# Patient Record
Sex: MALE | Race: WHITE | NOT HISPANIC OR LATINO | Employment: UNEMPLOYED | URBAN - NONMETROPOLITAN AREA
[De-identification: names, ages, dates, MRNs, and addresses within clinical notes are randomized per-mention and may not be internally consistent; named-entity substitution may affect disease eponyms.]

---

## 2017-02-02 ENCOUNTER — HOSPITAL ENCOUNTER (EMERGENCY)
Facility: HOSPITAL | Age: 52
Discharge: PSYCHIATRIC OR SUBSTANCE USE FACILITY | End: 2017-02-03
Attending: EMERGENCY MEDICINE | Admitting: EMERGENCY MEDICINE

## 2017-02-02 DIAGNOSIS — F33.2 SEVERE EPISODE OF RECURRENT MAJOR DEPRESSIVE DISORDER, WITHOUT PSYCHOTIC FEATURES (HCC): Primary | ICD-10-CM

## 2017-02-02 DIAGNOSIS — R45.851 SUICIDAL THOUGHTS: ICD-10-CM

## 2017-02-02 LAB
ALBUMIN SERPL-MCNC: 4.1 G/DL (ref 3.4–4.8)
ALBUMIN/GLOB SERPL: 1.5 G/DL (ref 1.1–1.8)
ALP SERPL-CCNC: 100 U/L (ref 38–126)
ALT SERPL W P-5'-P-CCNC: 18 U/L (ref 21–72)
AMPHET+METHAMPHET UR QL: POSITIVE
ANION GAP SERPL CALCULATED.3IONS-SCNC: 8 MMOL/L (ref 5–15)
APAP SERPL-MCNC: <10 MCG/ML (ref 10–30)
AST SERPL-CCNC: 26 U/L (ref 17–59)
BARBITURATES UR QL SCN: NEGATIVE
BASOPHILS # BLD AUTO: 0.05 10*3/MM3 (ref 0–0.2)
BASOPHILS NFR BLD AUTO: 0.5 % (ref 0–2)
BENZODIAZ UR QL SCN: NEGATIVE
BILIRUB SERPL-MCNC: 0.4 MG/DL (ref 0.2–1.3)
BUN BLD-MCNC: 11 MG/DL (ref 7–21)
BUN/CREAT SERPL: 13.9 (ref 7–25)
CALCIUM SPEC-SCNC: 9.4 MG/DL (ref 8.4–10.2)
CANNABINOIDS SERPL QL: POSITIVE
CHLORIDE SERPL-SCNC: 99 MMOL/L (ref 95–110)
CO2 SERPL-SCNC: 29 MMOL/L (ref 22–31)
COCAINE UR QL: NEGATIVE
CREAT BLD-MCNC: 0.79 MG/DL (ref 0.7–1.3)
DEPRECATED RDW RBC AUTO: 43.8 FL (ref 35.1–43.9)
EOSINOPHIL # BLD AUTO: 0.33 10*3/MM3 (ref 0–0.7)
EOSINOPHIL NFR BLD AUTO: 3.5 % (ref 0–7)
ERYTHROCYTE [DISTWIDTH] IN BLOOD BY AUTOMATED COUNT: 13.5 % (ref 11.5–14.5)
ETHANOL BLD-MCNC: <10 MG/DL (ref 0–0)
ETHANOL UR QL: <0.01 %
GFR SERPL CREATININE-BSD FRML MDRD: 103 ML/MIN/1.73 (ref 56–130)
GLOBULIN UR ELPH-MCNC: 2.7 GM/DL (ref 2.3–3.5)
GLUCOSE BLD-MCNC: 89 MG/DL (ref 60–100)
HCT VFR BLD AUTO: 40.9 % (ref 39–49)
HGB BLD-MCNC: 14.2 G/DL (ref 13.7–17.3)
IMM GRANULOCYTES # BLD: 0.02 10*3/MM3 (ref 0–0.02)
IMM GRANULOCYTES NFR BLD: 0.2 % (ref 0–0.5)
LYMPHOCYTES # BLD AUTO: 3.65 10*3/MM3 (ref 0.6–4.2)
LYMPHOCYTES NFR BLD AUTO: 38.9 % (ref 10–50)
MCH RBC QN AUTO: 30.7 PG (ref 26.5–34)
MCHC RBC AUTO-ENTMCNC: 34.7 G/DL (ref 31.5–36.3)
MCV RBC AUTO: 88.3 FL (ref 80–98)
METHADONE UR QL SCN: NEGATIVE
MONOCYTES # BLD AUTO: 0.39 10*3/MM3 (ref 0–0.9)
MONOCYTES NFR BLD AUTO: 4.2 % (ref 0–12)
NEUTROPHILS # BLD AUTO: 4.95 10*3/MM3 (ref 2–8.6)
NEUTROPHILS NFR BLD AUTO: 52.7 % (ref 37–80)
OPIATES UR QL: NEGATIVE
OXYCODONE UR QL SCN: NEGATIVE
PLATELET # BLD AUTO: 320 10*3/MM3 (ref 150–450)
PMV BLD AUTO: 10.4 FL (ref 8–12)
POTASSIUM BLD-SCNC: 4.1 MMOL/L (ref 3.5–5.1)
PROT SERPL-MCNC: 6.8 G/DL (ref 6.3–8.6)
RBC # BLD AUTO: 4.63 10*6/MM3 (ref 4.37–5.74)
SALICYLATES SERPL-MCNC: <1 MG/DL (ref 10–20)
SODIUM BLD-SCNC: 136 MMOL/L (ref 137–145)
WBC NRBC COR # BLD: 9.39 10*3/MM3 (ref 3.2–9.8)

## 2017-02-02 RX ORDER — ACETAMINOPHEN 500 MG
1000 TABLET ORAL ONCE
Status: COMPLETED | OUTPATIENT
Start: 2017-02-02 | End: 2017-02-02

## 2017-02-02 RX ADMIN — ACETAMINOPHEN 1000 MG: 500 TABLET ORAL at 23:51

## 2017-02-03 ENCOUNTER — HOSPITAL ENCOUNTER (INPATIENT)
Facility: HOSPITAL | Age: 52
LOS: 5 days | Discharge: HOME | End: 2017-02-08
Attending: PSYCHIATRY & NEUROLOGY | Admitting: PSYCHIATRY & NEUROLOGY

## 2017-02-03 VITALS
HEIGHT: 72 IN | HEART RATE: 102 BPM | RESPIRATION RATE: 18 BRPM | SYSTOLIC BLOOD PRESSURE: 119 MMHG | DIASTOLIC BLOOD PRESSURE: 69 MMHG | OXYGEN SATURATION: 96 % | TEMPERATURE: 98.4 F | WEIGHT: 165 LBS | BODY MASS INDEX: 22.35 KG/M2

## 2017-02-03 PROBLEM — F12.10 CANNABIS ABUSE: Status: ACTIVE | Noted: 2017-02-03

## 2017-02-03 PROBLEM — F15.20 METHAMPHETAMINE USE DISORDER, MODERATE: Status: ACTIVE | Noted: 2017-02-03

## 2017-02-03 PROBLEM — F33.2 SEVERE EPISODE OF RECURRENT MAJOR DEPRESSIVE DISORDER, WITHOUT PSYCHOTIC FEATURES: Status: ACTIVE | Noted: 2017-02-03

## 2017-02-03 PROBLEM — R45.851 SUICIDAL IDEATIONS: Status: ACTIVE | Noted: 2017-02-03

## 2017-02-03 LAB
HOLD SPECIMEN: NORMAL
HOLD SPECIMEN: NORMAL
WHOLE BLOOD HOLD SPECIMEN: NORMAL
WHOLE BLOOD HOLD SPECIMEN: NORMAL

## 2017-02-03 PROCEDURE — 90791 PSYCH DIAGNOSTIC EVALUATION: CPT | Performed by: PSYCHIATRY & NEUROLOGY

## 2017-02-03 PROCEDURE — 99222 1ST HOSP IP/OBS MODERATE 55: CPT | Performed by: FAMILY MEDICINE

## 2017-02-03 RX ORDER — ACETAMINOPHEN 325 MG/1
650 TABLET ORAL EVERY 4 HOURS PRN
Status: DISCONTINUED | OUTPATIENT
Start: 2017-02-03 | End: 2017-02-08 | Stop reason: HOSPADM

## 2017-02-03 RX ORDER — PAROXETINE HYDROCHLORIDE 20 MG/1
20 TABLET, FILM COATED ORAL DAILY
Status: COMPLETED | OUTPATIENT
Start: 2017-02-03 | End: 2017-02-03

## 2017-02-03 RX ORDER — PAROXETINE 10 MG/1
10 TABLET, FILM COATED ORAL
COMMUNITY
End: 2017-02-08 | Stop reason: HOSPADM

## 2017-02-03 RX ORDER — HYDROXYZINE PAMOATE 50 MG/1
50 CAPSULE ORAL EVERY 6 HOURS PRN
Status: DISCONTINUED | OUTPATIENT
Start: 2017-02-03 | End: 2017-02-08 | Stop reason: HOSPADM

## 2017-02-03 RX ORDER — TRAZODONE HYDROCHLORIDE 50 MG/1
50 TABLET ORAL NIGHTLY PRN
Status: DISCONTINUED | OUTPATIENT
Start: 2017-02-03 | End: 2017-02-08 | Stop reason: HOSPADM

## 2017-02-03 RX ORDER — MAGNESIUM HYDROXIDE/ALUMINUM HYDROXICE/SIMETHICONE 120; 1200; 1200 MG/30ML; MG/30ML; MG/30ML
30 SUSPENSION ORAL EVERY 6 HOURS PRN
Status: DISCONTINUED | OUTPATIENT
Start: 2017-02-03 | End: 2017-02-08 | Stop reason: HOSPADM

## 2017-02-03 RX ORDER — DOCUSATE SODIUM 100 MG/1
100 CAPSULE, LIQUID FILLED ORAL 2 TIMES DAILY PRN
Status: DISCONTINUED | OUTPATIENT
Start: 2017-02-03 | End: 2017-02-08 | Stop reason: HOSPADM

## 2017-02-03 RX ORDER — CLONIDINE HYDROCHLORIDE 0.1 MG/1
0.1 TABLET ORAL EVERY 4 HOURS PRN
Status: DISCONTINUED | OUTPATIENT
Start: 2017-02-03 | End: 2017-02-08 | Stop reason: HOSPADM

## 2017-02-03 RX ORDER — LOPERAMIDE HYDROCHLORIDE 2 MG/1
2 CAPSULE ORAL 4 TIMES DAILY PRN
Status: DISCONTINUED | OUTPATIENT
Start: 2017-02-03 | End: 2017-02-08 | Stop reason: HOSPADM

## 2017-02-03 RX ORDER — QUETIAPINE FUMARATE 25 MG/1
50 TABLET, FILM COATED ORAL NIGHTLY
Status: DISCONTINUED | OUTPATIENT
Start: 2017-02-03 | End: 2017-02-04

## 2017-02-03 RX ADMIN — TRAZODONE HYDROCHLORIDE 50 MG: 50 TABLET ORAL at 20:23

## 2017-02-03 RX ADMIN — ACETAMINOPHEN 650 MG: 325 TABLET ORAL at 13:33

## 2017-02-03 RX ADMIN — QUETIAPINE FUMARATE 50 MG: 25 TABLET, FILM COATED ORAL at 20:23

## 2017-02-03 RX ADMIN — PAROXETINE HYDROCHLORIDE 20 MG: 20 TABLET, FILM COATED ORAL at 16:44

## 2017-02-03 NOTE — CONSULTS
"Adult Nutrition  Assessment    Patient Name:  Honorio Vigil Jr.  YOB: 1965  MRN: [id number]  Admit Date:  2/3/2017    Assessment Date:  2/3/2017          Reason for Assessment       02/03/17 1455    Reason for Assessment    Identified At Risk By Screening Criteria MST SCORE 2+                Anthropometrics       02/03/17 1457    Anthropometrics    Height 182.9 cm (72.01\")    Weight 74.8 kg (164 lb 14.5 oz)    Ideal Body Weight (IBW)    Ideal Body Weight (IBW), Male (kg) 82.09    % Ideal Body Weight 91.31    Body Mass Index (BMI)    BMI (kg/m2) 22.41            Labs/Tests/Procedures/Meds       02/03/17 1458    Labs/Tests/Procedures/Meds    Labs/Tests Review Na+;Glucose    Medication Review Reviewed, pertinent              Estimated/Assessed Needs       02/03/17 1459    Calculation Measurements    Weight Used For Calculations 74.8 kg (164 lb 14.5 oz)    Height Used for Calculations 1.829 m (6' 0.01\")    Estimated/Assessed Energy Needs    Energy Need Method Harman-St Jeor    Age 51    RMR (Harman-St. Jeor Equation) 1641.13    Activity Factors (Harman St Jeor)  Out of bed, ambulatory  1.3    Total estimated needs (Harman St. Jeor) 2133    Estimated/Assessed Protein Needs    Weight Used for Protein Calculation 74.8 kg (164 lb 14.5 oz)    Protein (gm/kg) 1.0    1.0 Gm Protein (gm) 74.8    Estimated/Assessed Fluid Needs    Fluid Need Method --   2244            Nutrition Prescription Ordered       02/03/17 1502    Nutrition Prescription PO    Current PO Diet Regular            Evaluation of Received Nutrient/Fluid Intake       02/03/17 1502    PO Evaluation    Number of Meals 2    % PO Intake 100% - 2x            Comments:  Pt. Indicates his appetite varies.  Reports weighing 210 lb 1 year ago indicating wt loss related to work, taking drugs.  Indicates his wt has been stable the past 3 mo.  Intake 100%.  Meds and labs reviewed.  Willing to receive MIghty Shakes.        Electronically signed " by:  Rayne Blackburn, NEVAEH  02/03/17 3:03 PM

## 2017-02-03 NOTE — NURSING NOTE
Dr Marin ROS         General  Recent weight change and headaches    Eyes   None     ENT/Mouth   None    Cardio   None    Resp   None    GI    None       None    MS    Back pain    Skin/Hair/Nails   None    Neuro   None

## 2017-02-03 NOTE — PLAN OF CARE
"Problem: BH Patient Care Overview (Adult)  Goal: Plan of Care Review  Outcome: Ongoing (interventions implemented as appropriate)    02/03/17 0925   Coping/Psychosocial Response Interventions   Plan Of Care Reviewed With patient   Coping/Psychosocial   Patient Agreement with Plan of Care agrees with comment (describe)   Outcome Evaluation   Outcome Summary/Follow up Plan Does not see substance abuse as a problem   Patient Care Overview   Progress no change       Goal: Interdisciplinary Rounds/Family Conference  Outcome: Ongoing (interventions implemented as appropriate)    02/03/17 0925   Interdisciplinary Rounds/Family Conf   Participants advanced practice nurse;;psychiatrist;social work;therapeutic recreation;nursing;other (see comments)       Goal: Individualization and Mutuality  Outcome: Ongoing (interventions implemented as appropriate)    02/03/17 0327   Behavioral Health Screens   Patient Personal Strengths resilient   Patient Personal Strengths Comment \" I don't know\"   Patient Vulnerabilities my granddaughter   Mutuality/Individual Preferences   What Anxieties, Fears or Concerns Do You Have About Your Health or Care? \" i don't know\"   What Questions Do You Have About Your Health or Care? \"none\"   What Information Would Help Us Give You More Personalized Care? \"none\"       Goal: Discharge Needs Assessment  Outcome: Ongoing (interventions implemented as appropriate)    02/03/17 0925   Discharge Needs Assessment   Concerns To Be Addressed substance/tobacco abuse/use concerns;mental health concerns;grief and loss concerns;financial/insurance concerns   Readmission Within The Last 30 Days no previous admission in last 30 days   Current Discharge Risk lack of support system/caregiver;other (see comments)  (Going thru a divorce)   Discharge Needs Assessment   Outpatient/Agency/Support Group Needs 12 step program (specify);outpatient substance abuse treatment (specify)   Anticipated Discharge " Disposition home or self-care   Living Environment   Transportation Available family or friend will provide

## 2017-02-03 NOTE — NURSING NOTE
Behavior   Anxiety 4  Depression 5  Pain 3  AVH no  S/I no  H/I no    Pt has been up out of his room for most of the day, pt attended group with moderate amount of participation. Pt takes meds well. Pt denies any needs/concerns at this time.        Intervention  Instructed in med usage and effects, encouraged to verbalize needs. Meds administered as ordered.          Response  Verbalized understanding           Plan  Continue to monitor for safety/  every 15 minute monitoring checks.

## 2017-02-03 NOTE — ED PROVIDER NOTES
Subjective   HPI Comments: 51 years old with major depression is brought in by police after he was threatening with suicide comments. He is having issues with the his brother whose presence makes him very agitated. He is having suicidal thought with ideas of hopelessness/helplessness.  He is on paxil and vistaril which doesnot seems very helpful.    Patient is a 51 y.o. male presenting with mental health disorder.   History provided by:  Patient  Mental Health Problem   Presenting symptoms: aggressive behavior, agitation, depression and suicidal thoughts    Degree of incapacity (severity):  Severe  Onset quality:  Gradual  Timing:  Intermittent  Progression:  Worsening  Chronicity:  Recurrent  Context: drug abuse and stressful life event    Treatment compliance:  Most of the time  Time since last psychoactive medication taken:  1 day  Relieved by:  Nothing  Worsened by:  Family interactions  Ineffective treatments:  Antidepressants and anti-anxiety medications  Associated symptoms: anxiety, fatigue, feelings of worthlessness, insomnia and weight change    Associated symptoms: no abdominal pain, no chest pain, no euphoric mood, no headaches and no irritability    Fatigue:     Severity:  Moderate    Timing:  Intermittent    Progression:  Worsening      Review of Systems   Constitutional: Positive for fatigue. Negative for activity change, chills, fever and irritability.   HENT: Negative for congestion, facial swelling, rhinorrhea and sinus pressure.    Eyes: Negative for photophobia and visual disturbance.   Respiratory: Negative for cough, chest tightness, shortness of breath and wheezing.    Cardiovascular: Negative for chest pain.   Gastrointestinal: Negative for abdominal distention, abdominal pain, diarrhea, nausea and vomiting.   Endocrine: Negative for polyuria.   Genitourinary: Negative for flank pain.   Musculoskeletal: Negative for back pain, joint swelling and neck pain.   Skin: Negative for rash.    Neurological: Negative for seizures, numbness and headaches.   Hematological: Negative for adenopathy.   Psychiatric/Behavioral: Positive for agitation, behavioral problems, decreased concentration and suicidal ideas. The patient is nervous/anxious and has insomnia.        History reviewed. No pertinent past medical history.    No Known Allergies    History reviewed. No pertinent past surgical history.    History reviewed. No pertinent family history.    Social History     Social History   • Marital status:      Spouse name: N/A   • Number of children: N/A   • Years of education: N/A     Social History Main Topics   • Smoking status: Current Every Day Smoker     Packs/day: 1.00   • Smokeless tobacco: None   • Alcohol use No   • Drug use: 1.00 per week     Special: Marijuana   • Sexual activity: Not Asked     Other Topics Concern   • None     Social History Narrative           Objective   Physical Exam   Constitutional: He is oriented to person, place, and time. He appears well-developed and well-nourished. No distress.   HENT:   Head: Normocephalic and atraumatic.   Eyes: Conjunctivae and EOM are normal. Pupils are equal, round, and reactive to light.   Neck: Normal range of motion. Neck supple.   Cardiovascular: Normal rate, regular rhythm and normal heart sounds.    Pulmonary/Chest: Effort normal and breath sounds normal. No respiratory distress. He has no wheezes.   Abdominal: Soft. Bowel sounds are normal. He exhibits no distension. There is no tenderness. There is no rebound and no guarding.   Musculoskeletal: Normal range of motion. He exhibits no edema or deformity.   Neurological: He is alert and oriented to person, place, and time.   Skin: Skin is warm and dry. No rash noted.   Psychiatric: His mood appears anxious. He is withdrawn. He expresses inappropriate judgment. He exhibits a depressed mood. He expresses suicidal ideation.   Nursing note and vitals reviewed.      Procedures         ED  Course  ED Course   Comment By Time   Medically all the workup is grossly negative except for UDS. [facility] has talked to the patient and is being admitted . Angel Falcon MD 02/03 0113                Labs Reviewed   COMPREHENSIVE METABOLIC PANEL - Abnormal; Notable for the following:        Result Value    Sodium 136 (*)     ALT (SGPT) 18 (*)     All other components within normal limits   ACETAMINOPHEN LEVEL - Abnormal; Notable for the following:     Acetaminophen <10.0 (*)     All other components within normal limits   ETHANOL - Abnormal; Notable for the following:     Ethanol <10 (*)     All other components within normal limits   SALICYLATE LEVEL - Abnormal; Notable for the following:     Salicylate <1.0 (*)     All other components within normal limits   URINE DRUG SCREEN - Abnormal; Notable for the following:     Amphetamine Screen, Urine Positive (*)     THC, Screen, Urine Positive (*)     All other components within normal limits    Narrative:     Negative Thresholds For Drugs Screened in Urine:     Amphetamines          500 ng/ml  Barbiturates          200 ng/ml  Benzodiazepines       200 ng/ml  Cocaine               150 ng/ml  Methadone             300 ng/mL  Opiates               300 ng/mL  Oxycodone             100 ng/mL  THC                   20 ng/mL    The normal value for all drugs tested is negative. This report includes final unconfirmed screening results.  A positive result by this assay can be, at your request, sent to the Reference Lab for confirmation by gas chromatography. Unconfirmed results must not be used for non-medical purposes, such as employment or legal testing. Clinical consideration should be applied to any drug of abuse test result, particularly when unconfirmed results are used.   CBC WITH AUTO DIFFERENTIAL - Normal   RAINBOW DRAW    Narrative:     The following orders were created for panel order [place] Draw.  Procedure                               Abnormality         Status                      ---------                               -----------         ------                     Light Blue Top[[id number]]                                    In process                 Green Top (Gel)[[id number]]                                   In process                 Lavender Top[[id number]]                                      In process                 Gold Top - SST[[id number]]                                    In process                   Please view results for these tests on the individual orders.   CBC AND DIFFERENTIAL    Narrative:     The following orders were created for panel order CBC & Differential.  Procedure                               Abnormality         Status                     ---------                               -----------         ------                     CBC Auto Differential[[id number]]         Normal              Final result                 Please view results for these tests on the individual orders.   LIGHT BLUE TOP   GREEN TOP   LAVENDER TOP   GOLD TOP - SST       No results found.        MDM  Number of Diagnoses or Management Options  Severe episode of recurrent major depressive disorder, without psychotic features:   Suicidal thoughts:      Amount and/or Complexity of Data Reviewed  Clinical lab tests: ordered and reviewed    Patient Progress  Patient progress: stable      Final diagnoses:   Severe episode of recurrent major depressive disorder, without psychotic features   Suicidal thoughts            Angel Falcon MD  02/03/17 0120

## 2017-02-03 NOTE — PLAN OF CARE
Problem: BH Patient Care Overview (Adult)  Goal: Plan of Care Review  Outcome: Ongoing (interventions implemented as appropriate)  Goal: Interdisciplinary Rounds/Family Conference  Outcome: Ongoing (interventions implemented as appropriate)  Goal: Individualization and Mutuality  Outcome: Ongoing (interventions implemented as appropriate)  Goal: Discharge Needs Assessment  Outcome: Ongoing (interventions implemented as appropriate)    Problem:  Overarching Goals  Goal: Adheres to Safety Considerations for Self and Others  Outcome: Ongoing (interventions implemented as appropriate)  Goal: Optimized Coping Skills in Response to Life Stressors  Outcome: Ongoing (interventions implemented as appropriate)  Goal: Develops/Participates in Therapeutic [place] to Support Successful Transition  Outcome: Ongoing (interventions implemented as appropriate)

## 2017-02-03 NOTE — H&P
2/3/2017    Chief Complaint: suicidal ideation    HPI:    Patient is a 51 y.o. male who presents with suicidal ideation. Onset of symptoms was abrupt starting a few months ago.  Agitation and anger has been present on a intemittent basis.  He notes he has had passive SI intermittently for the last few months. Symptoms are associated with anxiety, depressed mood, irritability and substance use.  Symptoms are aggravated by problems related to support from wife and problems with substance abuse.  Patients symptoms severity is severe.   Patient reports that level of hopefulness is 0/10.  Patient's symptoms occur in the context of In Sept he found out dad had terminal colon cancer.  He found out from doc before his father did.  He notes he lost his job over it b/c of missing too many days.  He did not have FMLA.  His wife filed for divorce around that time.  He notes since then he has had an anger problem.  He notes having more trouble with emotion control and will have outbursts of tears.  He is having an issue with his younger brother since dad's cancer and he is not able to go over there.  He seems to think brother has intentions for dad's property.  He could not find his cellphone and he got upset with wife b/c he thought she was hiding it from him.  He got upset and destroyed several things.  She called the police but they did not do anything.  The next morning he could not get back to into house because it was locked.  He got upset and kicked the door down.  She called the police.  He was never arrested but he had EPO but she took it off.  She filed divorce the next day.  They are still living together b/c he had nowhere to go.    Psychiatric Review Of Systems:  anhedonia, anxiety, depression, excessive irritability, suicidal ideations and unstable mood    History  Past psychiatric history:    Psychiatric Hospitalizations: Patient has had no prior hospitalizations.    Suicide Attempts: Patient has had no prior  suicide attempts.    Prior Treatment and Medications Tried: Paxil started in December but not particularly helpful.  Vistaril did not help.    History of violence or legal issues: The patient has had drug or alcohold related charges including couple of DUIs. The patient has a history of violence.  See HPI for details.    Social History     Social History   • Marital status:      Spouse name: N/A   • Number of children: N/A   • Years of education: N/A     Occupational History   • Not on file.     Social History Main Topics   • Smoking status: Current Every Day Smoker     Packs/day: 1.00   • Smokeless tobacco: Not on file   • Alcohol use No   • Drug use: 1.00 per week     Special: Marijuana, Methamphetamines   • Sexual activity: Not on file     Other Topics Concern   • Not on file     Social History Narrative       Substance Abuse: Alcohol: none currently but had issues in the past and had DUIs,  Cannabis: 2-3 times per month, Methamphetamine: 2 years daily use in the 90's.  Recently intermittent use with periods of daily use for periods of weeks., Opiate: quit 4-5 months ago but daily prior to that., Cocaine: does not use, Synthetic: does not use and IV drug use: denies  Abuse/Trauma: History of physical abuse: no and History of sexual abuse: no  Education: high school diploma/GED   Occupation: , individual, not currently working, last worked in Sept.  Worked at [employer] for 1 year.  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  Living Situation: spouse and 18yo son    Family History   Problem Relation Age of Onset   • Anxiety disorder Mother    • Depression Mother        Further details: oldest daughter has depression and BPAD; dad: ETOH; brother: IV drugs; suicide: possibly mom has tried    Past Medical and Surgical History:    Past Medical History   Diagnosis Date   • Substance abuse      History reviewed. No pertinent past surgical history.  Allergies:  Review of patient's allergies indicates no known  "allergies.  Prescriptions Prior to Admission   Medication Sig Dispense Refill Last Dose   • PARoxetine (PAXIL) 10 MG tablet Take 10 mg by mouth Daily With Dinner.   2/2/2017 at 2300       Medical Review Of Systems:  Reviewed review of systems from  Dr. Marin consult note from today.    Objective     Vital Signs    Temp:  [98.1 °F (36.7 °C)-98.8 °F (37.1 °C)] 98.8 °F (37.1 °C)  Heart Rate:  [] 88  Resp:  [18-20] 18  BP: (109-136)/(69-84) 122/75    Physical Exam:   General Appearance: alert, appears stated age and cooperative,  Hygiene:   good  Gait & Station: Normal  Musculoskeletal: No tremors or abnormal involuntary movements    Mental Status Exam:   Cooperation:  Cooperative  Eye Contact:  at times downcast  Psychomotor Behavior:  Restless  Mood: Sad/Depressed  Affect:  labile  Speech:  Rapid  Thought Process:  Coherent and [place]  Associations: Circumstantial and Tangential  Thought Content:     Mood congurent   Suicidal:  Suicidal Ideation   Homicidal:  None   Hallucinations:  None   Delusion:  None  Cognitive Functioning:   Consciousness: awake and alert   Orientation:  Person, Place, Time and Situation   Attention: normal Concentration: World Backwards: \"dlrow\"   Language:  Intact Vocabulary: Average   Short Term Memory: Intact   Long Term Memory: Intact   Fund of Knowledge: Below Average  Reliability:  fair  Insight:  Poor  Judgement:  Poor  Impulse Control:  Impaired    Diagnostic Data:    Labs Reviewed   COMPREHENSIVE METABOLIC PANEL - Abnormal; Notable for the following:    Result Value      Sodium 136 (*)       ALT (SGPT) 18 (*)       All other components within normal limits   ACETAMINOPHEN LEVEL - Abnormal; Notable for the following:      Acetaminophen <10.0 (*)       All other components within normal limits   ETHANOL - Abnormal; Notable for the following:      Ethanol <10 (*)       All other components within normal limits   SALICYLATE LEVEL - Abnormal; Notable for the following:      " Salicylate <1.0 (*)       All other components within normal limits   URINE DRUG SCREEN - Abnormal; Notable for the following:      Amphetamine Screen, Urine Positive (*)       THC, Screen, Urine Positive (*)       All other components within normal limits       Imaging Results (last 72 hours)     ** No results found for the last 72 hours. **          Patient Strengths: capable of independent living, communication skills, good physical health     Patient Barriers: marital/family conflict, resistance to treatment    Assessment/Plan     Active Problems:    Suicidal ideations    Severe episode of recurrent major depressive disorder, without psychotic features    Cannabis abuse    Methamphetamine use disorder, moderate      Treatment Plan:    1) Will admit patient to the behavioral health unit at [facility] to ensure patient safety.  2) Patient will be provided treatment with the unit milieu, activities, therapies and psychopharmacological management.  3) Patient placed on  Q15 minute checks  and Suicide precautions.  4) Dr. Marin consulted for management of medical co-morbidities.  5) Will order following labs: none  6) Will restart patient on the following psychiatric home meds: paxil but increase to 30mg daily.  7) Will make the following medication changes: Start seroquel 50mg qhs.  Will increase to 100mg if not too sedating.  8) Will begin discharge planning as appropriate for patient.  Suggested rehab but he declines.  Will cont to discuss rehab.  9) Psychotherapy provided for less than 16 minutes.    Treatment plan and medication risks and benefits discussed with: Patient      Estimated Length of Stay: 3-5 days  Prognosis: angel Begum MD  02/03/17  12:04 PM

## 2017-02-03 NOTE — CONSULTS
CHIEF COMPLAINT/REASON FOR VISIT:  Suicidal Ideation    HPI:  Patient presented to our ED with the above complaint on February 2 at 11:13 PM.  He reported that his wife gave him 1 day to get out of the house in the process of a divorce.  He meant to call the crisis line and actually got the suicide line.  Eventually the police were involved and brought him to the emergency room.  The suicidal ideation was not consistent but he did tell someone that if he had nowhere to live he would rather be dead.    PROBLEM LIST:  Patient Active Problem List    Diagnosis   • Suicidal ideations [R45.851]         CURRENT MEDICATIONS:  Prescriptions Prior to Admission   Medication Sig Dispense Refill Last Dose   • PARoxetine (PAXIL) 10 MG tablet Take 10 mg by mouth Daily With Dinner.   2/2/2017 at 2300       ALLERGIES:  Review of patient's allergies indicates no known allergies.      PAST MEDICAL/SURGICAL HISTORY:  Past Medical History   Diagnosis Date   • Substance abuse        History reviewed. No pertinent past surgical history.    Review of Systems   Constitutional: Negative for activity change, appetite change, fatigue and fever.   HENT: Negative for congestion, ear discharge, ear pain, facial swelling, hearing loss, nosebleeds, postnasal drip, rhinorrhea, sinus pressure, sore throat, tinnitus and trouble swallowing.    Eyes: Negative for pain, discharge and visual disturbance.   Respiratory: Negative for cough, shortness of breath and wheezing.    Cardiovascular: Negative for chest pain, palpitations and leg swelling.   Gastrointestinal: Negative for abdominal pain, blood in stool, constipation, diarrhea, nausea and vomiting.   Genitourinary: Negative for difficulty urinating, discharge, dysuria, flank pain, frequency, hematuria, penile pain, penile swelling, scrotal swelling, testicular pain and urgency.   Musculoskeletal: Positive for back pain. Negative for arthralgias, joint swelling, myalgias and neck pain.   Skin:  "Negative for rash and wound.   Neurological: Positive for headaches. Negative for dizziness, seizures, syncope, weakness and light-headedness.   Hematological: Negative for adenopathy.   Psychiatric/Behavioral: Positive for agitation, dysphoric mood, sleep disturbance and suicidal ideas.       Social History     Social History   • Marital status:      Spouse name: N/A   • Number of children: N/A   • Years of education: N/A     Occupational History   • Not on file.     Social History Main Topics   • Smoking status: Current Every Day Smoker     Packs/day: 1.00   • Smokeless tobacco: Not on file   • Alcohol use No   • Drug use: 1.00 per week     Special: Marijuana, Methamphetamines   • Sexual activity: Not on file     Other Topics Concern   • Not on file     Social History Narrative       Family History   Problem Relation Age of Onset   • Anxiety disorder Mother    • Depression Mother              Objective     Visit Vitals   • /75 (BP Location: Right arm, Patient Position: Lying)   • Pulse 88   • Temp 98.8 °F (37.1 °C) (Oral)   • Resp 18   • Ht 72\" (182.9 cm)   • Wt 165 lb (74.8 kg)   • SpO2 98%   • BMI 22.38 kg/m2       Physical Exam   Constitutional: He appears well-developed and well-nourished.   HENT:   Head: Normocephalic and atraumatic.   Eyes: Conjunctivae and EOM are normal.   Neck: Normal range of motion. Neck supple. No thyromegaly present.   Cardiovascular: Normal rate, regular rhythm and normal heart sounds.  Exam reveals no gallop and no friction rub.    No murmur heard.  Pulmonary/Chest: Effort normal and breath sounds normal. No respiratory distress. He has no wheezes. He has no rales.   Abdominal: Soft. He exhibits no distension and no mass. There is no tenderness. There is no rebound and no guarding.   Musculoskeletal: Normal range of motion.   Lymphadenopathy:     He has no cervical adenopathy.   Neurological: He is alert. He has normal strength and normal reflexes. He displays no tremor " and normal reflexes. No cranial nerve deficit or sensory deficit. He exhibits normal muscle tone. Coordination normal. He displays no Babinski's sign on the right side. He displays no Babinski's sign on the left side.   Reflex Scores:       Tricep reflexes are 2+ on the right side and 2+ on the left side.       Bicep reflexes are 2+ on the right side and 2+ on the left side.       Brachioradialis reflexes are 2+ on the right side and 2+ on the left side.       Patellar reflexes are 2+ on the right side and 2+ on the left side.       Achilles reflexes are 2+ on the right side and 2+ on the left side.  Skin: Skin is warm and dry. No rash noted. No erythema.   Nursing note and vitals reviewed.      Dystonia/Tardive Dyskinesia  Absent  Meningeal Signs  Absent    Diagnostic Studies  CBC, CMP,UDS, acetaminophen level, salicylate level, ethanol level, all normal except  COMPREHENSIVE METABOLIC PANEL - Abnormal; Notable for the following:    Result Value      Sodium 136 (*)       ALT (SGPT) 18 (*)       All other components within normal limits   ACETAMINOPHEN LEVEL - Abnormal; Notable for the following:      Acetaminophen <10.0 (*)       All other components within normal limits   ETHANOL - Abnormal; Notable for the following:      Ethanol <10 (*)       All other components within normal limits   SALICYLATE LEVEL - Abnormal; Notable for the following:      Salicylate <1.0 (*)       All other components within normal limits   URINE DRUG SCREEN - Abnormal; Notable for the following:      Amphetamine Screen, Urine Positive (*)       THC, Screen, Urine Positive (*)       All other components within normal limits       Assessment/Plan     Patient Active Problem List    Diagnosis   • Suicidal ideations [R47.639]     depression      Continue Home Meds as ordered. Mental health and pain issues managed per psychiatry.  Further diagnostic studies or intervention based on hospital course.

## 2017-02-03 NOTE — NURSING NOTE
Behavior   Anxiety 5  Depression 7  Pain 0  AVH no  S/I no  H/I no    Pt resting in his room, pt has flat affect and requires prompting by staff for communication, pt has poor eye contact. Pt states his anxiety and depression are related to his home situation and not knowing what is going to happen.          Intervention  Instructed in med usage and effects, encouraged to verbalize needs. Meds administered as ordered.          Response  Verbalized understanding           Plan  Continue to monitor for safety/  every 15 minute monitoring checks.

## 2017-02-03 NOTE — SIGNIFICANT NOTE
"   02/03/17 0846   Individual Counseling   Time Session Began 800   Time Session Ended 830   Total Time (minutes) 30   Topic Initial Assessment   Session Detail CSW met with pt 1:1 and completed psychosocial assessment and BPRS   Patient Response Pt presented with anxious/irritable mood, affect was congruent. Pt was somewhat labile. Pt was easily distracted, often jumping from topic to topic. Pt stated \"I called the hotline because my anger has just been out of control.\" Pt notes that in September he found out his father had colon cancer, his wife filed for divorce, and he lost his job all within a month. Pt stated that following these events, his anger increased and he became more unstable. Pt stated that his wife eventually filed an EPO against him, as he was \"destroying everything in the house.\" Pt notes that his substance use also increased after these precipitating events. PT stated that he began using THC and meth weekly. Pt also notes using THC to \"help with my headaches.\" CSW voiced concern re: substance use and provided feedback linking use to health related issues. CSW advised pt to abstain. Pt stated he had been to rehab in the \"early 90s\" and was \"mostly sober until this happened.\" Pt does report having \"a couple\" of DUIs in the past, denies other legal charges. Pt denies any hx of abuse or trauma. Pt notes that he has not had any prior psych admissions. PT reports that he has had no prior suicide attempts, however, stated that last night \"I wasnt far from thinking about it.\" Pt appears to have poor impulse control and limited judgement. Pt is agreeable to tx, goal is \"to be able to control my anger.\" Plan is for pt to participate in individual and group tx, remain med compliant. Tx team to meet and develop tx plan. CSW to follow up accordingly.      "

## 2017-02-03 NOTE — PLAN OF CARE
Problem: BH Patient Care Overview (Adult)  Goal: Plan of Care Review  Outcome: Ongoing (interventions implemented as appropriate)  Goal: Individualization and Mutuality  Outcome: Ongoing (interventions implemented as appropriate)  Goal: Discharge Needs Assessment  Outcome: Ongoing (interventions implemented as appropriate)    Problem: BH Overarching Goals  Goal: Adheres to Safety Considerations for Self and Others  Outcome: Ongoing (interventions implemented as appropriate)  Goal: Optimized Coping Skills in Response to Life Stressors  Outcome: Ongoing (interventions implemented as appropriate)  Goal: Develops/Participates in Therapeutic [place] to Support Successful Transition  Outcome: Ongoing (interventions implemented as appropriate)    Problem: Depression  Goal: Treatment Goal: Demonstrate behavioral control of depressive symptoms, verbalize feelings of improved mood/affect, and adopt new coping skills prior to discharge  Outcome: Ongoing (interventions implemented as appropriate)  Goal: Verbalize thoughts and feelings associated with:  Outcome: Ongoing (interventions implemented as appropriate)  Goal: Refrain from harming self  Outcome: Ongoing (interventions implemented as appropriate)  Goal: Refrain from isolation  Outcome: Ongoing (interventions implemented as appropriate)  Goal: Refrain from self-neglect  Outcome: Ongoing (interventions implemented as appropriate)  Goal: Attend and participate in unit activities, including therapeutic, recreational, and educational groups  Outcome: Ongoing (interventions implemented as appropriate)  Goal: Complete daily ADLs, including personal hygiene independently, as able  Outcome: Ongoing (interventions implemented as appropriate)

## 2017-02-03 NOTE — NURSING NOTE
Pt. Escorted to [facility] room 664. Oriented to room & unit. Consents signed. Pt. Noted with good eye contact, calm, cooperative. Relates he had called hotline due to crisses not suicide. Reveals his wife is  him and had told him he would need to be out by 2/3/17 & pt. Would be homeless. Confirms recent meth use of 2 days ago & did so to escape his problems. At this time denies SI/HI. Has not thought of SI since 5 days ago. Pt. Has granddaughter that he watches some and she is his reason to live. Anxiety at a 2, depression at a 10. Denies hallucinations & delusions.

## 2017-02-04 PROCEDURE — 99232 SBSQ HOSP IP/OBS MODERATE 35: CPT | Performed by: NURSE PRACTITIONER

## 2017-02-04 RX ORDER — NICOTINE 21 MG/24HR
1 PATCH, TRANSDERMAL 24 HOURS TRANSDERMAL EVERY 24 HOURS
Status: DISCONTINUED | OUTPATIENT
Start: 2017-02-04 | End: 2017-02-08 | Stop reason: HOSPADM

## 2017-02-04 RX ORDER — QUETIAPINE FUMARATE 100 MG/1
100 TABLET, FILM COATED ORAL NIGHTLY
Status: DISCONTINUED | OUTPATIENT
Start: 2017-02-04 | End: 2017-02-05

## 2017-02-04 RX ADMIN — QUETIAPINE FUMARATE 100 MG: 100 TABLET, FILM COATED ORAL at 20:26

## 2017-02-04 RX ADMIN — NICOTINE 1 PATCH: 21 PATCH TRANSDERMAL at 13:03

## 2017-02-04 RX ADMIN — ACETAMINOPHEN 650 MG: 325 TABLET ORAL at 15:50

## 2017-02-04 RX ADMIN — TRAZODONE HYDROCHLORIDE 50 MG: 50 TABLET ORAL at 20:26

## 2017-02-04 RX ADMIN — PAROXETINE 30 MG: 10 TABLET, FILM COATED ORAL at 08:09

## 2017-02-04 RX ADMIN — ACETAMINOPHEN 650 MG: 325 TABLET ORAL at 20:26

## 2017-02-04 NOTE — NURSING NOTE
"Behavior   Anxiety 4  Depression 5  Pain 4  AVH no  S/I no  H/I no    Pt resting in bed this morning.  Pt anxious, cooperative.  Pt compliant with medications.  Pt stated that he slept \"ok\" last night.        Intervention  Meds administered as ordered.  Pt encouraged to attend groups, follow treatment plan, express any concerns that he may have.          Response  Verbalized understanding           Plan  Continue to monitor for safety/  every 15 minute monitoring checks.  "

## 2017-02-04 NOTE — PLAN OF CARE
Problem: Depression  Goal: Refrain from self-neglect  Outcome: Ongoing (interventions implemented as appropriate)

## 2017-02-04 NOTE — NURSING NOTE
Behavior   Anxiety 8  Depression 8  Pain 0  AVH no  S/I no  H/I no      PT IS SLEEPING NOW. EARLIER HE STATED THAT HE IS STILL UPSET ABOUT GETTING A DIVORCE FROM HIS WIFE. HE DOESN'T KNOW WHERE HE IS GOING TO GO OR HOW TO HAVE MONEY TO LIVE.      Intervention  Instructed in med usage and effects, encouraged to verbalize needs. Meds administered as ordered.          Response  Verbalized understanding           Plan  Continue to monitor for safety/  every 15 minute monitoring checks.

## 2017-02-04 NOTE — NURSING NOTE
"Behavior   Anxiety 3  Depression 6  Pain 6  AVH no  S/I no  H/I no    Pt has not attended any groups today.  \"I just don't want to go.\"        Intervention  Pt encouraged to attend groups, follow treatment plan and express any concerns that he may have.          Response  Verbalized understanding           Plan  Continue to monitor for safety/  every 15 minute monitoring checks.  "

## 2017-02-04 NOTE — PLAN OF CARE
Problem: Depression  Goal: Complete daily ADLs, including personal hygiene independently, as able  Outcome: Ongoing (interventions implemented as appropriate)

## 2017-02-04 NOTE — PLAN OF CARE
Problem: Depression  Goal: Verbalize thoughts and feelings associated with:  Outcome: Ongoing (interventions implemented as appropriate)

## 2017-02-04 NOTE — PLAN OF CARE
Problem: Depression  Goal: Treatment Goal: Demonstrate behavioral control of depressive symptoms, verbalize feelings of improved mood/affect, and adopt new coping skills prior to discharge  Outcome: Ongoing (interventions implemented as appropriate)
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
## 2017-02-04 NOTE — PLAN OF CARE
Problem: BH Overarching Goals  Goal: Adheres to Safety Considerations for Self and Others  Outcome: Ongoing (interventions implemented as appropriate)  Goal: Optimized Coping Skills in Response to Life Stressors  Outcome: Ongoing (interventions implemented as appropriate)  Goal: Develops/Participates in Therapeutic [place] to Support Successful Transition  Outcome: Ongoing (interventions implemented as appropriate)    Problem: Depression  Goal: Treatment Goal: Demonstrate behavioral control of depressive symptoms, verbalize feelings of improved mood/affect, and adopt new coping skills prior to discharge  Outcome: Ongoing (interventions implemented as appropriate)  Goal: Verbalize thoughts and feelings associated with:  Outcome: Ongoing (interventions implemented as appropriate)  Goal: Refrain from harming self  Outcome: Ongoing (interventions implemented as appropriate)  Goal: Refrain from isolation  Outcome: Ongoing (interventions implemented as appropriate)  Goal: Refrain from self-neglect  Outcome: Ongoing (interventions implemented as appropriate)  Goal: Attend and participate in unit activities, including therapeutic, recreational, and educational groups  Outcome: Ongoing (interventions implemented as appropriate)  Goal: Complete daily ADLs, including personal hygiene independently, as able  Outcome: Ongoing (interventions implemented as appropriate)    Problem: Risk for Self Injury/Neglect  Goal: Treatment Goal: Remain safe during length of stay, learn and adopt new coping skills, and be free of self-injurious ideation, impulses and acts at the time of discharge  Outcome: Ongoing (interventions implemented as appropriate)  Goal: Verbalize thoughts and feelings associated with:  Outcome: Ongoing (interventions implemented as appropriate)  Goal: Refrain from harming self  Outcome: Ongoing (interventions implemented as appropriate)  Goal: Attend and participate in unit activities, including therapeutic,  recreational, and educational groups  Outcome: Ongoing (interventions implemented as appropriate)  Goal: Recognize maladaptive responses and adopt new coping mechanisms  Outcome: Ongoing (interventions implemented as appropriate)  Goal: Complete daily ADLs, including personal hygiene independently, as able  Outcome: Ongoing (interventions implemented as appropriate)    Problem: SUBSTANCE USE/ABUSE  Goal: By day 5 will complete medical detox and be discharged with an appropriate treatment plan in place.  Outcome: Ongoing (interventions implemented as appropriate)  Goal: By discharge, will develop insight into their chemical dependency and sustain motivation to continue in recovery.  Outcome: Ongoing (interventions implemented as appropriate)  Goal: By discharge, will have in place an ongoing treatment plan in collaboration with assigned CDP.  Outcome: Ongoing (interventions implemented as appropriate)

## 2017-02-04 NOTE — PLAN OF CARE
Problem: Depression  Goal: Attend and participate in unit activities, including therapeutic, recreational, and educational groups  Outcome: Ongoing (interventions implemented as appropriate)

## 2017-02-05 PROCEDURE — 99232 SBSQ HOSP IP/OBS MODERATE 35: CPT | Performed by: NURSE PRACTITIONER

## 2017-02-05 RX ORDER — ACETAMINOPHEN, ASPIRIN AND CAFFEINE 250; 250; 65 MG/1; MG/1; MG/1
2 TABLET, FILM COATED ORAL EVERY 6 HOURS PRN
Status: DISCONTINUED | OUTPATIENT
Start: 2017-02-05 | End: 2017-02-06

## 2017-02-05 RX ORDER — QUETIAPINE FUMARATE 100 MG/1
200 TABLET, FILM COATED ORAL NIGHTLY
Status: DISCONTINUED | OUTPATIENT
Start: 2017-02-05 | End: 2017-02-08 | Stop reason: HOSPADM

## 2017-02-05 RX ADMIN — QUETIAPINE FUMARATE 200 MG: 100 TABLET, FILM COATED ORAL at 20:21

## 2017-02-05 RX ADMIN — HYDROXYZINE PAMOATE 50 MG: 50 CAPSULE ORAL at 16:15

## 2017-02-05 RX ADMIN — HYDROXYZINE PAMOATE 50 MG: 50 CAPSULE ORAL at 09:59

## 2017-02-05 RX ADMIN — HYDROXYZINE PAMOATE 50 MG: 50 CAPSULE ORAL at 22:24

## 2017-02-05 RX ADMIN — TRAZODONE HYDROCHLORIDE 50 MG: 50 TABLET ORAL at 21:10

## 2017-02-05 RX ADMIN — PAROXETINE 30 MG: 10 TABLET, FILM COATED ORAL at 09:07

## 2017-02-05 RX ADMIN — NICOTINE 1 PATCH: 21 PATCH TRANSDERMAL at 10:00

## 2017-02-05 RX ADMIN — ACETAMINOPHEN, ASPIRIN, CAFFEINE 2 TABLET: 250; 250; 65 TABLET ORAL at 21:10

## 2017-02-05 RX ADMIN — ACETAMINOPHEN 650 MG: 325 TABLET ORAL at 09:11

## 2017-02-05 RX ADMIN — ACETAMINOPHEN 650 MG: 325 TABLET ORAL at 15:42

## 2017-02-05 NOTE — NURSING NOTE
"Behavior   Anxiety 4  Depression 5  Pain 0  AVH no  S/I no  H/I no  Isolates in room between groups and meals. c/o headache \"ever since i came in\". Refused groups.minimal participation with peers/staff.fair eye contact.\"I didn't sleep good even after they changed my medicine.\"          Intervention  Instructed in med usage and effects, encouraged to verbalize needs. Meds administered as ordered.dr ag notified of headache.medicated for headache.          Response  Verbalized understanding           Plan  Continue to monitor for safety/  every 15 minute monitoring checks.  "

## 2017-02-05 NOTE — PLAN OF CARE
Problem: BH Patient Care Overview (Adult)  Goal: Plan of Care Review  Outcome: Ongoing (interventions implemented as appropriate)  Goal: Interdisciplinary Rounds/Family Conference  Outcome: Ongoing (interventions implemented as appropriate)  Goal: Individualization and Mutuality  Outcome: Ongoing (interventions implemented as appropriate)  Goal: Discharge Needs Assessment  Outcome: Ongoing (interventions implemented as appropriate)    Problem: BH Overarching Goals  Goal: Adheres to Safety Considerations for Self and Others  Outcome: Ongoing (interventions implemented as appropriate)  Goal: Optimized Coping Skills in Response to Life Stressors  Outcome: Ongoing (interventions implemented as appropriate)  Goal: Develops/Participates in Therapeutic [place] to Support Successful Transition  Outcome: Ongoing (interventions implemented as appropriate)    Problem: Depression  Goal: Treatment Goal: Demonstrate behavioral control of depressive symptoms, verbalize feelings of improved mood/affect, and adopt new coping skills prior to discharge  Outcome: Ongoing (interventions implemented as appropriate)  Goal: Verbalize thoughts and feelings associated with:  Outcome: Ongoing (interventions implemented as appropriate)  Goal: Refrain from harming self  Outcome: Ongoing (interventions implemented as appropriate)  Goal: Refrain from isolation  Outcome: Ongoing (interventions implemented as appropriate)  Goal: Refrain from self-neglect  Outcome: Ongoing (interventions implemented as appropriate)  Goal: Attend and participate in unit activities, including therapeutic, recreational, and educational groups  Outcome: Ongoing (interventions implemented as appropriate)  Goal: Complete daily ADLs, including personal hygiene independently, as able  Outcome: Ongoing (interventions implemented as appropriate)    Problem: Risk for Self Injury/Neglect  Goal: Treatment Goal: Remain safe during length of stay, learn and adopt new coping  skills, and be free of self-injurious ideation, impulses and acts at the time of discharge  Outcome: Ongoing (interventions implemented as appropriate)  Goal: Verbalize thoughts and feelings associated with:  Outcome: Ongoing (interventions implemented as appropriate)  Goal: Refrain from harming self  Outcome: Ongoing (interventions implemented as appropriate)  Goal: Attend and participate in unit activities, including therapeutic, recreational, and educational groups  Outcome: Ongoing (interventions implemented as appropriate)  Attended 1.5 groups  Goal: Recognize maladaptive responses and adopt new coping mechanisms  Outcome: Ongoing (interventions implemented as appropriate)  Goal: Complete daily ADLs, including personal hygiene independently, as able  Outcome: Ongoing (interventions implemented as appropriate)    Problem: SUBSTANCE USE/ABUSE  Goal: By day 5 will complete medical detox and be discharged with an appropriate treatment plan in place.  Outcome: Ongoing (interventions implemented as appropriate)  Goal: By discharge, will develop insight into their chemical dependency and sustain motivation to continue in recovery.  Outcome: Ongoing (interventions implemented as appropriate)  Goal: By discharge, will have in place an ongoing treatment plan in collaboration with assigned CDP.  Outcome: Ongoing (interventions implemented as appropriate)

## 2017-02-05 NOTE — PLAN OF CARE
Problem: BH Overarching Goals  Goal: Develops/Participates in Therapeutic [place] to Support Successful Transition  Outcome: Ongoing (interventions implemented as appropriate)    Problem: Depression  Goal: Treatment Goal: Demonstrate behavioral control of depressive symptoms, verbalize feelings of improved mood/affect, and adopt new coping skills prior to discharge  Outcome: Ongoing (interventions implemented as appropriate)

## 2017-02-05 NOTE — NURSING NOTE
Behavior   Anxiety 8  Depression 8  Pain 0  AVH no  S/I no  H/I no    PT IS SLEEPING NOW. EARLIER HE WAS CONCERNED ABOUT BEING HOMELESS AND HIS WIFE  HIM. HE STATED THEY HAD BEEN TOGETHER FOR OVER 30 YEARS. HE STATED THAT HE HAD A DYSFUNCTIONAL FAMILY ALSO. HE AND HIS BROTHER FIGHT ALL OF THE TIME. HIS PARENTS HAVE ALWAYS BEEN DRINKERS. HE STATED THERE HAS BEEN A LOT OF PROBLEMS OVER THE YEARS.        Intervention  Instructed in med usage and effects, encouraged to verbalize needs. Meds administered as ordered.          Response  Verbalized understanding           Plan  Continue to monitor for safety/  every 15 minute monitoring checks.

## 2017-02-05 NOTE — PROGRESS NOTES
2/5/2017    Chief Complaint: Headache    Subjective:  Patient is a 51 y.o. male who was hospitalized for suicidal ideation and depression.   Since yesterday the patient has been about the same. Still having problems with mid insomnia. Still has headache not relieved by Tylenol.  Patient reports that level of hopefulness is 2/10.  Patient reports medications are tolerable and but no benefits noted yet.  Further history reported: had a visit from son, daughter and wife--did not speak to wife, she sat outside the unit. His father also knows that he is here. He does not want his father to visit him. Pros and cons of this decision was discussed. His youngest son is 17. Spoke with daughter about living with her. Her boyfriend does not want Honorio living with them. He believes he will be returning to live with wife. He and wife are now getting a divorce.    Objective     Vital Signs    Temp:  [96.6 °F (35.9 °C)-98.1 °F (36.7 °C)] 98.1 °F (36.7 °C)  Heart Rate:  [] 83  Resp:  [16] 16  BP: (103-119)/(57-79) 119/57    Physical Exam:   General Appearance: alert, appears stated age and cooperative,  Hygiene:   fair  Gait & Station: affected by back and knee pain  Musculoskeletal: mild hand tremors}    Mental Status Exam:   Cooperation:  Cooperative  Eye Contact:  Downcast  Psychomotor Behavior:  Slow  Mood: Sad/Depressed and Anxious/Nervous  Affect:  flat  Speech:  Monotone  Thought Process:  Poverty of thought  Associations: Goal Directed  Thought Content:     Normal   Suicidal:  None   Homicidal:  None   Hallucinations:  None   Delusion:  None  Cognitive Functioning:   Consciousness: awake and alert  Reliability:  good  Insight:  Fair  Judgement:  Fair  Impulse Control:  improving    Lab Results (last 24 hours)     ** No results found for the last 24 hours. **        Imaging Results (last 24 hours)     ** No results found for the last 24 hours. **          Medicine:   Current Facility-Administered Medications:   •   acetaminophen (TYLENOL) tablet 650 mg, 650 mg, Oral, Q4H PRN, Doris Begum MD, 650 mg at 02/05/17 0911  •  aluminum-magnesium hydroxide-simethicone (MAALOX/MYLANTA) suspension 30 mL, 30 mL, Oral, Q6H PRN, Doris Begum MD  •  aspirin-acetaminophen-caffeine (EXCEDRIN MIGRAINE) per tablet 2 tablet, 2 tablet, Oral, Q6H PRN, FLAVIO Melgar  •  CloNIDine (CATAPRES) tablet 0.1 mg, 0.1 mg, Oral, Q4H PRN, Doris Begum MD  •  docusate sodium (COLACE) capsule 100 mg, 100 mg, Oral, BID PRN, Doris Begum MD  •  hydrOXYzine (VISTARIL) capsule 50 mg, 50 mg, Oral, Q6H PRN, Doris Begum MD  •  loperamide (IMODIUM) capsule 2 mg, 2 mg, Oral, 4x Daily PRN, Doris Begum MD  •  magnesium hydroxide (MILK OF MAGNESIA) suspension 2400 mg/10mL 10 mL, 10 mL, Oral, Daily PRN, Doris Begum MD  •  nicotine (NICODERM CQ) 21 MG/24HR patch 1 patch, 1 patch, Transdermal, Q24H, FLAVIO Melgar, 1 patch at 02/04/17 1303  •  [COMPLETED] PARoxetine (PAXIL) tablet 20 mg, 20 mg, Oral, Daily, 20 mg at 02/03/17 1644 **FOLLOWED BY** PARoxetine (PAXIL) tablet 30 mg, 30 mg, Oral, Daily, Doris Begum MD, 30 mg at 02/05/17 0907  •  QUEtiapine (SEROquel) tablet 200 mg, 200 mg, Oral, Nightly, FLAVIO Melgar  •  traZODone (DESYREL) tablet 50 mg, 50 mg, Oral, Nightly PRN, Doris Begum MD, 50 mg at 02/04/17 2026    Diagnoses/Assessment:   Active Problems:    Suicidal ideations    Severe episode of recurrent major depressive disorder, without psychotic features    Cannabis abuse    Methamphetamine use disorder, moderate      Treatment Plan:    1) Will continue care for the patient on the behavioral health unit at [facility] to ensure patient safety.  2) Will continue to provide treatment with the unit milieu, activities, therapies and psychopharmacological management.  3) Patient to be placed on or continued on  Q15 minute checks  and Suicide precautions.  4) Will  continue medical management by Dr. Marin.  5) Will order following labs: none  6) Will make the following medication changes: Increase Seroquel to 200 mg daily.  7) Will continue discharge planning as appropriate for patient.  8) Psychotherapy provided for less than 16 minutes.    Treatment plan and medication risks and benefits discussed with: Patient    Alisia Castro Umesh, FLAVIO  02/05/17  9:33 AM

## 2017-02-06 PROCEDURE — 99232 SBSQ HOSP IP/OBS MODERATE 35: CPT | Performed by: PSYCHIATRY & NEUROLOGY

## 2017-02-06 RX ORDER — PAROXETINE HYDROCHLORIDE 20 MG/1
20 TABLET, FILM COATED ORAL DAILY
Status: DISCONTINUED | OUTPATIENT
Start: 2017-02-07 | End: 2017-02-08 | Stop reason: HOSPADM

## 2017-02-06 RX ORDER — BUPROPION HYDROCHLORIDE 150 MG/1
150 TABLET, EXTENDED RELEASE ORAL DAILY
Status: DISCONTINUED | OUTPATIENT
Start: 2017-02-07 | End: 2017-02-08 | Stop reason: HOSPADM

## 2017-02-06 RX ORDER — GABAPENTIN 300 MG/1
300 CAPSULE ORAL 3 TIMES DAILY
Status: DISCONTINUED | OUTPATIENT
Start: 2017-02-06 | End: 2017-02-08 | Stop reason: HOSPADM

## 2017-02-06 RX ORDER — BUPROPION HYDROCHLORIDE 75 MG/1
75 TABLET ORAL ONCE
Status: COMPLETED | OUTPATIENT
Start: 2017-02-06 | End: 2017-02-06

## 2017-02-06 RX ORDER — NAPROXEN 375 MG/1
375 TABLET ORAL 3 TIMES DAILY PRN
Status: DISCONTINUED | OUTPATIENT
Start: 2017-02-06 | End: 2017-02-08 | Stop reason: HOSPADM

## 2017-02-06 RX ADMIN — GABAPENTIN 300 MG: 300 CAPSULE ORAL at 20:31

## 2017-02-06 RX ADMIN — GABAPENTIN 300 MG: 300 CAPSULE ORAL at 16:11

## 2017-02-06 RX ADMIN — ACETAMINOPHEN 650 MG: 325 TABLET ORAL at 13:25

## 2017-02-06 RX ADMIN — BUPROPION HYDROCHLORIDE 75 MG: 75 TABLET, FILM COATED ORAL at 13:21

## 2017-02-06 RX ADMIN — NAPROXEN 375 MG: 375 TABLET ORAL at 18:43

## 2017-02-06 RX ADMIN — DOCUSATE SODIUM 100 MG: 100 CAPSULE, LIQUID FILLED ORAL at 16:42

## 2017-02-06 RX ADMIN — QUETIAPINE FUMARATE 200 MG: 100 TABLET, FILM COATED ORAL at 20:31

## 2017-02-06 RX ADMIN — PAROXETINE 30 MG: 10 TABLET, FILM COATED ORAL at 08:54

## 2017-02-06 RX ADMIN — NICOTINE 1 PATCH: 21 PATCH TRANSDERMAL at 12:08

## 2017-02-06 RX ADMIN — TRAZODONE HYDROCHLORIDE 50 MG: 50 TABLET ORAL at 20:31

## 2017-02-06 RX ADMIN — GABAPENTIN 300 MG: 300 CAPSULE ORAL at 13:21

## 2017-02-06 NOTE — NURSING NOTE
Behavior Pt is resting in bed with eyes closed. No s/s of distress noted. Pt has rested quietly throughout the night.            Intervention          Response          Plan  Continue to monitor for safety/  every 15 minute monitoring checks and redirect PRN.

## 2017-02-06 NOTE — PLAN OF CARE
Problem: BH Patient Care Overview (Adult)  Goal: Plan of Care Review  Outcome: Ongoing (interventions implemented as appropriate)  Goal: Interdisciplinary Rounds/Family Conference  Outcome: Ongoing (interventions implemented as appropriate)  Goal: Individualization and Mutuality  Outcome: Ongoing (interventions implemented as appropriate)  Goal: Discharge Needs Assessment  Outcome: Ongoing (interventions implemented as appropriate)    Problem:  Overarching Goals  Goal: Adheres to Safety Considerations for Self and Others  Outcome: Ongoing (interventions implemented as appropriate)  Goal: Optimized Coping Skills in Response to Life Stressors  Outcome: Ongoing (interventions implemented as appropriate)  Goal: Develops/Participates in Therapeutic [place] to Support Successful Transition  Outcome: Ongoing (interventions implemented as appropriate)

## 2017-02-06 NOTE — PROGRESS NOTES
2/6/2017    Chief Complaint: suicidal ideation, substance abuse and anxiety    Subjective:  Patient is a 51 y.o. male who was hospitalized for suicidal ideation, substance abuse and anxiety.   Since yesterday the patient has been about the same.  He notes has a buzzing in his ears and he feels that he will lose his balance.  He has had prior to hosp.  He has not sought help for it.  Patient reports that level of hopefulness is 2/10.  Patient reports medications are tolerable and effective.  Further history reported: He notes having a headache since getting here but denies it is related to his meds.  He notes sleep is better on the seroquel.  He denies change in sadness or anhedonia, however.  He notes no SI since here but not sure if it is due to being in controlled hosp environment.    Objective     Vital Signs    Temp:  [96.8 °F (36 °C)-97.1 °F (36.2 °C)] 97.1 °F (36.2 °C)  Heart Rate:  [74-93] 74  Resp:  [18] 18  BP: (115-116)/(64-81) 115/64    Physical Exam:   General Appearance: alert, appears stated age and cooperative,  Hygiene:   good  Gait & Station: Normal  Musculoskeletal: No tremors or abnormal involuntary movements}    Mental Status Exam:   Cooperation:  Cooperative  Eye Contact:  Downcast  Psychomotor Behavior:  Slow  Mood: Apathetic and Sad/Depressed  Affect:  mood-congruent  Speech:  Monotone and soft  Thought Process:  Coherent  Associations: Circumstantial and Tangential  Thought Content:     Normal   Suicidal:  None   Homicidal:  None   Hallucinations:  None   Delusion:  None  Cognitive Functioning:   Consciousness: awake, alert and oriented  Reliability:  fair  Insight:  Fair  Judgement:  Fair  Impulse Control:  Fair    Lab Results (last 24 hours)     ** No results found for the last 24 hours. **        Imaging Results (last 24 hours)     ** No results found for the last 24 hours. **          Medicine:   Current Facility-Administered Medications:   •  acetaminophen (TYLENOL) tablet 650 mg, 650  mg, Oral, Q4H PRN, Doris Begum MD, 650 mg at 02/05/17 1542  •  aluminum-magnesium hydroxide-simethicone (MAALOX/MYLANTA) suspension 30 mL, 30 mL, Oral, Q6H PRN, Doris Begum MD  •  CloNIDine (CATAPRES) tablet 0.1 mg, 0.1 mg, Oral, Q4H PRN, Doris Begum MD  •  docusate sodium (COLACE) capsule 100 mg, 100 mg, Oral, BID PRN, Doris Begum MD  •  hydrOXYzine (VISTARIL) capsule 50 mg, 50 mg, Oral, Q6H PRN, Doris Begum MD, 50 mg at 02/05/17 2224  •  loperamide (IMODIUM) capsule 2 mg, 2 mg, Oral, 4x Daily PRN, Doris Begum MD  •  magnesium hydroxide (MILK OF MAGNESIA) suspension 2400 mg/10mL 10 mL, 10 mL, Oral, Daily PRN, Doris Begum MD  •  naproxen (NAPROSYN) tablet 375 mg, 375 mg, Oral, TID PRN, Nabil Marin MD  •  nicotine (NICODERM CQ) 21 MG/24HR patch 1 patch, 1 patch, Transdermal, Q24H, FLAVIO Melgar, 1 patch at 02/05/17 1000  •  [COMPLETED] PARoxetine (PAXIL) tablet 20 mg, 20 mg, Oral, Daily, 20 mg at 02/03/17 1644 **FOLLOWED BY** PARoxetine (PAXIL) tablet 30 mg, 30 mg, Oral, Daily, Doris Begum MD, 30 mg at 02/06/17 0854  •  QUEtiapine (SEROquel) tablet 200 mg, 200 mg, Oral, Nightly, FLAVIO Melgar, 200 mg at 02/05/17 2021  •  traZODone (DESYREL) tablet 50 mg, 50 mg, Oral, Nightly PRN, Doris Begum MD, 50 mg at 02/05/17 2110    Diagnoses/Assessment:   Active Problems:    Suicidal ideations    Severe episode of recurrent major depressive disorder, without psychotic features    Cannabis abuse    Methamphetamine use disorder, moderate      Treatment Plan:    1) Will continue care for the patient on the behavioral health unit at [facility] to ensure patient safety.  2) Will continue to provide treatment with the unit milieu, activities, therapies and psychopharmacological management.  3) Patient to be placed on or continued on  Q15 minute checks  and Suicide precautions.  4) Will continue medical management by   Tomas.  5) Will order following labs: TSH as it was not done on admission.  6) Will make the following medication changes: decrease the paxil to 20mg daily with plan to taper off.  Will start wellbutrin 75mg today and increase to wellbutrin sr 150mg daily tomorrow.  Will start neurontin 300mg tid for anxiety and back pain.  7) Will continue discharge planning as appropriate for patient.  8) Psychotherapy provided: none    Treatment plan and medication risks and benefits discussed with: Patient    Doris Begum MD  02/06/17  11:49 AM

## 2017-02-06 NOTE — PLAN OF CARE
Problem: BH Patient Care Overview (Adult)  Goal: Plan of Care Review  Outcome: Ongoing (interventions implemented as appropriate)    02/06/17 0525   Coping/Psychosocial Response Interventions   Plan Of Care Reviewed With patient   Coping/Psychosocial   Patient Agreement with Plan of Care agrees   Outcome Evaluation   Outcome Summary/Follow up Plan Pt still have depression and anxiety. Pt dad makes him very anxious even when talking to him on the phone. Pt is quiet, withdrawn, isolating to room. Flat affect. Pt reports feeling tired and not sleeping well.    Patient Care Overview   Progress no change       Goal: Individualization and Mutuality  Outcome: Ongoing (interventions implemented as appropriate)  Goal: Discharge Needs Assessment  Outcome: Ongoing (interventions implemented as appropriate)    Problem:  Overarching Goals  Goal: Adheres to Safety Considerations for Self and Others  Outcome: Ongoing (interventions implemented as appropriate)  Goal: Optimized Coping Skills in Response to Life Stressors  Outcome: Ongoing (interventions implemented as appropriate)  Goal: Develops/Participates in Therapeutic [place] to Support Successful Transition  Outcome: Ongoing (interventions implemented as appropriate)

## 2017-02-06 NOTE — NURSING NOTE
"Behavior Pt is sitting on his bed talking with this nurse. When asked how his day has been pt states \"I ended up calling my dad and he started doing the same thing he always does and my anxiety went up.\" Pt said he only called his day b/c he was told he had to. Pt rates his depression a 6/10 and it is related to \"losing my job, my dad having colon cancer, my wife served me papers and I don't have no where to go and I have anger problems now.\" Pt said he can't go to his dad house b/c him and his younger brother don't get alone. Pt reports feeling hopeless and helpless. Pt has a flat affect, quiet, withdrawn, and isolating to self and little interactions with peers. Pt rates his anxiety a 5/10 b/c \"thinking about the situation I'm in and talking to my dad.\" Pt reports feeling tired and not much energy. Fair eye contact. Pt denies suicidal/homicidal ideations and hallucinations. Pt did say if his dad dies \"I'm going to wipe my brother ass every time I see him.\" Pt did not attend group tonight.            Intervention  Instructed in med usage and effects,compliant with all night meds. Encouraged pt to participate in treatment while he is here. Encouraged pt to let staff know of any concerns or problems.          Response  Pt verbalized understanding.          Plan  Will continue to monitor for safety/  every 15 minute monitoring checks and redirect PRN.    "

## 2017-02-06 NOTE — NURSING NOTE
Behavior   Anxiety4  Depression 5    Pain no    AVH no  S/ no  IH/I no          Intervention  Instructed in med usage and effects, encouraged to verbalize needs. Meds administered as ordered.          Response  Verbalized understanding . Pt reports he slept well last night. Tends to isolate self in room most of the time. Very little interaction with staff or peers.           Plan  Continue to monitor for safety/  every 15 minute monitoring checks. Will assist to meet treatment goals

## 2017-02-06 NOTE — NURSING NOTE
"Behavior   Anxiety 3  Depression 4  Pain 0  AVH no  S/I no  H/I no  Participated in 2 groups otherwise isolates in room. \"I have a headache\" c/o anxiety. Good eye contact          Intervention  Instructed in med usage and effects, encouraged to verbalize needs. Meds administered as ordered.medicated for anxiety          Response  Verbalized understanding           Plan  Continue to monitor for safety/  every 15 minute monitoring checks.  "

## 2017-02-07 LAB — TSH SERPL DL<=0.05 MIU/L-ACNC: 0.07 MIU/ML (ref 0.46–4.68)

## 2017-02-07 PROCEDURE — 84443 ASSAY THYROID STIM HORMONE: CPT | Performed by: PSYCHIATRY & NEUROLOGY

## 2017-02-07 PROCEDURE — 99232 SBSQ HOSP IP/OBS MODERATE 35: CPT | Performed by: NURSE PRACTITIONER

## 2017-02-07 RX ORDER — CETIRIZINE HYDROCHLORIDE 10 MG/1
10 TABLET ORAL DAILY
Status: DISCONTINUED | OUTPATIENT
Start: 2017-02-07 | End: 2017-02-08 | Stop reason: HOSPADM

## 2017-02-07 RX ADMIN — DOCUSATE SODIUM 100 MG: 100 CAPSULE, LIQUID FILLED ORAL at 15:42

## 2017-02-07 RX ADMIN — HYDROXYZINE PAMOATE 50 MG: 50 CAPSULE ORAL at 12:52

## 2017-02-07 RX ADMIN — PAROXETINE HYDROCHLORIDE 20 MG: 20 TABLET, FILM COATED ORAL at 08:19

## 2017-02-07 RX ADMIN — GABAPENTIN 300 MG: 300 CAPSULE ORAL at 20:52

## 2017-02-07 RX ADMIN — GABAPENTIN 300 MG: 300 CAPSULE ORAL at 16:46

## 2017-02-07 RX ADMIN — ACETAMINOPHEN 650 MG: 325 TABLET ORAL at 08:39

## 2017-02-07 RX ADMIN — TRAZODONE HYDROCHLORIDE 50 MG: 50 TABLET ORAL at 20:52

## 2017-02-07 RX ADMIN — GABAPENTIN 300 MG: 300 CAPSULE ORAL at 08:20

## 2017-02-07 RX ADMIN — NICOTINE 1 PATCH: 21 PATCH TRANSDERMAL at 09:48

## 2017-02-07 RX ADMIN — CETIRIZINE HYDROCHLORIDE 10 MG: 10 TABLET, FILM COATED ORAL at 13:09

## 2017-02-07 RX ADMIN — BUPROPION HYDROCHLORIDE 150 MG: 150 TABLET, FILM COATED, EXTENDED RELEASE ORAL at 08:19

## 2017-02-07 RX ADMIN — ACETAMINOPHEN 650 MG: 325 TABLET ORAL at 17:14

## 2017-02-07 RX ADMIN — QUETIAPINE FUMARATE 200 MG: 100 TABLET, FILM COATED ORAL at 20:52

## 2017-02-07 RX ADMIN — HYDROXYZINE PAMOATE 50 MG: 50 CAPSULE ORAL at 20:52

## 2017-02-07 NOTE — NURSING NOTE
Behavior   Anxiety 5  Depression 6  Pain 4  AVH no  S/I no  H/I no  Pt has been lying in his room sleeping. Pt reports a constant HA. Pt upset about father's dx and wife  him.          Intervention  Instructed in med usage and effects, encouraged to verbalize needs. Meds administered as ordered. RN offered self for expression.  RN gave PRN med as ordered.           Response  Verbalized understanding. Pt expressed self to staff.            Plan  Continue to monitor for safety/  every 15 minute monitoring checks.  RN will assess and educate as needed.

## 2017-02-07 NOTE — NURSING NOTE
"Behavior Pt is sitting visitation room watching t.v alone. When asked how his day has been pt states \"I stayed in bed most of the day and my dad visited today.\" Pt said he had an \"okay\" visit with his dad. Pt rates his depression as a 6/10 and it is still related to the issues that is going on, like losing his job, dad having terminal cancer, and going through a divorce. Pt said his worried about \"where he is going to go and what is going to happen.\" Pt reports feeling tired and not much energy. Pt has a flat affect, keeps to self, a little interactions with peers tonight,and pt isolates to self/room. Pt rates anxiety a 5/10, he says his dad makes him anxious but he says his dad didn't make him as anxious today as he usually does. Pt wants his dad to see his side about his brother and not believe everything his younger brother says and not always take the younger brothers side. Pt still reports feeling helpless and hopeless. Pt denies suicidal/homicidal ideations and hallucinations. Pt did not attend group.         Intervention  Instructed in med usage and effects, compliant with all night meds. Trazodone 50mg PO PRN is given for sleep per pt request. Encouraged pt to continue to participate in treatment. Encouraged pt to let staff know of any concerns or problems.          Response  Pt verbalized understanding.           Plan  Will continue to monitor for safety/  every 15 minute monitoring checks and redirect PRN.    "

## 2017-02-07 NOTE — NURSING NOTE
Behavior Pt is resting in bed with eyes closed. No s/s of distress noted. Pt has rested quietly throughout the night.          Intervention          Response          Plan  Will continue to monitor for safety/  every 15 minute monitoring checks and redirect PRN.
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
## 2017-02-07 NOTE — PLAN OF CARE
Problem: BH Patient Care Overview (Adult)  Goal: Plan of Care Review  Outcome: Ongoing (interventions implemented as appropriate)    02/07/17 0608   Coping/Psychosocial Response Interventions   Plan Of Care Reviewed With patient   Coping/Psychosocial   Patient Agreement with Plan of Care agrees   Outcome Evaluation   Outcome Summary/Follow up Plan Pt is still depressed and anxious. Withdrawn, little interactions with peers, not attending groups, isolating to self/room. Flat affect, reports feeling tired and staying in bed most of day.   Patient Care Overview   Progress no change       Goal: Individualization and Mutuality  Outcome: Ongoing (interventions implemented as appropriate)  Goal: Discharge Needs Assessment  Outcome: Ongoing (interventions implemented as appropriate)    Problem:  Overarching Goals  Goal: Adheres to Safety Considerations for Self and Others  Outcome: Ongoing (interventions implemented as appropriate)  Goal: Optimized Coping Skills in Response to Life Stressors  Outcome: Ongoing (interventions implemented as appropriate)  Goal: Develops/Participates in Therapeutic [place] to Support Successful Transition  Outcome: Ongoing (interventions implemented as appropriate)

## 2017-02-08 VITALS
OXYGEN SATURATION: 98 % | WEIGHT: 170.4 LBS | SYSTOLIC BLOOD PRESSURE: 113 MMHG | RESPIRATION RATE: 18 BRPM | HEART RATE: 98 BPM | TEMPERATURE: 97.8 F | HEIGHT: 72 IN | BODY MASS INDEX: 23.08 KG/M2 | DIASTOLIC BLOOD PRESSURE: 63 MMHG

## 2017-02-08 PROCEDURE — 99239 HOSP IP/OBS DSCHRG MGMT >30: CPT | Performed by: PSYCHIATRY & NEUROLOGY

## 2017-02-08 RX ORDER — PAROXETINE HYDROCHLORIDE 20 MG/1
20 TABLET, FILM COATED ORAL DAILY
Qty: 30 TABLET | Refills: 0 | Status: SHIPPED | OUTPATIENT
Start: 2017-02-08 | End: 2022-07-06

## 2017-02-08 RX ORDER — QUETIAPINE FUMARATE 200 MG/1
200 TABLET, FILM COATED ORAL NIGHTLY
Qty: 30 TABLET | Refills: 0 | Status: SHIPPED | OUTPATIENT
Start: 2017-02-08 | End: 2022-11-16

## 2017-02-08 RX ORDER — NAPROXEN 375 MG/1
375 TABLET ORAL 3 TIMES DAILY PRN
Qty: 90 TABLET | Refills: 0 | Status: SHIPPED | OUTPATIENT
Start: 2017-02-08 | End: 2017-05-23

## 2017-02-08 RX ORDER — NICOTINE 21 MG/24HR
1 PATCH, TRANSDERMAL 24 HOURS TRANSDERMAL EVERY 24 HOURS
Qty: 14 PATCH | Refills: 0 | Status: SHIPPED | OUTPATIENT
Start: 2017-02-08 | End: 2017-05-23

## 2017-02-08 RX ORDER — GABAPENTIN 400 MG/1
400 CAPSULE ORAL 3 TIMES DAILY
Qty: 90 CAPSULE | Refills: 0 | OUTPATIENT
Start: 2017-02-08 | End: 2022-07-09

## 2017-02-08 RX ORDER — CETIRIZINE HYDROCHLORIDE 10 MG/1
10 TABLET ORAL DAILY
Qty: 30 TABLET | Refills: 0 | Status: SHIPPED | OUTPATIENT
Start: 2017-02-08 | End: 2017-05-23

## 2017-02-08 RX ORDER — BUPROPION HYDROCHLORIDE 150 MG/1
150 TABLET, EXTENDED RELEASE ORAL DAILY
Qty: 30 TABLET | Refills: 0 | Status: SHIPPED | OUTPATIENT
Start: 2017-02-08 | End: 2022-07-06

## 2017-02-08 RX ORDER — HYDROXYZINE PAMOATE 50 MG/1
50 CAPSULE ORAL 3 TIMES DAILY PRN
Qty: 90 CAPSULE | Refills: 0 | Status: SHIPPED | OUTPATIENT
Start: 2017-02-08 | End: 2022-07-06

## 2017-02-08 RX ADMIN — ACETAMINOPHEN 650 MG: 325 TABLET ORAL at 08:27

## 2017-02-08 RX ADMIN — PAROXETINE HYDROCHLORIDE 20 MG: 20 TABLET, FILM COATED ORAL at 08:20

## 2017-02-08 RX ADMIN — HYDROXYZINE PAMOATE 50 MG: 50 CAPSULE ORAL at 15:07

## 2017-02-08 RX ADMIN — GABAPENTIN 300 MG: 300 CAPSULE ORAL at 08:19

## 2017-02-08 RX ADMIN — ACETAMINOPHEN 650 MG: 325 TABLET ORAL at 16:43

## 2017-02-08 RX ADMIN — BUPROPION HYDROCHLORIDE 150 MG: 150 TABLET, FILM COATED, EXTENDED RELEASE ORAL at 08:19

## 2017-02-08 RX ADMIN — NICOTINE 1 PATCH: 21 PATCH TRANSDERMAL at 08:21

## 2017-02-08 RX ADMIN — CETIRIZINE HYDROCHLORIDE 10 MG: 10 TABLET, FILM COATED ORAL at 08:20

## 2017-02-08 RX ADMIN — DOCUSATE SODIUM 100 MG: 100 CAPSULE, LIQUID FILLED ORAL at 12:24

## 2017-02-08 RX ADMIN — GABAPENTIN 300 MG: 300 CAPSULE ORAL at 15:06

## 2017-02-08 RX ADMIN — HYDROXYZINE PAMOATE 50 MG: 50 CAPSULE ORAL at 08:20

## 2017-02-08 NOTE — NURSING NOTE
Behavior   Anxiety 0  Depression 0  Pain 0  AVH no  S/I no  H/I no    Discharge instrucions given written and verbal to pt. rx's given to pt. Follow up instruction given with crisis number. Go to er for emergencies.        Intervention  Belongings given to pt from security. Discharged ambulatory with beongings  Accompanied by daughter

## 2017-02-08 NOTE — PLAN OF CARE
Problem: BH Patient Care Overview (Adult)  Goal: Individualization and Mutuality  Outcome: Ongoing (interventions implemented as appropriate)  Goal: Discharge Needs Assessment  Outcome: Ongoing (interventions implemented as appropriate)    Problem: Depression  Goal: Treatment Goal: Demonstrate behavioral control of depressive symptoms, verbalize feelings of improved mood/affect, and adopt new coping skills prior to discharge  Outcome: Ongoing (interventions implemented as appropriate)  Goal: Verbalize thoughts and feelings associated with:  Outcome: Ongoing (interventions implemented as appropriate)  Goal: Refrain from harming self  Outcome: Ongoing (interventions implemented as appropriate)  Goal: Refrain from isolation  Outcome: Ongoing (interventions implemented as appropriate)  Goal: Refrain from self-neglect  Outcome: Ongoing (interventions implemented as appropriate)  Goal: Attend and participate in unit activities, including therapeutic, recreational, and educational groups  Outcome: Ongoing (interventions implemented as appropriate)  Patient remained out of his room all pm, interacting approp. With peers.  Spent free time watching tv, eating snack, socializing.  Goal: Complete daily ADLs, including personal hygiene independently, as able  Outcome: Ongoing (interventions implemented as appropriate)    Problem: Risk for Self Injury/Neglect  Goal: Treatment Goal: Remain safe during length of stay, learn and adopt new coping skills, and be free of self-injurious ideation, impulses and acts at the time of discharge  Outcome: Ongoing (interventions implemented as appropriate)  Goal: Verbalize thoughts and feelings associated with:  Outcome: Ongoing (interventions implemented as appropriate)  Goal: Refrain from harming self  Outcome: Ongoing (interventions implemented as appropriate)  Goal: Attend and participate in unit activities, including therapeutic, recreational, and educational groups  Outcome: Ongoing  (interventions implemented as appropriate)  Goal: Recognize maladaptive responses and adopt new coping mechanisms  Outcome: Ongoing (interventions implemented as appropriate)  Goal: Complete daily ADLs, including personal hygiene independently, as able  Outcome: Ongoing (interventions implemented as appropriate)    Problem: SUBSTANCE USE/ABUSE  Goal: By day 5 will complete medical detox and be discharged with an appropriate treatment plan in place.  Outcome: Ongoing (interventions implemented as appropriate)  Goal: By discharge, will develop insight into their chemical dependency and sustain motivation to continue in recovery.  Outcome: Ongoing (interventions implemented as appropriate)  Goal: By discharge, will have in place an ongoing treatment plan in collaboration with assigned CDP.  Outcome: Ongoing (interventions implemented as appropriate)

## 2017-02-08 NOTE — NURSING NOTE
Behavior   Anxiety 0  Depression 0  Pain 0  AVH no  S/I no  H/I no  Participated in groups. Good eye contact.interacting well with staff. Minimal interaction with peers.      Intervention  Instructed in med usage and effects, encouraged to verbalize needs. Meds administered as ordered.encouraged pt to continue follow up out patient therapy. Maintain med compliance          Response  Verbalized understanding           Plan  Continue to monitor for safety/  every 15 minute monitoring checks.

## 2017-02-08 NOTE — PLAN OF CARE
Problem: BH Overarching Goals  Goal: Adheres to Safety Considerations for Self and Others  Outcome: Ongoing (interventions implemented as appropriate)  Goal: Optimized Coping Skills in Response to Life Stressors  Outcome: Ongoing (interventions implemented as appropriate)  Goal: Develops/Participates in Therapeutic [place] to Support Successful Transition  Outcome: Ongoing (interventions implemented as appropriate)

## 2017-02-08 NOTE — NURSING NOTE
"Behavior   Anxiety 5  Depression 6  Pain 4  AVH no  S/I no  H/I no  Refused am group. \"I have a headache. i cant see my dad because of my brother. My dad has cancer\" fair eye contact. Mod interaction with peers.          Intervention  Instructed in med usage and effects, encouraged to verbalize needs. Meds administered as ordered.encouraged pt to increase participation in groups. Notify staff of concerns.           Response  Verbalized understanding           Plan  Continue to monitor for safety/  every 15 minute monitoring checks.work toward treatment goals.  "

## 2017-02-08 NOTE — SIGNIFICANT NOTE
02/08/17 1308   Individual Counseling   Time Session Began 1240   Time Session Ended 1300   Total Time (minutes) 20   Topic Safety/Dc Plan   Session Detail CSW met with pt 1:1 and reviewed safety/dc plan. CSW scheduled follow up apt with therapist, Luda Weber, at [facility] in [place]. Pt to be seen and referred for medication management. CSW educated pt on Crisis Hotline, advised him to call as needed.   Patient Response Pt continues to present with somewhat of a blunted affect, anxious mood. Majority of anxiety is reportedly due to marital/living stressors. Pt does plan to return home with estranged spouse. Pt was agreeable to follow up outpt, however, appears to be somewhat apprehensive. Tx team discused rehab options with pt to address some substance abuse concerns, pt refused rehab. CSW did provide pt with education on community resources that could assist pt in abstaining. Pt did participate in some unit activities such as group tx, was med compliant. Insight and judgement are fair. Pt denies SI/HI, AVH. BPRS was complete and pt given Press Ganey to complete and return at [place].

## 2017-02-08 NOTE — NURSING NOTE
Behavior   Anxiety 5  Depression 5  Pain 5  AVH no  S/I no  H/I no  Pt still reports a ha rated 5/10. Pt given PRN tylenol. Pt states he usually takes his mother's Fioricet and it works well. Pt is very upset about his brother Natan and their relationship. This relationship also effects the relationship with his parents.          Intervention  Instructed in med usage and effects, encouraged to verbalize needs. Meds administered as ordered.          Response  Verbalized understanding           Plan  Continue to monitor for safety/  every 15 minute monitoring checks. RN will assess and educate as needed.

## 2017-02-08 NOTE — DISCHARGE SUMMARY
Admission Date: 2/3/2017    Discharge Date: 2/8/2017    Psychiatric History: Patient is a 51 y.o. male who presents with suicidal ideation. Onset of symptoms was abrupt starting a few months ago. Agitation and anger has been present on a intemittent basis. He notes he has had passive SI intermittently for the last few months. Symptoms are associated with anxiety, depressed mood, irritability and substance use. Symptoms are aggravated by problems related to support from wife and problems with substance abuse.  Patients symptoms severity is severe. Patient reports that level of hopefulness is 0/10. Patient's symptoms occur in the context of In Sept he found out dad had terminal colon cancer. He found out from doc before his father did. He notes he lost his job over it b/c of missing too many days. He did not have FMLA. His wife filed for divorce around that time. He notes since then he has had an anger problem. He notes having more trouble with emotion control and will have outbursts of tears. He is having an issue with his younger brother since dad's cancer and he is not able to go over there. He seems to think brother has intentions for dad's property. He could not find his cellphone and he got upset with wife b/c he thought she was hiding it from him. He got upset and destroyed several things. She called the police but they did not do anything. The next morning he could not get back to into house because it was locked. He got upset and kicked the door down. She called the police. He was never arrested but he had EPO but she took it off. She filed divorce the next day. They are still living together b/c he had nowhere to go.    Diagnostic Data:    Recent Results (from the past 168 hour(s))   Comprehensive Metabolic Panel    Collection Time: 02/02/17  9:59 PM   Result Value Ref Range    Glucose 89 60 - 100 mg/dL    BUN 11 7 - 21 mg/dL    Creatinine 0.79 0.70 - 1.30 mg/dL    Sodium 136 (L) 137 - 145 mmol/L     Potassium 4.1 3.5 - 5.1 mmol/L    Chloride 99 95 - 110 mmol/L    CO2 29.0 22.0 - 31.0 mmol/L    Calcium 9.4 8.4 - 10.2 mg/dL    Total Protein 6.8 6.3 - 8.6 g/dL    Albumin 4.10 3.40 - 4.80 g/dL    ALT (SGPT) 18 (L) 21 - 72 U/L    AST (SGOT) 26 17 - 59 U/L    Alkaline Phosphatase 100 38 - 126 U/L    Total Bilirubin 0.4 0.2 - 1.3 mg/dL    eGFR Non  Amer 103 56 - 130 mL/min/1.73    Globulin 2.7 2.3 - 3.5 gm/dL    A/G Ratio 1.5 1.1 - 1.8 g/dL    BUN/Creatinine Ratio 13.9 7.0 - 25.0    Anion Gap 8.0 5.0 - 15.0 mmol/L   Acetaminophen Level    Collection Time: 02/02/17  9:59 PM   Result Value Ref Range    Acetaminophen <10.0 (L) 10.0 - 30.0 mcg/mL   Ethanol    Collection Time: 02/02/17  9:59 PM   Result Value Ref Range    Ethanol <10 (H) 0 - 0 mg/dL    Ethanol % <0.010 %   Salicylate Level    Collection Time: 02/02/17  9:59 PM   Result Value Ref Range    Salicylate <1.0 (L) 10.0 - 20.0 mg/dL   CBC Auto Differential    Collection Time: 02/02/17  9:59 PM   Result Value Ref Range    WBC 9.39 3.20 - 9.80 10*3/mm3    RBC 4.63 4.37 - 5.74 10*6/mm3    Hemoglobin 14.2 13.7 - 17.3 g/dL    Hematocrit 40.9 39.0 - 49.0 %    MCV 88.3 80.0 - 98.0 fL    MCH 30.7 26.5 - 34.0 pg    MCHC 34.7 31.5 - 36.3 g/dL    RDW 13.5 11.5 - 14.5 %    RDW-SD 43.8 35.1 - 43.9 fl    MPV 10.4 8.0 - 12.0 fL    Platelets 320 150 - 450 10*3/mm3    Neutrophil % 52.7 37.0 - 80.0 %    Lymphocyte % 38.9 10.0 - 50.0 %    Monocyte % 4.2 0.0 - 12.0 %    Eosinophil % 3.5 0.0 - 7.0 %    Basophil % 0.5 0.0 - 2.0 %    Immature Grans % 0.2 0.0 - 0.5 %    Neutrophils, Absolute 4.95 2.00 - 8.60 10*3/mm3    Lymphocytes, Absolute 3.65 0.60 - 4.20 10*3/mm3    Monocytes, Absolute 0.39 0.00 - 0.90 10*3/mm3    Eosinophils, Absolute 0.33 0.00 - 0.70 10*3/mm3    Basophils, Absolute 0.05 0.00 - 0.20 10*3/mm3    Immature Grans, Absolute 0.02 0.00 - 0.02 10*3/mm3   Urine Drug Screen    Collection Time: 02/02/17 10:10 PM   Result Value Ref Range    Amphetamine Screen, Urine  Positive (A) Negative    Barbiturates Screen, Urine Negative Negative    Benzodiazepine Screen, Urine Negative Negative    Cocaine Screen, Urine Negative Negative    Methadone Screen, Urine Negative Negative    Opiate Screen Negative Negative    Oxycodone Screen, Urine Negative Negative    THC, Screen, Urine Positive (A) Negative   TSH    Collection Time: 02/07/17  5:44 AM   Result Value Ref Range    TSH 0.070 (L) 0.460 - 4.680 mIU/mL     Imaging Results (last 7 days)     ** No results found for the last 168 hours. **            Summary of Hospital Course:  Patient was admitted to the behavioral health unit at [facility] to ensure patient safety.  Patient was provided treatment with the unit milieu, activities, therapies and psychopharmacological management.  Patient was placed on Q15 minute checks and Suicide.  Dr. Marin was consulted for management of medical co-morbidities.  Patient was restarted on the following psychiatric medications: paxil.  The following medication changes were made during the hospital stay: Paxil was initially increased to 30mg/day but later reduced to 20mg daily and augmented with wellbutrin SR 150mg daily.  He was also given seroquel for augmentation and titrated to 200mg qhs.   Patient had improvement over the course of the hospital stay and tolerated his medications.  He suicidal thoughts resolved and he felt a bit more hopeful but he was still feeling down.  He cont to be worried about the chaos at home with dad's illness, his conflict with brother and wife wanting to divorce him.  Substance abuse issues were present.  He notes resolution to stop using drugs but declined rehab.  Provided individual therapy on his circumstances and discussed focusing on what had control over.  Discussed safety planning including option for hosp for return of suicidal ideations.  He cont to be depressed but was not suicidal and insurance declined further days.  He was agreeable to giving  time on the outpatient basis for the medication to fully take effect.    Patients Condition at Discharge:  Patient is stable for discharge and is not an imminent threat to self or others.  The patient's behavrior was Appropriate.  Patient reported that mood was Sad/Depressed.  Patient's affect was constricted.  Patient's thought content was as follows:   Suicidal:  None   Homicidal:  None   Hallucinations:  None   Delusion:  None    Discharge Diagnosis:  Active Problems:    Suicidal ideations    Severe episode of recurrent major depressive disorder, without psychotic features    Cannabis abuse    Methamphetamine use disorder, moderate      Discharge Medications:      Your medication list      START taking these medications       Instructions Last Dose Given Next Dose Due    buPROPion  MG 12 hr tablet   Commonly known as:  WELLBUTRIN SR        Take 1 tablet by mouth Daily.         cetirizine 10 MG tablet   Commonly known as:  zyrTEC        Take 1 tablet by mouth Daily.         gabapentin 400 MG capsule   Commonly known as:  NEURONTIN        Take 1 capsule by mouth 3 (Three) Times a Day.         hydrOXYzine 50 MG capsule   Commonly known as:  VISTARIL        Take 1 capsule by mouth 3 (Three) Times a Day As Needed for anxiety.         naproxen 375 MG tablet   Commonly known as:  NAPROSYN        Take 1 tablet by mouth 3 (Three) Times a Day As Needed for mild pain (1-3) (HA).         QUEtiapine 200 MG tablet   Commonly known as:  SEROquel        Take 1 tablet by mouth Every Night.           CHANGE how you take these medications       Instructions Last Dose Given Next Dose Due    PARoxetine 20 MG tablet   Commonly known as:  PAXIL   What changed:    - medication strength  - how much to take  - when to take this        Take 1 tablet by mouth Daily.              Where to Get Your Medications      You can get these medications from any pharmacy     Bring a paper prescription for each of these medications    • buPROPion   MG 12 hr tablet   • cetirizine 10 MG tablet   • gabapentin 400 MG capsule   • hydrOXYzine 50 MG capsule   • naproxen 375 MG tablet   • PARoxetine 20 MG tablet   • QUEtiapine 200 MG tablet             Justification for multiple antipsychotic medications at discharge:  Not Applicable.    Disposition: Patient was discharged home with self.       Referrals and Follow-ups to Schedule     Crisis  Number - [phone], go to emergency room for emergencies           Follow-up Information     Follow up with [facility]. Go on 2/21/2017.    Why:  Arrive at 11:15 am    Please take your ID, Ins Card, SS Card, and med bottles to follow up    Call Crisis hotline as needed at [phone]    Contact information:    [address] [place] [address]  [phone]        Medical follow up will be with primary care physician.    Time Spent: More than 30 minutes.    Doris Begum MD  02/08/17  12:12 PM

## 2017-10-18 ENCOUNTER — HOSPITAL ENCOUNTER (EMERGENCY)
Facility: HOSPITAL | Age: 52
Discharge: HOME | End: 2017-10-18
Attending: EMERGENCY MEDICINE | Admitting: EMERGENCY MEDICINE

## 2017-10-18 VITALS
WEIGHT: 190 LBS | HEIGHT: 72 IN | HEART RATE: 101 BPM | SYSTOLIC BLOOD PRESSURE: 119 MMHG | BODY MASS INDEX: 25.73 KG/M2 | OXYGEN SATURATION: 99 % | TEMPERATURE: 99.2 F | DIASTOLIC BLOOD PRESSURE: 83 MMHG | RESPIRATION RATE: 20 BRPM

## 2017-10-18 DIAGNOSIS — S46.211A BICEPS MUSCLE TEAR, RIGHT, INITIAL ENCOUNTER: Primary | ICD-10-CM

## 2017-10-18 PROCEDURE — 99283 EMERGENCY DEPT VISIT LOW MDM: CPT

## 2017-10-18 RX ORDER — ACETAMINOPHEN AND CODEINE PHOSPHATE 300; 30 MG/1; MG/1
1 TABLET ORAL EVERY 6 HOURS PRN
Qty: 6 TABLET | Refills: 0 | Status: SHIPPED | OUTPATIENT
Start: 2017-10-18 | End: 2017-10-20

## 2017-10-18 RX ORDER — HYDROCODONE BITARTRATE AND ACETAMINOPHEN 5; 325 MG/1; MG/1
1 TABLET ORAL ONCE
Status: COMPLETED | OUTPATIENT
Start: 2017-10-18 | End: 2017-10-18

## 2017-10-18 RX ADMIN — HYDROCODONE BITARTRATE AND ACETAMINOPHEN 1 TABLET: 5; 325 TABLET ORAL at 23:45

## 2017-10-19 NOTE — ED PROVIDER NOTES
Subjective   HPI Comments: Patient was lifting a 50 pound box when patient felt something snap from his right shoulder and he is swelling to the biceps area and appears that patient has ruptured bicipital HEAD of biceps.  Patient denies any fall or injury or any distal numbness or paresthesias.  Patient has a remote history of surgery on the shoulder    Patient is a 52 y.o. male presenting with shoulder problem.   Shoulder Problem   Location:  Shoulder  Shoulder location:  R shoulder  Injury: no    Pain details:     Quality:  Aching and throbbing    Radiates to:  R elbow    Severity:  Moderate    Onset quality:  Sudden    Duration:  1 day    Timing:  Constant    Progression:  Unchanged  Handedness:  Right-handed  Dislocation: no    Prior injury to area:  Yes  Relieved by:  Immobilization  Worsened by:  Movement  Ineffective treatments:  None tried  Associated symptoms: decreased range of motion and muscle weakness    Associated symptoms: no back pain, no fatigue, no fever, no neck pain, no numbness, no stiffness, no swelling and no tingling        Review of Systems   Constitutional: Negative for fatigue and fever.   Respiratory: Negative.    Cardiovascular: Negative.    Musculoskeletal: Negative for back pain, neck pain and stiffness.   Neurological: Negative.    All other systems reviewed and are negative.      Past Medical History:   Diagnosis Date   • Backache    • Cellulitis of scrotum    • Substance abuse    • Viral gastroenteritis        No Known Allergies    Past Surgical History:   Procedure Laterality Date   • INJECTION OF MEDICATION  08/28/2014    kENALOG(1)   • INJECTION OF MEDICATION  09/13/2014    tORADOL(2)   • INJECTION OF MEDICATION  09/20/2011    zOFRAN(1)   • KNEE ARTHROSCOPY  05/21/2014    Arthroscopy of right knee, debride medial meniscus.       Family History   Problem Relation Age of Onset   • Anxiety disorder Mother    • Depression Mother        Social History     Social History   • Marital  status:      Spouse name: N/A   • Number of children: N/A   • Years of education: N/A     Social History Main Topics   • Smoking status: Current Every Day Smoker     Packs/day: 1.00   • Smokeless tobacco: Never Used   • Alcohol use No   • Drug use: 1.00 per week     Special: Marijuana, Methamphetamines   • Sexual activity: Not Asked     Other Topics Concern   • None     Social History Narrative           Objective   Physical Exam   Constitutional: He is oriented to person, place, and time. He appears well-developed and well-nourished.   HENT:   Head: Normocephalic.   Neck: Neck supple.   Cardiovascular: Normal rate and regular rhythm.    Pulmonary/Chest: Effort normal and breath sounds normal.   Abdominal: Soft. Bowel sounds are normal.   Musculoskeletal:   Patient has ruptured bicipital tendon of biceps on the right side.  There is no other localized deformity or tenderness.   Neurological: He is alert and oriented to person, place, and time.   Skin: Skin is warm and dry.   Nursing note and vitals reviewed.      Procedures         ED Course  ED Course        Patient was given sling and referral for orthopedic physician and advised to call the office in the morning to get an appointment to be evaluated          MDM    Final diagnoses:   Biceps muscle tear, right, initial encounter            Drew Fletcher MD  10/18/17 9823

## 2018-10-12 ENCOUNTER — TRANSCRIBE ORDERS (OUTPATIENT)
Dept: ADMINISTRATIVE | Facility: HOSPITAL | Age: 53
End: 2018-10-12

## 2018-10-12 DIAGNOSIS — J34.89 OTHER SPECIFIED DISORDERS OF NOSE AND NASAL SINUSES: Primary | ICD-10-CM

## 2018-10-18 ENCOUNTER — HOSPITAL ENCOUNTER (OUTPATIENT)
Dept: CT IMAGING | Facility: HOSPITAL | Age: 53
Discharge: HOME | End: 2018-10-18
Admitting: FAMILY MEDICINE

## 2018-10-18 DIAGNOSIS — J34.89 OTHER SPECIFIED DISORDERS OF NOSE AND NASAL SINUSES: ICD-10-CM

## 2018-10-18 PROCEDURE — 70486 CT MAXILLOFACIAL W/O DYE: CPT

## 2019-05-30 ENCOUNTER — HOSPITAL ENCOUNTER (EMERGENCY)
Facility: HOSPITAL | Age: 54
Discharge: HOME | End: 2019-05-30
Attending: EMERGENCY MEDICINE | Admitting: EMERGENCY MEDICINE

## 2019-05-30 ENCOUNTER — APPOINTMENT (OUTPATIENT)
Dept: GENERAL RADIOLOGY | Facility: HOSPITAL | Age: 54
End: 2019-05-30

## 2019-05-30 VITALS
HEART RATE: 85 BPM | HEIGHT: 72 IN | TEMPERATURE: 98.4 F | OXYGEN SATURATION: 97 % | DIASTOLIC BLOOD PRESSURE: 92 MMHG | BODY MASS INDEX: 29.12 KG/M2 | WEIGHT: 215 LBS | SYSTOLIC BLOOD PRESSURE: 119 MMHG | RESPIRATION RATE: 16 BRPM

## 2019-05-30 DIAGNOSIS — F41.9 ANXIETY: ICD-10-CM

## 2019-05-30 DIAGNOSIS — R07.9 CHEST PAIN, UNSPECIFIED TYPE: Primary | ICD-10-CM

## 2019-05-30 LAB
ALBUMIN SERPL-MCNC: 4.6 G/DL (ref 3.5–5)
ALBUMIN/GLOB SERPL: 1.6 G/DL (ref 1.1–2.5)
ALP SERPL-CCNC: 106 U/L (ref 24–120)
ALT SERPL W P-5'-P-CCNC: <15 U/L (ref 0–54)
ANION GAP SERPL CALCULATED.3IONS-SCNC: 10 MMOL/L (ref 4–13)
AST SERPL-CCNC: 27 U/L (ref 7–45)
BASOPHILS # BLD AUTO: 0.04 10*3/MM3 (ref 0–0.2)
BASOPHILS NFR BLD AUTO: 0.3 % (ref 0–2)
BILIRUB SERPL-MCNC: 0.6 MG/DL (ref 0.1–1)
BUN BLD-MCNC: 9 MG/DL (ref 5–21)
BUN/CREAT SERPL: 10.6 (ref 7–25)
CALCIUM SPEC-SCNC: 9.9 MG/DL (ref 8.4–10.4)
CHLORIDE SERPL-SCNC: 107 MMOL/L (ref 98–110)
CO2 SERPL-SCNC: 23 MMOL/L (ref 24–31)
CREAT BLD-MCNC: 0.85 MG/DL (ref 0.5–1.4)
DEPRECATED RDW RBC AUTO: 41.1 FL (ref 40–54)
EOSINOPHIL # BLD AUTO: 0.22 10*3/MM3 (ref 0–0.7)
EOSINOPHIL NFR BLD AUTO: 1.8 % (ref 0–4)
ERYTHROCYTE [DISTWIDTH] IN BLOOD BY AUTOMATED COUNT: 13.3 % (ref 12–15)
GFR SERPL CREATININE-BSD FRML MDRD: 94 ML/MIN/1.73
GLOBULIN UR ELPH-MCNC: 2.9 GM/DL
GLUCOSE BLD-MCNC: 110 MG/DL (ref 70–100)
HCT VFR BLD AUTO: 42.6 % (ref 40–52)
HGB BLD-MCNC: 15 G/DL (ref 14–18)
HOLD SPECIMEN: NORMAL
HOLD SPECIMEN: NORMAL
IMM GRANULOCYTES # BLD AUTO: 0.04 10*3/MM3 (ref 0–0.05)
IMM GRANULOCYTES NFR BLD AUTO: 0.3 % (ref 0–5)
LIPASE SERPL-CCNC: 264 U/L (ref 23–203)
LYMPHOCYTES # BLD AUTO: 2.45 10*3/MM3 (ref 0.72–4.86)
LYMPHOCYTES NFR BLD AUTO: 20.2 % (ref 15–45)
MCH RBC QN AUTO: 30.1 PG (ref 28–32)
MCHC RBC AUTO-ENTMCNC: 35.2 G/DL (ref 33–36)
MCV RBC AUTO: 85.5 FL (ref 82–95)
MONOCYTES # BLD AUTO: 0.69 10*3/MM3 (ref 0.19–1.3)
MONOCYTES NFR BLD AUTO: 5.7 % (ref 4–12)
NEUTROPHILS # BLD AUTO: 8.69 10*3/MM3 (ref 1.87–8.4)
NEUTROPHILS NFR BLD AUTO: 71.7 % (ref 39–78)
NRBC BLD AUTO-RTO: 0 /100 WBC (ref 0–0.2)
PLATELET # BLD AUTO: 315 10*3/MM3 (ref 130–400)
PMV BLD AUTO: 10.8 FL (ref 6–12)
POTASSIUM BLD-SCNC: 3.6 MMOL/L (ref 3.5–5.3)
PROT SERPL-MCNC: 7.5 G/DL (ref 6.3–8.7)
RBC # BLD AUTO: 4.98 10*6/MM3 (ref 4.8–5.9)
SODIUM BLD-SCNC: 140 MMOL/L (ref 135–145)
TROPONIN I SERPL-MCNC: <0.012 NG/ML (ref 0–0.03)
TROPONIN I SERPL-MCNC: <0.012 NG/ML (ref 0–0.03)
WBC NRBC COR # BLD: 12.13 10*3/MM3 (ref 4.8–10.8)
WHOLE BLOOD HOLD SPECIMEN: NORMAL
WHOLE BLOOD HOLD SPECIMEN: NORMAL

## 2019-05-30 PROCEDURE — 80053 COMPREHEN METABOLIC PANEL: CPT | Performed by: EMERGENCY MEDICINE

## 2019-05-30 PROCEDURE — 93005 ELECTROCARDIOGRAM TRACING: CPT

## 2019-05-30 PROCEDURE — 25010000002 MORPHINE PER 10 MG: Performed by: EMERGENCY MEDICINE

## 2019-05-30 PROCEDURE — 71045 X-RAY EXAM CHEST 1 VIEW: CPT

## 2019-05-30 PROCEDURE — 93010 ELECTROCARDIOGRAM REPORT: CPT | Performed by: INTERNAL MEDICINE

## 2019-05-30 PROCEDURE — 85025 COMPLETE CBC W/AUTO DIFF WBC: CPT | Performed by: EMERGENCY MEDICINE

## 2019-05-30 PROCEDURE — 83690 ASSAY OF LIPASE: CPT | Performed by: EMERGENCY MEDICINE

## 2019-05-30 PROCEDURE — 93005 ELECTROCARDIOGRAM TRACING: CPT | Performed by: EMERGENCY MEDICINE

## 2019-05-30 PROCEDURE — 96376 TX/PRO/DX INJ SAME DRUG ADON: CPT

## 2019-05-30 PROCEDURE — 99284 EMERGENCY DEPT VISIT MOD MDM: CPT

## 2019-05-30 PROCEDURE — 96375 TX/PRO/DX INJ NEW DRUG ADDON: CPT

## 2019-05-30 PROCEDURE — 96374 THER/PROPH/DIAG INJ IV PUSH: CPT

## 2019-05-30 PROCEDURE — 25010000002 LORAZEPAM PER 2 MG: Performed by: EMERGENCY MEDICINE

## 2019-05-30 PROCEDURE — 84484 ASSAY OF TROPONIN QUANT: CPT | Performed by: EMERGENCY MEDICINE

## 2019-05-30 RX ORDER — LORAZEPAM 2 MG/ML
1 INJECTION INTRAMUSCULAR ONCE
Status: COMPLETED | OUTPATIENT
Start: 2019-05-30 | End: 2019-05-30

## 2019-05-30 RX ORDER — ASPIRIN 81 MG/1
324 TABLET, CHEWABLE ORAL ONCE
Status: COMPLETED | OUTPATIENT
Start: 2019-05-30 | End: 2019-05-30

## 2019-05-30 RX ORDER — SODIUM CHLORIDE 0.9 % (FLUSH) 0.9 %
10 SYRINGE (ML) INJECTION AS NEEDED
Status: DISCONTINUED | OUTPATIENT
Start: 2019-05-30 | End: 2019-05-31 | Stop reason: HOSPADM

## 2019-05-30 RX ORDER — FAMOTIDINE 10 MG/ML
20 INJECTION, SOLUTION INTRAVENOUS ONCE
Status: COMPLETED | OUTPATIENT
Start: 2019-05-30 | End: 2019-05-30

## 2019-05-30 RX ORDER — BUSPIRONE HYDROCHLORIDE 5 MG/1
5 TABLET ORAL 3 TIMES DAILY
Qty: 30 TABLET | Refills: 0 | Status: SHIPPED | OUTPATIENT
Start: 2019-05-30 | End: 2022-07-06

## 2019-05-30 RX ADMIN — MORPHINE SULFATE 4 MG: 4 INJECTION INTRAVENOUS at 20:04

## 2019-05-30 RX ADMIN — FAMOTIDINE 20 MG: 10 INJECTION, SOLUTION INTRAVENOUS at 21:14

## 2019-05-30 RX ADMIN — LORAZEPAM 1 MG: 2 INJECTION INTRAMUSCULAR; INTRAVENOUS at 22:23

## 2019-05-30 RX ADMIN — LORAZEPAM 1 MG: 2 INJECTION INTRAMUSCULAR; INTRAVENOUS at 19:13

## 2019-05-30 RX ADMIN — MORPHINE SULFATE 4 MG: 4 INJECTION INTRAVENOUS at 21:14

## 2019-05-30 RX ADMIN — ASPIRIN 81 MG 324 MG: 81 TABLET ORAL at 19:06

## 2019-07-05 ENCOUNTER — HOSPITAL ENCOUNTER (EMERGENCY)
Facility: HOSPITAL | Age: 54
Discharge: HOME | End: 2019-07-05
Attending: EMERGENCY MEDICINE | Admitting: EMERGENCY MEDICINE

## 2019-07-05 ENCOUNTER — APPOINTMENT (OUTPATIENT)
Dept: GENERAL RADIOLOGY | Facility: HOSPITAL | Age: 54
End: 2019-07-05

## 2019-07-05 VITALS
DIASTOLIC BLOOD PRESSURE: 76 MMHG | OXYGEN SATURATION: 98 % | TEMPERATURE: 97.5 F | BODY MASS INDEX: 29.8 KG/M2 | RESPIRATION RATE: 20 BRPM | WEIGHT: 220 LBS | HEIGHT: 72 IN | SYSTOLIC BLOOD PRESSURE: 144 MMHG | HEART RATE: 89 BPM

## 2019-07-05 DIAGNOSIS — M25.561 ACUTE PAIN OF RIGHT KNEE: Primary | ICD-10-CM

## 2019-07-05 PROCEDURE — 99283 EMERGENCY DEPT VISIT LOW MDM: CPT

## 2019-07-05 PROCEDURE — 96372 THER/PROPH/DIAG INJ SC/IM: CPT

## 2019-07-05 PROCEDURE — 25010000002 KETOROLAC TROMETHAMINE PER 15 MG: Performed by: EMERGENCY MEDICINE

## 2019-07-05 PROCEDURE — 73564 X-RAY EXAM KNEE 4 OR MORE: CPT

## 2019-07-05 RX ORDER — HYDROCODONE BITARTRATE AND ACETAMINOPHEN 7.5; 325 MG/1; MG/1
1 TABLET ORAL EVERY 4 HOURS PRN
Qty: 12 TABLET | Refills: 0 | OUTPATIENT
Start: 2019-07-05 | End: 2022-07-09

## 2019-07-05 RX ORDER — HYDROCODONE BITARTRATE AND ACETAMINOPHEN 10; 325 MG/1; MG/1
1 TABLET ORAL ONCE
Status: COMPLETED | OUTPATIENT
Start: 2019-07-05 | End: 2019-07-05

## 2019-07-05 RX ORDER — KETOROLAC TROMETHAMINE 30 MG/ML
30 INJECTION, SOLUTION INTRAMUSCULAR; INTRAVENOUS ONCE
Status: COMPLETED | OUTPATIENT
Start: 2019-07-05 | End: 2019-07-05

## 2019-07-05 RX ADMIN — KETOROLAC TROMETHAMINE 30 MG: 60 INJECTION, SOLUTION INTRAMUSCULAR at 22:19

## 2019-07-05 RX ADMIN — HYDROCODONE BITARTRATE AND ACETAMINOPHEN 1 TABLET: 10; 325 TABLET ORAL at 23:18

## 2019-07-06 NOTE — DISCHARGE INSTRUCTIONS
Take pain medications as needed.  Avoid exertional activities.  Weightbearing as tolerated follow-up with primary care-orthopedic surgery for reevaluation.

## 2019-07-06 NOTE — ED PROVIDER NOTES
Subjective   53 years old presented in the ER with chief complaint of right knee pain since this evening when he stepped in a small ditch accidentally and rotated his ankle/knee.  Since then he is having moderate to severe intensity sharp pain which is worse with ambulation and is unable to put much weight on right lower extremity because of knee pain.  Minimal swelling around.  No pain in the ankle.        History provided by:  Patient      Review of Systems   Constitutional: Negative for chills and fever.   HENT: Negative for congestion, sinus pressure and sore throat.    Respiratory: Negative for chest tightness and shortness of breath.    Cardiovascular: Negative for chest pain.   Gastrointestinal: Negative for abdominal pain.   Genitourinary: Negative for flank pain.   Musculoskeletal: Positive for gait problem. Negative for joint swelling.   Skin: Negative for color change.   Neurological: Negative for syncope and headaches.   Psychiatric/Behavioral: Negative for agitation.       Past Medical History:   Diagnosis Date   • Backache    • Cellulitis of scrotum    • Substance abuse (CMS/[facility])    • Viral gastroenteritis        No Known Allergies    Past Surgical History:   Procedure Laterality Date   • INJECTION OF MEDICATION  08/28/2014    kENALOG(1)   • INJECTION OF MEDICATION  09/13/2014    tORADOL(2)   • INJECTION OF MEDICATION  09/20/2011    zOFRAN(1)   • KNEE ARTHROSCOPY  05/21/2014    Arthroscopy of right knee, debride medial meniscus.       Family History   Problem Relation Age of Onset   • Anxiety disorder Mother    • Depression Mother        Social History     Socioeconomic History   • Marital status:      Spouse name: Not on file   • Number of children: Not on file   • Years of education: Not on file   • Highest education level: Not on file   Tobacco Use   • Smoking status: Current Every Day Smoker     Packs/day: 1.00   • Smokeless tobacco: Never Used   Substance and Sexual Activity   • Alcohol  use: No   • Drug use: Yes     Frequency: 1.0 times per week     Types: Marijuana, Methamphetamines           Objective   Physical Exam   Constitutional: He is oriented to person, place, and time. He appears well-developed and well-nourished.   HENT:   Head: Normocephalic and atraumatic.   Nose: Nose normal.   Eyes: EOM are normal.   Neck: Normal range of motion. Neck supple.   Cardiovascular: Normal rate, regular rhythm and normal heart sounds.   Pulmonary/Chest: Effort normal and breath sounds normal.   Abdominal: Soft. There is no tenderness.   Musculoskeletal: He exhibits tenderness.        Right knee: He exhibits decreased range of motion. He exhibits no swelling, no effusion, no laceration, no bony tenderness and normal meniscus. Tenderness found. Patellar tendon tenderness noted.   Neurological: He is alert and oriented to person, place, and time.   Skin: Skin is warm and dry. Capillary refill takes less than 2 seconds.   Psychiatric: He has a normal mood and affect.   Nursing note and vitals reviewed.      Procedures           ED Course  ED Course as of Jul 05 2344 Fri Jul 05, 2019   2336 Request # : [id number]  [AN]      ED Course User Index  [AN] Angel Falcon MD                  MDM  Number of Diagnoses or Management Options  Diagnosis management comments: Ruled out fracture dislocation.  No effusion.  Pain better with symptomatic treatment in the ER.  I would give knee sleeve and crutches with outpatient primary care and Ortho follow-up for reevaluation.    Labs Reviewed - No data to display    Xr Knee 4+ View Right    Result Date: 7/5/2019  Narrative: Right knee four view on 7/5/2019 CLINICAL INDICATION: Right knee pain after fall COMPARISON: None FINDINGS: There is a large irregular chronic calcification superior to the tibial tuberosity that may be related to old Osgood Schlatter's disease. No joint effusion is noted. There are no acute fractures. Visualized joints are well aligned. No other bony  abnormality is noted.     Impression: No acute abnormality. Electronically signed by:  Gal Vale  7/5/2019 10:20 PM CDT Workstation: [id number]          Final diagnoses:   Acute pain of right knee            Angel Falcon MD  07/05/19 2347

## 2020-08-07 NOTE — PLAN OF CARE
Problem: Depression  Goal: Verbalize thoughts and feelings associated with:  Outcome: Ongoing (interventions implemented as appropriate)       Consent (Lip)/Introductory Paragraph: The rationale for Mohs was explained to the patient and consent was obtained. The risks, benefits and alternatives to therapy were discussed in detail. Specifically, the risks of lip deformity, changes in the oral aperture, infection, scarring, bleeding, prolonged wound healing, incomplete removal, allergy to anesthesia, nerve injury and recurrence were addressed. Prior to the procedure, the treatment site was clearly identified and confirmed by the patient. All components of Universal Protocol/PAUSE Rule completed.

## 2022-01-11 PROCEDURE — U0004 COV-19 TEST NON-CDC HGH THRU: HCPCS | Performed by: FAMILY MEDICINE

## 2022-01-12 ENCOUNTER — TELEPHONE (OUTPATIENT)
Dept: URGENT CARE | Facility: CLINIC | Age: 57
End: 2022-01-12

## 2022-01-13 ENCOUNTER — TELEPHONE (OUTPATIENT)
Dept: URGENT CARE | Facility: CLINIC | Age: 57
End: 2022-01-13

## 2022-01-14 ENCOUNTER — TELEPHONE (OUTPATIENT)
Dept: URGENT CARE | Facility: CLINIC | Age: 57
End: 2022-01-14

## 2022-02-24 ENCOUNTER — TRANSCRIBE ORDERS (OUTPATIENT)
Dept: ADMINISTRATIVE | Facility: HOSPITAL | Age: 57
End: 2022-02-24

## 2022-02-24 DIAGNOSIS — S89.81XA HYPEREXTENSION INJURY OF KNEE, RIGHT, INITIAL ENCOUNTER: ICD-10-CM

## 2022-02-24 DIAGNOSIS — M25.561 CHRONIC PAIN OF RIGHT KNEE: Primary | ICD-10-CM

## 2022-02-24 DIAGNOSIS — G89.29 CHRONIC PAIN OF RIGHT KNEE: Primary | ICD-10-CM

## 2022-06-01 ENCOUNTER — HOSPITAL ENCOUNTER (EMERGENCY)
Facility: HOSPITAL | Age: 57
Discharge: HOME | End: 2022-06-01
Attending: EMERGENCY MEDICINE | Admitting: EMERGENCY MEDICINE

## 2022-06-01 ENCOUNTER — APPOINTMENT (OUTPATIENT)
Dept: GENERAL RADIOLOGY | Facility: HOSPITAL | Age: 57
End: 2022-06-01

## 2022-06-01 VITALS
HEART RATE: 87 BPM | SYSTOLIC BLOOD PRESSURE: 127 MMHG | OXYGEN SATURATION: 100 % | TEMPERATURE: 97.6 F | WEIGHT: 220 LBS | HEIGHT: 72 IN | BODY MASS INDEX: 29.8 KG/M2 | RESPIRATION RATE: 18 BRPM | DIASTOLIC BLOOD PRESSURE: 86 MMHG

## 2022-06-01 DIAGNOSIS — S60.032A CONTUSION OF LEFT MIDDLE FINGER WITHOUT DAMAGE TO NAIL, INITIAL ENCOUNTER: Primary | ICD-10-CM

## 2022-06-01 PROCEDURE — 73130 X-RAY EXAM OF HAND: CPT

## 2022-06-01 PROCEDURE — 99282 EMERGENCY DEPT VISIT SF MDM: CPT

## 2022-06-01 NOTE — DISCHARGE INSTRUCTIONS
There were no fractures identified on your x-ray.  Take Tylenol and/or ibuprofen as needed for pain.  Apply ice and/or moist heat as tolerated.    Return for worsening symptoms or new concerns.

## 2022-06-01 NOTE — ED PROVIDER NOTES
Time: 12:11 PM EDT  Arrived by: KIRA  Chief Complaint: Left finger  History provided by: Patient    History of Present Illness:  Patient is a 56 y.o. year old male that presents to the emergency department with left finger pain and swelling after his hand was caught between a self closing door and the door frame.  He is concerned that it may be broken.         used: No    Finger Injury  Location:  Left middle finger  Severity:  Mild  Onset quality:  Sudden  Duration:  5 days  Timing:  Constant  Progression:  Worsening  Chronicity:  New  Relieved by:  Nothing  Worsened by:  Nothing  Ineffective treatments:  None tried          Similar Symptoms Previously: No  Recently seen: No      Patient Care Team  Primary Care Provider: None listed    Past Medical History:     No Known Allergies  Past Medical History:   Diagnosis Date   • ADHD    • Anxiety disorder    • Backache    • Cellulitis of scrotum    • Insomnia disorder    • Substance abuse (HCC)    • Viral gastroenteritis      Past Surgical History:   Procedure Laterality Date   • INJECTION OF MEDICATION  08/28/2014    kENALOG(1)   • INJECTION OF MEDICATION  09/13/2014    tORADOL(2)   • INJECTION OF MEDICATION  09/20/2011    zOFRAN(1)   • KNEE ARTHROSCOPY  05/21/2014    Arthroscopy of right knee, debride medial meniscus.   • SHOULDER ARTHROSCOPY       Family History   Problem Relation Age of Onset   • Anxiety disorder Mother    • Depression Mother        Home Medications:  Prior to Admission medications    Medication Sig Start Date End Date Taking? Authorizing Provider   amphetamine-dextroamphetamine (ADDERALL) 20 MG tablet Take 20 mg by mouth Daily.    Emergency, Nurse Armando, RN   azithromycin (Zithromax) 250 MG tablet Take 2 tabs po day 1, then 1 tab po daily x 4 days. 1/11/22   Rita Calvert MD   brompheniramine-pseudoephedrine-DM 30-2-10 MG/5ML syrup Take 10 mL by mouth 4 (Four) Times a Day As Needed for Cough or Allergies. 1/11/22   Enrike  "Rita Hernandez MD   buPROPion SR (WELLBUTRIN SR) 150 MG 12 hr tablet Take 1 tablet by mouth Daily. 2/8/17   Doris Begum MD   busPIRone (BUSPAR) 5 MG tablet Take 1 tablet by mouth 3 (Three) Times a Day. 5/30/19   Jun Damon Jr., MD   escitalopram (LEXAPRO) 20 MG tablet Take 20 mg by mouth Daily.    Emergency, Nurse Armando, RN   gabapentin (NEURONTIN) 400 MG capsule Take 1 capsule by mouth 3 (Three) Times a Day. 2/8/17   Doris Begum MD   HYDROcodone-acetaminophen (NORCO) 7.5-325 MG per tablet Take 1 tablet by mouth Every 6 (Six) Hours As Needed for Moderate Pain (4-6).    Emergency, Nurse Armando RN   HYDROcodone-acetaminophen (NORCO) 7.5-325 MG per tablet Take 1 tablet by mouth Every 4 (Four) Hours As Needed for Moderate Pain . 7/5/19   Angel Falcon MD   hydrOXYzine (VISTARIL) 50 MG capsule Take 1 capsule by mouth 3 (Three) Times a Day As Needed for anxiety. 2/8/17   Doris Begum MD   naproxen (NAPROSYN) 500 MG tablet Take 1 tablet by mouth 2 (Two) Times a Day As Needed for Mild Pain (1-3). 5/23/17   Kalina Rodriguez APRN   PARoxetine (PAXIL) 20 MG tablet Take 1 tablet by mouth Daily. 2/8/17   Doris Begum MD   QUEtiapine (SEROquel) 200 MG tablet Take 1 tablet by mouth Every Night. 2/8/17   Doris Begum MD        Social History:   PT  reports that he has been smoking. He has been smoking about 0.50 packs per day. He has never used smokeless tobacco. He reports previous drug use. Frequency: 1.00 time per week. Drugs: Marijuana and Methamphetamines. He reports that he does not drink alcohol.    Record Review:  I have reviewed the patient[facility] records [facility].     Review of Systems  Review of Systems   Musculoskeletal:        Left middle finger pain   All other systems reviewed and are negative.       Physical Exam    /86 (BP Location: Right arm, Patient Position: Sitting)   Pulse 87   Temp 97.6 °F (36.4 °C) (Oral)   Resp 18   Ht 182.9 cm (72\")   Wt 99.8 kg (220 lb)   SpO2 " "100%   BMI 29.84 kg/m²     Physical Exam  Vitals and nursing note reviewed.   Constitutional:       General: He is not in acute distress.     Appearance: Normal appearance. He is not toxic-appearing.   HENT:      Head: Normocephalic and atraumatic.      Mouth/Throat:      Mouth: Mucous membranes are moist.   Eyes:      General: No scleral icterus.  Cardiovascular:      Rate and Rhythm: Normal rate and regular rhythm.      Pulses:           Radial pulses are 2+ on the right side and 2+ on the left side.      Heart sounds: Normal heart sounds.   Pulmonary:      Effort: Pulmonary effort is normal. No respiratory distress.      Breath sounds: Normal breath sounds. No wheezing or rhonchi.   Musculoskeletal:         General: Normal range of motion.      Right hand: Normal.      Cervical back: Normal range of motion and neck supple.      Comments: Mild swelling of proximal phalange of left middle finger, range of motion of PIP joint decreased and painful   Skin:     General: Skin is warm and dry.   Neurological:      Mental Status: He is alert and oriented to person, place, and time. Mental status is at baseline.                  ED Course  /86 (BP Location: Right arm, Patient Position: Sitting)   Pulse 87   Temp 97.6 °F (36.4 °C) (Oral)   Resp 18   Ht 182.9 cm (72\")   Wt 99.8 kg (220 lb)   SpO2 100%   BMI 29.84 kg/m²   Results for orders placed or performed during the hospital encounter of 01/11/22   COVID-19,APTIMA PANTHER([place]),[facility], NP/OP SWAB IN UTM/VTM/[facility] MEDIA,24 HR TAT - Swab, Nasopharynx    Specimen: Nasopharynx; Swab   Result Value Ref Range    COVID19 Detected (C) Not Detected - Ref. Range   POCT Influenza A/B    Specimen: Swab   Result Value Ref Range    Rapid Influenza A Ag Negative Negative    Rapid Influenza B Ag Negative Negative    Internal Control Passed Passed    Lot Number [id number]     Expiration Date 1/19/23      Medications - No data to display  XR Hand 3+ View " Left    Result Date: 6/1/2022  Narrative: PROCEDURE: XR HAND 3+ VW LEFT  COMPARISON: None  INDICATIONS: caught 3rd digit in door 5-6 days ago, hurts to bend finger  FINDINGS:  BONES: Normal.  No significant arthropathy or acute abnormality.  SOFT TISSUES: Negative.  No visible soft tissue swelling.  EFFUSION: None visible.  OTHER: No foreign body is seen.      Impression:  Negative left hand series.      ANN-MARIE BRAND MD       Electronically Signed and Approved By: ANN-MARIE BRAND MD on 6/01/2022 at 12:21               Procedures/EKGs:  Procedures      Medical Decision Making:                     MDM  Number of Diagnoses or Management Options  Contusion of left middle finger without damage to nail, initial encounter  Diagnosis management comments: No acute findings on left finger x-rays. I have spoken with the patient and explained the patient´s condition, diagnoses and treatment plan based on the information available to me at this time. I have answered the patient's questions and addressed any concerns. The patient has a good  understanding of the diagnosis, condition, and treatment plan as can be expected at this point. The vital signs have been stable. The patient´s condition is stable and appropriate for discharge from the emergency department.      The patient will pursue further outpatient evaluation with the primary care physician or other designated or consulting physician as outlined in the discharge instructions. They are agreeable to this plan of care and follow-up instructions have been explained in detail. The patient has received these instructions in written format and has expressed an understanding of the discharge instructions. The patient is aware that any significant change in condition or worsening of symptoms should prompt an immediate return to this or the closest emergency department or call to 911.       Amount and/or Complexity of Data Reviewed  Tests in the radiology section of CPT®:  reviewed and ordered    Risk of Complications, Morbidity, and/or Mortality  Presenting problems: minimal  Diagnostic procedures: minimal  Management options: minimal    Patient Progress  Patient progress: stable       Final diagnoses:   Contusion of left middle finger without damage to nail, initial encounter        Disposition:  ED Disposition     ED Disposition   Discharge    Condition   Stable    Comment   --               Part of this note may be an electronic transcription/translation of spoken language to printed text using the Dragon Dictation System.      Eduarda Gurrola, FLAVIO  06/01/22 1600

## 2022-07-09 ENCOUNTER — HOSPITAL ENCOUNTER (EMERGENCY)
Facility: HOSPITAL | Age: 57
Discharge: HOME | End: 2022-07-09
Attending: EMERGENCY MEDICINE | Admitting: EMERGENCY MEDICINE

## 2022-07-09 ENCOUNTER — APPOINTMENT (OUTPATIENT)
Dept: GENERAL RADIOLOGY | Facility: HOSPITAL | Age: 57
End: 2022-07-09

## 2022-07-09 VITALS
DIASTOLIC BLOOD PRESSURE: 87 MMHG | WEIGHT: 225 LBS | HEIGHT: 72 IN | TEMPERATURE: 98.1 F | SYSTOLIC BLOOD PRESSURE: 142 MMHG | HEART RATE: 93 BPM | BODY MASS INDEX: 30.48 KG/M2 | OXYGEN SATURATION: 100 % | RESPIRATION RATE: 17 BRPM

## 2022-07-09 DIAGNOSIS — S83.92XA SPRAIN OF LEFT KNEE, UNSPECIFIED LIGAMENT, INITIAL ENCOUNTER: Primary | ICD-10-CM

## 2022-07-09 PROCEDURE — 96372 THER/PROPH/DIAG INJ SC/IM: CPT

## 2022-07-09 PROCEDURE — 73562 X-RAY EXAM OF KNEE 3: CPT

## 2022-07-09 PROCEDURE — 99283 EMERGENCY DEPT VISIT LOW MDM: CPT

## 2022-07-09 PROCEDURE — 25010000002 KETOROLAC TROMETHAMINE PER 15 MG: Performed by: EMERGENCY MEDICINE

## 2022-07-09 RX ORDER — HYDROCODONE BITARTRATE AND ACETAMINOPHEN 5; 325 MG/1; MG/1
1 TABLET ORAL ONCE
Status: COMPLETED | OUTPATIENT
Start: 2022-07-09 | End: 2022-07-09

## 2022-07-09 RX ORDER — KETOROLAC TROMETHAMINE 10 MG/1
10 TABLET, FILM COATED ORAL EVERY 6 HOURS PRN
Qty: 15 TABLET | Refills: 0 | Status: SHIPPED | OUTPATIENT
Start: 2022-07-09 | End: 2022-08-09 | Stop reason: HOSPADM

## 2022-07-09 RX ORDER — KETOROLAC TROMETHAMINE 30 MG/ML
30 INJECTION, SOLUTION INTRAMUSCULAR; INTRAVENOUS ONCE
Status: COMPLETED | OUTPATIENT
Start: 2022-07-09 | End: 2022-07-09

## 2022-07-09 RX ADMIN — KETOROLAC TROMETHAMINE 30 MG: 30 INJECTION, SOLUTION INTRAMUSCULAR; INTRAVENOUS at 17:50

## 2022-07-09 RX ADMIN — HYDROCODONE BITARTRATE AND ACETAMINOPHEN 1 TABLET: 5; 325 TABLET ORAL at 17:50

## 2022-07-09 NOTE — ED PROVIDER NOTES
Subjective   Patient planing of left anterior knee pain starting today.  Patient dates he was helping a friend move a mattress when he rotated his knee causing pain.  Patient states pain is worse with movement and weightbearing of left knee.  Patient states he has had numerous ligament injuries and 2 surgeries in his right knee and injury feels consistent with a ligament injury.  Patient denies any additional symptoms and or concerns at this time.      History provided by:  Patient   used: No    Leg Pain  Location:  Knee  Time since incident: Starting today at about noon.  Injury: yes    Mechanism of injury comment:  Patient states was helping move a mattress when the knee pain began.  Knee location:  L knee  Pain details:     Quality:  Aching, sharp and throbbing    Radiates to:  Does not radiate    Severity:  Moderate    Onset quality:  Sudden    Duration: Starting today.    Timing:  Constant    Progression:  Waxing and waning  Chronicity:  New  Dislocation: no    Relieved by:  Nothing  Worsened by:  Bearing weight and activity  Ineffective treatments:  None tried  Associated symptoms: stiffness and swelling    Associated symptoms: no back pain, no fatigue, no fever, no itching, no muscle weakness, no neck pain, no numbness and no tingling        Review of Systems   Constitutional: Negative for chills, fatigue and fever.   HENT: Negative for congestion, ear pain and sore throat.    Eyes: Negative for pain.   Respiratory: Negative for cough, chest tightness and shortness of breath.    Cardiovascular: Negative for chest pain.   Gastrointestinal: Negative for abdominal pain, diarrhea, nausea and vomiting.   Genitourinary: Negative for flank pain and hematuria.   Musculoskeletal: Positive for stiffness. Negative for back pain, joint swelling and neck pain.        Patient planing of left knee pain.  Patient dates medial aspect of left knee is having a sharp stabbing pain.  Patient dates he was  moving a mattress when the pain began.   Skin: Negative for itching and pallor.   Neurological: Negative for seizures and headaches.   All other systems reviewed and are negative.      Past Medical History:   Diagnosis Date   • ADHD    • Anxiety disorder    • Backache    • Cellulitis of scrotum    • Insomnia disorder    • Substance abuse (HCC)    • Viral gastroenteritis        No Known Allergies    Past Surgical History:   Procedure Laterality Date   • INJECTION OF MEDICATION  08/28/2014    kENALOG(1)   • INJECTION OF MEDICATION  09/13/2014    tORADOL(2)   • INJECTION OF MEDICATION  09/20/2011    zOFRAN(1)   • KNEE ARTHROSCOPY  05/21/2014    Arthroscopy of right knee, debride medial meniscus.   • SHOULDER ARTHROSCOPY         Family History   Problem Relation Age of Onset   • Anxiety disorder Mother    • Depression Mother        Social History     Socioeconomic History   • Marital status:    Tobacco Use   • Smoking status: Current Every Day Smoker     Packs/day: 0.50   • Smokeless tobacco: Never Used   Vaping Use   • Vaping Use: Never used   Substance and Sexual Activity   • Alcohol use: No   • Drug use: Not Currently     Frequency: 1.0 times per week     Types: Marijuana, Methamphetamines     Comment: former           Objective   Physical Exam  Vitals and nursing note reviewed.   Constitutional:       General: He is not in acute distress.     Appearance: Normal appearance. He is not toxic-appearing.   HENT:      Head: Normocephalic and atraumatic.      Mouth/Throat:      Mouth: Mucous membranes are moist.   Eyes:      General: No scleral icterus.  Cardiovascular:      Rate and Rhythm: Normal rate and regular rhythm.      Pulses: Normal pulses.      Heart sounds: Normal heart sounds.   Pulmonary:      Effort: Pulmonary effort is normal. No respiratory distress.      Breath sounds: Normal breath sounds.   Abdominal:      General: Abdomen is flat.      Palpations: Abdomen is soft.      Tenderness: There is no  abdominal tenderness.   Musculoskeletal:         General: Normal range of motion.      Cervical back: Normal range of motion and neck supple.        Legs:    Skin:     General: Skin is warm and dry.   Neurological:      Mental Status: He is alert and oriented to person, place, and time. Mental status is at baseline.   Psychiatric:         Mood and Affect: Mood normal.         Behavior: Behavior normal.         Thought Content: Thought content normal.         Judgment: Judgment normal.         Procedures           ED Course                                           MDM  Number of Diagnoses or Management Options  Diagnosis management comments: I have spoken with patient. I have explained the patient´s condition, diagnoses and treatment plan based on the information available to me at this time. I have answered the patient's questions and addressed any concerns. The patient has a good  understanding of the patient´s diagnosis, condition, and treatment plan as can be expected at this point. The vital signs have been stable. The patient´s condition is stable and appropriate for discharge from the emergency department.      The patient will pursue further outpatient evaluation with the primary care physician or other designated or consulting physician as outlined in the discharge instructions. They are agreeable to this plan of care and follow-up instructions have been explained in detail. The patient has received these instructions in written format and have expressed an understanding of the discharge instructions. The patient is aware that any significant change in condition or worsening of symptoms should prompt an immediate return to this or the closest emergency department or call to 911.       Amount and/or Complexity of Data Reviewed  Tests in the radiology section of CPT®: reviewed    Risk of Complications, Morbidity, and/or Mortality  Presenting problems: moderate  Diagnostic procedures: low  Management options:  low    Patient Progress  Patient progress: stable      Final diagnoses:   Sprain of left knee, unspecified ligament, initial encounter       ED Disposition  ED Disposition     ED Disposition   Discharge    Condition   Stable    Comment   --             Provider, No Known  [facility]  [place] [id number]    In 3 days      Carlos Hameed MD  [address] [place], [place] [id number] [phone]    Schedule an appointment as soon as possible for a visit            Medication List      New Prescriptions    ketorolac 10 MG tablet  Commonly known as: TORADOL  Take 1 tablet by mouth Every 6 (Six) Hours As Needed for Moderate Pain .        Changed    QUEtiapine 200 MG tablet  Commonly known as: SEROquel  Take 1 tablet by mouth Every Night.  What changed: how much to take        Stop    amphetamine-dextroamphetamine 20 MG tablet  Commonly known as: ADDERALL     gabapentin 400 MG capsule  Commonly known as: NEURONTIN     HYDROcodone-acetaminophen 7.5-325 MG per tablet  Commonly known as: NORCO           Where to Get Your Medications      These medications were sent to [id number] - [place], [place] - [address] AT [facility] - [phone]  - [phone]  [address] [address]    Phone: [phone]   · ketorolac 10 MG tablet          Fabrice Collado APRN  07/09/22 1748

## 2022-07-11 ENCOUNTER — OFFICE VISIT (OUTPATIENT)
Dept: ORTHOPEDIC SURGERY | Facility: CLINIC | Age: 57
End: 2022-07-11

## 2022-07-11 VITALS — HEART RATE: 101 BPM | BODY MASS INDEX: 30.48 KG/M2 | OXYGEN SATURATION: 98 % | HEIGHT: 72 IN | WEIGHT: 225 LBS

## 2022-07-11 DIAGNOSIS — M23.92 ACUTE INTERNAL DERANGEMENT OF LEFT KNEE: ICD-10-CM

## 2022-07-11 DIAGNOSIS — S89.92XA INJURY OF LEFT KNEE, INITIAL ENCOUNTER: Primary | ICD-10-CM

## 2022-07-11 DIAGNOSIS — S83.92XA SPRAIN OF LEFT KNEE, UNSPECIFIED LIGAMENT, INITIAL ENCOUNTER: ICD-10-CM

## 2022-07-11 PROCEDURE — 99203 OFFICE O/P NEW LOW 30 MIN: CPT | Performed by: STUDENT IN AN ORGANIZED HEALTH CARE EDUCATION/TRAINING PROGRAM

## 2022-07-11 NOTE — PROGRESS NOTES
"Chief Complaint  Pain and Initial Evaluation of the Left Knee    Subjective          Honorio Vigil Jr. presents to [facility] for   History of Present Illness    The patient presents here today for evaluation of the left knee. The patient reports he was carrying a mattress down some stairs and he felt a sharp stabbing pain on 7/9/22. He reports he could not bear weight. He locates pain to the medial knee. He was seen and evaluated with x-rays. He was placed in a knee brace and given crutches. He denies previous surgery. He has been taking toradol.   Allergies   Allergen Reactions   • Naproxen GI Intolerance        Social History     Socioeconomic History   • Marital status:    Tobacco Use   • Smoking status: Current Every Day Smoker     Packs/day: 0.50   • Smokeless tobacco: Never Used   Vaping Use   • Vaping Use: Never used   Substance and Sexual Activity   • Alcohol use: No   • Drug use: Not Currently     Frequency: 1.0 times per week     Types: Marijuana, Methamphetamines     Comment: former        I reviewed the patient's chief complaint, history of present illness, review of systems, past medical history, surgical history, family history, social history, medications, and allergy list.     REVIEW OF SYSTEMS    Constitutional: Denies fevers, chills, weight loss  Cardiovascular: Denies chest pain, shortness of breath  Skin: Denies rashes, acute skin changes  Neurologic: Denies headache, loss of consciousness  MSK: Left knee pain      Objective   Vital Signs:   Pulse 101   Ht 182.9 cm (72\")   Wt 102 kg (225 lb)   SpO2 98%   BMI 30.52 kg/m²     Body mass index is 30.52 kg/m².    Physical Exam    General: Alert. No acute distress.   Left knee- mild effusion. Knee Extensor Mechanism  Intact. ROM 0-110 degrees. Medial joint line tenderness. No lateral joint line tenderness. Increased translation with Lachman's. Stable to posterior drawer. Calf soft. Stable to " varus/valgus stress. Positive EHL, FHL, GS and TA. Sensation intact to all 5 nerves of the foot. Positive pulses. Positive Yuridia's to the medial joint.     Procedures    Imaging Results (Most Recent)     None                   Assessment and Plan        XR Knee 1 or 2 View Left    Result Date: 7/6/2022  Narrative: PROCEDURE: XR KNEE 1 OR 2 VW LEFT  COMPARISON: None  INDICATIONS: left medial knee pain x 3 weeks, no known injury  FINDINGS:  No acute fracture or dislocation is identified.  There is a small suprapatellar knee effusion.  Joint space is well maintained the there is minimal patellar osteophytosis.  Normal bone mineralization.      Impression:   1. Small suprapatellar knee effusion and mild degenerative changes involving the patella.      AMY SALCEDO MD       Electronically Signed and Approved By: AMY SALCEDO MD on 7/06/2022 at 13:34             XR Knee 3 View Left    Result Date: 7/9/2022  Narrative: PROCEDURE: XR KNEE 3 VW LEFT  COMPARISON: [facility], , XR KNEE 1 OR 2 VW LEFT, 7/06/2022, 13:24.  INDICATIONS: injury/LEFT KNEE PAIN  FINDINGS:  No fractures are visualized.  No lytic or sclerotic bone lesions are seen.  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  A small joint effusion is unchanged.      Impression:   Mild patellar chondromalacia with small joint effusion, unchanged.      ANN-MARIE BRAND MD       Electronically Signed and Approved By: ANN-MARIE BRAND MD on 7/09/2022 at 16:58                Diagnoses and all orders for this visit:    1. Injury of left knee, initial encounter (Primary)  -     MRI Knee Left Without Contrast; Future    2. Sprain of left knee, unspecified ligament, initial encounter    3. Acute internal derangement of left knee  -     MRI Knee Left Without Contrast; Future        Discussed the treatment plan with the patient.  I reviewed the x-rays that were previously obtained. Normal knee brace given today. Plan for MRI of the left knee to evaluate for  internal derangement.     Call or return if worsening symptoms.    Scribed for Carlos Hameed MD by Shanda Bay  07/11/2022   13:54 EDT         Follow Up   Return for MRI results.  Patient was given instructions and counseling regarding his condition or for health maintenance advice. Please see specific information pulled into the AVS if appropriate.       I have personally performed the services described in this document as scribed by the above individual and it is both accurate and complete.     Carlos Hameed MD  07/11/22  13:59 EDT

## 2022-07-23 ENCOUNTER — HOSPITAL ENCOUNTER (OUTPATIENT)
Dept: MRI IMAGING | Facility: HOSPITAL | Age: 57
Discharge: HOME | End: 2022-07-23
Admitting: STUDENT IN AN ORGANIZED HEALTH CARE EDUCATION/TRAINING PROGRAM

## 2022-07-23 DIAGNOSIS — S89.92XA INJURY OF LEFT KNEE, INITIAL ENCOUNTER: ICD-10-CM

## 2022-07-23 DIAGNOSIS — M23.92 ACUTE INTERNAL DERANGEMENT OF LEFT KNEE: ICD-10-CM

## 2022-07-23 PROCEDURE — 73721 MRI JNT OF LWR EXTRE W/O DYE: CPT

## 2022-07-25 ENCOUNTER — TELEPHONE (OUTPATIENT)
Dept: ORTHOPEDIC SURGERY | Facility: CLINIC | Age: 57
End: 2022-07-25

## 2022-07-25 NOTE — TELEPHONE ENCOUNTER
Caller: PATIENT     Relationship to patient: SELF     Best call back number: [phone]    Patient is needing: PATIENT HAD AN MRI OF HIS LEFT KNEE ON 7-23-22 AND WOULD LIKE SOMEONE TO CALL HIM WITH THE RESULTS.

## 2022-07-25 NOTE — TELEPHONE ENCOUNTER
----- Message from Lesly Turcios sent at 7/25/2022  3:16 PM EDT -----  Regarding: RESULTS FOLLOW UP  PLEASE CONTACT PATIENT TO SCHEDULE MRI RESULTS FOLLOW UP WITH DR VIVAS

## 2022-07-25 NOTE — TELEPHONE ENCOUNTER
Caller: Honorio Vigil Jr.    Relationship to patient: Self    Best call back number:     Patient is needing:PATIENT IS SURE THAT HE HAS A TEAR IN HIS LEFT KNEE. HAD THE MRI DONE ON 7/23/22 AND HIS FOLLOW UP IS NOT UNTIL 8/5/22. IS STILL IN PAIN AND WANTING TO BE SEEN SOONER IF POSSIBLE. PLEASE CALL TO ASSIST, THANK YOU!

## 2022-07-29 ENCOUNTER — TELEPHONE (OUTPATIENT)
Dept: ORTHOPEDIC SURGERY | Facility: CLINIC | Age: 57
End: 2022-07-29

## 2022-07-29 ENCOUNTER — OFFICE VISIT (OUTPATIENT)
Dept: ORTHOPEDIC SURGERY | Facility: CLINIC | Age: 57
End: 2022-07-29

## 2022-07-29 ENCOUNTER — PREP FOR SURGERY (OUTPATIENT)
Dept: OTHER | Facility: HOSPITAL | Age: 57
End: 2022-07-29

## 2022-07-29 VITALS — HEIGHT: 72 IN | OXYGEN SATURATION: 98 % | HEART RATE: 94 BPM | BODY MASS INDEX: 30.88 KG/M2 | WEIGHT: 228 LBS

## 2022-07-29 DIAGNOSIS — S83.242A ACUTE MEDIAL MENISCUS TEAR OF LEFT KNEE, INITIAL ENCOUNTER: ICD-10-CM

## 2022-07-29 DIAGNOSIS — M23.92 ACUTE INTERNAL DERANGEMENT OF LEFT KNEE: Primary | ICD-10-CM

## 2022-07-29 DIAGNOSIS — S83.242A ACUTE MEDIAL MENISCUS TEAR OF LEFT KNEE, INITIAL ENCOUNTER: Primary | ICD-10-CM

## 2022-07-29 PROCEDURE — 99213 OFFICE O/P EST LOW 20 MIN: CPT | Performed by: STUDENT IN AN ORGANIZED HEALTH CARE EDUCATION/TRAINING PROGRAM

## 2022-07-29 RX ORDER — MIRTAZAPINE 15 MG/1
TABLET, FILM COATED ORAL
COMMUNITY
Start: 2022-05-10 | End: 2022-08-02

## 2022-07-29 RX ORDER — DEXTROAMPHETAMINE SULFATE, DEXTROAMPHETAMINE SACCHARATE, AMPHETAMINE SULFATE AND AMPHETAMINE ASPARTATE 7.5; 7.5; 7.5; 7.5 MG/1; MG/1; MG/1; MG/1
30 CAPSULE, EXTENDED RELEASE ORAL EVERY MORNING
COMMUNITY
Start: 2022-07-12 | End: 2022-11-16

## 2022-07-29 RX ORDER — GABAPENTIN 600 MG/1
600 TABLET ORAL 2 TIMES DAILY
COMMUNITY
Start: 2022-07-12

## 2022-07-29 RX ORDER — NAPROXEN 500 MG/1
500 TABLET ORAL 2 TIMES DAILY
Qty: 60 TABLET | Refills: 0 | Status: SHIPPED | OUTPATIENT
Start: 2022-07-29 | End: 2022-08-02

## 2022-07-29 RX ORDER — CEFAZOLIN SODIUM 2 G/100ML
2 INJECTION, SOLUTION INTRAVENOUS ONCE
Status: CANCELLED | OUTPATIENT
Start: 2022-07-29 | End: 2022-07-29

## 2022-07-29 RX ORDER — CEFAZOLIN SODIUM IN 0.9 % NACL 3 G/100 ML
3 INTRAVENOUS SOLUTION, PIGGYBACK (ML) INTRAVENOUS ONCE
Status: CANCELLED | OUTPATIENT
Start: 2022-07-29 | End: 2022-07-29

## 2022-07-29 RX ORDER — HYDROXYZINE 50 MG/1
50 TABLET, FILM COATED ORAL 3 TIMES DAILY PRN
COMMUNITY
Start: 2022-07-11 | End: 2022-11-16

## 2022-07-29 NOTE — TELEPHONE ENCOUNTER
CALLED PATIENT PRIOR TO SURGERY SCHEDULING. PATIENT WAS PRESCRIBED NAPROXEN 500MG AT APPOINTMENT THIS MORNING, WHEN GOING OVER WHAT MEDS PATIENT WOULD NEED TO HOLD PRIOR TO SURGERY HE STATED NAPROXEN HURTS HIS STOMACH AND REQUESTED SOMETHING DIFFERENT.   [facility]

## 2022-07-29 NOTE — PROGRESS NOTES
"Chief Complaint  Follow-up and Pain of the Left Knee    Subjective          oHnorio Vigil Jr. presents to [facility] for   History of Present Illness    Mr. Vigil returns to the office for follow-up of his left knee after obtaining an MRI.  To review he injured his knee while carrying a mattress down the stairs on 7/9/2022.  The exam was concerning for a medial meniscus tear.  He denies any new symptoms.  He denies any distal numbness.  He denies any chest pain or shortness of breath.  He has a history of prior right knee arthroscopy.  No prior left knee surgeries.  Allergies   Allergen Reactions   • Naproxen GI Intolerance        Social History     Socioeconomic History   • Marital status:    Tobacco Use   • Smoking status: Current Every Day Smoker     Packs/day: 0.50   • Smokeless tobacco: Never Used   Vaping Use   • Vaping Use: Never used   Substance and Sexual Activity   • Alcohol use: No   • Drug use: Not Currently     Frequency: 1.0 times per week     Types: Marijuana, Methamphetamines     Comment: former        I reviewed the patient's chief complaint, history of present illness, review of systems, past medical history, surgical history, family history, social history, medications, and allergy list.     REVIEW OF SYSTEMS    Constitutional: Denies fevers, chills, weight loss  Cardiovascular: Denies chest pain, shortness of breath  Skin: Denies rashes, acute skin changes  Neurologic: Denies headache, loss of consciousness  MSK: Left knee pain      Objective   Vital Signs:   Pulse 94   Ht 182.9 cm (72\")   Wt 103 kg (228 lb)   SpO2 98%   BMI 30.92 kg/m²     Body mass index is 30.92 kg/m².    Physical Exam    General: Alert. No acute distress.   Cardiac: Intact peripheral pulses  Pulmonary: Nonlabored respirations  Left knee- mild effusion. Knee Extensor Mechanism  Intact. ROM 0-110 degrees. Medial joint line tenderness. No lateral joint line tenderness. " Increased translation with Lachman's. Stable to posterior drawer. Calf soft. Stable to varus/valgus stress. Positive EHL, FHL, GS and TA. Sensation intact to all 5 nerves of the foot. Positive pulses. Positive Yuridia's to the medial joint.       Procedures    Imaging Results (Most Recent)     None                   Assessment and Plan        XR Knee 1 or 2 View Left    Result Date: 7/6/2022  Narrative: PROCEDURE: XR KNEE 1 OR 2 VW LEFT  COMPARISON: None  INDICATIONS: left medial knee pain x 3 weeks, no known injury  FINDINGS:  No acute fracture or dislocation is identified.  There is a small suprapatellar knee effusion.  Joint space is well maintained the there is minimal patellar osteophytosis.  Normal bone mineralization.      Impression:   1. Small suprapatellar knee effusion and mild degenerative changes involving the patella.      AMY SALCEDO MD       Electronically Signed and Approved By: AMY SALCEDO MD on 7/06/2022 at 13:34             XR Knee 3 View Left    Result Date: 7/9/2022  Narrative: PROCEDURE: XR KNEE 3 VW LEFT  COMPARISON: [facility], , XR KNEE 1 OR 2 VW LEFT, 7/06/2022, 13:24.  INDICATIONS: injury/LEFT KNEE PAIN  FINDINGS:  No fractures are visualized.  No lytic or sclerotic bone lesions are seen.  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  A small joint effusion is unchanged.      Impression:   Mild patellar chondromalacia with small joint effusion, unchanged.      ANN-MARIE BRAND MD       Electronically Signed and Approved By: ANN-MARIE BRAND MD on 7/09/2022 at 16:58             MRI Knee Left Without Contrast    Result Date: 7/25/2022  Narrative: PROCEDURE: MRI KNEE LEFT  WO CONTRAST  COMPARISON: [facility], , XR KNEE 3 VW LEFT, 7/09/2022, 16:22.  INDICATIONS: Knee trauma, meniscal/ligament injury suspected, xray done (Age >= 1y)      TECHNIQUE: A complete multi-planar MRI was performed.   FINDINGS:  No fracture or malalignment is seen.  Mild marrow edema is  noted in the medial tibial plateau.  There is a complex undersurface tear of the medial meniscal body.  No parameniscal cyst is seen.  No tear of the lateral meniscus is seen.  The cruciate ligaments, lateral collateral ligament complex, patellar retinacula and extensor mechanism appear unremarkable.  There is edema noted along the medial collateral ligament which itself appears intact.  Finding is consistent with a grade 1 sprain.  A small knee joint effusion is noted.  Cartilage in the joint appears intact.  Early osteoarthritic spurring is noted.  A 4.0 cm x 0.9 cm popliteal cyst is noted.      Impression:   1. Complex tear of the medial meniscal body 2. Small knee joint effusion 3. 4.0 cm x 0.9 cm popliteal cyst 4. Grade 1 MCL sprain     Wali Patino M.D.       Electronically Signed and Approved By: Wali Patino M.D. on 7/25/2022 at 12:19                Diagnoses and all orders for this visit:    1. Acute internal derangement of left knee (Primary)  -     naproxen (NAPROSYN) 500 MG tablet; Take 1 tablet by mouth 2 (Two) Times a Day.  Dispense: 60 tablet; Refill: 0    2. Acute medial meniscus tear of left knee, initial encounter        Mr. Vigil has a medial meniscus tear and grade 1 MCL sprain.  We discussed treatment options.  We discussed both operative and nonoperative management.  Given his pain and acute injury he elected to proceed with surgery.  We specifically discussed left knee arthroscopy with partial medial meniscectomy and possible chondroplasty.  We discussed that the primary benefit of surgery is relieving his mechanical symptoms secondary to the meniscus tear.  We discussed surgical risk including bleeding, infection, damage nerves and blood vessels, cartilage injury, retear, persistent pain, stiffness, anesthesia risk, DVT/PE, and need for additional procedures.  He elected to proceed with surgery.      Discussed surgery. and Call or return if worsening symptoms.    Scribed for Carlos  MD Zari by Carlos Hameed MD  07/29/2022   08:04 EDT         Follow Up   Return in about 2 weeks (around 8/12/2022).  Patient was given instructions and counseling regarding his condition or for health maintenance advice. Please see specific information pulled into the AVS if appropriate.       I have personally performed the services described in this document as scribed by the above individual and it is both accurate and complete.     Carlos Hameed MD  07/29/22  08:06 EDT

## 2022-07-29 NOTE — TELEPHONE ENCOUNTER
PER DR VIVAS PATIENT ADVISED TO TAKE OTC TYLENOL/IBUPROFEN TO HELP WITH PAIN. DR VIVAS WILL PRESCRIBE SOMETHING FOR PAIN AFTER SURGERY. PATIENT VERBALIZED UNDERSTANDING.

## 2022-08-02 NOTE — PRE-PROCEDURE INSTRUCTIONS
PATIENT INSTRUCTED TO BE:    - NPO AFTER MIDNIGHT EXCEPT CAN HAVE CLEAR LIQUIDS 2 HOURS PRIOR TO SURGERY ARRIVAL TIME     - TO HOLD ALL VITAMINS, SUPPLEMENTS, NSAIDS FOR ONE WEEK PRIOR TO THEIR SURGICAL PROCEDURE    - INSTRUCTED PT TO USE SURGICAL SOAP 1 TIME THE NIGHT PRIOR TO SURGERY OR THE AM OF SURGERY.   USE SOAP FROM NECK TO TOES AVOID THEIR FACE, HAIR, AND PRIVATE PARTS. INSTRUCTED NO LOTIONS, JEWELRY, PIERCINGS, OR DEODORANT DAY OF SURGERY        - INSTRUCTED TO TAKE THE FOLLOWING MEDICATIONS THE DAY OF SURGERY: Atarax, Gabapentin      PATIENT VERBALIZED UNDERSTANDING

## 2022-08-04 ENCOUNTER — LAB (OUTPATIENT)
Dept: LAB | Facility: HOSPITAL | Age: 57
End: 2022-08-04

## 2022-08-04 DIAGNOSIS — S83.242A ACUTE MEDIAL MENISCUS TEAR OF LEFT KNEE, INITIAL ENCOUNTER: ICD-10-CM

## 2022-08-04 LAB — SARS-COV-2 RNA PNL SPEC NAA+PROBE: NOT DETECTED

## 2022-08-04 PROCEDURE — U0004 COV-19 TEST NON-CDC HGH THRU: HCPCS

## 2022-08-09 ENCOUNTER — ANESTHESIA (OUTPATIENT)
Dept: PERIOP | Facility: HOSPITAL | Age: 57
End: 2022-08-09

## 2022-08-09 ENCOUNTER — HOSPITAL ENCOUNTER (OUTPATIENT)
Facility: HOSPITAL | Age: 57
Setting detail: HOSPITAL OUTPATIENT SURGERY
Discharge: HOME | End: 2022-08-09
Attending: STUDENT IN AN ORGANIZED HEALTH CARE EDUCATION/TRAINING PROGRAM | Admitting: STUDENT IN AN ORGANIZED HEALTH CARE EDUCATION/TRAINING PROGRAM

## 2022-08-09 ENCOUNTER — ANESTHESIA EVENT (OUTPATIENT)
Dept: PERIOP | Facility: HOSPITAL | Age: 57
End: 2022-08-09

## 2022-08-09 VITALS
DIASTOLIC BLOOD PRESSURE: 72 MMHG | WEIGHT: 223.55 LBS | RESPIRATION RATE: 16 BRPM | SYSTOLIC BLOOD PRESSURE: 113 MMHG | HEIGHT: 72 IN | HEART RATE: 83 BPM | TEMPERATURE: 97.1 F | BODY MASS INDEX: 30.28 KG/M2 | OXYGEN SATURATION: 93 %

## 2022-08-09 DIAGNOSIS — S83.242A ACUTE MEDIAL MENISCUS TEAR OF LEFT KNEE, INITIAL ENCOUNTER: ICD-10-CM

## 2022-08-09 LAB — QT INTERVAL: 398 MS

## 2022-08-09 PROCEDURE — 25010000002 HYDROMORPHONE 1 MG/ML SOLUTION: Performed by: NURSE ANESTHETIST, CERTIFIED REGISTERED

## 2022-08-09 PROCEDURE — 25010000002 DEXAMETHASONE PER 1 MG: Performed by: NURSE ANESTHETIST, CERTIFIED REGISTERED

## 2022-08-09 PROCEDURE — 29881 ARTHRS KNE SRG MNISECTMY M/L: CPT | Performed by: STUDENT IN AN ORGANIZED HEALTH CARE EDUCATION/TRAINING PROGRAM

## 2022-08-09 PROCEDURE — 25010000002 MIDAZOLAM PER 1 MG: Performed by: ANESTHESIOLOGY

## 2022-08-09 PROCEDURE — 29881 ARTHRS KNE SRG MNISECTMY M/L: CPT | Performed by: PHYSICIAN ASSISTANT

## 2022-08-09 PROCEDURE — 93010 ELECTROCARDIOGRAM REPORT: CPT | Performed by: INTERNAL MEDICINE

## 2022-08-09 PROCEDURE — 25010000002 CEFAZOLIN IN DEXTROSE 2-4 GM/100ML-% SOLUTION: Performed by: STUDENT IN AN ORGANIZED HEALTH CARE EDUCATION/TRAINING PROGRAM

## 2022-08-09 PROCEDURE — 93005 ELECTROCARDIOGRAM TRACING: CPT | Performed by: STUDENT IN AN ORGANIZED HEALTH CARE EDUCATION/TRAINING PROGRAM

## 2022-08-09 PROCEDURE — 25010000002 FENTANYL CITRATE (PF) 50 MCG/ML SOLUTION: Performed by: NURSE ANESTHETIST, CERTIFIED REGISTERED

## 2022-08-09 PROCEDURE — 25010000002 ONDANSETRON PER 1 MG: Performed by: NURSE ANESTHETIST, CERTIFIED REGISTERED

## 2022-08-09 PROCEDURE — 25010000002 PROPOFOL 10 MG/ML EMULSION: Performed by: NURSE ANESTHETIST, CERTIFIED REGISTERED

## 2022-08-09 RX ORDER — BUPIVACAINE HYDROCHLORIDE 2.5 MG/ML
INJECTION, SOLUTION EPIDURAL; INFILTRATION; INTRACAUDAL AS NEEDED
Status: DISCONTINUED | OUTPATIENT
Start: 2022-08-09 | End: 2022-08-09 | Stop reason: HOSPADM

## 2022-08-09 RX ORDER — SODIUM CHLORIDE, SODIUM LACTATE, POTASSIUM CHLORIDE, CALCIUM CHLORIDE 600; 310; 30; 20 MG/100ML; MG/100ML; MG/100ML; MG/100ML
9 INJECTION, SOLUTION INTRAVENOUS CONTINUOUS PRN
Status: DISCONTINUED | OUTPATIENT
Start: 2022-08-09 | End: 2022-08-09 | Stop reason: HOSPADM

## 2022-08-09 RX ORDER — MEPERIDINE HYDROCHLORIDE 25 MG/ML
12.5 INJECTION INTRAMUSCULAR; INTRAVENOUS; SUBCUTANEOUS
Status: DISCONTINUED | OUTPATIENT
Start: 2022-08-09 | End: 2022-08-09 | Stop reason: HOSPADM

## 2022-08-09 RX ORDER — DEXAMETHASONE SODIUM PHOSPHATE 4 MG/ML
INJECTION, SOLUTION INTRA-ARTICULAR; INTRALESIONAL; INTRAMUSCULAR; INTRAVENOUS; SOFT TISSUE AS NEEDED
Status: DISCONTINUED | OUTPATIENT
Start: 2022-08-09 | End: 2022-08-09 | Stop reason: SURG

## 2022-08-09 RX ORDER — ONDANSETRON 4 MG/1
4 TABLET, FILM COATED ORAL EVERY 8 HOURS PRN
Qty: 30 TABLET | Refills: 0 | Status: SHIPPED | OUTPATIENT
Start: 2022-08-09 | End: 2022-11-16

## 2022-08-09 RX ORDER — DOCUSATE SODIUM 100 MG/1
100 CAPSULE, LIQUID FILLED ORAL 2 TIMES DAILY PRN
Qty: 20 CAPSULE | Refills: 0 | Status: SHIPPED | OUTPATIENT
Start: 2022-08-09 | End: 2022-11-16

## 2022-08-09 RX ORDER — CEFAZOLIN SODIUM IN 0.9 % NACL 3 G/100 ML
3 INTRAVENOUS SOLUTION, PIGGYBACK (ML) INTRAVENOUS ONCE
Status: DISCONTINUED | OUTPATIENT
Start: 2022-08-09 | End: 2022-08-09 | Stop reason: SDUPTHER

## 2022-08-09 RX ORDER — LIDOCAINE HYDROCHLORIDE 20 MG/ML
INJECTION, SOLUTION EPIDURAL; INFILTRATION; INTRACAUDAL; PERINEURAL AS NEEDED
Status: DISCONTINUED | OUTPATIENT
Start: 2022-08-09 | End: 2022-08-09 | Stop reason: SURG

## 2022-08-09 RX ORDER — PROMETHAZINE HYDROCHLORIDE 12.5 MG/1
25 TABLET ORAL ONCE AS NEEDED
Status: DISCONTINUED | OUTPATIENT
Start: 2022-08-09 | End: 2022-08-09 | Stop reason: HOSPADM

## 2022-08-09 RX ORDER — OXYCODONE HYDROCHLORIDE 5 MG/1
5 TABLET ORAL
Status: DISCONTINUED | OUTPATIENT
Start: 2022-08-09 | End: 2022-08-09 | Stop reason: HOSPADM

## 2022-08-09 RX ORDER — PHENYLEPHRINE HCL IN 0.9% NACL 1 MG/10 ML
SYRINGE (ML) INTRAVENOUS AS NEEDED
Status: DISCONTINUED | OUTPATIENT
Start: 2022-08-09 | End: 2022-08-09 | Stop reason: SURG

## 2022-08-09 RX ORDER — PROPOFOL 10 MG/ML
VIAL (ML) INTRAVENOUS AS NEEDED
Status: DISCONTINUED | OUTPATIENT
Start: 2022-08-09 | End: 2022-08-09 | Stop reason: SURG

## 2022-08-09 RX ORDER — ACETAMINOPHEN 500 MG
1000 TABLET ORAL ONCE
Status: COMPLETED | OUTPATIENT
Start: 2022-08-09 | End: 2022-08-09

## 2022-08-09 RX ORDER — CEFAZOLIN SODIUM 2 G/100ML
2 INJECTION, SOLUTION INTRAVENOUS ONCE
Status: COMPLETED | OUTPATIENT
Start: 2022-08-09 | End: 2022-08-09

## 2022-08-09 RX ORDER — ONDANSETRON 2 MG/ML
4 INJECTION INTRAMUSCULAR; INTRAVENOUS ONCE AS NEEDED
Status: DISCONTINUED | OUTPATIENT
Start: 2022-08-09 | End: 2022-08-09 | Stop reason: HOSPADM

## 2022-08-09 RX ORDER — PROMETHAZINE HYDROCHLORIDE 25 MG/1
25 SUPPOSITORY RECTAL ONCE AS NEEDED
Status: DISCONTINUED | OUTPATIENT
Start: 2022-08-09 | End: 2022-08-09 | Stop reason: HOSPADM

## 2022-08-09 RX ORDER — EPHEDRINE SULFATE 50 MG/ML
INJECTION, SOLUTION INTRAVENOUS AS NEEDED
Status: DISCONTINUED | OUTPATIENT
Start: 2022-08-09 | End: 2022-08-09 | Stop reason: SURG

## 2022-08-09 RX ORDER — HYDROCODONE BITARTRATE AND ACETAMINOPHEN 5; 325 MG/1; MG/1
1 TABLET ORAL EVERY 4 HOURS PRN
Qty: 30 TABLET | Refills: 0 | Status: SHIPPED | OUTPATIENT
Start: 2022-08-09 | End: 2022-11-16

## 2022-08-09 RX ORDER — FENTANYL CITRATE 50 UG/ML
INJECTION, SOLUTION INTRAMUSCULAR; INTRAVENOUS AS NEEDED
Status: DISCONTINUED | OUTPATIENT
Start: 2022-08-09 | End: 2022-08-09 | Stop reason: SURG

## 2022-08-09 RX ORDER — MIDAZOLAM HYDROCHLORIDE 1 MG/ML
2 INJECTION INTRAMUSCULAR; INTRAVENOUS ONCE
Status: COMPLETED | OUTPATIENT
Start: 2022-08-09 | End: 2022-08-09

## 2022-08-09 RX ORDER — MAGNESIUM HYDROXIDE 1200 MG/15ML
LIQUID ORAL AS NEEDED
Status: DISCONTINUED | OUTPATIENT
Start: 2022-08-09 | End: 2022-08-09 | Stop reason: HOSPADM

## 2022-08-09 RX ORDER — ONDANSETRON 2 MG/ML
INJECTION INTRAMUSCULAR; INTRAVENOUS AS NEEDED
Status: DISCONTINUED | OUTPATIENT
Start: 2022-08-09 | End: 2022-08-09 | Stop reason: SURG

## 2022-08-09 RX ADMIN — FENTANYL CITRATE 25 MCG: 50 INJECTION, SOLUTION INTRAMUSCULAR; INTRAVENOUS at 10:11

## 2022-08-09 RX ADMIN — ONDANSETRON 4 MG: 2 INJECTION INTRAMUSCULAR; INTRAVENOUS at 10:13

## 2022-08-09 RX ADMIN — CEFAZOLIN SODIUM 2 G: 2 INJECTION, SOLUTION INTRAVENOUS at 09:51

## 2022-08-09 RX ADMIN — MIDAZOLAM HYDROCHLORIDE 2 MG: 2 INJECTION, SOLUTION INTRAMUSCULAR; INTRAVENOUS at 09:30

## 2022-08-09 RX ADMIN — FENTANYL CITRATE 50 MCG: 50 INJECTION, SOLUTION INTRAMUSCULAR; INTRAVENOUS at 09:54

## 2022-08-09 RX ADMIN — LIDOCAINE HYDROCHLORIDE 100 MG: 20 INJECTION, SOLUTION EPIDURAL; INFILTRATION; INTRACAUDAL; PERINEURAL at 09:54

## 2022-08-09 RX ADMIN — ACETAMINOPHEN 1000 MG: 500 TABLET ORAL at 08:00

## 2022-08-09 RX ADMIN — HYDROMORPHONE HYDROCHLORIDE 0.5 MG: 1 INJECTION, SOLUTION INTRAMUSCULAR; INTRAVENOUS; SUBCUTANEOUS at 10:51

## 2022-08-09 RX ADMIN — EPHEDRINE SULFATE 10 MG: 50 INJECTION INTRAVENOUS at 10:00

## 2022-08-09 RX ADMIN — PROPOFOL 200 MG: 10 INJECTION, EMULSION INTRAVENOUS at 09:56

## 2022-08-09 RX ADMIN — SODIUM CHLORIDE, POTASSIUM CHLORIDE, SODIUM LACTATE AND CALCIUM CHLORIDE 9 ML/HR: 600; 310; 30; 20 INJECTION, SOLUTION INTRAVENOUS at 08:00

## 2022-08-09 RX ADMIN — EPHEDRINE SULFATE 10 MG: 50 INJECTION INTRAVENOUS at 10:06

## 2022-08-09 RX ADMIN — DEXAMETHASONE SODIUM PHOSPHATE 4 MG: 4 INJECTION, SOLUTION INTRA-ARTICULAR; INTRALESIONAL; INTRAMUSCULAR; INTRAVENOUS; SOFT TISSUE at 10:02

## 2022-08-09 RX ADMIN — FENTANYL CITRATE 25 MCG: 50 INJECTION, SOLUTION INTRAMUSCULAR; INTRAVENOUS at 10:23

## 2022-08-09 RX ADMIN — Medication 100 MCG: at 10:03

## 2022-08-09 NOTE — ANESTHESIA POSTPROCEDURE EVALUATION
Patient: Honorio Vigil Jr.    Procedure Summary     Date: 08/09/22 Room / Location: [facility]  / [facility]    Anesthesia Start: 0951 Anesthesia Stop:     Procedure: KNEE ARTHROSCOPY WITH PARTIAL MEDIAL MENISCECTOMY (Left Knee) Diagnosis:       Acute medial meniscus tear of left knee, initial encounter      (Acute medial meniscus tear of left knee, initial encounter [S83.242A])    Surgeons: Carlos Hameed MD Provider: Rc Ford MD    Anesthesia Type: general ASA Status: 2          Anesthesia Type: general    Vitals  No vitals data found for the desired time range.          Post Anesthesia Care and Evaluation    Patient location during evaluation: bedside  Patient participation: complete - patient participated  Level of consciousness: awake and alert  Pain management: adequate    Airway patency: patent  Anesthetic complications: No anesthetic complications  PONV Status: none  Cardiovascular status: acceptable  Respiratory status: acceptable  Hydration status: acceptable    Comments: An Anesthesiologist personally participated in the most demanding procedures (including induction and emergence if applicable) in the anesthesia plan, monitored the course of anesthesia administration at frequent intervals and remained physically present and available for immediate diagnosis and treatment of emergencies.

## 2022-08-09 NOTE — DISCHARGE INSTRUCTIONS
[facility]  Postop Instructions  Outpatient Knee Surgery    DIET  Begin with clear liquids and light foods (jello, soups, etc).  Progress to your normal diet if you are not nauseated.  Take pain medicine with food - crackers, bread, etc.    WOUND CARE  Maintain your operative dressing, loosen bandage if swelling or tingling of the ankle or toes.  It is normal for the knee to bleed and swell from the surgery site.  If blood soaks onto the bandage, do not become alarmed; reinforce with additional dressing.  If blood saturates more than 2 bandages call [facility].  Remove surgical dressing on the 2nd post-operative day.  If minimal drainage is present, apply bandaids over incisions.  Do not use antibiotic ointment.  To avoid infection, keep surgical incisions clean and dry.  You may shower on the 2nd day.  No immersion of operative leg until instructed by staff.    MEDICATIONS  Pain medication is injected into the wound and knee joint during surgery.  This will wear off within 8-12 hours.  Aleve 500mg 2 times per day may be taken for pain if you do not have a history of bleeding or ulcers.  Some patients will require narcotic pain medication for a short period of time.  This can be taken as per directions on the bottle.  Potential narcotic side effects include: constipation, nausea, vomiting, sleepiness.  If nausea and vomiting continue for more than 12-24 hours, contact the office to have your medication changed.  Do not drive a car or operate machinery while taking the prescription pain medication.  Increase vegetables, whole grains, and water intake to decrease risk of constipation related to pain medications.  Drink a full glass of water with every dose of medication (prescription or over the counter) and take with food.    ACTIVITY  When sleeping or resting, elevate the leg with the support of a few pillows at the ankle to reduce swelling and pain.  Do not engage in impact activities which increase  pain/swelling over the first 7-10 days following surgery.  Avoid long periods of sitting or long distance traveling for 2 weeks.  No driving or operating heavy machinery until you are off all prescription pain medications.  You may return to sedentary work or school 1-3 days after surgery, if pain is tolerable.          WEIGHT BEARING and RANGE OF MOTION  Meniscectomy / chondroplasty: Weight bearing as tolerated.      ICE THERAPY  Begin icing immediately after surgery using the compression machine or cold packs.    EXERCISE (see below)  Knee flexion as tolerated 5-10 minutes, 4 times per day.  Begin knee exercises the following day after surgery unless otherwise instructed by the surgeon  Towel Roll extensions 3 times per day for 20 minutes  Knee Flexion Progression 3 times per day  Straight Leg Raises- Hold for 2 seconds, repeat 10 reps, 3 times per day  You may also begin ankle and foot range of motion on the first post-operative day about 2-3 times per day.            Straight Leg Raises                                        Towel Extensions                           EMERGENCIES  Contact [facility] if any of the following are present:  Painful swelling or numbness, tingling, color change, or coolness in the ankle or foot  Unrelenting pain  Fever over 101 (It is normal to have a low grad fever for the first day or two following surgery)  Redness or worsening pain around incisions  Continuous drainage or bleeding from incision (a small amount of drainage is expected)  Difficulty breathing, wheezing  Excessive nausea/vomiting causing inability to keep anything down for 12-24 hours  Inability to urinate    WHAT TO EXPECT AT YOUR FIRST POST OPERATIVE VISIT  Suture/stitches will be removed and new bandage applied.  Follow up Xrays will be taken if indicated.  Next follow-up visit will usually be scheduled for 4-6 weeks

## 2022-08-09 NOTE — OP NOTE
KNEE ARTHROSCOPY WITH MENISCECTOMY  Procedure Report    Patient Name:  Honorio Vigil Jr.  YOB: 1965    Date of Surgery:  8/9/2022     Indications: Mr. Vigil is a 56-year-old male with a history of left knee pain after a injury while carrying a mattress.  MRI confirmed a medial meniscus tear.  We discussed treatment options.  We discussed both operative and nonoperative management.  We specifically discussed left knee arthroscopy with partial medial meniscectomy and possible chondroplasty.  The risk, benefits, indications, and alternatives were discussed as previously documented.  He elected to proceed with surgical management and consent was obtained.    Pre-op Diagnosis:   Acute medial meniscus tear of left knee, initial encounter [S83.242A]       Post-Op Diagnosis Codes:     * Acute medial meniscus tear of left knee, initial encounter [S83.242A]    Procedure/CPT® Codes:      Procedure(s):  KNEE ARTHROSCOPY WITH PARTIAL MEDIAL MENISCECTOMY    Staff:  Surgeon(s):  Carlos Hameed MD    Assistant: ELIZA Liang    Anesthesia: General    Estimated Blood Loss: minimal    Implants:    Nothing was implanted during the procedure    Specimen:          None        Findings: Complex medial meniscus tear at the junction of the body and posterior horn, intact posterior root.  Grade 2 chondrosis patellofemoral and medial compartments.  Grade 2 and grade 3 chondrosis lateral compartment.    Complications: None    Description of Procedure: The patient was met in the preoperative holding area and the operative extremity was marked.  The patient was transported to the operating room and laid supine on the operating room table.  General anesthesia was induced without complication.  An upper thigh tourniquet was applied but not used during the case.  The left leg was placed into a leg avalos and the foot of the table was dropped.  All extremities were well padded.  2 g of preoperative Ancef were  administered.  The left lower extremity was then prepped and draped in the usual sterile fashion.  A timeout was taken to ensure the appropriate patient, procedure, and procedural site.  All were in agreement.  I began by marking the superficial landmarks over the anterior aspect of the left knee.  A vertical incision for a standard lateral portal was created and the arthroscope was inserted into the patellofemoral compartment.  Grade 2 chondral changes were noted.  No loose or unstable cartilage flaps were identified.  No loose bodies were identified. The arthroscope was transitioned into the lateral gutter and no loose bodies were identified.  The arthroscope was transitioned into the medial gutter and no loose bodies were identified.  The arthroscope was then transitioned into the medial compartment.  A vertical incision for a medial working portal was created after needle localization.  The arthroscopic shaver was introduced and the fat pad was debrided.  A probe was introduced in the medial compartment was evaluated.    A complex tear of the medial meniscus was identified at the junction of the body and posterior horn.  The posterior root was intact.  Grade 2 chondral changes were noted diffusely.  I used the arthroscopic meniscal biter and shaver to perform a partial medial meniscectomy taking this tear back to a stable border.  The apex of the tear was taken back to the capsule, and the tear was tapered both anteriorly and posteriorly. I was satisfied with the working the medial compartment.  The arthroscope was transitioned into the notch.  The ACL and PCL were identified and were intact.  I then transitioned into the lateral compartment.    The lateral meniscus was intact.  Grade 2 and central grade 3 chondral changes were noted predominantly on the lateral tibial plateau. I then transition back into the patellofemoral compartment.  The knee was then thoroughly irrigated and the arthroscopic instrumentation  was removed.  The knee was drained of arthroscopic fluid.  Portal sites were closed with 4-0 nylon in interrupted fashion.  I injected 30 cc of 0.5% Marcaine into the portal sites.  The wound was dressed with Xeroform, fluffs, soft roll, and an Ace wrap from the foot to the proximal thigh.  The patient awoke from anesthesia without complication and was transferred to the recovery room in stable condition.  All surgical counts were correct.  The patient had a palpable dorsalis pedis pulse prior to leaving the operating room.    Assistant: ELIZA Liang was present and her skilled assistance was necessary for patient positioning, manipulating the leg, holding the camera, closing, and dressing application.      Carlos Hameed MD     Date: 8/9/2022  Time: 10:32 EDT

## 2022-08-09 NOTE — INTERVAL H&P NOTE
H&P reviewed. The patient was examined and there are no changes to the H&P.    I have seen and examined the above patient and agree with the plan.     Cardiac: Intact peripheral pulses  Pulmonary: Nonlabored respirations on room air.   Left lower extremity: Skin intact.  Distal neurovascular intact.      We reviewed the risk, benefits, indications, and alternatives to left knee arthroscopy with partial medial meniscectomy and possible chondroplasty.  Patient elected to proceed and consent was obtained.    Electronically signed by Carlos Hameed MD, 08/09/22, 7:58 AM EDT.

## 2022-08-09 NOTE — ANESTHESIA PREPROCEDURE EVALUATION
Anesthesia Evaluation     Patient summary reviewed and Nursing notes reviewed   no history of anesthetic complications:  NPO Solid Status: > 8 hours  NPO Liquid Status: > 2 hours           Airway   Mallampati: II  TM distance: >3 FB  Neck ROM: full  No difficulty expected  Dental    (+) edentulous    Pulmonary - negative pulmonary ROS and normal exam    breath sounds clear to auscultation  Cardiovascular - negative cardio ROS and normal exam  Exercise tolerance: good (4-7 METS)    Rhythm: regular  Rate: normal        Neuro/Psych  (+) psychiatric history,    GI/Hepatic/Renal/Endo - negative ROS     Musculoskeletal (-) negative ROS    Abdominal    Substance History - negative use     OB/GYN negative ob/gyn ROS         Other - negative ROS                     Anesthesia Plan    ASA 2     general     (Patient understands anesthesia not responsible for dental damage.)  intravenous induction     Anesthetic plan, risks, benefits, and alternatives have been provided, discussed and informed consent has been obtained with: patient.    Plan discussed with CRNA.        CODE STATUS:

## 2022-08-22 ENCOUNTER — OFFICE VISIT (OUTPATIENT)
Dept: ORTHOPEDIC SURGERY | Facility: CLINIC | Age: 57
End: 2022-08-22

## 2022-08-22 VITALS — OXYGEN SATURATION: 98 % | HEIGHT: 72 IN | WEIGHT: 223 LBS | HEART RATE: 93 BPM | BODY MASS INDEX: 30.2 KG/M2

## 2022-08-22 DIAGNOSIS — Z98.890 S/P LEFT KNEE ARTHROSCOPY: Primary | ICD-10-CM

## 2022-08-22 PROCEDURE — 99024 POSTOP FOLLOW-UP VISIT: CPT | Performed by: STUDENT IN AN ORGANIZED HEALTH CARE EDUCATION/TRAINING PROGRAM

## 2022-08-22 NOTE — PROGRESS NOTES
"Chief Complaint  Pain and Follow-up of the Left Knee     Subjective      Honorio Vigil Jr. presents to [facility] for follow up evaluation of the left knee. He is S/P Left Knee Arthroscopy With Partial Medial Meniscectomy 8/9/22. He is overall doing well. His sutures were removed today. He has no other complaints.     Allergies   Allergen Reactions   • Naproxen GI Intolerance        Social History     Socioeconomic History   • Marital status:    Tobacco Use   • Smoking status: Current Every Day Smoker     Packs/day: 0.50   • Smokeless tobacco: Never Used   Vaping Use   • Vaping Use: Never used   Substance and Sexual Activity   • Alcohol use: No   • Drug use: Not Currently     Frequency: 1.0 times per week     Types: Marijuana, Methamphetamines     Comment: former        Review of Systems     Objective   Vital Signs:   Pulse 93   Ht 182.9 cm (72\")   Wt 101 kg (223 lb)   SpO2 98%   BMI 30.24 kg/m²       Physical Exam  Constitutional:       Appearance: Normal appearance. The patient is well-developed and normal weight.   HENT:      Head: Normocephalic.      Right Ear: Hearing and external ear normal.      Left Ear: Hearing and external ear normal.      Nose: Nose normal.   Eyes:      Conjunctiva/sclera: Conjunctivae normal.   Cardiovascular:      Rate and Rhythm: Normal rate.   Pulmonary:      Effort: Pulmonary effort is normal.      Breath sounds: No wheezing or rales.   Abdominal:      Palpations: Abdomen is soft.      Tenderness: There is no abdominal tenderness.   Musculoskeletal:      Cervical back: Normal range of motion.   Skin:     Findings: No rash.   Neurological:      Mental Status: The patient is alert and oriented to person, place, and time.   Psychiatric:         Mood and Affect: Mood and affect normal.         Judgment: Judgment normal.       Ortho Exam      Left knee- healing arthroscopy portals with no signs of infection or drainage. No effusion. ROM " 0-110 degrees. Stable to varus/valgus stress. Stable to anterior/posterior drawer. Extensor Mechanism  Intact. Neurovascularly intact. No joint line tenderness. Negative Yuridia's. Negative Lachman's. Calf soft, non-tender.     Procedures      Imaging Results (Most Recent)     None           Result Review :       MRI Knee Left Without Contrast    Result Date: 7/25/2022  Narrative: PROCEDURE: MRI KNEE LEFT  WO CONTRAST  COMPARISON: [facility], CR, XR KNEE 3 VW LEFT, 7/09/2022, 16:22.  INDICATIONS: Knee trauma, meniscal/ligament injury suspected, xray done (Age >= 1y)      TECHNIQUE: A complete multi-planar MRI was performed.   FINDINGS:  No fracture or malalignment is seen.  Mild marrow edema is noted in the medial tibial plateau.  There is a complex undersurface tear of the medial meniscal body.  No parameniscal cyst is seen.  No tear of the lateral meniscus is seen.  The cruciate ligaments, lateral collateral ligament complex, patellar retinacula and extensor mechanism appear unremarkable.  There is edema noted along the medial collateral ligament which itself appears intact.  Finding is consistent with a grade 1 sprain.  A small knee joint effusion is noted.  Cartilage in the joint appears intact.  Early osteoarthritic spurring is noted.  A 4.0 cm x 0.9 cm popliteal cyst is noted.      Impression:   1. Complex tear of the medial meniscal body 2. Small knee joint effusion 3. 4.0 cm x 0.9 cm popliteal cyst 4. Grade 1 MCL sprain     Wali Patino M.D.       Electronically Signed and Approved By: Wali Patino M.D. on 7/25/2022 at 12:19                      Assessment and Plan     Diagnoses and all orders for this visit:    1. S/P Left Knee Arthroscopy With Partial Medial Meniscectomy  (Primary)  -     Ambulatory Referral to Physical Therapy Evaluate and treat, POST OP, Ortho; Stretching, ROM, Strengthening; Full weight bearing        Discussed the treatment plan with the patient.  Order for physical  therapy given today. Work note given today for light duty.     Call or return if worsening symptoms.    Follow Up     4 weeks      Patient was given instructions and counseling regarding his condition or for health maintenance advice. Please see specific information pulled into the AVS if appropriate.     Scribed for Carlos Hameed MD by Shanda Bay.  08/22/22   08:47 EDT

## 2022-08-26 ENCOUNTER — TREATMENT (OUTPATIENT)
Dept: PHYSICAL THERAPY | Facility: CLINIC | Age: 57
End: 2022-08-26

## 2022-08-26 DIAGNOSIS — Z98.890 S/P ARTHROSCOPIC PARTIAL MEDIAL MENISCECTOMY: Primary | ICD-10-CM

## 2022-08-26 DIAGNOSIS — M25.562 ACUTE PAIN OF LEFT KNEE: ICD-10-CM

## 2022-08-26 DIAGNOSIS — R29.898 WEAKNESS OF LEFT LOWER EXTREMITY: ICD-10-CM

## 2022-08-26 PROCEDURE — 97110 THERAPEUTIC EXERCISES: CPT | Performed by: PHYSICAL THERAPIST

## 2022-08-26 PROCEDURE — 97161 PT EVAL LOW COMPLEX 20 MIN: CPT | Performed by: PHYSICAL THERAPIST

## 2022-08-26 NOTE — PROGRESS NOTES
Physical Therapy Initial Evaluation and Plan of Care    Patient: Honorio Vigil Jr.   : 1965  Diagnosis/ICD-10 Code:  S/P arthroscopic partial medial meniscectomy [Z98.890]  Referring practitioner: Carlos Hameed MD  Date of Initial Visit: 2022  Today's Date: 2022  Patient seen for 1 sessions           Subjective Questionnaire: LEFS: 21/80 - 73.75% disability       Subjective Evaluation    History of Present Illness  Mechanism of injury: Pt presents to PT s/p L medial menisectomy on 2022. Pt injured his knee on 2022 when he turned on his knee after carrying a mattress down the stairs and tore his meniscus. Pt presents to PT with no AD and states he hasn't used one since surgery. Pt also reports he has not been icing because he doesn't like the cold on his knee. 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      Patient Occupation: Remodels apartments  Pain  Current pain ratin  At best pain rating: 3  At worst pain ratin  Quality: discomfort and dull ache  Aggravating factors: ambulation, stairs, standing and sleeping    Treatments  No previous or current treatments  Patient Goals  Patient goals for therapy: decreased pain, increased motion, increased strength and return to work             Objective          Observations   Left Knee   Positive for incision.     Additional Hip Observation Details  Pt has arthroscopic incisions on L knee with no signs and symptoms of infection.   Additional Knee Observation Details  Pt has arthroscopic incisions on L knee with no signs or symptoms of infection and slight swelling in L knee.    Active Range of Motion   Left Knee   Flexion: 130 degrees   Extension: 0 degrees     Right Knee   Flexion: 130 degrees   Extension: 0 degrees     Strength/Myotome Testing     Left Hip   Planes of Motion   Flexion: 3  Abduction: 4  Adduction: 4    Right Hip   Planes of Motion   Flexion:  5  Abduction: 5  Adduction: 5    Left Knee   Flexion: 3+  Extension: 3+    Right Knee   Flexion: 5  Extension: 5    Ambulation     Observational Gait     Additional Observational Gait Details  Pt ambulates with normal gait mechanics with no antalgic gait.         See Exercise, Manual, and Modality Logs for complete treatment.       Assessment & Plan     Assessment  Impairments: abnormal gait, abnormal muscle firing, abnormal or restricted ROM, activity intolerance, impaired balance, impaired physical strength, lacks appropriate home exercise program, pain with function and weight-bearing intolerance  Functional Limitations: lifting, sleeping, walking, uncomfortable because of pain and standing  Assessment details: Pt presents to PT s/p L medial menisectomy on 08/08/2022. Pt has normal ROM of left knee, normal gait mechanics, but patient is lacking L LE strength in both hip and knee. Will perform PT in order to improve patient's strength in L LE in order to return patient back to maximum function which includes back to work.   Prognosis: good    Goals  Plan Goals:         1. The patient has limited strength of the left knee.    LTG 1: 12 weeks: The patient will demonstrate 5/5 strength for left knee flexion and extension in order to allow patient improved joint stability in order for patient to be able to return back to work.    STATUS: New    STG 1a: 6 weeks: The patient will demonstrate 4+/5 strength for left knee flexion and extension    STATUS: New    LTG : 2 weeks: The patient will report a pain rating of 2/10 or better in order to improve tolerance to activities of daily living and improve sleep quality and be able to return back to work.    STATUS: New    STG 2: 6 weeks: The patient will report a pain rating of 4/10 or better.    STATUS: New    LTG 3: 12 weeks: The patient will demonstrate 40% limitation on the Lower Extremity Functional Scale.    STATUS: New      STG 3a: 6 weeks: The patient will demonstrate  50% limitation on the Lower Extremity Functional Scale.    STATUS: New      Plan  Therapy options: will be seen for skilled therapy services  Planned modality interventions: cryotherapy and TENS  Planned therapy interventions: manual therapy, neuromuscular re-education, ADL retraining, balance/weight-bearing training, flexibility, functional ROM exercises, gait training, home exercise program, joint mobilization, soft tissue mobilization, strengthening, stretching, therapeutic activities, abdominal trunk stabilization and spinal/joint mobilization  Frequency: 2x week  Duration in weeks: 12  Treatment plan discussed with: patient        History # of Personal Factors and/or Comorbidities: LOW (0)  Examination of Body System(s): # of elements: LOW (1-2)  Clinical Presentation: STABLE   Clinical Decision Making: LOW       Timed:         Manual Therapy:    0     mins  66707;     Therapeutic Exercise:    8     mins  33460;     Neuromuscular Amy:    0    mins  79507;    Therapeutic Activity:     0     mins  09855;     Gait Trainin     mins  74597;     Ultrasound:     0     mins  91169;    Ionto                               0    mins   14217  Self pay                         0     mins [id number]    Un-Timed:  Electrical Stimulation:    0     mins  40957 (MC )  Traction     0     mins 56949  Low Eval     22     Mins  76381  Mod Eval     0     Mins  64100  High Eval                       0     Mins  57820  Self Pay Eval                 0     PTSP1   Re-Eval                           0    mins  89824        Timed Treatment:   8   mins   Total Treatment:     30   mins    PT SIGNATURE: Electronically signed by Morales Cameron PT  [place] LICENSE: [id number]    DATE TREATMENT INITIATED: 2022    Initial Certification  Certification Period: 2022 thru 2022  I certify that the therapy services are furnished while this patient is under my care.  The services outlined above are required by this patient, and  will be reviewed every 90 days.     PHYSICIAN: Carlos Hameed MD   NPI: [id number]      DATE:     Please sign and return via fax to [phone] Thank you, [facility].

## 2022-08-29 ENCOUNTER — TELEPHONE (OUTPATIENT)
Dept: PHYSICAL THERAPY | Facility: CLINIC | Age: 57
End: 2022-08-29

## 2022-08-31 ENCOUNTER — TREATMENT (OUTPATIENT)
Dept: PHYSICAL THERAPY | Facility: CLINIC | Age: 57
End: 2022-08-31

## 2022-08-31 DIAGNOSIS — M25.562 ACUTE PAIN OF LEFT KNEE: Primary | ICD-10-CM

## 2022-08-31 DIAGNOSIS — Z98.890 S/P ARTHROSCOPIC PARTIAL MEDIAL MENISCECTOMY: ICD-10-CM

## 2022-08-31 DIAGNOSIS — R29.898 WEAKNESS OF LEFT LOWER EXTREMITY: ICD-10-CM

## 2022-08-31 PROCEDURE — 97530 THERAPEUTIC ACTIVITIES: CPT | Performed by: PHYSICAL THERAPIST

## 2022-08-31 PROCEDURE — 97110 THERAPEUTIC EXERCISES: CPT | Performed by: PHYSICAL THERAPIST

## 2022-08-31 NOTE — PROGRESS NOTES
Physical Therapy Daily Treatment Note      Patient: Honorio Vigil Jr.   : 1965  Referring practitioner: Carlos Hameed MD  Date of Initial Visit: Type: THERAPY  Noted: 2022  Today's Date: 2022  Patient seen for 2 sessions           Subjective Questionnaire:       Subjective Evaluation    History of Present Illness    Subjective comment: Pt reports 4/10 L knee pain.  Pt reports he has intermittent sharp shooting medial knee pain.       Objective   See Exercise, Manual, and Modality Logs for complete treatment.       Assessment & Plan     Assessment    Assessment details: Pt reported increased pain with step ups and tolerated strengthening exercise well.  Continue to strengthen to advance pt's LLE function.        Visit Diagnoses:    ICD-10-CM ICD-9-CM   1. Acute pain of left knee  M25.562 719.46   2. S/P arthroscopic partial medial meniscectomy  Z98.890 V45.89   3. Weakness of left lower extremity  R29.898 729.89       Progress per Plan of Care and Progress strengthening /stabilization /functional activity           Timed:  Manual Therapy:         mins  09807;  Therapeutic Exercise:    22     mins  43413;     Neuromuscular Amy:        mins  63970;    Therapeutic Activity:     8     mins  28698;     Gait Training:           mins  60201;     Ultrasound:          mins  76094;    Electrical Stimulation:         mins  53058 ( );  Aquatic Therapy          mins  35622    Untimed:  Electrical Stimulation:         mins  04367 ( );  Mechanical Traction:         mins  87349;     Timed Treatment:   30   mins   Total Treatment:     30   mins    Electronically signed    Tracy Lindquist PTA  Physical Therapist Assistant    JOR[place] LUIS license: [id number]

## 2022-11-16 ENCOUNTER — OFFICE VISIT (OUTPATIENT)
Dept: FAMILY MEDICINE CLINIC | Facility: CLINIC | Age: 57
End: 2022-11-16

## 2022-11-16 ENCOUNTER — LAB (OUTPATIENT)
Dept: LAB | Facility: HOSPITAL | Age: 57
End: 2022-11-16

## 2022-11-16 VITALS
BODY MASS INDEX: 31.64 KG/M2 | DIASTOLIC BLOOD PRESSURE: 80 MMHG | HEIGHT: 72 IN | TEMPERATURE: 97.8 F | HEART RATE: 110 BPM | OXYGEN SATURATION: 96 % | SYSTOLIC BLOOD PRESSURE: 124 MMHG | RESPIRATION RATE: 19 BRPM | WEIGHT: 233.6 LBS

## 2022-11-16 DIAGNOSIS — Z00.00 ANNUAL PHYSICAL EXAM: Primary | ICD-10-CM

## 2022-11-16 DIAGNOSIS — Z12.11 ENCOUNTER FOR SCREENING FOR MALIGNANT NEOPLASM OF COLON: ICD-10-CM

## 2022-11-16 DIAGNOSIS — Z12.5 PROSTATE CANCER SCREENING: ICD-10-CM

## 2022-11-16 DIAGNOSIS — Z76.89 ENCOUNTER TO ESTABLISH CARE: ICD-10-CM

## 2022-11-16 DIAGNOSIS — H72.91 PERFORATION OF RIGHT TYMPANIC MEMBRANE: ICD-10-CM

## 2022-11-16 DIAGNOSIS — Z00.00 ANNUAL PHYSICAL EXAM: ICD-10-CM

## 2022-11-16 DIAGNOSIS — R13.19 ESOPHAGEAL DYSPHAGIA: ICD-10-CM

## 2022-11-16 DIAGNOSIS — H65.113 NON-RECURRENT ACUTE ALLERGIC OTITIS MEDIA OF BOTH EARS: ICD-10-CM

## 2022-11-16 DIAGNOSIS — F17.200 SMOKER: ICD-10-CM

## 2022-11-16 DIAGNOSIS — F32.9 REACTIVE DEPRESSION: ICD-10-CM

## 2022-11-16 DIAGNOSIS — F41.9 ANXIETY: ICD-10-CM

## 2022-11-16 LAB
ALBUMIN SERPL-MCNC: 4.6 G/DL (ref 3.5–5.2)
ALBUMIN/GLOB SERPL: 1.8 G/DL
ALP SERPL-CCNC: 92 U/L (ref 39–117)
ALT SERPL W P-5'-P-CCNC: 16 U/L (ref 1–41)
ANION GAP SERPL CALCULATED.3IONS-SCNC: 8 MMOL/L (ref 5–15)
AST SERPL-CCNC: 34 U/L (ref 1–40)
BASOPHILS # BLD AUTO: 0.09 10*3/MM3 (ref 0–0.2)
BASOPHILS NFR BLD AUTO: 0.7 % (ref 0–1.5)
BILIRUB SERPL-MCNC: 0.5 MG/DL (ref 0–1.2)
BUN SERPL-MCNC: 11 MG/DL (ref 6–20)
BUN/CREAT SERPL: 10.7 (ref 7–25)
CALCIUM SPEC-SCNC: 9.9 MG/DL (ref 8.6–10.5)
CHLORIDE SERPL-SCNC: 103 MMOL/L (ref 98–107)
CHOLEST SERPL-MCNC: 206 MG/DL (ref 0–200)
CO2 SERPL-SCNC: 29 MMOL/L (ref 22–29)
CREAT SERPL-MCNC: 1.03 MG/DL (ref 0.76–1.27)
DEPRECATED RDW RBC AUTO: 45.2 FL (ref 37–54)
EGFRCR SERPLBLD CKD-EPI 2021: 84.7 ML/MIN/1.73
EOSINOPHIL # BLD AUTO: 0.27 10*3/MM3 (ref 0–0.4)
EOSINOPHIL NFR BLD AUTO: 2.2 % (ref 0.3–6.2)
ERYTHROCYTE [DISTWIDTH] IN BLOOD BY AUTOMATED COUNT: 13.6 % (ref 12.3–15.4)
GLOBULIN UR ELPH-MCNC: 2.6 GM/DL
GLUCOSE SERPL-MCNC: 83 MG/DL (ref 65–99)
HCT VFR BLD AUTO: 48.2 % (ref 37.5–51)
HDLC SERPL-MCNC: 39 MG/DL (ref 40–60)
HGB BLD-MCNC: 16 G/DL (ref 13–17.7)
IMM GRANULOCYTES # BLD AUTO: 0.07 10*3/MM3 (ref 0–0.05)
IMM GRANULOCYTES NFR BLD AUTO: 0.6 % (ref 0–0.5)
LDLC SERPL CALC-MCNC: 131 MG/DL (ref 0–100)
LDLC/HDLC SERPL: 3.24 {RATIO}
LYMPHOCYTES # BLD AUTO: 3.7 10*3/MM3 (ref 0.7–3.1)
LYMPHOCYTES NFR BLD AUTO: 30.4 % (ref 19.6–45.3)
MCH RBC QN AUTO: 29.9 PG (ref 26.6–33)
MCHC RBC AUTO-ENTMCNC: 33.2 G/DL (ref 31.5–35.7)
MCV RBC AUTO: 90.1 FL (ref 79–97)
MONOCYTES # BLD AUTO: 0.61 10*3/MM3 (ref 0.1–0.9)
MONOCYTES NFR BLD AUTO: 5 % (ref 5–12)
NEUTROPHILS NFR BLD AUTO: 61.1 % (ref 42.7–76)
NEUTROPHILS NFR BLD AUTO: 7.45 10*3/MM3 (ref 1.7–7)
NRBC BLD AUTO-RTO: 0.1 /100 WBC (ref 0–0.2)
PLATELET # BLD AUTO: 300 10*3/MM3 (ref 140–450)
PMV BLD AUTO: 11.5 FL (ref 6–12)
POTASSIUM SERPL-SCNC: 4.4 MMOL/L (ref 3.5–5.2)
PROT SERPL-MCNC: 7.2 G/DL (ref 6–8.5)
PSA SERPL-MCNC: 0.83 NG/ML (ref 0–4)
RBC # BLD AUTO: 5.35 10*6/MM3 (ref 4.14–5.8)
SODIUM SERPL-SCNC: 140 MMOL/L (ref 136–145)
TRIGL SERPL-MCNC: 203 MG/DL (ref 0–150)
TSH SERPL DL<=0.05 MIU/L-ACNC: 0.9 UIU/ML (ref 0.27–4.2)
VLDLC SERPL-MCNC: 36 MG/DL (ref 5–40)
WBC NRBC COR # BLD: 12.19 10*3/MM3 (ref 3.4–10.8)

## 2022-11-16 PROCEDURE — 2014F MENTAL STATUS ASSESS: CPT | Performed by: STUDENT IN AN ORGANIZED HEALTH CARE EDUCATION/TRAINING PROGRAM

## 2022-11-16 PROCEDURE — G0103 PSA SCREENING: HCPCS

## 2022-11-16 PROCEDURE — 80061 LIPID PANEL: CPT

## 2022-11-16 PROCEDURE — 99386 PREV VISIT NEW AGE 40-64: CPT | Performed by: STUDENT IN AN ORGANIZED HEALTH CARE EDUCATION/TRAINING PROGRAM

## 2022-11-16 PROCEDURE — 36415 COLL VENOUS BLD VENIPUNCTURE: CPT

## 2022-11-16 PROCEDURE — 96372 THER/PROPH/DIAG INJ SC/IM: CPT | Performed by: STUDENT IN AN ORGANIZED HEALTH CARE EDUCATION/TRAINING PROGRAM

## 2022-11-16 PROCEDURE — 85025 COMPLETE CBC W/AUTO DIFF WBC: CPT

## 2022-11-16 PROCEDURE — 83036 HEMOGLOBIN GLYCOSYLATED A1C: CPT

## 2022-11-16 PROCEDURE — 80053 COMPREHEN METABOLIC PANEL: CPT

## 2022-11-16 PROCEDURE — 3008F BODY MASS INDEX DOCD: CPT | Performed by: STUDENT IN AN ORGANIZED HEALTH CARE EDUCATION/TRAINING PROGRAM

## 2022-11-16 PROCEDURE — 84443 ASSAY THYROID STIM HORMONE: CPT

## 2022-11-16 RX ORDER — PREDNISONE 50 MG/1
50 TABLET ORAL DAILY
Qty: 5 TABLET | Refills: 0 | Status: SHIPPED | OUTPATIENT
Start: 2022-11-16 | End: 2022-12-16

## 2022-11-16 RX ORDER — KETOROLAC TROMETHAMINE 30 MG/ML
60 INJECTION, SOLUTION INTRAMUSCULAR; INTRAVENOUS ONCE
Status: COMPLETED | OUTPATIENT
Start: 2022-11-16 | End: 2022-11-16

## 2022-11-16 RX ORDER — HYDROXYZINE PAMOATE 50 MG/1
50 CAPSULE ORAL 3 TIMES DAILY PRN
COMMUNITY
Start: 2022-11-04

## 2022-11-16 RX ORDER — DEXTROAMPHETAMINE SACCHARATE, AMPHETAMINE ASPARTATE, DEXTROAMPHETAMINE SULFATE AND AMPHETAMINE SULFATE 5; 5; 5; 5 MG/1; MG/1; MG/1; MG/1
TABLET ORAL
COMMUNITY
Start: 2022-11-04 | End: 2023-02-13

## 2022-11-16 RX ORDER — DICLOFENAC SODIUM 75 MG/1
75 TABLET, DELAYED RELEASE ORAL 2 TIMES DAILY
Qty: 30 TABLET | Refills: 0 | Status: SHIPPED | OUTPATIENT
Start: 2022-11-16 | End: 2022-12-16

## 2022-11-16 RX ORDER — DOXYCYCLINE HYCLATE 100 MG/1
100 CAPSULE ORAL 2 TIMES DAILY
Qty: 20 CAPSULE | Refills: 0 | Status: SHIPPED | OUTPATIENT
Start: 2022-11-16 | End: 2022-12-16

## 2022-11-16 RX ORDER — METHYLPREDNISOLONE ACETATE 40 MG/ML
80 INJECTION, SUSPENSION INTRA-ARTICULAR; INTRALESIONAL; INTRAMUSCULAR; SOFT TISSUE ONCE
Status: COMPLETED | OUTPATIENT
Start: 2022-11-16 | End: 2022-11-16

## 2022-11-16 RX ADMIN — METHYLPREDNISOLONE ACETATE 80 MG: 40 INJECTION, SUSPENSION INTRA-ARTICULAR; INTRALESIONAL; INTRAMUSCULAR; SOFT TISSUE at 15:27

## 2022-11-16 RX ADMIN — KETOROLAC TROMETHAMINE 60 MG: 30 INJECTION, SOLUTION INTRAMUSCULAR; INTRAVENOUS at 15:26

## 2022-11-16 NOTE — PROGRESS NOTES
"Chief Complaint  Establishing care with new provider for annual physical and discuss health concerns.    Subjective         Honorio Vigil Jr. is a 57 y.o. male who presents to [facility]    57 years old male comes to the clinic today to establish care for annual physical and follow-up.    Patient is following up with psych, getting Adderall and gabapentin from psych.    Patient was recently diagnosed with bilateral ear infection, and right ear perforation at the urgent care.  Patient has finished antibiotics but no significant improvement noted with right ear discomfort and pain.  Denies any vision changes/hearing changes or any dizziness/palpitations or shortness of breath/chest pain.  Patient was referred to the ENT and pending appointment as per patient.    Patient reports occasional dysphagia, father had a history of esophageal dysplasia and stenosis so he would like to get EGD done.    Physically active without any chest pain or shortness of breath.  Objective   Vital Signs:   Vitals:    11/16/22 1456   BP: 124/80   Pulse: 110   Resp: 19   Temp: 97.8 °F (36.6 °C)   SpO2: 96%   Weight: 106 kg (233 lb 9.6 oz)   Height: 182.9 cm (72\")      Body mass index is 31.68 kg/m².   Wt Readings from Last 3 Encounters:   11/16/22 106 kg (233 lb 9.6 oz)   11/02/22 101 kg (222 lb 10.6 oz)   08/22/22 101 kg (223 lb)      BP Readings from Last 3 Encounters:   11/16/22 124/80   11/02/22 134/90   08/09/22 113/72        Patient Care Team:  Roni Gray MD as PCP - General (Family Medicine)     Physical Exam  Vitals reviewed.   Constitutional:       Appearance: Normal appearance. He is well-developed.   HENT:      Head: Normocephalic and atraumatic.      Right Ear: External ear normal. Tympanic membrane is injected and perforated.      Left Ear: External ear normal. Tympanic membrane is erythematous.      Mouth/Throat:      Pharynx: No oropharyngeal exudate.   Eyes:      " Conjunctiva/sclera: Conjunctivae normal.      Pupils: Pupils are equal, round, and reactive to light.   Cardiovascular:      Rate and Rhythm: Normal rate and regular rhythm.      Heart sounds: No murmur heard.    No friction rub. No gallop.   Pulmonary:      Effort: Pulmonary effort is normal.      Breath sounds: Normal breath sounds. No wheezing or rhonchi.   Abdominal:      General: Bowel sounds are normal. There is no distension.      Palpations: Abdomen is soft.      Tenderness: There is no abdominal tenderness.   Skin:     General: Skin is warm and dry.   Neurological:      Mental Status: He is alert and oriented to person, place, and time.      Cranial Nerves: No cranial nerve deficit.   Psychiatric:         Mood and Affect: Mood and affect normal.         Behavior: Behavior normal.         Thought Content: Thought content normal.         Judgment: Judgment normal.               Assessment and Plan   Diagnoses and all orders for this visit:    1. Annual physical exam (Primary)  Comments:  Daily exercise and healthy diet recommended  Orders:  -     TSH Rfx On Abnormal To Free T4; Future  -     CBC & Differential; Future  -     Comprehensive Metabolic Panel; Future  -     Hemoglobin A1c; Future  -     Lipid Panel; Future    2. Encounter to establish care  -     TSH Rfx On Abnormal To Free T4; Future  -     CBC & Differential; Future  -     Comprehensive Metabolic Panel; Future  -     Hemoglobin A1c; Future  -     Lipid Panel; Future    3. Non-recurrent acute allergic otitis media of both ears  Comments:  Will empirically treat and consult ENT  Orders:  -     TSH Rfx On Abnormal To Free T4; Future  -     CBC & Differential; Future  -     Comprehensive Metabolic Panel; Future  -     Hemoglobin A1c; Future  -     Lipid Panel; Future  -     doxycycline (VIBRAMYCIN) 100 MG capsule; Take 1 capsule by mouth 2 (Two) Times a Day.  Dispense: 20 capsule; Refill: 0  -     predniSONE (DELTASONE) 50 MG tablet; Take 1 tablet  by mouth Daily.  Dispense: 5 tablet; Refill: 0  -     diclofenac (VOLTAREN) 75 MG EC tablet; Take 1 tablet by mouth 2 (Two) Times a Day.  Dispense: 30 tablet; Refill: 0  -     ketorolac (TORADOL) injection 60 mg  -     methylPREDNISolone acetate (DEPO-medrol) injection 80 mg  -     Ambulatory Referral to ENT (Otolaryngology)    4. Encounter for screening for malignant neoplasm of colon  -     Ambulatory Referral For Screening Colonoscopy  -     TSH Rfx On Abnormal To Free T4; Future  -     CBC & Differential; Future  -     Comprehensive Metabolic Panel; Future  -     Hemoglobin A1c; Future  -     Lipid Panel; Future    5. Smoker  Comments:  Smoking cessation discussed  Orders:  -      CT Chest Low Dose Cancer Screening WO; Future  -     TSH Rfx On Abnormal To Free T4; Future  -     CBC & Differential; Future  -     Comprehensive Metabolic Panel; Future  -     Hemoglobin A1c; Future  -     Lipid Panel; Future    6. Perforation of right tympanic membrane  Comments:  Management as per ENT  Orders:  -     Ambulatory Referral to ENT (Otolaryngology)    7. Prostate cancer screening  -     PSA SCREENING; Future    8. Esophageal dysphagia  Comments:  Need EGD  Orders:  -     Ambulatory referral for Screening EGD    9. Anxiety  Comments:  Continue with psych, taking Adderall and gabapentin.    10. Reactive depression          Tobacco Use: High Risk   • Smoking Tobacco Use: Every Day   • Smokeless Tobacco Use: Never   • Passive Exposure: Not on file            Follow Up   Return in about 3 months (around 2/16/2023) for Next scheduled follow up.  Patient was given instructions and counseling regarding his condition or for health maintenance advice. Please see specific information pulled into the AVS if appropriate.

## 2022-11-17 LAB — HBA1C MFR BLD: 6 % (ref 4.8–5.6)

## 2022-11-22 ENCOUNTER — PREP FOR SURGERY (OUTPATIENT)
Dept: OTHER | Facility: HOSPITAL | Age: 57
End: 2022-11-22

## 2022-11-22 ENCOUNTER — OFFICE VISIT (OUTPATIENT)
Dept: SURGERY | Facility: CLINIC | Age: 57
End: 2022-11-22

## 2022-11-22 VITALS — HEIGHT: 72 IN | BODY MASS INDEX: 32.37 KG/M2 | WEIGHT: 239 LBS | RESPIRATION RATE: 16 BRPM

## 2022-11-22 DIAGNOSIS — R13.10 DYSPHAGIA, UNSPECIFIED TYPE: ICD-10-CM

## 2022-11-22 DIAGNOSIS — Z80.0 FAMILY HISTORY OF COLON CANCER IN FATHER: Primary | ICD-10-CM

## 2022-11-22 PROCEDURE — 99202 OFFICE O/P NEW SF 15 MIN: CPT | Performed by: SURGERY

## 2022-11-22 NOTE — PROGRESS NOTES
"Chief Complaint:  Esophageal dysphagia/cscope    Primary Care Provider: Roni Gray MD    Referring Provider: Roni Gray MD    History of Present Illness  Honorio Vigil Jr. is a 57 y.o. male referred by Roni Gray MD.  Patient needs to have a colonoscopy.  Patient last had a colonoscopy about 8 years ago.  No change in normal bowel function.  Patient's father had colon cancer when he was in his 70s.  Additionally, the patient has been having some difficulty swallowing.  He says it feels like food oftentimes gets stuck in his lower esophagus but he has not had to regurgitate anything.  He has occasional heartburn.  He does not take any antacid medications on a regular basis.  He says he was told when he was a child that his \" stomach flap was not working right.\"    Allergies: Naproxen    Outpatient Medications Marked as Taking for the 11/22/22 encounter (Office Visit) with Corey Torres MD   Medication Sig Dispense Refill   • amphetamine-dextroamphetamine (ADDERALL) 20 MG tablet TAKE ONE tablet by MOUTH AT EIGHT IN THE MORNING AND ONE IN THE EVENING daily     • diclofenac (VOLTAREN) 75 MG EC tablet Take 1 tablet by mouth 2 (Two) Times a Day. 30 tablet 0   • doxycycline (VIBRAMYCIN) 100 MG capsule Take 1 capsule by mouth 2 (Two) Times a Day. 20 capsule 0   • gabapentin (NEURONTIN) 600 MG tablet Take 600 mg by mouth 2 (Two) Times a Day.     • hydrOXYzine pamoate (VISTARIL) 50 MG capsule Take 50 mg by mouth 3 (Three) Times a Day As Needed.     • predniSONE (DELTASONE) 50 MG tablet Take 1 tablet by mouth Daily. 5 tablet 0       Past Medical History:   • ADHD   • Anxiety disorder   • Backache   • Cellulitis of scrotum   • Insomnia disorder   • Seizures (HCC)   • Substance abuse (HCC)    years ago   • Viral gastroenteritis        Past Surgical History:   • HAND SURGERY    ring finger   • KNEE ARTHROSCOPY    Arthroscopy of right knee, debride medial meniscus.   • KNEE ARTHROSCOPY W/ MENISCECTOMY    " "Procedure: KNEE ARTHROSCOPY WITH PARTIAL MEDIAL MENISCECTOMY;  Surgeon: Carlos Hameed MD;  [place] [facility] [place] [facility] [place]  Service: [place]opedics;  Laterality: Left;   • SHOULDER ARTHROSCOPY       Family History:   Family History   Problem Relation Age of Onset   • Anxiety disorder Mother    • Depression Mother         Social History:  Social History     Tobacco Use   • Smoking status: Every Day     Packs/day: 0.50     Types: Cigarettes   • Smokeless tobacco: Never   Substance Use Topics   • Alcohol use: No       Objective     Vital Signs:  Resp 16   Ht 182.9 cm (72\")   Wt 108 kg (239 lb)   BMI 32.41 kg/m²   • Constitutional: healthy appearing, alert, no acute distress, reliable historian  • HENT:  NCAT, no visible deformities or lesions  • Eyes:  sclerae clear, conjunctivae clear, EOMI  • Neck:  normal appearance, no masses, trachea midline  • Respiratory:  breathing not labored, respiratory effort appears normal  • Cardiovascular:  heart regular rate  • Abdomen:  soft, nontender, nondistended    • Skin and subcutaneous tissue:  no visible concerning rashes or lesions, no jaundice  • Musculoskeletal: moving all extremities symmetrically and purposefully  • Neurologic:  no obvious motor or sensory deficits, normal gait, able to stand without difficulty, cerebellar function without any obvious abnormalities, alert & oriented x 3, speech clear  • Psychiatric:  judgment and insight intact, mood normal, affect appropriate, cooperative      Assessment:  1. Family history of colon cancer in father  2. Dysphagia    Plan:  Colonoscopy   Esophagogastroduodenoscopy with possible balloon dilation    Discussion: Indications, options, risks, benefits, and expected outcomes of planned surgery were discussed with the patient and he agrees to proceed.    Corey Torres MD  11/22/2022    Electronically signed by Corey Torres MD, 11/22/22, 11:27 AM EST.      "

## 2022-11-23 ENCOUNTER — PATIENT ROUNDING (BHMG ONLY) (OUTPATIENT)
Dept: SURGERY | Facility: CLINIC | Age: 57
End: 2022-11-23

## 2022-11-23 NOTE — PROGRESS NOTES
November 23, 2022    Hello, may I speak with Honorio Vigil Jr.?    My name is Christen Almodovar      I am  with [facility]  [facility]  [address]  [place] [id number] [phone].    Before we get started may I verify your date of birth? 1965    I am calling to officially welcome you to our practice and ask about your recent visit. Is this a good time to talk? yes    Tell me about your visit with us. What things went well?  Patient said staff and Dr. Torres were nice and his appointment went good.       We're always looking for ways to make our patients' experiences even better. Do you have recommendations on ways we may improve?  no    Overall were you satisfied with your first visit to our practice? yes       I appreciate you taking the time to speak with me today. Is there anything else I can do for you? no      Thank you, and have a great day.

## 2022-11-29 ENCOUNTER — TELEPHONE (OUTPATIENT)
Dept: FAMILY MEDICINE CLINIC | Facility: CLINIC | Age: 57
End: 2022-11-29

## 2022-11-29 DIAGNOSIS — H65.113 NON-RECURRENT ACUTE ALLERGIC OTITIS MEDIA OF BOTH EARS: Primary | ICD-10-CM

## 2022-11-29 NOTE — TELEPHONE ENCOUNTER
Caller: Honorio Vigil Jr.    Relationship: Self    Best call back number: [phone]    What medication are you requesting: MEDICATION FR EAR DRAINAGE IS STILL NOT WORKING    What are your current symptoms: RUNNY EARS      Have you had these symptoms before:    [x] Yes  [] No    Have you been treated for these symptoms before:   [x] Yes  [] No    If a prescription is needed, what is your preferred pharmacy and phone number: [id number] - [place], [place] - [address] AT [facility] [phone] [facility] [phone]

## 2022-12-09 ENCOUNTER — HOSPITAL ENCOUNTER (OUTPATIENT)
Dept: CT IMAGING | Facility: HOSPITAL | Age: 57
Discharge: HOME | End: 2022-12-09
Admitting: STUDENT IN AN ORGANIZED HEALTH CARE EDUCATION/TRAINING PROGRAM

## 2022-12-09 DIAGNOSIS — F17.200 SMOKER: ICD-10-CM

## 2022-12-09 PROCEDURE — 71271 CT THORAX LUNG CANCER SCR C-: CPT

## 2022-12-16 ENCOUNTER — TELEPHONE (OUTPATIENT)
Dept: URGENT CARE | Facility: CLINIC | Age: 57
End: 2022-12-16

## 2022-12-16 PROCEDURE — 84550 ASSAY OF BLOOD/URIC ACID: CPT | Performed by: FAMILY MEDICINE

## 2022-12-16 NOTE — TELEPHONE ENCOUNTER
----- Message from Severiano Mondragon MD sent at 12/16/2022  2:48 PM EST -----  I spoke with the patient and reported its not gout

## 2022-12-22 ENCOUNTER — HOSPITAL ENCOUNTER (EMERGENCY)
Facility: HOSPITAL | Age: 57
Discharge: HOME | End: 2022-12-22
Attending: EMERGENCY MEDICINE | Admitting: EMERGENCY MEDICINE

## 2022-12-22 ENCOUNTER — APPOINTMENT (OUTPATIENT)
Dept: GENERAL RADIOLOGY | Facility: HOSPITAL | Age: 57
End: 2022-12-22

## 2022-12-22 VITALS
SYSTOLIC BLOOD PRESSURE: 141 MMHG | DIASTOLIC BLOOD PRESSURE: 83 MMHG | OXYGEN SATURATION: 96 % | HEIGHT: 72 IN | HEART RATE: 84 BPM | RESPIRATION RATE: 18 BRPM | BODY MASS INDEX: 30.88 KG/M2 | WEIGHT: 228 LBS | TEMPERATURE: 98 F

## 2022-12-22 DIAGNOSIS — L03.031 PARONYCHIA OF GREAT TOE, RIGHT: Primary | ICD-10-CM

## 2022-12-22 LAB
ALBUMIN SERPL-MCNC: 4.9 G/DL (ref 3.5–5.2)
ALBUMIN/GLOB SERPL: 1.5 G/DL
ALP SERPL-CCNC: 138 U/L (ref 39–117)
ALT SERPL W P-5'-P-CCNC: 20 U/L (ref 1–41)
ANION GAP SERPL CALCULATED.3IONS-SCNC: 12.9 MMOL/L (ref 5–15)
AST SERPL-CCNC: 28 U/L (ref 1–40)
BASOPHILS # BLD AUTO: 0.09 10*3/MM3 (ref 0–0.2)
BASOPHILS NFR BLD AUTO: 1 % (ref 0–1.5)
BILIRUB SERPL-MCNC: 0.2 MG/DL (ref 0–1.2)
BILIRUB UR QL STRIP: NEGATIVE
BUN SERPL-MCNC: 15 MG/DL (ref 6–20)
BUN/CREAT SERPL: 11.3 (ref 7–25)
CALCIUM SPEC-SCNC: 10.1 MG/DL (ref 8.6–10.5)
CHLORIDE SERPL-SCNC: 100 MMOL/L (ref 98–107)
CLARITY UR: CLEAR
CO2 SERPL-SCNC: 25.1 MMOL/L (ref 22–29)
COLOR UR: YELLOW
CREAT SERPL-MCNC: 1.33 MG/DL (ref 0.76–1.27)
D-LACTATE SERPL-SCNC: 2.1 MMOL/L (ref 0.5–2)
DEPRECATED RDW RBC AUTO: 44.3 FL (ref 37–54)
EGFRCR SERPLBLD CKD-EPI 2021: 62.3 ML/MIN/1.73
EOSINOPHIL # BLD AUTO: 0.33 10*3/MM3 (ref 0–0.4)
EOSINOPHIL NFR BLD AUTO: 3.8 % (ref 0.3–6.2)
ERYTHROCYTE [DISTWIDTH] IN BLOOD BY AUTOMATED COUNT: 13.7 % (ref 12.3–15.4)
ERYTHROCYTE [SEDIMENTATION RATE] IN BLOOD: 20 MM/HR (ref 0–20)
GLOBULIN UR ELPH-MCNC: 3.3 GM/DL
GLUCOSE SERPL-MCNC: 105 MG/DL (ref 65–99)
GLUCOSE UR STRIP-MCNC: NEGATIVE MG/DL
HCT VFR BLD AUTO: 49.4 % (ref 37.5–51)
HGB BLD-MCNC: 16.7 G/DL (ref 13–17.7)
HGB UR QL STRIP.AUTO: NEGATIVE
IMM GRANULOCYTES # BLD AUTO: 0.07 10*3/MM3 (ref 0–0.05)
IMM GRANULOCYTES NFR BLD AUTO: 0.8 % (ref 0–0.5)
KETONES UR QL STRIP: NEGATIVE
LEUKOCYTE ESTERASE UR QL STRIP.AUTO: NEGATIVE
LYMPHOCYTES # BLD AUTO: 2.53 10*3/MM3 (ref 0.7–3.1)
LYMPHOCYTES NFR BLD AUTO: 29.5 % (ref 19.6–45.3)
MCH RBC QN AUTO: 29.9 PG (ref 26.6–33)
MCHC RBC AUTO-ENTMCNC: 33.8 G/DL (ref 31.5–35.7)
MCV RBC AUTO: 88.5 FL (ref 79–97)
MONOCYTES # BLD AUTO: 0.56 10*3/MM3 (ref 0.1–0.9)
MONOCYTES NFR BLD AUTO: 6.5 % (ref 5–12)
NEUTROPHILS NFR BLD AUTO: 5 10*3/MM3 (ref 1.7–7)
NEUTROPHILS NFR BLD AUTO: 58.4 % (ref 42.7–76)
NITRITE UR QL STRIP: NEGATIVE
NRBC BLD AUTO-RTO: 0 /100 WBC (ref 0–0.2)
PH UR STRIP.AUTO: 6 [PH] (ref 5–8)
PLATELET # BLD AUTO: 373 10*3/MM3 (ref 140–450)
PMV BLD AUTO: 10.4 FL (ref 6–12)
POTASSIUM SERPL-SCNC: 4.2 MMOL/L (ref 3.5–5.2)
PROT SERPL-MCNC: 8.2 G/DL (ref 6–8.5)
PROT UR QL STRIP: NEGATIVE
RBC # BLD AUTO: 5.58 10*6/MM3 (ref 4.14–5.8)
SODIUM SERPL-SCNC: 138 MMOL/L (ref 136–145)
SP GR UR STRIP: 1.02 (ref 1–1.03)
UROBILINOGEN UR QL STRIP: NORMAL
WBC NRBC COR # BLD: 8.58 10*3/MM3 (ref 3.4–10.8)

## 2022-12-22 PROCEDURE — 85652 RBC SED RATE AUTOMATED: CPT | Performed by: EMERGENCY MEDICINE

## 2022-12-22 PROCEDURE — 80053 COMPREHEN METABOLIC PANEL: CPT

## 2022-12-22 PROCEDURE — 83605 ASSAY OF LACTIC ACID: CPT | Performed by: EMERGENCY MEDICINE

## 2022-12-22 PROCEDURE — 25010000002 MORPHINE PER 10 MG: Performed by: EMERGENCY MEDICINE

## 2022-12-22 PROCEDURE — 25010000002 ONDANSETRON PER 1 MG: Performed by: EMERGENCY MEDICINE

## 2022-12-22 PROCEDURE — 96375 TX/PRO/DX INJ NEW DRUG ADDON: CPT

## 2022-12-22 PROCEDURE — 87040 BLOOD CULTURE FOR BACTERIA: CPT

## 2022-12-22 PROCEDURE — 36415 COLL VENOUS BLD VENIPUNCTURE: CPT

## 2022-12-22 PROCEDURE — 0 LIDOCAINE 1 % SOLUTION: Performed by: PHYSICIAN ASSISTANT

## 2022-12-22 PROCEDURE — 81003 URINALYSIS AUTO W/O SCOPE: CPT | Performed by: EMERGENCY MEDICINE

## 2022-12-22 PROCEDURE — 96374 THER/PROPH/DIAG INJ IV PUSH: CPT

## 2022-12-22 PROCEDURE — 73660 X-RAY EXAM OF TOE(S): CPT

## 2022-12-22 PROCEDURE — 99283 EMERGENCY DEPT VISIT LOW MDM: CPT

## 2022-12-22 PROCEDURE — 85025 COMPLETE CBC W/AUTO DIFF WBC: CPT

## 2022-12-22 RX ORDER — GINSENG 100 MG
1 CAPSULE ORAL ONCE
Status: COMPLETED | OUTPATIENT
Start: 2022-12-22 | End: 2022-12-22

## 2022-12-22 RX ORDER — ONDANSETRON 2 MG/ML
4 INJECTION INTRAMUSCULAR; INTRAVENOUS ONCE
Status: COMPLETED | OUTPATIENT
Start: 2022-12-22 | End: 2022-12-22

## 2022-12-22 RX ORDER — HYDROCODONE BITARTRATE AND ACETAMINOPHEN 5; 325 MG/1; MG/1
1 TABLET ORAL EVERY 4 HOURS PRN
Qty: 18 TABLET | Refills: 0 | Status: SHIPPED | OUTPATIENT
Start: 2022-12-22 | End: 2022-12-25

## 2022-12-22 RX ORDER — LIDOCAINE HYDROCHLORIDE 10 MG/ML
5 INJECTION, SOLUTION INFILTRATION; PERINEURAL ONCE
Status: COMPLETED | OUTPATIENT
Start: 2022-12-22 | End: 2022-12-22

## 2022-12-22 RX ADMIN — MORPHINE SULFATE 4 MG: 4 INJECTION, SOLUTION INTRAMUSCULAR; INTRAVENOUS at 16:08

## 2022-12-22 RX ADMIN — LIDOCAINE HYDROCHLORIDE 5 ML: 10 INJECTION, SOLUTION INFILTRATION; PERINEURAL at 16:08

## 2022-12-22 RX ADMIN — ONDANSETRON 4 MG: 2 INJECTION INTRAMUSCULAR; INTRAVENOUS at 16:06

## 2022-12-22 RX ADMIN — Medication 1 APPLICATION: at 17:32

## 2022-12-22 NOTE — ED PROVIDER NOTES
Time: 2:35 PM EST  Chief Complaint:   Chief Complaint   Patient presents with   • Wound Infection     Right great toe             History of Present Illness (obtained by ED Provider, Dr. Cristo Donahue)   The patient states problems with the right great toe began 10 days ago on Dec 12. He was seen by a provider about a week ago and was started on two different oral antibiotics, Bactrim and Keflex. He has been taking the antibiotics for 7 or 8 days, and thinks he has one more day left. The pain did improve a little at first, but recurred about 2-3 days ago. He denies fever or vomiting. No history of diabetes.              Patient Care Team  Primary Care Provider: Roni Gray MD    Past Medical History:     Allergies   Allergen Reactions   • Naproxen GI Intolerance     Past Medical History:   Diagnosis Date   • ADHD    • Anxiety disorder    • Backache    • Cellulitis of scrotum    • Insomnia disorder    • Seizures (HCC)    • Substance abuse (HCC)     years ago   • Viral gastroenteritis      Past Surgical History:   Procedure Laterality Date   • HAND SURGERY Right     ring finger   • KNEE ARTHROSCOPY  05/21/2014    Arthroscopy of right knee, debride medial meniscus.   • KNEE ARTHROSCOPY W/ MENISCECTOMY Left 8/9/2022    Procedure: KNEE ARTHROSCOPY WITH PARTIAL MEDIAL MENISCECTOMY;  Surgeon: Carlos Hameed MD;  Location: [facility] OR [facility] [place];  Service: Orthopedics;  Laterality: Left;   • SHOULDER ARTHROSCOPY Right      Family History   Problem Relation Age of Onset   • Anxiety disorder Mother    • Depression Mother        Home Medications:  Prior to Admission medications    Medication Sig Start Date End Date Taking? Authorizing Provider   amphetamine-dextroamphetamine (ADDERALL) 20 MG tablet TAKE ONE tablet by MOUTH AT EIGHT IN THE MORNING AND ONE IN THE EVENING daily 11/4/22   Provider, MD Charu   cephalexin (KEFLEX) 500 MG capsule Take 1 capsule by mouth 3 (Three) Times a Day for 7 days. 12/16/22 12/23/22  Giancarlo  Severiano Harley MD   cloNIDine (CATAPRES) 0.1 MG tablet Take 1 tablet (0.1 mg) by mouth at bedtime 12/7/22   Charu Del Castillo MD   gabapentin (NEURONTIN) 600 MG tablet Take 600 mg by mouth 2 (Two) Times a Day. 7/12/22   Charu Del Castillo MD   hydrOXYzine pamoate (VISTARIL) 50 MG capsule Take 50 mg by mouth 3 (Three) Times a Day As Needed. 11/4/22   Charu Del Castillo MD   sulfamethoxazole-trimethoprim (Bactrim DS) 800-160 MG per tablet Take 1 tablet by mouth 2 (Two) Times a Day for 7 days. 12/16/22 12/23/22  Severiano Mondragon MD        Social History:   Social History     Tobacco Use   • Smoking status: Every Day     Packs/day: 0.50     Types: Cigarettes   • Smokeless tobacco: Never   Vaping Use   • Vaping Use: Never used   Substance Use Topics   • Alcohol use: No   • Drug use: Not Currently     Frequency: 1.0 times per week     Types: Marijuana, Methamphetamines     Comment: former         Review of Systems:  Review of Systems   Constitutional: Negative for chills, diaphoresis and fever.   HENT: Negative for congestion, postnasal drip, rhinorrhea and sore throat.    Eyes: Negative for photophobia.   Respiratory: Negative for cough, chest tightness and shortness of breath.    Cardiovascular: Negative for chest pain, palpitations and leg swelling.   Gastrointestinal: Negative for abdominal pain, diarrhea, nausea and vomiting.   Genitourinary: Negative for difficulty urinating, dysuria, flank pain, frequency, hematuria and urgency.   Musculoskeletal: Negative for neck pain and neck stiffness.   Skin: Positive for color change and wound (right great toe). Negative for pallor and rash.   Neurological: Negative for dizziness, syncope, weakness, numbness and headaches.   Hematological: Negative for adenopathy. Does not bruise/bleed easily.   Psychiatric/Behavioral: Negative.         Physical Exam:  /83 (BP Location: Right arm, Patient Position: Sitting)   Pulse 84   Temp 98 °F (36.7 °C) (Oral)    "Resp 18   Ht 182.9 cm (72\")   Wt 103 kg (228 lb)   SpO2 96%   BMI 30.92 kg/m²     Physical Exam  Vitals and nursing note reviewed.   Constitutional:       General: He is not in acute distress.     Appearance: Normal appearance. He is not ill-appearing, toxic-appearing or diaphoretic.   HENT:      Head: Normocephalic and atraumatic.      Mouth/Throat:      Mouth: Mucous membranes are moist.   Eyes:      Extraocular Movements: Extraocular movements intact.      Conjunctiva/sclera: Conjunctivae normal.      Pupils: Pupils are equal, round, and reactive to light.   Cardiovascular:      Rate and Rhythm: Normal rate and regular rhythm.      Pulses: Normal pulses.           Carotid pulses are 2+ on the right side and 2+ on the left side.       Radial pulses are 2+ on the right side and 2+ on the left side.        Femoral pulses are 2+ on the right side and 2+ on the left side.       Popliteal pulses are 2+ on the right side and 2+ on the left side.        Dorsalis pedis pulses are 2+ on the right side and 2+ on the left side.        Posterior tibial pulses are 2+ on the right side and 2+ on the left side.      Heart sounds: Normal heart sounds. No murmur heard.  Pulmonary:      Effort: Pulmonary effort is normal. No accessory muscle usage, respiratory distress or retractions.      Breath sounds: Normal breath sounds. No wheezing, rhonchi or rales.   Abdominal:      General: Abdomen is flat. There is no distension.      Palpations: Abdomen is soft. There is no mass.      Tenderness: There is no abdominal tenderness. There is no right CVA tenderness, left CVA tenderness, guarding or rebound.      Comments: No rigidity   Musculoskeletal:         General: No swelling, tenderness or deformity.      Cervical back: Neck supple. No tenderness.      Right lower leg: No edema.      Left lower leg: No edema.   Feet:      Comments: Extends from the nail bed dorsally to the interphalangeal joint  Skin:     General: Skin is warm and " dry.      Capillary Refill: Capillary refill takes less than 2 seconds.      Coloration: Skin is not cyanotic, jaundiced or pale.      Findings: Abscess, erythema and wound present.      Comments: Right foot -  Appears to be an abscess located at the nail bed of the great toe  Extends from the nail bed dorsally to the interphalangeal joint  Pustular discharge present  Mildly erythematous  No red streaking   Neurological:      General: No focal deficit present.      Mental Status: He is alert and oriented to person, place, and time. Mental status is at baseline.      Sensory: No sensory deficit.      Motor: No weakness.   Psychiatric:         Attention and Perception: Attention and perception normal.         Mood and Affect: Mood normal.         Behavior: Behavior normal.                Medications in the Emergency Department:  Medications   lidocaine (XYLOCAINE) 1 % injection 5 mL (5 mL Infiltration Given 12/22/22 1608)   morphine injection 4 mg (4 mg Intravenous Given 12/22/22 1608)   ondansetron (ZOFRAN) injection 4 mg (4 mg Intravenous Given 12/22/22 1606)   bacitracin 500 UNIT/GM ointment 1 application (1 application Topical Given 12/22/22 1732)        Labs  Lab Results (last 24 hours)     Procedure Component Value Units Date/Time    CBC & Differential [[id number]]  (Abnormal) Collected: 12/22/22 1445    Specimen: Blood Updated: 12/22/22 1501    Narrative:      The following orders were created for panel order CBC & Differential.  Procedure                               Abnormality         Status                     ---------                               -----------         ------                     CBC Auto Differential[[id number]]        Abnormal            Final result                 Please view results for these tests on the individual orders.    Comprehensive Metabolic Panel [[id number]]  (Abnormal) Collected: 12/22/22 1445    Specimen: Blood Updated: 12/22/22 1518     Glucose 105 mg/dL      BUN 15 mg/dL       Creatinine 1.33 mg/dL      Sodium 138 mmol/L      Potassium 4.2 mmol/L      Chloride 100 mmol/L      CO2 25.1 mmol/L      Calcium 10.1 mg/dL      Total Protein 8.2 g/dL      Albumin 4.90 g/dL      ALT (SGPT) 20 U/L      AST (SGOT) 28 U/L      Alkaline Phosphatase 138 U/L      Total Bilirubin 0.2 mg/dL      Globulin 3.3 gm/dL      A/G Ratio 1.5 g/dL      BUN/Creatinine Ratio 11.3     Anion Gap 12.9 mmol/L      eGFR 62.3 mL/min/1.73      Comment: National Kidney Foundation and American Society of Nephrology (ASN) Task Force recommended calculation based on the Chronic Kidney Disease Epidemiology Collaboration (CKD-EPI) equation refit without adjustment for race.       Narrative:      GFR Normal >60  Chronic Kidney Disease <60  Kidney Failure <15      Blood Culture - Blood, Arm, Right [[id number]] Collected: 12/22/22 1445    Specimen: Blood from Arm, Right Updated: 12/22/22 1457    Lactic Acid, Plasma [[id number]]  (Abnormal) Collected: 12/22/22 1445    Specimen: Blood Updated: 12/22/22 1528     Lactate 2.1 mmol/L     CBC Auto Differential [[id number]]  (Abnormal) Collected: 12/22/22 1445    Specimen: Blood Updated: 12/22/22 1501     WBC 8.58 10*3/mm3      RBC 5.58 10*6/mm3      Hemoglobin 16.7 g/dL      Hematocrit 49.4 %      MCV 88.5 fL      MCH 29.9 pg      MCHC 33.8 g/dL      RDW 13.7 %      RDW-SD 44.3 fl      MPV 10.4 fL      Platelets 373 10*3/mm3      Neutrophil % 58.4 %      Lymphocyte % 29.5 %      Monocyte % 6.5 %      Eosinophil % 3.8 %      Basophil % 1.0 %      Immature Grans % 0.8 %      Neutrophils, Absolute 5.00 10*3/mm3      Lymphocytes, Absolute 2.53 10*3/mm3      Monocytes, Absolute 0.56 10*3/mm3      Eosinophils, Absolute 0.33 10*3/mm3      Basophils, Absolute 0.09 10*3/mm3      Immature Grans, Absolute 0.07 10*3/mm3      nRBC 0.0 /100 WBC     Sedimentation Rate [[id number]]  (Normal) Collected: 12/22/22 1445    Specimen: Blood Updated: 12/22/22 1529     Sed Rate 20 mm/hr     Blood Culture - Blood,  Arm, Left [[id number]] Collected: 12/22/22 1449    Specimen: Blood from Arm, Left Updated: 12/22/22 1457    Urinalysis With Microscopic If Indicated (No Culture) - Urine, Clean Catch [[id number]]  (Normal) Collected: 12/22/22 1500    Specimen: Urine, Clean Catch Updated: 12/22/22 1519     Color, UA Yellow     Appearance, UA Clear     pH, UA 6.0     Specific Gravity, UA 1.022     Glucose, UA Negative     Ketones, UA Negative     Bilirubin, UA Negative     Blood, UA Negative     Protein, UA Negative     Leuk Esterase, UA Negative     Nitrite, UA Negative     Urobilinogen, UA 0.2 E.U./dL    Narrative:      Urine microscopic not indicated.           Imaging:  XR Toe 2+ View Right    Result Date: 12/22/2022  PROCEDURE: XR TOE 2+ VW RIGHT  COMPARISON: None  INDICATIONS: right first toe infection  FINDINGS:  No soft tissue gas.  No bone destruction.  No acute bony abnormality       No evidence of osteomyelitis      DOREEN RICHARDS MD       Electronically Signed and Approved By: DOREEN RICHARDS MD on 12/22/2022 at 15:52               Procedures:  Incision & Drainage    Date/Time: 12/22/2022 4:30 PM  Performed by: Jeremias Ford PA-C  Authorized by: Cristo Donahue DO     Consent:     Consent obtained:  Verbal    Consent given by:  Patient    Risks, benefits, and alternatives were discussed: yes      Risks discussed:  Bleeding, incomplete drainage, infection and pain    Alternatives discussed:  No treatment  Universal protocol:     Patient identity confirmed:  Verbally with patient  Location:     Type:  Abscess    Size:  2 cm    Location:  Lower extremity    Lower extremity location:  Toe    Toe location:  R big toe  Pre-procedure details:     Skin preparation:  Chlorhexidine  Sedation:     Sedation type:  None  Anesthesia:     Anesthesia method:  Nerve block    Block location:  Wing    Block needle gauge:  27 G    Block anesthetic:  Lidocaine 1% w/o epi    Block injection procedure:  Anatomic landmarks identified, anatomic  landmarks palpated, negative aspiration for blood, introduced needle and incremental injection    Block outcome:  Anesthesia achieved  Procedure type:     Complexity:  Simple  Procedure details:     Incision types:  Stab incision    Incision depth:  Subcutaneous    Wound management:  Extensive cleaning    Drainage:  Purulent    Drainage amount:  Moderate    Wound treatment:  Wound left open  Post-procedure details:     Procedure completion:  Tolerated well, no immediate complications        Progress  ED Course as of 12/22/22 2157   Thu Dec 22, 2022   1436 --- PROVIDER IN TRIAGE NOTE ---    The patient was evaluated my meCalderon in triage. Orders were placed and the patient is currently awaiting disposition.    [AJ]      ED Course User Index  [AJ] Calderon Devine PA-C                            The patient was initially evaluated in the triage area where orders were placed. The patient was later dispositioned by Cristo Donahue DO.      The patient was advised to stay for completion of workup which includes but is not limited to communication of labs and radiological results, reassessment and plan. The patient was advised that leaving prior to disposition by a provider could result in critical findings that are not communicated to the patient.     Medical Decision Making:  MDM  Number of Diagnoses or Management Options  Paronychia of great toe, right  Diagnosis management comments: The patient clinically had a paronychia.  There is no evidence of lymphangitis or cellulitis.  The patient's vital signs are stable.  The patient's CBC was normal.  The patient's sed rate was normal.  The patient's urine was normal.  The patient's chemistry was essentially unremarkable.  The patient's x-ray of the right toe demonstrated no evidence of osteomyelitis.  The patient's paronychia was drained by the nurse practitioner.  A large amount of pus was obtained.  The patient was given morphine for pain in the emergency  room.  The patient tolerated the procedure well.  The patient will do warm soaks with antibacterial soap twice a day.  The patient will apply bacitracin.  The patient will follow-up with her doctor in 24 to 48 hours for reevaluation of the wound.  The patient will be discharged home with hydrocodone.  The patient was given very specific instructions on when and why to return to the emergency room.  The patient voiced understanding and felt comfortable with the discharge instructions.  They would return to the emergency room if necessary.  The patient appears appropriate for discharge and outpatient follow-up.         Amount and/or Complexity of Data Reviewed  Clinical lab tests: reviewed  Tests in the radiology section of CPT®: reviewed             The following orders were placed after triage and evaluation:  Orders Placed This Encounter   Procedures   • Incision & Drainage   • Blood Culture - Blood,   • Blood Culture - Blood,   • XR Toe 2+ View Right   • Comprehensive Metabolic Panel   • Lactic Acid, Plasma   • CBC Auto Differential   • Urinalysis With Microscopic If Indicated (No Culture) - Urine, Clean Catch   • Sedimentation Rate   • CBC & Differential       Final diagnoses:   Paronychia of great toe, right          Disposition:  ED Disposition     ED Disposition   Discharge    Condition   Stable    Comment   --             This medical record created using voice recognition software.           Rosa Maxwell  12/22/22 1541       Rosa Maxwell  12/22/22 1542       Cristo Donahue DO  12/22/22 0383

## 2022-12-22 NOTE — DISCHARGE INSTRUCTIONS
Please soak your right great toe in warm water and antibacterial soap twice a day    Please apply bacitracin twice daily    Please change your dressing twice daily    Return to the emergency immediately for increasing redness of the toe or foot, red streaking of the foot, uncontrolled pain, fever, shaking chills, diffuse muscle ache or any new symptoms you are concerned with

## 2022-12-27 LAB
BACTERIA SPEC AEROBE CULT: NORMAL
BACTERIA SPEC AEROBE CULT: NORMAL

## 2023-01-02 ENCOUNTER — APPOINTMENT (OUTPATIENT)
Dept: GENERAL RADIOLOGY | Facility: HOSPITAL | Age: 58
End: 2023-01-02
Payer: COMMERCIAL

## 2023-01-02 ENCOUNTER — APPOINTMENT (OUTPATIENT)
Dept: CT IMAGING | Facility: HOSPITAL | Age: 58
End: 2023-01-02
Payer: COMMERCIAL

## 2023-01-02 ENCOUNTER — HOSPITAL ENCOUNTER (EMERGENCY)
Facility: HOSPITAL | Age: 58
Discharge: LEFT BEFORE DISCHARGE | End: 2023-01-02
Attending: EMERGENCY MEDICINE | Admitting: EMERGENCY MEDICINE
Payer: COMMERCIAL

## 2023-01-02 VITALS
HEIGHT: 72 IN | TEMPERATURE: 98 F | BODY MASS INDEX: 29.8 KG/M2 | SYSTOLIC BLOOD PRESSURE: 105 MMHG | DIASTOLIC BLOOD PRESSURE: 87 MMHG | WEIGHT: 220 LBS | RESPIRATION RATE: 18 BRPM | OXYGEN SATURATION: 99 % | HEART RATE: 82 BPM

## 2023-01-02 LAB
ALBUMIN SERPL-MCNC: 4.2 G/DL (ref 3.5–5.2)
ALBUMIN/GLOB SERPL: 1.4 G/DL
ALP SERPL-CCNC: 100 U/L (ref 39–117)
ALT SERPL W P-5'-P-CCNC: 11 U/L (ref 1–41)
AMPHET+METHAMPHET UR QL: POSITIVE
ANION GAP SERPL CALCULATED.3IONS-SCNC: 9.7 MMOL/L (ref 5–15)
AST SERPL-CCNC: 23 U/L (ref 1–40)
BARBITURATES UR QL SCN: NEGATIVE
BASOPHILS # BLD AUTO: 0.05 10*3/MM3 (ref 0–0.2)
BASOPHILS NFR BLD AUTO: 0.4 % (ref 0–1.5)
BENZODIAZ UR QL SCN: POSITIVE
BILIRUB SERPL-MCNC: 0.5 MG/DL (ref 0–1.2)
BILIRUB UR QL STRIP: NEGATIVE
BUN SERPL-MCNC: 12 MG/DL (ref 6–20)
BUN/CREAT SERPL: 10.5 (ref 7–25)
CALCIUM SPEC-SCNC: 9.3 MG/DL (ref 8.6–10.5)
CANNABINOIDS SERPL QL: NEGATIVE
CHLORIDE SERPL-SCNC: 104 MMOL/L (ref 98–107)
CLARITY UR: CLEAR
CO2 SERPL-SCNC: 25.3 MMOL/L (ref 22–29)
COCAINE UR QL: NEGATIVE
COLOR UR: YELLOW
CREAT SERPL-MCNC: 1.14 MG/DL (ref 0.76–1.27)
DEPRECATED RDW RBC AUTO: 41.6 FL (ref 37–54)
EGFRCR SERPLBLD CKD-EPI 2021: 75 ML/MIN/1.73
EOSINOPHIL # BLD AUTO: 0.14 10*3/MM3 (ref 0–0.4)
EOSINOPHIL NFR BLD AUTO: 1.1 % (ref 0.3–6.2)
ERYTHROCYTE [DISTWIDTH] IN BLOOD BY AUTOMATED COUNT: 13.2 % (ref 12.3–15.4)
ETHANOL BLD-MCNC: <10 MG/DL (ref 0–10)
ETHANOL UR QL: <0.01 %
GLOBULIN UR ELPH-MCNC: 2.9 GM/DL
GLUCOSE SERPL-MCNC: 110 MG/DL (ref 65–99)
GLUCOSE UR STRIP-MCNC: NEGATIVE MG/DL
HCT VFR BLD AUTO: 45.3 % (ref 37.5–51)
HGB BLD-MCNC: 15.3 G/DL (ref 13–17.7)
HGB UR QL STRIP.AUTO: NEGATIVE
IMM GRANULOCYTES # BLD AUTO: 0.05 10*3/MM3 (ref 0–0.05)
IMM GRANULOCYTES NFR BLD AUTO: 0.4 % (ref 0–0.5)
KETONES UR QL STRIP: NEGATIVE
LEUKOCYTE ESTERASE UR QL STRIP.AUTO: NEGATIVE
LYMPHOCYTES # BLD AUTO: 3.41 10*3/MM3 (ref 0.7–3.1)
LYMPHOCYTES NFR BLD AUTO: 28 % (ref 19.6–45.3)
MCH RBC QN AUTO: 29.3 PG (ref 26.6–33)
MCHC RBC AUTO-ENTMCNC: 33.8 G/DL (ref 31.5–35.7)
MCV RBC AUTO: 86.8 FL (ref 79–97)
METHADONE UR QL SCN: NEGATIVE
MONOCYTES # BLD AUTO: 0.45 10*3/MM3 (ref 0.1–0.9)
MONOCYTES NFR BLD AUTO: 3.7 % (ref 5–12)
NEUTROPHILS NFR BLD AUTO: 66.4 % (ref 42.7–76)
NEUTROPHILS NFR BLD AUTO: 8.1 10*3/MM3 (ref 1.7–7)
NITRITE UR QL STRIP: NEGATIVE
NRBC BLD AUTO-RTO: 0 /100 WBC (ref 0–0.2)
OPIATES UR QL: POSITIVE
OXYCODONE UR QL SCN: NEGATIVE
PH UR STRIP.AUTO: 6 [PH] (ref 5–8)
PLATELET # BLD AUTO: 277 10*3/MM3 (ref 140–450)
PMV BLD AUTO: 10.4 FL (ref 6–12)
POTASSIUM SERPL-SCNC: 3.5 MMOL/L (ref 3.5–5.2)
PROT SERPL-MCNC: 7.1 G/DL (ref 6–8.5)
PROT UR QL STRIP: NEGATIVE
RBC # BLD AUTO: 5.22 10*6/MM3 (ref 4.14–5.8)
SODIUM SERPL-SCNC: 139 MMOL/L (ref 136–145)
SP GR UR STRIP: 1.02 (ref 1–1.03)
UROBILINOGEN UR QL STRIP: NORMAL
WBC NRBC COR # BLD: 12.2 10*3/MM3 (ref 3.4–10.8)

## 2023-01-02 PROCEDURE — 80307 DRUG TEST PRSMV CHEM ANLYZR: CPT

## 2023-01-02 PROCEDURE — 82077 ASSAY SPEC XCP UR&BREATH IA: CPT

## 2023-01-02 PROCEDURE — 71046 X-RAY EXAM CHEST 2 VIEWS: CPT

## 2023-01-02 PROCEDURE — 99283 EMERGENCY DEPT VISIT LOW MDM: CPT

## 2023-01-02 PROCEDURE — 70450 CT HEAD/BRAIN W/O DYE: CPT

## 2023-01-02 PROCEDURE — 81003 URINALYSIS AUTO W/O SCOPE: CPT

## 2023-01-02 PROCEDURE — 72125 CT NECK SPINE W/O DYE: CPT

## 2023-01-02 PROCEDURE — 72100 X-RAY EXAM L-S SPINE 2/3 VWS: CPT

## 2023-01-02 PROCEDURE — 85025 COMPLETE CBC W/AUTO DIFF WBC: CPT

## 2023-01-02 PROCEDURE — 80053 COMPREHEN METABOLIC PANEL: CPT

## 2023-01-02 RX ORDER — KETOROLAC TROMETHAMINE 30 MG/ML
30 INJECTION, SOLUTION INTRAMUSCULAR; INTRAVENOUS ONCE
Status: DISCONTINUED | OUTPATIENT
Start: 2023-01-02 | End: 2023-01-02

## 2023-01-02 RX ORDER — KETOROLAC TROMETHAMINE 15 MG/ML
15 INJECTION, SOLUTION INTRAMUSCULAR; INTRAVENOUS ONCE
Status: DISCONTINUED | OUTPATIENT
Start: 2023-01-02 | End: 2023-01-02 | Stop reason: HOSPADM

## 2023-01-02 NOTE — ED PROVIDER NOTES
Time: 4:57 PM EST  Date of encounter:  1/2/2023  Independent Historian/Clinical History and Information was obtained by:   Patient and EMS  Chief Complaint   Patient presents with   • Fall     via EMS, pt reports falling at home, hitting lower back first and then head.        History is limited by: N/A    History of Present Illness:  Patient is a 57 y.o. year old male who presents to the emergency department for evaluation of fall. The patient states that he was at his home and fell down multiple steps today. He states he slipped and fell. He reports he landed on his lower back and hit the back of his head. -LOC. Reports headache, denies dizziness. Reports complaints of pain to lower back, more pronounced on left side. Pain radiates down left posterior hip and tingling to left foot. Has not attempted to walk since this occurred.     HPI    Patient Care Team  Primary Care Provider: Roni Gray MD    Past Medical History:     Allergies   Allergen Reactions   • Naproxen GI Intolerance     Past Medical History:   Diagnosis Date   • ADHD    • Anxiety disorder    • Backache    • Cellulitis of scrotum    • Insomnia disorder    • Seizures (HCC)    • Substance abuse ([facility])     years ago   • Viral gastroenteritis      Past Surgical History:   Procedure Laterality Date   • HAND SURGERY Right     ring finger   • KNEE ARTHROSCOPY  05/21/2014    Arthroscopy of right knee, debride medial meniscus.   • KNEE ARTHROSCOPY W/ MENISCECTOMY Left 8/9/2022    Procedure: KNEE ARTHROSCOPY WITH PARTIAL MEDIAL MENISCECTOMY;  Surgeon: Carlos Hameed MD;  [place] [facility] [place] [facility] [place]  Service: Orthopedics;  Laterality: Left;   • SHOULDER ARTHROSCOPY Right      Family History   Problem Relation Age of Onset   • Anxiety disorder Mother    • Depression Mother        Home Medications:  Prior to Admission medications    Medication Sig Start Date End Date Taking? Authorizing Provider   amphetamine-dextroamphetamine (ADDERALL) 20 MG tablet TAKE ONE tablet  by MOUTH AT EIGHT IN THE MORNING AND ONE IN THE EVENING daily 11/4/22   Charu Del Castillo MD   cloNIDine (CATAPRES) 0.1 MG tablet Take 1 tablet (0.1 mg) by mouth at bedtime 12/7/22   Charu Del Castillo MD   gabapentin (NEURONTIN) 600 MG tablet Take 600 mg by mouth 2 (Two) Times a Day. 7/12/22   Charu Del Castillo MD   hydrOXYzine pamoate (VISTARIL) 50 MG capsule Take 50 mg by mouth 3 (Three) Times a Day As Needed. 11/4/22   Charu Del Castillo MD        Social History:   Social History     Tobacco Use   • Smoking status: Every Day     Packs/day: 0.50     Types: Cigarettes   • Smokeless tobacco: Never   Vaping Use   • Vaping Use: Never used   Substance Use Topics   • Alcohol use: No   • Drug use: Not Currently     Frequency: 1.0 times per week     Types: Marijuana, Methamphetamines     Comment: former         Review of Systems:  Review of Systems   Constitutional: Negative for chills and fever.   HENT: Negative for congestion, ear pain and sore throat.    Eyes: Negative for pain.   Respiratory: Negative for cough, chest tightness and shortness of breath.    Cardiovascular: Negative for chest pain.   Gastrointestinal: Negative for abdominal pain, diarrhea, nausea and vomiting.   Genitourinary: Negative for flank pain and hematuria.   Musculoskeletal: Positive for back pain. Negative for joint swelling, neck pain and neck stiffness.   Skin: Negative for pallor.   Neurological: Positive for headaches. Negative for seizures.   All other systems reviewed and are negative.       Physical Exam:  /87   Pulse 82   Temp 98 °F (36.7 °C)   Resp 18   Ht 182.9 cm (72\")   Wt 99.8 kg (220 lb)   SpO2 99%   BMI 29.84 kg/m²     Physical Exam  Vitals and nursing note reviewed.   Constitutional:       General: He is not in acute distress.     Appearance: Normal appearance. He is ill-appearing. He is not toxic-appearing.   HENT:      Head: Normocephalic and atraumatic.      Mouth/Throat:      Mouth: Mucous  membranes are moist.   Eyes:      General: No scleral icterus.     Extraocular Movements: Extraocular movements intact.      Conjunctiva/sclera: Conjunctivae normal.      Pupils: Pupils are equal, round, and reactive to light.   Cardiovascular:      Rate and Rhythm: Normal rate and regular rhythm.      Pulses:           Radial pulses are 2+ on the right side and 2+ on the left side.        Dorsalis pedis pulses are 2+ on the right side and 2+ on the left side.      Heart sounds: Normal heart sounds.   Pulmonary:      Effort: Pulmonary effort is normal. No respiratory distress.      Breath sounds: Normal breath sounds. No wheezing or rhonchi.   Abdominal:      General: Abdomen is flat. Bowel sounds are normal.      Palpations: Abdomen is soft.      Tenderness: There is no abdominal tenderness.   Musculoskeletal:         General: Normal range of motion.      Cervical back: Normal range of motion and neck supple. Muscular tenderness present.        Back:       Comments: No saddle anesthesia   Skin:     General: Skin is warm and dry.      Coloration: Skin is not cyanotic.   Neurological:      Mental Status: He is alert and oriented to person, place, and time. Mental status is at baseline.      Sensory: Sensation is intact.      Motor: Motor function is intact.      Coordination: Coordination is intact.   Psychiatric:         Attention and Perception: Attention and perception normal.         Mood and Affect: Mood normal.                  Procedures:  Procedures      Medical Decision Making:      Comorbidities that affect care:    Back pain    External Notes reviewed:    None      The following orders were placed and all results were independently analyzed by me:  Orders Placed This Encounter   Procedures   • CT Head Without Contrast   • CT Cervical Spine Without Contrast   • XR Chest 2 View   • XR Spine Lumbar 2 or 3 View   • Comprehensive Metabolic Panel   • Ethanol   • CBC Auto Differential   • Urinalysis With  Microscopic If Indicated (No Culture) - Urine, Clean Catch   • Urine Drug Screen - Urine, Clean Catch   • CBC & Differential       Medications Given in the Emergency Department:  Medications   ketorolac (TORADOL) injection 15 mg (15 mg Intravenous Not Given 1/2/23 1810)        ED Course:    The patient was initially evaluated in the triage area where orders were placed. The patient was later dispositioned by FLAVIO Stephens.      The patient was advised to stay for completion of workup which includes but is not limited to communication of labs and radiological results, reassessment and plan. The patient was advised that leaving prior to disposition by a provider could result in critical findings that are not communicated to the patient.     ED Course as of 01/02/23 1818 Mon Jan 02, 2023 1655 The patient was seen and examined by Kaitlin lin APRN, while in triage. Orders placed. Patient is awaiting disposition.   [AR]   1806 XR Spine Lumbar 2 or 3 View  Degenerative changes [CW]      ED Course User Index  [AR] Kaitlin Marshall APRN  [CW] Eduarda Gurrola APRN       Labs:    Lab Results (last 24 hours)     Procedure Component Value Units Date/Time    Comprehensive Metabolic Panel [[id number]]  (Abnormal) Collected: 01/02/23 1443    Specimen: Blood Updated: 01/02/23 1506     Glucose 110 mg/dL      BUN 12 mg/dL      Creatinine 1.14 mg/dL      Sodium 139 mmol/L      Potassium 3.5 mmol/L      Chloride 104 mmol/L      CO2 25.3 mmol/L      Calcium 9.3 mg/dL      Total Protein 7.1 g/dL      Albumin 4.2 g/dL      ALT (SGPT) 11 U/L      AST (SGOT) 23 U/L      Alkaline Phosphatase 100 U/L      Total Bilirubin 0.5 mg/dL      Globulin 2.9 gm/dL      A/G Ratio 1.4 g/dL      BUN/Creatinine Ratio 10.5     Anion Gap 9.7 mmol/L      eGFR 75.0 mL/min/1.73      Comment: National Kidney Foundation and American Society of Nephrology (ASN) Task Force recommended calculation based on the Chronic Kidney Disease Epidemiology  Collaboration (CKD-EPI) equation refit without adjustment for race.       Narrative:      GFR Normal >60  Chronic Kidney Disease <60  Kidney Failure <15      CBC & Differential [[id number]]  (Abnormal) Collected: 01/02/23 1443    Specimen: Blood Updated: 01/02/23 1451    Narrative:      The following orders were created for panel order CBC & Differential.  Procedure                               Abnormality         Status                     ---------                               -----------         ------                     CBC Auto Differential[[id number]]        Abnormal            Final result                 Please view results for these tests on the individual orders.    Ethanol [[id number]] Collected: 01/02/23 1443    Specimen: Blood Updated: 01/02/23 1506     Ethanol <10 mg/dL      Ethanol % <0.010 %     Narrative:      Ethanol (Plasma)  <10 Essentially Negative    Toxic Concentrations           mg/dL    Flushing, slowing of reflexes    Impaired visual activity         Depression of CNS              >100  Possible Coma                  >300       CBC Auto Differential [[id number]]  (Abnormal) Collected: 01/02/23 1443    Specimen: Blood Updated: 01/02/23 1451     WBC 12.20 10*3/mm3      RBC 5.22 10*6/mm3      Hemoglobin 15.3 g/dL      Hematocrit 45.3 %      MCV 86.8 fL      MCH 29.3 pg      MCHC 33.8 g/dL      RDW 13.2 %      RDW-SD 41.6 fl      MPV 10.4 fL      Platelets 277 10*3/mm3      Neutrophil % 66.4 %      Lymphocyte % 28.0 %      Monocyte % 3.7 %      Eosinophil % 1.1 %      Basophil % 0.4 %      Immature Grans % 0.4 %      Neutrophils, Absolute 8.10 10*3/mm3      Lymphocytes, Absolute 3.41 10*3/mm3      Monocytes, Absolute 0.45 10*3/mm3      Eosinophils, Absolute 0.14 10*3/mm3      Basophils, Absolute 0.05 10*3/mm3      Immature Grans, Absolute 0.05 10*3/mm3      nRBC 0.0 /100 WBC     Urinalysis With Microscopic If Indicated (No Culture) - Urine, Clean Catch [[id number]]  (Normal)  Collected: 01/02/23 1706    Specimen: Urine, Clean Catch Updated: 01/02/23 1715     Color, UA Yellow     Appearance, UA Clear     pH, UA 6.0     Specific Gravity, UA 1.017     Glucose, UA Negative     Ketones, UA Negative     Bilirubin, UA Negative     Blood, UA Negative     Protein, UA Negative     Leuk Esterase, UA Negative     Nitrite, UA Negative     Urobilinogen, UA 0.2 E.U./dL    Narrative:      Urine microscopic not indicated.    Urine Drug Screen - Urine, Clean Catch [[id number]]  (Abnormal) Collected: 01/02/23 1706    Specimen: Urine, Clean Catch Updated: 01/02/23 1740     Amphet/Methamphet, Screen Positive     Barbiturates Screen, Urine Negative     Benzodiazepine Screen, Urine Positive     Cocaine Screen, Urine Negative     Opiate Screen Positive     THC, Screen, Urine Negative     Methadone Screen, Urine Negative     Oxycodone Screen, Urine Negative    Narrative:      Negative Thresholds Per Drugs Screened:    Amphetamines                 500 ng/ml  Barbiturates                 200 ng/ml  Benzodiazepines              100 ng/ml  Cocaine                      300 ng/ml  Methadone                    300 ng/ml  Opiates                      300 ng/ml  Oxycodone                    100 ng/ml  THC                           50 ng/ml    The Normal Value for all drugs tested is negative. This report includes final unconfirmed screening results to be used for medical treatment purposes only. Unconfirmed results must not be used for non-medical purposes such as employment or legal testing. Clinical consideration should be applied to any drug of abuse test, particularly when unconfirmed results are used.                   Imaging:    XR Chest 2 View    Result Date: 1/2/2023  PROCEDURE: XR CHEST 2 VW  COMPARISON: [facility], CT, CT CHEST LOW DOSE CANCER SCREENING WO, 12/09/2022, 15:05.  INDICATIONS: fall  FINDINGS:  The heart is normal in size.  The lungs are well-expanded.  Subsegmental atelectasis is seen  at the left lung base.  Bony structures appear intact.  Mild degenerative spurring is seen in the thoracic spine.        Subsegmental atelectasis is seen at the left lung base.     ANN-MARIE BRAND MD       Electronically Signed and Approved By: ANN-MARIE BRAND MD on 1/02/2023 at 17:46             XR Spine Lumbar 2 or 3 View    Result Date: 1/2/2023  PROCEDURE: XR SPINE LUMBAR 2 OR 3 VW  COMPARISON: None  INDICATIONS: fall, low back pain  FINDINGS:  Vertebral body heights and disc spaces are maintained.  No fractures or subluxations are seen.  Mild anterior spurring is evident.  No lytic or sclerotic bone lesions are seen.        Lumbar spine series demonstrating mild multi level degenerative change.     ANN-MARIE BRAND MD       Electronically Signed and Approved By: ANN-MARIE BRAND MD on 1/02/2023 at 17:47             CT Head Without Contrast    Result Date: 1/2/2023  PROCEDURE: CT HEAD WO CONTRAST  COMPARISON:  None  INDICATIONS: FALL. GENERALIZED HEADACHE AND NECK PAIN.  PROTOCOL:   Standard imaging protocol performed    RADIATION:   DLP: 1079mGy*cm   MA and/or KV was adjusted to minimize radiation dose.    TECHNIQUE: CT images were obtained without non-ionic intravenous contrast material.  FINDINGS:  The ventricles are normal in size, position, and configuration.  Sulci are not abnormally prominent.  No abnormal gray or white matter density is appreciated.  There is no CT evidence of acute intracranial hemorrhage, mass, or mass effect.  The orbits have a normal appearance.  The paranasal sinuses, middle ears, and mastoid air cells are well aerated.  No fractures are seen on bone window images.       Negative CT scan of the head without IV contrast.     ANN-MARIE BRAND MD       Electronically Signed and Approved By: ANN-MARIE BRAND MD on 1/02/2023 at 18:04             CT Cervical Spine Without Contrast    Result Date: 1/2/2023  PROCEDURE: CT CERVICAL SPINE WO CONTRAST  COMPARISON: None  INDICATIONS: FALL. GENERALIZED  HEADACHE AND NECK PAIN.  PROTOCOL:   Standard imaging protocol performed    RADIATION:   DLP: 479mGy*cm   MA and/or KV was adjusted to minimize radiation dose.    TECHNIQUE: Multiplanar CT images were created without contrast material.   FINDINGS:  No abnormality is seen at the craniocervical junction.  Mild degenerative spurring is seen between the anterior arch of C1 and the dens.  Vertebral body heights are well maintained.  No fractures or subluxations are seen.  Mild facet arthropathy is seen at multiple levels.  This C2-C3, C3-C4, and C4-C5 disc spaces are maintained.  Mild spurring is seen at C4-5.  Mild narrowing of the C5-6 disc space is seen, with moderate spurring.  The C6-7 disc space is maintained.  Mild spurring is evident.  No abnormality is seen at C7-T1.  Taco-'s fracture of the posterior spinous process of T1 appears old, we have no prior studies for comparison.  No soft tissue mass or adenopathy is seen.  The thyroid gland has a normal appearance.  CONTINUED ON NEXT PAGE...    Moderate paraseptal emphysema is seen at the lung apices.        CT scan of the cervical spine with multiplanar reconstructions demonstrating multi level degenerative change.  Taco-'s fracture of the posterior spinous process of T1 appears old.     ANN-MARIE BRAND MD       Electronically Signed and Approved By: ANN-MARIE BRAND MD on 1/02/2023 at 18:10                 Differential Diagnosis and Discussion:      Back pain: The patient presents with back and neck pain after a fall.  My differential diagnosis includes but is not limited to acute spinal injury, back contusion, musculoskeletal back pain, spinal fracture, osteoarthritis        MDM  Number of Diagnoses or Management Options  Diagnosis management comments: This patient has left the emergency department or waiting room with no communication to myself, nursing or administrative staff. There was no opportunity to discuss the patient's decision to leave,  provide medical advice or discuss alternatives to leaving. The staff has made efforts to locate patient without success.       Amount and/or Complexity of Data Reviewed  Clinical lab tests: reviewed and ordered  Tests in the radiology section of CPT®: reviewed and ordered             Patient Care Considerations:          Consultants/Shared Management Plan:    None    Social Determinants of Health:    Patient is independent, reliable, and has access to care.       Disposition and Care Coordination:    Eloped: This patient has left the emergency department or waiting room with no communication to myself, nursing or administrative staff. There was no opportunity to discuss the patient's decision to leave, provide medical advice or discuss alternatives to. The staff has made efforts to locate patient without success.        Final diagnoses:   None        ED Disposition     ED Disposition   Eloped    Condition   --    Comment   Pt ripped out IV and left             This medical record created using voice recognition software.           Eduarda Gurrola APRN  01/02/23 1816       Eduarda Gurrola, FLAVIO  01/02/23 181

## 2023-01-05 ENCOUNTER — TELEPHONE (OUTPATIENT)
Dept: SURGERY | Facility: CLINIC | Age: 58
End: 2023-01-05
Payer: COMMERCIAL

## 2023-01-05 NOTE — TELEPHONE ENCOUNTER
I tried calling pt to get him r/s for his colonoscopy/egd that was on for yesterday. There was no answer. LMOM

## 2023-01-06 NOTE — TELEPHONE ENCOUNTER
I tried calling pt again this morning, no answer. LMOM    I will send referring provider a letter letting them know colon/egd was not done.

## 2023-01-17 ENCOUNTER — HOSPITAL ENCOUNTER (EMERGENCY)
Facility: HOSPITAL | Age: 58
Discharge: HOME | End: 2023-01-17
Attending: EMERGENCY MEDICINE | Admitting: EMERGENCY MEDICINE
Payer: COMMERCIAL

## 2023-01-17 ENCOUNTER — APPOINTMENT (OUTPATIENT)
Dept: GENERAL RADIOLOGY | Facility: HOSPITAL | Age: 58
End: 2023-01-17
Payer: COMMERCIAL

## 2023-01-17 VITALS
BODY MASS INDEX: 29.53 KG/M2 | SYSTOLIC BLOOD PRESSURE: 113 MMHG | OXYGEN SATURATION: 98 % | RESPIRATION RATE: 18 BRPM | HEIGHT: 72 IN | TEMPERATURE: 98 F | DIASTOLIC BLOOD PRESSURE: 79 MMHG | WEIGHT: 218.03 LBS | HEART RATE: 84 BPM

## 2023-01-17 DIAGNOSIS — L08.9 TOE INFECTION: ICD-10-CM

## 2023-01-17 DIAGNOSIS — L03.031 CELLULITIS OF TOE OF RIGHT FOOT: Primary | ICD-10-CM

## 2023-01-17 LAB
ALBUMIN SERPL-MCNC: 4.5 G/DL (ref 3.5–5.2)
ALBUMIN/GLOB SERPL: 1.5 G/DL
ALP SERPL-CCNC: 115 U/L (ref 39–117)
ALT SERPL W P-5'-P-CCNC: 13 U/L (ref 1–41)
ANION GAP SERPL CALCULATED.3IONS-SCNC: 7.4 MMOL/L (ref 5–15)
AST SERPL-CCNC: 21 U/L (ref 1–40)
BASOPHILS # BLD AUTO: 0.05 10*3/MM3 (ref 0–0.2)
BASOPHILS NFR BLD AUTO: 0.3 % (ref 0–1.5)
BILIRUB SERPL-MCNC: 0.6 MG/DL (ref 0–1.2)
BUN SERPL-MCNC: 12 MG/DL (ref 6–20)
BUN/CREAT SERPL: 11.5 (ref 7–25)
CALCIUM SPEC-SCNC: 9.7 MG/DL (ref 8.6–10.5)
CHLORIDE SERPL-SCNC: 104 MMOL/L (ref 98–107)
CO2 SERPL-SCNC: 28.6 MMOL/L (ref 22–29)
CREAT SERPL-MCNC: 1.04 MG/DL (ref 0.76–1.27)
D-LACTATE SERPL-SCNC: 0.9 MMOL/L (ref 0.5–2)
DEPRECATED RDW RBC AUTO: 45.5 FL (ref 37–54)
EGFRCR SERPLBLD CKD-EPI 2021: 83.7 ML/MIN/1.73
EOSINOPHIL # BLD AUTO: 0.21 10*3/MM3 (ref 0–0.4)
EOSINOPHIL NFR BLD AUTO: 1.4 % (ref 0.3–6.2)
ERYTHROCYTE [DISTWIDTH] IN BLOOD BY AUTOMATED COUNT: 13.9 % (ref 12.3–15.4)
GLOBULIN UR ELPH-MCNC: 3.1 GM/DL
GLUCOSE SERPL-MCNC: 109 MG/DL (ref 65–99)
HCT VFR BLD AUTO: 49.2 % (ref 37.5–51)
HGB BLD-MCNC: 16.1 G/DL (ref 13–17.7)
HOLD SPECIMEN: NORMAL
HOLD SPECIMEN: NORMAL
IMM GRANULOCYTES # BLD AUTO: 0.07 10*3/MM3 (ref 0–0.05)
IMM GRANULOCYTES NFR BLD AUTO: 0.5 % (ref 0–0.5)
LYMPHOCYTES # BLD AUTO: 2.67 10*3/MM3 (ref 0.7–3.1)
LYMPHOCYTES NFR BLD AUTO: 17.5 % (ref 19.6–45.3)
MCH RBC QN AUTO: 29.2 PG (ref 26.6–33)
MCHC RBC AUTO-ENTMCNC: 32.7 G/DL (ref 31.5–35.7)
MCV RBC AUTO: 89.1 FL (ref 79–97)
MONOCYTES # BLD AUTO: 0.86 10*3/MM3 (ref 0.1–0.9)
MONOCYTES NFR BLD AUTO: 5.6 % (ref 5–12)
NEUTROPHILS NFR BLD AUTO: 11.43 10*3/MM3 (ref 1.7–7)
NEUTROPHILS NFR BLD AUTO: 74.7 % (ref 42.7–76)
NRBC BLD AUTO-RTO: 0 /100 WBC (ref 0–0.2)
PLATELET # BLD AUTO: 324 10*3/MM3 (ref 140–450)
PMV BLD AUTO: 11.2 FL (ref 6–12)
POTASSIUM SERPL-SCNC: 4 MMOL/L (ref 3.5–5.2)
PROT SERPL-MCNC: 7.6 G/DL (ref 6–8.5)
RBC # BLD AUTO: 5.52 10*6/MM3 (ref 4.14–5.8)
SODIUM SERPL-SCNC: 140 MMOL/L (ref 136–145)
WBC NRBC COR # BLD: 15.29 10*3/MM3 (ref 3.4–10.8)
WHOLE BLOOD HOLD COAG: NORMAL
WHOLE BLOOD HOLD SPECIMEN: NORMAL

## 2023-01-17 PROCEDURE — 96366 THER/PROPH/DIAG IV INF ADDON: CPT

## 2023-01-17 PROCEDURE — 25010000002 KETOROLAC TROMETHAMINE PER 15 MG

## 2023-01-17 PROCEDURE — 73660 X-RAY EXAM OF TOE(S): CPT

## 2023-01-17 PROCEDURE — 99284 EMERGENCY DEPT VISIT MOD MDM: CPT

## 2023-01-17 PROCEDURE — 96375 TX/PRO/DX INJ NEW DRUG ADDON: CPT

## 2023-01-17 PROCEDURE — 25010000002 VANCOMYCIN 5 G RECONSTITUTED SOLUTION

## 2023-01-17 PROCEDURE — 80053 COMPREHEN METABOLIC PANEL: CPT

## 2023-01-17 PROCEDURE — 36415 COLL VENOUS BLD VENIPUNCTURE: CPT

## 2023-01-17 PROCEDURE — 83605 ASSAY OF LACTIC ACID: CPT

## 2023-01-17 PROCEDURE — 25010000002 MORPHINE PER 10 MG: Performed by: EMERGENCY MEDICINE

## 2023-01-17 PROCEDURE — 25010000002 FENTANYL CITRATE (PF) 50 MCG/ML SOLUTION: Performed by: EMERGENCY MEDICINE

## 2023-01-17 PROCEDURE — 96365 THER/PROPH/DIAG IV INF INIT: CPT

## 2023-01-17 PROCEDURE — 87040 BLOOD CULTURE FOR BACTERIA: CPT

## 2023-01-17 PROCEDURE — 85025 COMPLETE CBC W/AUTO DIFF WBC: CPT

## 2023-01-17 RX ORDER — KETOROLAC TROMETHAMINE 30 MG/ML
30 INJECTION, SOLUTION INTRAMUSCULAR; INTRAVENOUS ONCE
Status: COMPLETED | OUTPATIENT
Start: 2023-01-17 | End: 2023-01-17

## 2023-01-17 RX ORDER — HYDROCODONE BITARTRATE AND ACETAMINOPHEN 5; 325 MG/1; MG/1
1 TABLET ORAL EVERY 6 HOURS PRN
Qty: 12 TABLET | Refills: 0 | Status: SHIPPED | OUTPATIENT
Start: 2023-01-17 | End: 2023-02-06

## 2023-01-17 RX ORDER — MORPHINE SULFATE 2 MG/ML
1 INJECTION, SOLUTION INTRAMUSCULAR; INTRAVENOUS ONCE
Status: COMPLETED | OUTPATIENT
Start: 2023-01-17 | End: 2023-01-17

## 2023-01-17 RX ORDER — FENTANYL CITRATE 50 UG/ML
50 INJECTION, SOLUTION INTRAMUSCULAR; INTRAVENOUS ONCE
Status: COMPLETED | OUTPATIENT
Start: 2023-01-17 | End: 2023-01-17

## 2023-01-17 RX ORDER — CEPHALEXIN 500 MG/1
500 CAPSULE ORAL 2 TIMES DAILY
Qty: 14 CAPSULE | Refills: 0 | Status: SHIPPED | OUTPATIENT
Start: 2023-01-17 | End: 2023-01-24

## 2023-01-17 RX ORDER — IBUPROFEN 600 MG/1
600 TABLET ORAL EVERY 6 HOURS PRN
Qty: 20 TABLET | Refills: 0 | Status: SHIPPED | OUTPATIENT
Start: 2023-01-17

## 2023-01-17 RX ORDER — SODIUM CHLORIDE 0.9 % (FLUSH) 0.9 %
10 SYRINGE (ML) INJECTION AS NEEDED
Status: DISCONTINUED | OUTPATIENT
Start: 2023-01-17 | End: 2023-01-17 | Stop reason: HOSPADM

## 2023-01-17 RX ADMIN — FENTANYL CITRATE 50 MCG: 50 INJECTION, SOLUTION INTRAMUSCULAR; INTRAVENOUS at 16:30

## 2023-01-17 RX ADMIN — MORPHINE SULFATE 1 MG: 2 INJECTION, SOLUTION INTRAMUSCULAR; INTRAVENOUS at 13:27

## 2023-01-17 RX ADMIN — KETOROLAC TROMETHAMINE 30 MG: 30 INJECTION, SOLUTION INTRAMUSCULAR; INTRAVENOUS at 15:39

## 2023-01-17 RX ADMIN — VANCOMYCIN HYDROCHLORIDE 2000 MG: 5 INJECTION, POWDER, LYOPHILIZED, FOR SOLUTION INTRAVENOUS at 13:28

## 2023-01-17 NOTE — DISCHARGE INSTRUCTIONS
It is very important that you take all the antibiotics that been prescribed each day as directed.  Take the pain medications as needed.  It is also very important that you follow-up with the podiatrist whose information has been provided for you.  Call his office first thing in the morning to schedule an appointment.  If it anytime you develop a fever that cannot be controlled with Tylenol or Motrin, severe nausea, vomiting, diarrhea, have an increase of redness in your toe, noticed that streaking up your foot and up your leg, or have any other concerns return to the ER otherwise follow-up with Dr. Hand

## 2023-01-17 NOTE — ED PROVIDER NOTES
Emergency Department Encounter    Room number: 23/23  Date seen: 1/17/2023  Time: 1:06 PM EST    PCP: Roni Gray MD     Chief Complaint: Right great toe pain      Independent Historian/Clinical History and Information was obtained by:   Patient and Chart    History is limited by: N/A    History of Present Illness:  Patient is a 57 y.o. year old male who presents to the emergency department for evaluation of right great toe pain.  Patient was seen here recently and diagnosed with parenchyma.  It was incised and drained.  He reports that only topical antibiotic was applied and he did not receive any antibiotics to take once discharged.  He returns today with an increase in pain and concerns about infection.  He rates his pain as a 9 on a scale of 0-10.    HPI    Patient Care Team  Primary Care Provider: Roni Gray MD    Past Medical History:     Allergies   Allergen Reactions   • Naproxen GI Intolerance     Past Medical History:   Diagnosis Date   • ADHD    • Anxiety disorder    • Backache    • Cellulitis of scrotum    • Insomnia disorder    • Seizures (HCC)    • Substance abuse ([facility])     years ago   • Viral gastroenteritis      Past Surgical History:   Procedure Laterality Date   • HAND SURGERY Right     ring finger   • KNEE ARTHROSCOPY  05/21/2014    Arthroscopy of right knee, debride medial meniscus.   • KNEE ARTHROSCOPY W/ MENISCECTOMY Left 8/9/2022    Procedure: KNEE ARTHROSCOPY WITH PARTIAL MEDIAL MENISCECTOMY;  Surgeon: Carlos Hameed MD;  Location: [facility] [place] [facility] [place];  Service: Orthopedics;  Laterality: Left;   • SHOULDER ARTHROSCOPY Right      Family History   Problem Relation Age of Onset   • Anxiety disorder Mother    • Depression Mother        Home Medications:  Prior to Admission medications    Medication Sig Start Date End Date Taking? Authorizing Provider   amphetamine-dextroamphetamine (ADDERALL) 20 MG tablet TAKE ONE tablet by MOUTH AT EIGHT IN THE MORNING AND ONE IN THE EVENING daily 11/4/22    "Charu Del Castillo MD   cloNIDine (CATAPRES) 0.1 MG tablet Take 1 tablet (0.1 mg) by mouth at bedtime 12/7/22   Charu Del Castillo MD   gabapentin (NEURONTIN) 600 MG tablet Take 600 mg by mouth 2 (Two) Times a Day. 7/12/22   Charu Del Castillo MD   hydrOXYzine pamoate (VISTARIL) 50 MG capsule Take 50 mg by mouth 3 (Three) Times a Day As Needed. 11/4/22   Charu Del Castillo MD        Social History:   Social History     Tobacco Use   • Smoking status: Every Day     Packs/day: 0.50     Types: Cigarettes   • Smokeless tobacco: Never   Vaping Use   • Vaping Use: Never used   Substance Use Topics   • Alcohol use: No   • Drug use: Not Currently     Frequency: 1.0 times per week     Types: Marijuana, Methamphetamines     Comment: former         Review of Systems:  Review of Systems   Constitutional: Negative for chills and fever.   HENT: Negative for congestion, ear pain and sore throat.    Eyes: Negative for pain.   Respiratory: Negative for cough, chest tightness and shortness of breath.    Cardiovascular: Negative for chest pain.   Gastrointestinal: Negative for abdominal pain, diarrhea, nausea and vomiting.   Genitourinary: Negative for flank pain and hematuria.   Musculoskeletal: Negative for joint swelling.   Skin: Positive for color change and wound. Negative for pallor.        Right great toe   Neurological: Negative for seizures and headaches.   All other systems reviewed and are negative.       Physical Exam:  /84   Pulse 87   Temp 98 °F (36.7 °C) (Oral)   Resp 18   Ht 182.9 cm (72\")   Wt 98.9 kg (218 lb 0.6 oz)   SpO2 99%   BMI 29.57 kg/m²     Physical Exam  Vitals and nursing note reviewed.   Constitutional:       General: He is not in acute distress.     Appearance: Normal appearance. He is not ill-appearing, toxic-appearing or diaphoretic.   HENT:      Head: Normocephalic and atraumatic.      Mouth/Throat:      Mouth: Mucous membranes are moist.   Eyes:      General: No scleral " icterus.  Cardiovascular:      Rate and Rhythm: Normal rate and regular rhythm.      Pulses: Normal pulses.      Heart sounds: Normal heart sounds.   Pulmonary:      Effort: Pulmonary effort is normal. No respiratory distress.      Breath sounds: Normal breath sounds. No stridor. No wheezing.   Abdominal:      General: Abdomen is flat. There is no distension.      Palpations: Abdomen is soft.      Tenderness: There is no abdominal tenderness.   Musculoskeletal:         General: Swelling and tenderness present. No deformity or signs of injury. Normal range of motion.      Cervical back: Normal range of motion and neck supple.        Feet:    Skin:     General: Skin is warm and dry.      Findings: Erythema present.   Neurological:      Mental Status: He is alert and oriented to person, place, and time. Mental status is at baseline.      Sensory: No sensory deficit.                  Procedures:  Procedures      Medical Decision Making:      Comorbidities that affect care:    None patient denies any history of diabetes.    External Notes reviewed:    Previous ED Note      The following orders were placed and all results were independently analyzed by me:  Orders Placed This Encounter   Procedures   • Blood Culture - Blood,   • Blood Culture - Blood,   • XR Toe 2+ View Right   • [place] Draw   • Lactic Acid, Plasma   • Comprehensive Metabolic Panel   • CBC Auto Differential   • Ambulatory Referral to Podiatry   • Insert peripheral IV   • Green Top (Gel)   • Lavender Top   • Gold Top - SST   • Light Blue Top   • CBC & Differential       Medications Given in the Emergency Department:  Medications   sodium chloride 0.9 % flush 10 mL (has no administration in time range)   vancomycin 2000 mg/500 mL 0.9% NS IVPB ([facility]) (0 mg Intravenous Stopped 1/17/23 1607)   morphine injection 1 mg (1 mg Intravenous Given 1/17/23 1327)   ketorolac (TORADOL) injection 30 mg (30 mg Intravenous Given 1/17/23 1539)   fentaNYL citrate (PF)  (SUBLIMAZE) injection 50 mcg (50 mcg Intravenous Given 1/17/23 1630)        ED Course:    ED Course as of 01/17/23 1651 Tue Jan 17, 2023   1307 Photo of patient's right great toe taken using the haiku gilda, after receiving patient's permission, and placed in patient's chart under media tab [MS]   1531 Patient continues to receive vancomycin infusion.  He reports that his pain is starting to return.  Additional pain medications ordered at this time [MS]      ED Course User Index  [MS] Gemma Michaels, FLAVIO       Labs:    Lab Results (last 24 hours)     Procedure Component Value Units Date/Time    CBC & Differential [[id number]]  (Abnormal) Collected: 01/17/23 1237    Specimen: Blood Updated: 01/17/23 1601    Narrative:      The following orders were created for panel order CBC & Differential.  Procedure                               Abnormality         Status                     ---------                               -----------         ------                     CBC Auto Differential[[id number]]        Abnormal            Final result                 Please view results for these tests on the individual orders.    Comprehensive Metabolic Panel [[id number]]  (Abnormal) Collected: 01/17/23 1237    Specimen: Blood Updated: 01/17/23 1612     Glucose 109 mg/dL      BUN 12 mg/dL      Creatinine 1.04 mg/dL      Sodium 140 mmol/L      Potassium 4.0 mmol/L      Chloride 104 mmol/L      CO2 28.6 mmol/L      Calcium 9.7 mg/dL      Total Protein 7.6 g/dL      Albumin 4.5 g/dL      ALT (SGPT) 13 U/L      AST (SGOT) 21 U/L      Alkaline Phosphatase 115 U/L      Total Bilirubin 0.6 mg/dL      Globulin 3.1 gm/dL      A/G Ratio 1.5 g/dL      BUN/Creatinine Ratio 11.5     Anion Gap 7.4 mmol/L      eGFR 83.7 mL/min/1.73     Narrative:      GFR Normal >60  Chronic Kidney Disease <60  Kidney Failure <15      CBC Auto Differential [[id number]]  (Abnormal) Collected: 01/17/23 1237    Specimen: Blood Updated: 01/17/23 1601     WBC  15.29 10*3/mm3      RBC 5.52 10*6/mm3      Hemoglobin 16.1 g/dL      Hematocrit 49.2 %      MCV 89.1 fL      MCH 29.2 pg      MCHC 32.7 g/dL      RDW 13.9 %      RDW-SD 45.5 fl      MPV 11.2 fL      Platelets 324 10*3/mm3      Neutrophil % 74.7 %      Lymphocyte % 17.5 %      Monocyte % 5.6 %      Eosinophil % 1.4 %      Basophil % 0.3 %      Immature Grans % 0.5 %      Neutrophils, Absolute 11.43 10*3/mm3      Lymphocytes, Absolute 2.67 10*3/mm3      Monocytes, Absolute 0.86 10*3/mm3      Eosinophils, Absolute 0.21 10*3/mm3      Basophils, Absolute 0.05 10*3/mm3      Immature Grans, Absolute 0.07 10*3/mm3      nRBC 0.0 /100 WBC     Lactic Acid, Plasma [[id number]]  (Normal) Collected: 01/17/23 1326    Specimen: Blood Updated: 01/17/23 1359     Lactate 0.9 mmol/L     Blood Culture - Blood, Arm, Right [[id number]] Collected: 01/17/23 1326    Specimen: Blood from Arm, Right Updated: 01/17/23 1340    Blood Culture - Blood, Arm, Left [[id number]] Collected: 01/17/23 1328    Specimen: Blood from Arm, Left Updated: 01/17/23 1340           Imaging:    XR Toe 2+ View Right    Result Date: 1/17/2023  PROCEDURE: XR TOE 2+ VW RIGHT  COMPARISON: [facility], CR, XR TOE 2+ VW RIGHT, 12/22/2022, 15:37.  INDICATIONS: foot ulcer, infected BIG toe - reports worse than before  FINDINGS:  There is no acute fracture.  There are no conventional radiographic changes of osteomyelitis.  There is no soft tissue gas or foreign body.       No convincing radiographic evidence of osteomyelitis.      ACE VILLEGAS MD       Electronically Signed and Approved By: ACE VILLEGAS MD on 1/17/2023 at 14:06                 Differential Diagnosis and Discussion:    Extremity Pain: Differential diagnosis includes but is not limited to soft tissue sprain, tendonitis, tendon injury, dislocation, fracture, deep vein thrombosis, arterial insufficiency, osteoarthritis, bursitis, and ligamentous damage.    All labs were reviewed and analyzed by me.  All  X-rays were independently reviewed by me.    MDM  Number of Diagnoses or Management Options  Cellulitis of toe of right foot: new and requires workup  Toe infection: new and does not require workup     Amount and/or Complexity of Data Reviewed  Clinical lab tests: ordered and reviewed  Tests in the radiology section of CPT®: ordered and reviewed  Review and summarize past medical records: yes (I have personally reviewed patient's previous medical encounters.  )    Risk of Complications, Morbidity, and/or Mortality  Presenting problems: low  Diagnostic procedures: low  Management options: low    Patient Progress  Patient progress: stable           Patient Care Considerations:    I considered CT of extremity however plain film x-ray confirmed that there was no gas surrounding area, no osteomyelitis, and no fracture or foreign body.  Nothing was indeterminate therefore CT not needed.  Patient also denies being diabetic which is less concerning.      Consultants/Shared Management Plan:    None    Social Determinants of Health:    Patient is independent, reliable, and has access to care.       Disposition and Care Coordination:    Discharged: I considered escalation of care by admitting this patient for observation, however the patient has improved and is suitable and  stable for discharge.    I have explained the patient´s condition, diagnoses and treatment plan based on the information available to me at this time. I have answered questions and addressed any concerns. The patient has a good  understanding of the patient´s diagnosis, condition, and treatment plan as can be expected at this point. The vital signs have been stable. The patient´s condition is stable and appropriate for discharge from the emergency department.      The patient will pursue further outpatient evaluation with the primary care physician or other designated or consulting physician as outlined in the discharge instructions. They are agreeable to  this plan of care and follow-up instructions have been explained in detail. The patient has received these instructions in written format and have expressed an understanding of the discharge instructions. The patient is aware that any significant change in condition or worsening of symptoms should prompt an immediate return to this or the closest emergency department or call to 911.    Final diagnoses:   Cellulitis of toe of right foot   Toe infection        ED Disposition     ED Disposition   Discharge    Condition   Stable    Comment   --             This medical record created using voice recognition software.    Gemma Michaels, APRN  01/17/23 [id number]

## 2023-01-22 LAB
BACTERIA SPEC AEROBE CULT: NORMAL
BACTERIA SPEC AEROBE CULT: NORMAL

## 2023-01-23 ENCOUNTER — OFFICE VISIT (OUTPATIENT)
Dept: PODIATRY | Facility: CLINIC | Age: 58
End: 2023-01-23
Payer: COMMERCIAL

## 2023-01-23 VITALS
DIASTOLIC BLOOD PRESSURE: 90 MMHG | SYSTOLIC BLOOD PRESSURE: 145 MMHG | HEIGHT: 72 IN | BODY MASS INDEX: 29.8 KG/M2 | HEART RATE: 85 BPM | TEMPERATURE: 99.1 F | OXYGEN SATURATION: 98 % | WEIGHT: 220 LBS

## 2023-01-23 DIAGNOSIS — L03.119 CELLULITIS OF FOOT: ICD-10-CM

## 2023-01-23 DIAGNOSIS — L60.0 INGROWN TOENAIL: Primary | ICD-10-CM

## 2023-01-23 PROCEDURE — 99203 OFFICE O/P NEW LOW 30 MIN: CPT | Performed by: PODIATRIST

## 2023-01-23 PROCEDURE — 11750 EXCISION NAIL&NAIL MATRIX: CPT | Performed by: PODIATRIST

## 2023-01-23 NOTE — PROGRESS NOTES
[facility] - PODIATRY    Today's Date: 01/23/23    Patient Name: Honorio Vigil Jr.  MRN: [id number]  [facility]: [id number]  PCP: Roni Gray MD,  Referring Provider: Gemma Michaels, *    SUBJECTIVE     Chief Complaint   Patient presents with   • Right Foot - Establish Care, Ingrown Toenail, Pain     Possible infection with ingrown in patient's right big toe that has been ongoing for the past two months.      HPI: Honorio Vigil Jr., a 57 y.o.male, presents to clinic.    Patient is a 57-year-old male presenting with a right ingrown toenail to his right great toe.  Patient states has been going on for months.  Patient states that he has had it drained several times in the emergency department.  Patient states it is painful and has been swollen for 6 to 8 weeks.  He is here to have the nail removed.    Past Medical History:   Diagnosis Date   • ADHD    • Anxiety disorder    • Backache    • Cellulitis of scrotum    • Insomnia disorder    • Seizures (HCC)    • Substance abuse (HCC)     years ago   • Viral gastroenteritis      Past Surgical History:   Procedure Laterality Date   • HAND SURGERY Right     ring finger   • KNEE ARTHROSCOPY  05/21/2014    Arthroscopy of right knee, debride medial meniscus.   • KNEE ARTHROSCOPY W/ MENISCECTOMY Left 8/9/2022    Procedure: KNEE ARTHROSCOPY WITH PARTIAL MEDIAL MENISCECTOMY;  Surgeon: Carlos Hameed MD;  [place] [facility] [place] [facility] [place]  Service: [place]opedics;  Laterality: Left;   • SHOULDER ARTHROSCOPY Right      Family History   Problem Relation Age of Onset   • Thyroid disease Mother    • Diabetes Mother    • Cancer Mother         pancreatic,thyroid   • Anxiety disorder Mother    • Depression Mother    • Heart disease Father    • Cancer Father         colon   • Diabetes Maternal Grandmother    • Cancer Paternal Grandmother         lung   • Hypertension Neg Hx      Social History     Socioeconomic History   • Marital status:    Tobacco Use   •  Smoking status: Every Day     Packs/day: 0.25     Types: Cigarettes   • Smokeless tobacco: Never   Vaping Use   • Vaping Use: Never used   Substance and Sexual Activity   • Alcohol use: No   • Drug use: Not Currently     Frequency: 1.0 times per week     Types: Marijuana, Methamphetamines     Comment: former   • Sexual activity: Defer     Allergies   Allergen Reactions   • Naproxen GI Intolerance     Current Outpatient Medications   Medication Sig Dispense Refill   • ibuprofen (ADVIL,MOTRIN) 600 MG tablet Take 1 tablet by mouth Every 6 (Six) Hours As Needed for Moderate Pain. 20 tablet 0   • amphetamine-dextroamphetamine (ADDERALL) 20 MG tablet TAKE ONE tablet by MOUTH AT EIGHT IN THE MORNING AND ONE IN THE EVENING daily     • cephalexin (KEFLEX) 500 MG capsule Take 1 capsule by mouth 2 (Two) Times a Day for 7 days. 14 capsule 0   • cloNIDine (CATAPRES) 0.1 MG tablet Take 1 tablet (0.1 mg) by mouth at bedtime     • gabapentin (NEURONTIN) 600 MG tablet Take 600 mg by mouth 2 (Two) Times a Day.     • HYDROcodone-acetaminophen (NORCO) 5-325 MG per tablet Take 1 tablet by mouth Every 6 (Six) Hours As Needed for Severe Pain. 12 tablet 0   • hydrOXYzine pamoate (VISTARIL) 50 MG capsule Take 50 mg by mouth 3 (Three) Times a Day As Needed.       No current facility-administered medications for this visit.     Review of Systems   Musculoskeletal:        Pain to right great toe       OBJECTIVE     Vitals:    01/23/23 1258   BP: 145/90   Pulse:    Temp:    SpO2:        WBC   Date Value Ref Range Status   01/17/2023 15.29 (H) 3.40 - 10.80 10*3/mm3 Final     RBC   Date Value Ref Range Status   01/17/2023 5.52 4.14 - 5.80 10*6/mm3 Final     Hemoglobin   Date Value Ref Range Status   01/17/2023 16.1 13.0 - 17.7 g/dL Final     Hematocrit   Date Value Ref Range Status   01/17/2023 49.2 37.5 - 51.0 % Final     MCV   Date Value Ref Range Status   01/17/2023 89.1 79.0 - 97.0 fL Final     MCH   Date Value Ref Range Status   01/17/2023  29.2 26.6 - 33.0 pg Final     MCHC   Date Value Ref Range Status   01/17/2023 32.7 31.5 - 35.7 g/dL Final     RDW   Date Value Ref Range Status   01/17/2023 13.9 12.3 - 15.4 % Final     RDW-SD   Date Value Ref Range Status   01/17/2023 45.5 37.0 - 54.0 fl Final     MPV   Date Value Ref Range Status   01/17/2023 11.2 6.0 - 12.0 fL Final     Platelets   Date Value Ref Range Status   01/17/2023 324 140 - 450 10*3/mm3 Final     Neutrophil %   Date Value Ref Range Status   01/17/2023 74.7 42.7 - 76.0 % Final     Lymphocyte %   Date Value Ref Range Status   01/17/2023 17.5 (L) 19.6 - 45.3 % Final     Monocyte %   Date Value Ref Range Status   01/17/2023 5.6 5.0 - 12.0 % Final     Eosinophil %   Date Value Ref Range Status   01/17/2023 1.4 0.3 - 6.2 % Final     Basophil %   Date Value Ref Range Status   01/17/2023 0.3 0.0 - 1.5 % Final     Immature Grans %   Date Value Ref Range Status   01/17/2023 0.5 0.0 - 0.5 % Final     Neutrophils, Absolute   Date Value Ref Range Status   01/17/2023 11.43 (H) 1.70 - 7.00 10*3/mm3 Final     Lymphocytes, Absolute   Date Value Ref Range Status   01/17/2023 2.67 0.70 - 3.10 10*3/mm3 Final     Monocytes, Absolute   Date Value Ref Range Status   01/17/2023 0.86 0.10 - 0.90 10*3/mm3 Final     Eosinophils, Absolute   Date Value Ref Range Status   01/17/2023 0.21 0.00 - 0.40 10*3/mm3 Final     Basophils, Absolute   Date Value Ref Range Status   01/17/2023 0.05 0.00 - 0.20 10*3/mm3 Final     Immature Grans, Absolute   Date Value Ref Range Status   01/17/2023 0.07 (H) 0.00 - 0.05 10*3/mm3 Final     nRBC   Date Value Ref Range Status   01/17/2023 0.0 0.0 - 0.2 /100 WBC Final         Lab Results   Component Value Date    GLUCOSE 109 (H) 01/17/2023    BUN 12 01/17/2023    CREATININE 1.04 01/17/2023    EGFRIFNONA 94 05/30/2019    BCR 11.5 01/17/2023    K 4.0 01/17/2023    CO2 28.6 01/17/2023    CALCIUM 9.7 01/17/2023    ALBUMIN 4.5 01/17/2023    AST 21 01/17/2023    ALT 13 01/17/2023       Patient  seen in no apparent distress.      PHYSICAL EXAM:     Foot/Ankle Exam:       General:   Appearance: appears stated age and healthy    Orientation: AAOx3    Affect: appropriate    Gait: unimpaired    Shoe Gear:  Casual shoes    VASCULAR      Right Foot Vascularity   Normal vascular exam    Dorsalis pedis:  2+  Posterior tibial:  2+  Skin Temperature: warm    Edema Grading:  None  CFT:  < 3 seconds  Pedal Hair Growth:  Present  Varicosities: none       Left Foot Vascularity   Normal vascular exam    Dorsalis pedis:  2+  Posterior tibial:  2+  Skin Temperature: warm    Edema Grading:  None  CFT:  < 3 seconds  Pedal Hair Growth:  Present  Varicosities: none        NEUROLOGIC     Right Foot Neurologic   Normal sensation    Light touch sensation:  Normal  Vibratory sensation:  Normal  Hot/Cold sensation: normal       Left Foot Neurologic   Normal sensation    Light touch sensation:  Normal  Vibratory sensation:  Normal  Hot/cold sensation: normal       MUSCLE STRENGTH     Right Foot Muscle Strength   Foot dorsiflexion:  4  Foot plantar flexion:  4  Foot inversion:  4  Foot eversion:  4     Left Foot Muscle Strength   Foot dorsiflexion:  4  Foot plantar flexion:  4  Foot inversion:  4  Foot eversion:  4     RANGE OF MOTION      Right Foot Range of Motion   Foot and ankle ROM within normal limits       Left Foot Range of Motion   Foot and ankle ROM within normal limits       DERMATOLOGIC     Right Foot Dermatologic   Skin: skin intact    Nails: ingrown toenail and paronychia    Nails comment:  Right 1st bilateral nail borders     Left Foot Dermatologic   Skin: skin intact    Nails: normal    Nails comment:  Swelling and erythema to right great toe        XR Chest 2 View    Result Date: 1/2/2023  Narrative: PROCEDURE: XR CHEST 2 VW  COMPARISON: [facility], CT, CT CHEST LOW DOSE CANCER SCREENING WO, 12/09/2022, 15:05.  INDICATIONS: fall  FINDINGS:  The heart is normal in size.  The lungs are well-expanded.   Subsegmental atelectasis is seen at the left lung base.  Bony structures appear intact.  Mild degenerative spurring is seen in the thoracic spine.      Impression:   Subsegmental atelectasis is seen at the left lung base.     ANN-MARIE BRAND MD       Electronically Signed and Approved By: ANN-MARIE BRAND MD on 1/02/2023 at 17:46             XR Spine Lumbar 2 or 3 View    Result Date: 1/2/2023  Narrative: PROCEDURE: XR SPINE LUMBAR 2 OR 3 VW  COMPARISON: None  INDICATIONS: fall, low back pain  FINDINGS:  Vertebral body heights and disc spaces are maintained.  No fractures or subluxations are seen.  Mild anterior spurring is evident.  No lytic or sclerotic bone lesions are seen.      Impression:   Lumbar spine series demonstrating mild multi level degenerative change.     ANN-MARIE BRAND MD       Electronically Signed and Approved By: ANN-MARIE BRAND MD on 1/02/2023 at 17:47             CT Head Without Contrast    Result Date: 1/2/2023  Narrative: PROCEDURE: CT HEAD WO CONTRAST  COMPARISON:  None  INDICATIONS: FALL. GENERALIZED HEADACHE AND NECK PAIN.  PROTOCOL:   Standard imaging protocol performed    RADIATION:   DLP: 1079mGy*cm   MA and/or KV was adjusted to minimize radiation dose.    TECHNIQUE: CT images were obtained without non-ionic intravenous contrast material.  FINDINGS:  The ventricles are normal in size, position, and configuration.  Sulci are not abnormally prominent.  No abnormal gray or white matter density is appreciated.  There is no CT evidence of acute intracranial hemorrhage, mass, or mass effect.  The orbits have a normal appearance.  The paranasal sinuses, middle ears, and mastoid air cells are well aerated.  No fractures are seen on bone window images.      Impression:  Negative CT scan of the head without IV contrast.     ANN-MARIE BRAND MD       Electronically Signed and Approved By: ANN-MARIE BRAND MD on 1/02/2023 at 18:04             CT Cervical Spine Without Contrast    Result Date:  1/2/2023  Narrative: PROCEDURE: CT CERVICAL SPINE WO CONTRAST  COMPARISON: None  INDICATIONS: FALL. GENERALIZED HEADACHE AND NECK PAIN.  PROTOCOL:   Standard imaging protocol performed    RADIATION:   DLP: 479mGy*cm   MA and/or KV was adjusted to minimize radiation dose.    TECHNIQUE: Multiplanar CT images were created without contrast material.   FINDINGS:  No abnormality is seen at the craniocervical junction.  Mild degenerative spurring is seen between the anterior arch of C1 and the dens.  Vertebral body heights are well maintained.  No fractures or subluxations are seen.  Mild facet arthropathy is seen at multiple levels.  This C2-C3, C3-C4, and C4-C5 disc spaces are maintained.  Mild spurring is seen at C4-5.  Mild narrowing of the C5-6 disc space is seen, with moderate spurring.  The C6-7 disc space is maintained.  Mild spurring is evident.  No abnormality is seen at C7-T1.  Taco-'s fracture of the posterior spinous process of T1 appears old, we have no prior studies for comparison.  No soft tissue mass or adenopathy is seen.  The thyroid gland has a normal appearance.  CONTINUED ON NEXT PAGE...    Moderate paraseptal emphysema is seen at the lung apices.      Impression:   CT scan of the cervical spine with multiplanar reconstructions demonstrating multi level degenerative change.  Taco-'s fracture of the posterior spinous process of T1 appears old.     ANN-MARIE BRAND MD       Electronically Signed and Approved By: ANN-MARIE BRAND MD on 1/02/2023 at 18:10             XR Toe 2+ View Right    Result Date: 1/17/2023  Narrative: PROCEDURE: XR TOE 2+ VW RIGHT  COMPARISON: [facility], , XR TOE 2+ VW RIGHT, 12/22/2022, 15:37.  INDICATIONS: foot ulcer, infected BIG toe - reports worse than before  FINDINGS:  There is no acute fracture.  There are no conventional radiographic changes of osteomyelitis.  There is no soft tissue gas or foreign body.      Impression:  No convincing  radiographic evidence of osteomyelitis.      ACE VILLEGAS MD       Electronically Signed and Approved By: ACE VILLEGAS MD on 1/17/2023 at 14:06               ASSESSMENT/PLAN     Diagnoses and all orders for this visit:    1. Ingrown toenail (Primary)    2. Cellulitis of foot    Procedure - This procedure is indicated for onychocryptosis and infection of the right great nail. Indications, risks and benefits and alternative treatments have been discussed with this patient who has agreed to this procedure. The area was sterilely prepped with a povidone-iodine solution. The affected area was locally anesthetized with 3 ml, of 0.5% Marcaine plain. The offending nail plate was completely excised.  The nail matrix was debrided out with a curette.  Purulent drainage was expressed from the proximal nail site. A sterile dressing was applied. The patient tolerated the procedure well.  We will have patient follow-up in 2 weeks.  Postop instructions given with patient.  Okay to take anti-inflammatory medication for pain.      Patient to finish current antibiotics.    Comprehensive lower extremity examination and evaluation was performed.    Discussed findings and treatment plan including risks, benefits, and treatment options with patient in detail. Patient agreed with treatment plan.    Medications and allergies reviewed.  Reviewed available lab values along with other pertinent labs.  These were discussed with the patient.    An After Visit Summary was printed and given to the patient at discharge, including (if requested) any available informative/educational handouts regarding diagnosis, treatment, or medications. All questions were answered to patient/family satisfaction. Should symptoms fail to improve or worsen they agree to call or return to clinic or to go to the Emergency Department. Discussed the importance of following up with any needed screening tests/labs/specialist appointments and any requested follow-up  recommended by me today. Importance of maintaining follow-up discussed and patient accepts that missed appointments can delay diagnosis and potentially lead to worsening of conditions.    No follow-ups on file., or sooner if acute issues arise.    This document has been electronically signed by Cristo Christy DPM on January 23, 2023 14:16 EST

## 2023-02-06 ENCOUNTER — OFFICE VISIT (OUTPATIENT)
Dept: PODIATRY | Facility: CLINIC | Age: 58
End: 2023-02-06
Payer: COMMERCIAL

## 2023-02-06 VITALS
SYSTOLIC BLOOD PRESSURE: 170 MMHG | BODY MASS INDEX: 31.15 KG/M2 | OXYGEN SATURATION: 98 % | DIASTOLIC BLOOD PRESSURE: 86 MMHG | HEART RATE: 76 BPM | WEIGHT: 230 LBS | HEIGHT: 72 IN | TEMPERATURE: 98.2 F

## 2023-02-06 DIAGNOSIS — L02.611 ABSCESS OF RIGHT FOOT: Primary | ICD-10-CM

## 2023-02-06 PROCEDURE — 99213 OFFICE O/P EST LOW 20 MIN: CPT | Performed by: PODIATRIST

## 2023-02-06 RX ORDER — SODIUM CHLORIDE, SODIUM LACTATE, POTASSIUM CHLORIDE, CALCIUM CHLORIDE 600; 310; 30; 20 MG/100ML; MG/100ML; MG/100ML; MG/100ML
100 INJECTION, SOLUTION INTRAVENOUS CONTINUOUS
Status: CANCELLED | OUTPATIENT
Start: 2023-02-06

## 2023-02-06 RX ORDER — HYDROCODONE BITARTRATE AND ACETAMINOPHEN 5; 325 MG/1; MG/1
1-2 TABLET ORAL EVERY 8 HOURS PRN
Qty: 12 TABLET | Refills: 0 | Status: SHIPPED | OUTPATIENT
Start: 2023-02-06 | End: 2023-02-07

## 2023-02-06 RX ORDER — DEXTROAMPHETAMINE SACCHARATE, AMPHETAMINE ASPARTATE, DEXTROAMPHETAMINE SULFATE AND AMPHETAMINE SULFATE 7.5; 7.5; 7.5; 7.5 MG/1; MG/1; MG/1; MG/1
45 TABLET ORAL DAILY
COMMUNITY
Start: 2023-02-01

## 2023-02-06 NOTE — H&P (VIEW-ONLY)
[facility] - PODIATRY    Today's Date: 02/06/23    Patient Name: Honorio Vigil Jr.  MRN: [id number]  [facility]: [id number]  PCP: Roni Gray MD,  Referring Provider: No ref. provider found    SUBJECTIVE     Chief Complaint   Patient presents with   • Right Foot - Follow-up     Toe nail removal, pt states he believes it is getting worse.     HPI: Honorio Vigil Jr., a 57 y.o.male, presents to clinic.    Patient is a 57-year-old male presenting with a right ingrown toenail to his right great toe.  Patient states has been going on for months.  Patient states that he has had it drained several times in the emergency department.  Patient states it is painful and has been swollen for 6 to 8 weeks.  He is here to have the nail removed.    2/6/2023-patient was doing well stated 2 days ago his toe started swelling up.    Past Medical History:   Diagnosis Date   • ADHD    • Anxiety disorder    • Backache    • Cellulitis of scrotum    • Insomnia disorder    • Seizures (HCC)    • Substance abuse (HCC)     years ago   • Viral gastroenteritis      Past Surgical History:   Procedure Laterality Date   • HAND SURGERY Right     ring finger   • KNEE ARTHROSCOPY  05/21/2014    Arthroscopy of right knee, debride medial meniscus.   • KNEE ARTHROSCOPY W/ MENISCECTOMY Left 8/9/2022    Procedure: KNEE ARTHROSCOPY WITH PARTIAL MEDIAL MENISCECTOMY;  Surgeon: Carlos Hameed MD;  Location: [facility] [place];  Service: Orthopedics;  Laterality: Left;   • SHOULDER ARTHROSCOPY Right      Family History   Problem Relation Age of Onset   • Thyroid disease Mother    • Diabetes Mother    • Cancer Mother         pancreatic,thyroid   • Anxiety disorder Mother    • Depression Mother    • Heart disease Father    • Cancer Father         colon   • Diabetes Maternal Grandmother    • Cancer Paternal Grandmother         lung   • Hypertension Neg Hx      Social History     Socioeconomic History   • Marital status:    Tobacco  Use   • Smoking status: Every Day     Packs/day: 0.25     Types: Cigarettes   • Smokeless tobacco: Never   Vaping Use   • Vaping Use: Never used   Substance and Sexual Activity   • Alcohol use: No   • Drug use: Not Currently     Frequency: 1.0 times per week     Types: Marijuana, Methamphetamines     Comment: former   • Sexual activity: Defer     Allergies   Allergen Reactions   • Naproxen GI Intolerance     Current Outpatient Medications   Medication Sig Dispense Refill   • amphetamine-dextroamphetamine (ADDERALL) 20 MG tablet TAKE ONE tablet by MOUTH AT EIGHT IN THE MORNING AND ONE IN THE EVENING daily     • amphetamine-dextroamphetamine (ADDERALL) 30 MG tablet      • cloNIDine (CATAPRES) 0.1 MG tablet Take 1 tablet (0.1 mg) by mouth at bedtime     • gabapentin (NEURONTIN) 600 MG tablet Take 600 mg by mouth 2 (Two) Times a Day.     • hydrOXYzine pamoate (VISTARIL) 50 MG capsule Take 50 mg by mouth 3 (Three) Times a Day As Needed.     • ibuprofen (ADVIL,MOTRIN) 600 MG tablet Take 1 tablet by mouth Every 6 (Six) Hours As Needed for Moderate Pain. 20 tablet 0   • HYDROcodone-acetaminophen (Norco) 5-325 MG per tablet Take 1-2 tablets by mouth Every 8 (Eight) Hours As Needed for Moderate Pain. 12 tablet 0     No current facility-administered medications for this visit.     Review of Systems   Musculoskeletal:        Pain to right great toe       OBJECTIVE     Vitals:    02/06/23 1403   BP: 170/86   Pulse: 76   Temp: 98.2 °F (36.8 °C)   SpO2: 98%       WBC   Date Value Ref Range Status   01/17/2023 15.29 (H) 3.40 - 10.80 10*3/mm3 Final     RBC   Date Value Ref Range Status   01/17/2023 5.52 4.14 - 5.80 10*6/mm3 Final     Hemoglobin   Date Value Ref Range Status   01/17/2023 16.1 13.0 - 17.7 g/dL Final     Hematocrit   Date Value Ref Range Status   01/17/2023 49.2 37.5 - 51.0 % Final     MCV   Date Value Ref Range Status   01/17/2023 89.1 79.0 - 97.0 fL Final     MCH   Date Value Ref Range Status   01/17/2023 29.2 26.6 -  33.0 pg Final     MCHC   Date Value Ref Range Status   01/17/2023 32.7 31.5 - 35.7 g/dL Final     RDW   Date Value Ref Range Status   01/17/2023 13.9 12.3 - 15.4 % Final     RDW-SD   Date Value Ref Range Status   01/17/2023 45.5 37.0 - 54.0 fl Final     MPV   Date Value Ref Range Status   01/17/2023 11.2 6.0 - 12.0 fL Final     Platelets   Date Value Ref Range Status   01/17/2023 324 140 - 450 10*3/mm3 Final     Neutrophil %   Date Value Ref Range Status   01/17/2023 74.7 42.7 - 76.0 % Final     Lymphocyte %   Date Value Ref Range Status   01/17/2023 17.5 (L) 19.6 - 45.3 % Final     Monocyte %   Date Value Ref Range Status   01/17/2023 5.6 5.0 - 12.0 % Final     Eosinophil %   Date Value Ref Range Status   01/17/2023 1.4 0.3 - 6.2 % Final     Basophil %   Date Value Ref Range Status   01/17/2023 0.3 0.0 - 1.5 % Final     Immature Grans %   Date Value Ref Range Status   01/17/2023 0.5 0.0 - 0.5 % Final     Neutrophils, Absolute   Date Value Ref Range Status   01/17/2023 11.43 (H) 1.70 - 7.00 10*3/mm3 Final     Lymphocytes, Absolute   Date Value Ref Range Status   01/17/2023 2.67 0.70 - 3.10 10*3/mm3 Final     Monocytes, Absolute   Date Value Ref Range Status   01/17/2023 0.86 0.10 - 0.90 10*3/mm3 Final     Eosinophils, Absolute   Date Value Ref Range Status   01/17/2023 0.21 0.00 - 0.40 10*3/mm3 Final     Basophils, Absolute   Date Value Ref Range Status   01/17/2023 0.05 0.00 - 0.20 10*3/mm3 Final     Immature Grans, Absolute   Date Value Ref Range Status   01/17/2023 0.07 (H) 0.00 - 0.05 10*3/mm3 Final     nRBC   Date Value Ref Range Status   01/17/2023 0.0 0.0 - 0.2 /100 WBC Final         Lab Results   Component Value Date    GLUCOSE 109 (H) 01/17/2023    BUN 12 01/17/2023    CREATININE 1.04 01/17/2023    EGFRIFNONA 94 05/30/2019    BCR 11.5 01/17/2023    K 4.0 01/17/2023    CO2 28.6 01/17/2023    CALCIUM 9.7 01/17/2023    ALBUMIN 4.5 01/17/2023    AST 21 01/17/2023    ALT 13 01/17/2023       Patient seen in no  apparent distress.      PHYSICAL EXAM:     Foot/Ankle Exam:       General:   Appearance: appears stated age and healthy    Orientation: AAOx3    Affect: appropriate    Gait: unimpaired    Shoe Gear:  Casual shoes    VASCULAR      Right Foot Vascularity   Normal vascular exam    Dorsalis pedis:  2+  Posterior tibial:  2+  Skin Temperature: warm    Edema Grading:  None  CFT:  < 3 seconds  Pedal Hair Growth:  Present  Varicosities: none       Left Foot Vascularity   Normal vascular exam    Dorsalis pedis:  2+  Posterior tibial:  2+  Skin Temperature: warm    Edema Grading:  None  CFT:  < 3 seconds  Pedal Hair Growth:  Present  Varicosities: none        NEUROLOGIC     Right Foot Neurologic   Normal sensation    Light touch sensation:  Normal  Vibratory sensation:  Normal  Hot/Cold sensation: normal       Left Foot Neurologic   Normal sensation    Light touch sensation:  Normal  Vibratory sensation:  Normal  Hot/cold sensation: normal       MUSCLE STRENGTH     Right Foot Muscle Strength   Foot dorsiflexion:  4  Foot plantar flexion:  4  Foot inversion:  4  Foot eversion:  4     Left Foot Muscle Strength   Foot dorsiflexion:  4  Foot plantar flexion:  4  Foot inversion:  4  Foot eversion:  4     RANGE OF MOTION      Right Foot Range of Motion   Foot and ankle ROM within normal limits       Left Foot Range of Motion   Foot and ankle ROM within normal limits       DERMATOLOGIC     Right Foot Dermatologic   Skin: skin intact    Nails: no ingrown toenail and no paronychia    Nails comment:  Swelling just proximal to the nail fold with fluid and fluctuation.  Erythema around the area.     Left Foot Dermatologic   Skin: skin intact    Nails: normal          XR Toe 2+ View Right    Result Date: 1/17/2023  Narrative: PROCEDURE: XR TOE 2+ VW RIGHT  COMPARISON: [facility], , XR TOE 2+ VW RIGHT, 12/22/2022, 15:37.  INDICATIONS: foot ulcer, infected BIG toe - reports worse than before  FINDINGS:  There is no acute  fracture.  There are no conventional radiographic changes of osteomyelitis.  There is no soft tissue gas or foreign body.      Impression:  No convincing radiographic evidence of osteomyelitis.      ACE VILLEGAS MD       Electronically Signed and Approved By: ACE VILLEGAS MD on 1/17/2023 at 14:06               ASSESSMENT/PLAN     Diagnoses and all orders for this visit:    1. Abscess of right foot (Primary)  -     Case Request; Standing  -     lactated ringers infusion  -     ceFOXitin (MEFOXIN) 2 g in sodium chloride 0.9 % 100 mL IVPB  -     Case Request  -     HYDROcodone-acetaminophen (Norco) 5-325 MG per tablet; Take 1-2 tablets by mouth Every 8 (Eight) Hours As Needed for Moderate Pain.  Dispense: 12 tablet; Refill: 0    Other orders  -     Follow Anesthesia Guidelines / Protocol; Future  -     Obtain Informed Consent; Future  -     Provide NPO Instructions to Patient; Future  -     Chlorhexidine Skin Prep; Future  -     Follow Anesthesia Guidelines / Protocol; Standing  -     Verify / Perform Chlorhexidine Skin Prep; Standing  -     Verify / Perform Chlorhexidine Skin Prep if Indicated (If Not Already Completed); Standing      Nail site looks good.  Patient has recurrent swelling just proximal to the nail matrix.  We will need to open up the area surgically in order to drain the proximal aspect of the toe.  Discussed mechanical matrixectomy along with this in order to prevent any recurrent nail formation.  We will schedule him to the operating room for an incision and drainage.    Patient to finish current antibiotics.    Comprehensive lower extremity examination and evaluation was performed.    Discussed findings and treatment plan including risks, benefits, and treatment options with patient in detail. Patient agreed with treatment plan.    Medications and allergies reviewed.  Reviewed available lab values along with other pertinent labs.  These were discussed with the patient.    An After Visit Summary was  printed and given to the patient at discharge, including (if requested) any available informative/educational handouts regarding diagnosis, treatment, or medications. All questions were answered to patient/family satisfaction. Should symptoms fail to improve or worsen they agree to call or return to clinic or to go to the Emergency Department. Discussed the importance of following up with any needed screening tests/labs/specialist appointments and any requested follow-up recommended by me today. Importance of maintaining follow-up discussed and patient accepts that missed appointments can delay diagnosis and potentially lead to worsening of conditions.    No follow-ups on file., or sooner if acute issues arise.    This document has been electronically signed by Cristo Christy DPM on February 6, 2023 16:15 EST

## 2023-02-06 NOTE — PROGRESS NOTES
[facility] - PODIATRY    Today's Date: 02/06/23    Patient Name: Honorio Vigil Jr.  MRN: [id number]  [facility]: [id number]  PCP: Roni Gray MD,  Referring Provider: No ref. provider found    SUBJECTIVE     Chief Complaint   Patient presents with   • Right Foot - Follow-up     Toe nail removal, pt states he believes it is getting worse.     HPI: Honorio Vigil Jr., a 57 y.o.male, presents to clinic.    Patient is a 57-year-old male presenting with a right ingrown toenail to his right great toe.  Patient states has been going on for months.  Patient states that he has had it drained several times in the emergency department.  Patient states it is painful and has been swollen for 6 to 8 weeks.  He is here to have the nail removed.    2/6/2023-patient was doing well stated 2 days ago his toe started swelling up.    Past Medical History:   Diagnosis Date   • ADHD    • Anxiety disorder    • Backache    • Cellulitis of scrotum    • Insomnia disorder    • Seizures (HCC)    • Substance abuse (HCC)     years ago   • Viral gastroenteritis      Past Surgical History:   Procedure Laterality Date   • HAND SURGERY Right     ring finger   • KNEE ARTHROSCOPY  05/21/2014    Arthroscopy of right knee, debride medial meniscus.   • KNEE ARTHROSCOPY W/ MENISCECTOMY Left 8/9/2022    Procedure: KNEE ARTHROSCOPY WITH PARTIAL MEDIAL MENISCECTOMY;  Surgeon: Carlos Hameed MD;  Location: [facility] OR [facility] [place];  Service: Orthopedics;  Laterality: Left;   • SHOULDER ARTHROSCOPY Right      Family History   Problem Relation Age of Onset   • Thyroid disease Mother    • Diabetes Mother    • Cancer Mother         pancreatic,thyroid   • Anxiety disorder Mother    • Depression Mother    • Heart disease Father    • Cancer Father         colon   • Diabetes Maternal Grandmother    • Cancer Paternal Grandmother         lung   • Hypertension Neg Hx      Social History     Socioeconomic History   • Marital status:    Tobacco  Use   • Smoking status: Every Day     Packs/day: 0.25     Types: Cigarettes   • Smokeless tobacco: Never   Vaping Use   • Vaping Use: Never used   Substance and Sexual Activity   • Alcohol use: No   • Drug use: Not Currently     Frequency: 1.0 times per week     Types: Marijuana, Methamphetamines     Comment: former   • Sexual activity: Defer     Allergies   Allergen Reactions   • Naproxen GI Intolerance     Current Outpatient Medications   Medication Sig Dispense Refill   • amphetamine-dextroamphetamine (ADDERALL) 20 MG tablet TAKE ONE tablet by MOUTH AT EIGHT IN THE MORNING AND ONE IN THE EVENING daily     • amphetamine-dextroamphetamine (ADDERALL) 30 MG tablet      • cloNIDine (CATAPRES) 0.1 MG tablet Take 1 tablet (0.1 mg) by mouth at bedtime     • gabapentin (NEURONTIN) 600 MG tablet Take 600 mg by mouth 2 (Two) Times a Day.     • hydrOXYzine pamoate (VISTARIL) 50 MG capsule Take 50 mg by mouth 3 (Three) Times a Day As Needed.     • ibuprofen (ADVIL,MOTRIN) 600 MG tablet Take 1 tablet by mouth Every 6 (Six) Hours As Needed for Moderate Pain. 20 tablet 0   • HYDROcodone-acetaminophen (Norco) 5-325 MG per tablet Take 1-2 tablets by mouth Every 8 (Eight) Hours As Needed for Moderate Pain. 12 tablet 0     No current facility-administered medications for this visit.     Review of Systems   Musculoskeletal:        Pain to right great toe       OBJECTIVE     Vitals:    02/06/23 1403   BP: 170/86   Pulse: 76   Temp: 98.2 °F (36.8 °C)   SpO2: 98%       WBC   Date Value Ref Range Status   01/17/2023 15.29 (H) 3.40 - 10.80 10*3/mm3 Final     RBC   Date Value Ref Range Status   01/17/2023 5.52 4.14 - 5.80 10*6/mm3 Final     Hemoglobin   Date Value Ref Range Status   01/17/2023 16.1 13.0 - 17.7 g/dL Final     Hematocrit   Date Value Ref Range Status   01/17/2023 49.2 37.5 - 51.0 % Final     MCV   Date Value Ref Range Status   01/17/2023 89.1 79.0 - 97.0 fL Final     MCH   Date Value Ref Range Status   01/17/2023 29.2 26.6 -  33.0 pg Final     MCHC   Date Value Ref Range Status   01/17/2023 32.7 31.5 - 35.7 g/dL Final     RDW   Date Value Ref Range Status   01/17/2023 13.9 12.3 - 15.4 % Final     RDW-SD   Date Value Ref Range Status   01/17/2023 45.5 37.0 - 54.0 fl Final     MPV   Date Value Ref Range Status   01/17/2023 11.2 6.0 - 12.0 fL Final     Platelets   Date Value Ref Range Status   01/17/2023 324 140 - 450 10*3/mm3 Final     Neutrophil %   Date Value Ref Range Status   01/17/2023 74.7 42.7 - 76.0 % Final     Lymphocyte %   Date Value Ref Range Status   01/17/2023 17.5 (L) 19.6 - 45.3 % Final     Monocyte %   Date Value Ref Range Status   01/17/2023 5.6 5.0 - 12.0 % Final     Eosinophil %   Date Value Ref Range Status   01/17/2023 1.4 0.3 - 6.2 % Final     Basophil %   Date Value Ref Range Status   01/17/2023 0.3 0.0 - 1.5 % Final     Immature Grans %   Date Value Ref Range Status   01/17/2023 0.5 0.0 - 0.5 % Final     Neutrophils, Absolute   Date Value Ref Range Status   01/17/2023 11.43 (H) 1.70 - 7.00 10*3/mm3 Final     Lymphocytes, Absolute   Date Value Ref Range Status   01/17/2023 2.67 0.70 - 3.10 10*3/mm3 Final     Monocytes, Absolute   Date Value Ref Range Status   01/17/2023 0.86 0.10 - 0.90 10*3/mm3 Final     Eosinophils, Absolute   Date Value Ref Range Status   01/17/2023 0.21 0.00 - 0.40 10*3/mm3 Final     Basophils, Absolute   Date Value Ref Range Status   01/17/2023 0.05 0.00 - 0.20 10*3/mm3 Final     Immature Grans, Absolute   Date Value Ref Range Status   01/17/2023 0.07 (H) 0.00 - 0.05 10*3/mm3 Final     nRBC   Date Value Ref Range Status   01/17/2023 0.0 0.0 - 0.2 /100 WBC Final         Lab Results   Component Value Date    GLUCOSE 109 (H) 01/17/2023    BUN 12 01/17/2023    CREATININE 1.04 01/17/2023    EGFRIFNONA 94 05/30/2019    BCR 11.5 01/17/2023    K 4.0 01/17/2023    CO2 28.6 01/17/2023    CALCIUM 9.7 01/17/2023    ALBUMIN 4.5 01/17/2023    AST 21 01/17/2023    ALT 13 01/17/2023       Patient seen in no  apparent distress.      PHYSICAL EXAM:     Foot/Ankle Exam:       General:   Appearance: appears stated age and healthy    Orientation: AAOx3    Affect: appropriate    Gait: unimpaired    Shoe Gear:  Casual shoes    VASCULAR      Right Foot Vascularity   Normal vascular exam    Dorsalis pedis:  2+  Posterior tibial:  2+  Skin Temperature: warm    Edema Grading:  None  CFT:  < 3 seconds  Pedal Hair Growth:  Present  Varicosities: none       Left Foot Vascularity   Normal vascular exam    Dorsalis pedis:  2+  Posterior tibial:  2+  Skin Temperature: warm    Edema Grading:  None  CFT:  < 3 seconds  Pedal Hair Growth:  Present  Varicosities: none        NEUROLOGIC     Right Foot Neurologic   Normal sensation    Light touch sensation:  Normal  Vibratory sensation:  Normal  Hot/Cold sensation: normal       Left Foot Neurologic   Normal sensation    Light touch sensation:  Normal  Vibratory sensation:  Normal  Hot/cold sensation: normal       MUSCLE STRENGTH     Right Foot Muscle Strength   Foot dorsiflexion:  4  Foot plantar flexion:  4  Foot inversion:  4  Foot eversion:  4     Left Foot Muscle Strength   Foot dorsiflexion:  4  Foot plantar flexion:  4  Foot inversion:  4  Foot eversion:  4     RANGE OF MOTION      Right Foot Range of Motion   Foot and ankle ROM within normal limits       Left Foot Range of Motion   Foot and ankle ROM within normal limits       DERMATOLOGIC     Right Foot Dermatologic   Skin: skin intact    Nails: no ingrown toenail and no paronychia    Nails comment:  Swelling just proximal to the nail fold with fluid and fluctuation.  Erythema around the area.     Left Foot Dermatologic   Skin: skin intact    Nails: normal          XR Toe 2+ View Right    Result Date: 1/17/2023  Narrative: PROCEDURE: XR TOE 2+ VW RIGHT  COMPARISON: [facility], , XR TOE 2+ VW RIGHT, 12/22/2022, 15:37.  INDICATIONS: foot ulcer, infected BIG toe - reports worse than before  FINDINGS:  There is no acute  fracture.  There are no conventional radiographic changes of osteomyelitis.  There is no soft tissue gas or foreign body.      Impression:  No convincing radiographic evidence of osteomyelitis.      ACE VILLEGAS MD       Electronically Signed and Approved By: ACE VILLEGAS MD on 1/17/2023 at 14:06               ASSESSMENT/PLAN     Diagnoses and all orders for this visit:    1. Abscess of right foot (Primary)  -     Case Request; Standing  -     lactated ringers infusion  -     ceFOXitin (MEFOXIN) 2 g in sodium chloride 0.9 % 100 mL IVPB  -     Case Request  -     HYDROcodone-acetaminophen (Norco) 5-325 MG per tablet; Take 1-2 tablets by mouth Every 8 (Eight) Hours As Needed for Moderate Pain.  Dispense: 12 tablet; Refill: 0    Other orders  -     Follow Anesthesia Guidelines / Protocol; Future  -     Obtain Informed Consent; Future  -     Provide NPO Instructions to Patient; Future  -     Chlorhexidine Skin Prep; Future  -     Follow Anesthesia Guidelines / Protocol; Standing  -     Verify / Perform Chlorhexidine Skin Prep; Standing  -     Verify / Perform Chlorhexidine Skin Prep if Indicated (If Not Already Completed); Standing      Nail site looks good.  Patient has recurrent swelling just proximal to the nail matrix.  We will need to open up the area surgically in order to drain the proximal aspect of the toe.  Discussed mechanical matrixectomy along with this in order to prevent any recurrent nail formation.  We will schedule him to the operating room for an incision and drainage.    Patient to finish current antibiotics.    Comprehensive lower extremity examination and evaluation was performed.    Discussed findings and treatment plan including risks, benefits, and treatment options with patient in detail. Patient agreed with treatment plan.    Medications and allergies reviewed.  Reviewed available lab values along with other pertinent labs.  These were discussed with the patient.    An After Visit Summary was  printed and given to the patient at discharge, including (if requested) any available informative/educational handouts regarding diagnosis, treatment, or medications. All questions were answered to patient/family satisfaction. Should symptoms fail to improve or worsen they agree to call or return to clinic or to go to the Emergency Department. Discussed the importance of following up with any needed screening tests/labs/specialist appointments and any requested follow-up recommended by me today. Importance of maintaining follow-up discussed and patient accepts that missed appointments can delay diagnosis and potentially lead to worsening of conditions.    No follow-ups on file., or sooner if acute issues arise.    This document has been electronically signed by Cristo Christy DPM on February 6, 2023 16:15 EST

## 2023-02-07 ENCOUNTER — TELEPHONE (OUTPATIENT)
Dept: PODIATRY | Facility: CLINIC | Age: 58
End: 2023-02-07
Payer: COMMERCIAL

## 2023-02-07 RX ORDER — HYDROCODONE BITARTRATE AND ACETAMINOPHEN 5; 325 MG/1; MG/1
1-2 TABLET ORAL EVERY 8 HOURS PRN
Qty: 12 TABLET | Refills: 0 | Status: SHIPPED | OUTPATIENT
Start: 2023-02-07 | End: 2023-02-13

## 2023-02-07 NOTE — TELEPHONE ENCOUNTER
PATIENT CALLED STATING WE HAVE THE WRONG ADDRESS ON HIS CHART AND PHARMACY WILL NOT FILL UNTIL THEY GET A SCRIPT WITH THE CORRECT ADDRESS. HE WOULD LIKE TO PICK THIS UP AS HIS FOOT IS HURTING.

## 2023-02-13 ENCOUNTER — ANESTHESIA EVENT (OUTPATIENT)
Dept: PERIOP | Facility: HOSPITAL | Age: 58
End: 2023-02-13
Payer: COMMERCIAL

## 2023-02-13 NOTE — PRE-PROCEDURE INSTRUCTIONS
PATIENT INSTRUCTED TO BE:    - NPO AFTER MIDNIGHT EXCEPT CAN HAVE CLEAR LIQUIDS 2 HOURS PRIOR TO SURGERY ARRIVAL TIME     - TO HOLD ALL VITAMINS, SUPPLEMENTS, NSAIDS FOR ONE WEEK PRIOR TO THEIR SURGICAL PROCEDURE    - INSTRUCTED PT TO USE SURGICAL SOAP 1 TIME THE NIGHT PRIOR TO SURGERY OR THE AM OF SURGERY.   USE SOAP FROM NECK TO TOES AVOID THEIR FACE, HAIR, AND PRIVATE PARTS. INSTRUCTED NO LOTIONS, JEWELRY, PIERCINGS, OR DEODORANT DAY OF SURGERY    - IF DIABETIC, CHECK BLOOD GLUCOSE IF LESS THAN 70 CALL PREOP AREA -AM OF SURGERY     - INSTRUCTED TO TAKE THE FOLLOWING MEDICATIONS THE DAY OF SURGERY:      ADDERALL, CATAPRES, GABAPENTIN, VISTARIL PRN     PATIENT VERBALIZED UNDERSTANDING

## 2023-02-14 ENCOUNTER — HOSPITAL ENCOUNTER (OUTPATIENT)
Facility: HOSPITAL | Age: 58
Setting detail: HOSPITAL OUTPATIENT SURGERY
Discharge: HOME | End: 2023-02-14
Attending: PODIATRIST | Admitting: PODIATRIST
Payer: COMMERCIAL

## 2023-02-14 ENCOUNTER — ANESTHESIA (OUTPATIENT)
Dept: PERIOP | Facility: HOSPITAL | Age: 58
End: 2023-02-14
Payer: COMMERCIAL

## 2023-02-14 VITALS
HEIGHT: 72 IN | TEMPERATURE: 97.9 F | WEIGHT: 227.96 LBS | BODY MASS INDEX: 30.88 KG/M2 | DIASTOLIC BLOOD PRESSURE: 83 MMHG | RESPIRATION RATE: 20 BRPM | SYSTOLIC BLOOD PRESSURE: 117 MMHG | OXYGEN SATURATION: 97 % | HEART RATE: 81 BPM

## 2023-02-14 DIAGNOSIS — L02.611 ABSCESS OF RIGHT FOOT: Primary | ICD-10-CM

## 2023-02-14 PROCEDURE — 25010000002 PROPOFOL 10 MG/ML EMULSION: Performed by: NURSE ANESTHETIST, CERTIFIED REGISTERED

## 2023-02-14 PROCEDURE — 28002 TREATMENT OF FOOT INFECTION: CPT | Performed by: PODIATRIST

## 2023-02-14 PROCEDURE — 87205 SMEAR GRAM STAIN: CPT | Performed by: PODIATRIST

## 2023-02-14 PROCEDURE — 25010000002 CEFOXITIN PER 1 G: Performed by: PODIATRIST

## 2023-02-14 PROCEDURE — 25010000002 HYDROMORPHONE 1 MG/ML SOLUTION: Performed by: NURSE ANESTHETIST, CERTIFIED REGISTERED

## 2023-02-14 PROCEDURE — 87070 CULTURE OTHR SPECIMN AEROBIC: CPT | Performed by: PODIATRIST

## 2023-02-14 PROCEDURE — 25010000002 FENTANYL CITRATE (PF) 50 MCG/ML SOLUTION: Performed by: NURSE ANESTHETIST, CERTIFIED REGISTERED

## 2023-02-14 PROCEDURE — 87186 SC STD MICRODIL/AGAR DIL: CPT | Performed by: PODIATRIST

## 2023-02-14 PROCEDURE — 25010000002 MIDAZOLAM PER 1 MG: Performed by: ANESTHESIOLOGY

## 2023-02-14 PROCEDURE — 87147 CULTURE TYPE IMMUNOLOGIC: CPT | Performed by: PODIATRIST

## 2023-02-14 PROCEDURE — 87075 CULTR BACTERIA EXCEPT BLOOD: CPT | Performed by: PODIATRIST

## 2023-02-14 RX ORDER — PROMETHAZINE HYDROCHLORIDE 12.5 MG/1
12.5 TABLET ORAL EVERY 6 HOURS PRN
Qty: 20 TABLET | Refills: 0 | Status: SHIPPED | OUTPATIENT
Start: 2023-02-14

## 2023-02-14 RX ORDER — SODIUM CHLORIDE, SODIUM LACTATE, POTASSIUM CHLORIDE, CALCIUM CHLORIDE 600; 310; 30; 20 MG/100ML; MG/100ML; MG/100ML; MG/100ML
100 INJECTION, SOLUTION INTRAVENOUS CONTINUOUS
Status: DISCONTINUED | OUTPATIENT
Start: 2023-02-14 | End: 2023-02-14 | Stop reason: HOSPADM

## 2023-02-14 RX ORDER — BUPIVACAINE HYDROCHLORIDE 5 MG/ML
INJECTION, SOLUTION EPIDURAL; INTRACAUDAL AS NEEDED
Status: DISCONTINUED | OUTPATIENT
Start: 2023-02-14 | End: 2023-02-14 | Stop reason: HOSPADM

## 2023-02-14 RX ORDER — MIDAZOLAM HYDROCHLORIDE 1 MG/ML
2 INJECTION INTRAMUSCULAR; INTRAVENOUS ONCE
Status: COMPLETED | OUTPATIENT
Start: 2023-02-14 | End: 2023-02-14

## 2023-02-14 RX ORDER — FENTANYL CITRATE 50 UG/ML
INJECTION, SOLUTION INTRAMUSCULAR; INTRAVENOUS AS NEEDED
Status: DISCONTINUED | OUTPATIENT
Start: 2023-02-14 | End: 2023-02-14 | Stop reason: SURG

## 2023-02-14 RX ORDER — LIDOCAINE HYDROCHLORIDE 20 MG/ML
INJECTION, SOLUTION EPIDURAL; INFILTRATION; INTRACAUDAL; PERINEURAL AS NEEDED
Status: DISCONTINUED | OUTPATIENT
Start: 2023-02-14 | End: 2023-02-14 | Stop reason: SURG

## 2023-02-14 RX ORDER — HYDROCODONE BITARTRATE AND ACETAMINOPHEN 7.5; 325 MG/1; MG/1
1 TABLET ORAL EVERY 6 HOURS PRN
Qty: 20 TABLET | Refills: 0 | Status: SHIPPED | OUTPATIENT
Start: 2023-02-14 | End: 2023-02-28

## 2023-02-14 RX ORDER — PROMETHAZINE HYDROCHLORIDE 12.5 MG/1
25 TABLET ORAL ONCE AS NEEDED
Status: DISCONTINUED | OUTPATIENT
Start: 2023-02-14 | End: 2023-02-14 | Stop reason: HOSPADM

## 2023-02-14 RX ORDER — PROPOFOL 10 MG/ML
VIAL (ML) INTRAVENOUS AS NEEDED
Status: DISCONTINUED | OUTPATIENT
Start: 2023-02-14 | End: 2023-02-14 | Stop reason: SURG

## 2023-02-14 RX ORDER — OXYCODONE HYDROCHLORIDE 5 MG/1
5 TABLET ORAL
Status: DISCONTINUED | OUTPATIENT
Start: 2023-02-14 | End: 2023-02-14 | Stop reason: HOSPADM

## 2023-02-14 RX ORDER — LIDOCAINE HYDROCHLORIDE 10 MG/ML
INJECTION, SOLUTION EPIDURAL; INFILTRATION; INTRACAUDAL; PERINEURAL AS NEEDED
Status: DISCONTINUED | OUTPATIENT
Start: 2023-02-14 | End: 2023-02-14 | Stop reason: HOSPADM

## 2023-02-14 RX ORDER — ONDANSETRON 2 MG/ML
4 INJECTION INTRAMUSCULAR; INTRAVENOUS ONCE AS NEEDED
Status: DISCONTINUED | OUTPATIENT
Start: 2023-02-14 | End: 2023-02-14 | Stop reason: HOSPADM

## 2023-02-14 RX ORDER — MEPERIDINE HYDROCHLORIDE 25 MG/ML
12.5 INJECTION INTRAMUSCULAR; INTRAVENOUS; SUBCUTANEOUS
Status: DISCONTINUED | OUTPATIENT
Start: 2023-02-14 | End: 2023-02-14 | Stop reason: HOSPADM

## 2023-02-14 RX ORDER — SODIUM CHLORIDE, SODIUM LACTATE, POTASSIUM CHLORIDE, CALCIUM CHLORIDE 600; 310; 30; 20 MG/100ML; MG/100ML; MG/100ML; MG/100ML
9 INJECTION, SOLUTION INTRAVENOUS CONTINUOUS PRN
Status: DISCONTINUED | OUTPATIENT
Start: 2023-02-14 | End: 2023-02-14 | Stop reason: HOSPADM

## 2023-02-14 RX ORDER — ACETAMINOPHEN 500 MG
1000 TABLET ORAL ONCE
Status: COMPLETED | OUTPATIENT
Start: 2023-02-14 | End: 2023-02-14

## 2023-02-14 RX ORDER — DEXMEDETOMIDINE HYDROCHLORIDE 100 UG/ML
INJECTION, SOLUTION INTRAVENOUS AS NEEDED
Status: DISCONTINUED | OUTPATIENT
Start: 2023-02-14 | End: 2023-02-14 | Stop reason: SURG

## 2023-02-14 RX ORDER — PROMETHAZINE HYDROCHLORIDE 25 MG/1
25 SUPPOSITORY RECTAL ONCE AS NEEDED
Status: DISCONTINUED | OUTPATIENT
Start: 2023-02-14 | End: 2023-02-14 | Stop reason: HOSPADM

## 2023-02-14 RX ADMIN — DEXMEDETOMIDINE HYDROCHLORIDE 4 MCG: 100 INJECTION, SOLUTION, CONCENTRATE INTRAVENOUS at 11:18

## 2023-02-14 RX ADMIN — FENTANYL CITRATE 50 MCG: 50 INJECTION, SOLUTION INTRAMUSCULAR; INTRAVENOUS at 11:17

## 2023-02-14 RX ADMIN — HYDROMORPHONE HYDROCHLORIDE 0.25 MG: 1 INJECTION, SOLUTION INTRAMUSCULAR; INTRAVENOUS; SUBCUTANEOUS at 12:30

## 2023-02-14 RX ADMIN — DEXMEDETOMIDINE HYDROCHLORIDE 10 MCG: 100 INJECTION, SOLUTION, CONCENTRATE INTRAVENOUS at 11:20

## 2023-02-14 RX ADMIN — PROPOFOL 50 MG: 10 INJECTION, EMULSION INTRAVENOUS at 11:18

## 2023-02-14 RX ADMIN — FENTANYL CITRATE 50 MCG: 50 INJECTION, SOLUTION INTRAMUSCULAR; INTRAVENOUS at 11:13

## 2023-02-14 RX ADMIN — ACETAMINOPHEN 1000 MG: 500 TABLET ORAL at 10:28

## 2023-02-14 RX ADMIN — OXYCODONE HYDROCHLORIDE 5 MG: 5 TABLET ORAL at 12:14

## 2023-02-14 RX ADMIN — SODIUM CHLORIDE, POTASSIUM CHLORIDE, SODIUM LACTATE AND CALCIUM CHLORIDE 9 ML/HR: 600; 310; 30; 20 INJECTION, SOLUTION INTRAVENOUS at 10:28

## 2023-02-14 RX ADMIN — DEXMEDETOMIDINE HYDROCHLORIDE 4 MCG: 100 INJECTION, SOLUTION, CONCENTRATE INTRAVENOUS at 11:16

## 2023-02-14 RX ADMIN — SODIUM CHLORIDE 2 G: 900 INJECTION INTRAVENOUS at 11:18

## 2023-02-14 RX ADMIN — MIDAZOLAM HYDROCHLORIDE 2 MG: 1 INJECTION, SOLUTION INTRAMUSCULAR; INTRAVENOUS at 10:47

## 2023-02-14 RX ADMIN — LIDOCAINE HYDROCHLORIDE 50 MG: 20 INJECTION, SOLUTION EPIDURAL; INFILTRATION; INTRACAUDAL; PERINEURAL at 11:12

## 2023-02-14 RX ADMIN — PROPOFOL 50 MG: 10 INJECTION, EMULSION INTRAVENOUS at 11:19

## 2023-02-14 RX ADMIN — PROPOFOL 50 MG: 10 INJECTION, EMULSION INTRAVENOUS at 11:16

## 2023-02-14 RX ADMIN — PROPOFOL 100 MCG/KG/MIN: 10 INJECTION, EMULSION INTRAVENOUS at 11:17

## 2023-02-14 RX ADMIN — LIDOCAINE HYDROCHLORIDE 50 MG: 20 INJECTION, SOLUTION EPIDURAL; INFILTRATION; INTRACAUDAL; PERINEURAL at 11:15

## 2023-02-14 NOTE — OP NOTE
Operative Note   Foot and Ankle Surgery   Provider: Dr. Cristo Christy DPM  Location: [facility]      Procedure:  1. Incision and drainage to bone right great toe      Pre-operative Diagnosis:   1. Deep Abscess to right great toe       Post-operative Diagnosis: Same    Surgeon: Cristo Christy DPM    Assistant: Assistant: Mariia Jaramillo RN CSA was responsible for performing the following activities: Retraction, Suction, Irrigation and Placing Dressing and their skilled assistance was necessary for the success of this case.    Anesthesia: MAC with local     Implants: None    Findings: Draining sinus dorsal great toe    Specimen: Right great toe, swab     Blood Loss: Less than 5 mL    Complications: None      Summary:      Procedure, risks, complications, and goals were discussed with the patient at bedside.  Risks include but are not limited to infection, complications from anesthesia (including death), chronic pain or numbness, hematoma/seroma, deep vein thrombosis, wound complications, and potential for additional surgical procedures.  Patient understands and elects to proceed with surgery at this time. Informed consent was obtained before proceeding to the operating suite.  All questions were answered to the patient's satisfaction. No guarantees or assurances were given or implied.    Right foot was prepped and draped in usual sterile manner.  10 cc of 1% lidocaine plain was injected proximally to the toe.  Turnicot was applied to the right great toe.  Incision was made dorsal just proximal to the nail border this was done down to bone keeping all neurovascular structures intact.  The skin was then flapped up dorsally and inspected.  Multiple sinuses were noted draining dorsally to the skin.  Cultures were taken site was flushed with copious amounts saline. The area was cleaned using a currette. All Non viable soft tissue was removed. The bone was noted to be healthy and no signs of infection  were noted within the bone. Skin was closed using 4-0 nylon.  Site was dressed with triple antibiotic adaptic, 4x4, and coban.     Cristo Christy DPM  [facility]   [phone]    Part of this note may be an electronic transcription/translation of spoken language to printed text using the Dragon Dictation System.

## 2023-02-14 NOTE — DISCHARGE INSTRUCTIONS
DISCHARGE INSTRUCTIONS  SURGICAL / AMBULATORY  PROCEDURES      For your surgery you had:  General anesthesia (you may have a sore throat for the first 24 hours)  IV sedation.  Local anesthesia  Monitored anesthesia Care  You received a medicated patch for nausea prevention today (behind your ear). It is recommended that you remove it 24-48 hours post-operatively. It must be removed within 72 hours.   You have received an anesthesia medication today that can cause hormonal forms of birth control to be ineffective. You should use a different form of birth control (to prevent pregnancy) for 7 days.   You may experience dizziness, drowsiness, or light-headedness for several hours following surgery/procedure.  Do not stay alone today or tonight.  Limit your activity for 24 hours.  Resume your diet slowly.  Follow whatever special dietary instructions you may have been given by your doctor.  You should not drive or operate machinery, drink alcohol, or sign legally binding documents for 24 hours or while you are taking pain medication.  Last dose of pain medication was given at:   .  NOTIFY YOUR DOCTOR IF YOU EXPERIENCE ANY OF THE FOLLOWING:  Temperature greater than 101 degrees Fahrenheit  Shaking Chills  Redness or excessive drainage from incision  Nausea, vomiting and/or pain that is not controlled by prescribed medications  Increase in bleeding or bleeding that is excessive  Unable to urinate in 6 hours after surgery  If unable to reach your doctor, please go to the closest Emergency Room  You may begin dressing changes:     [x] in 24 hours   [] in 48 hours   [] Other: place large bandaid after removing dressing   You may remove Band-Aid/dressing tomorrow.  You may shower or bathe 24 hours  .  Apply an ice pack 24-48 hours.  Medications per physician instructions as indicated on Discharge Medication Information Sheet.  You should see Dr. Christy for follow-up care   on FEB 27th 3:00pm     Phone number: [phone]      SPECIAL INSTRUCTIONS:

## 2023-02-14 NOTE — ANESTHESIA POSTPROCEDURE EVALUATION
Patient: Honorio Vigil Jr.    Procedure Summary     Date: 02/14/23 Room / Location: [facility] OR 03 / [facility] MAIN OR    Anesthesia Start: 1110 Anesthesia Stop: 1148    Procedure: INCISION AND DRAINAGE BONE, right great toe (Right: Toe First) Diagnosis:       Abscess of right foot      (Abscess of right foot [L02.611])    Surgeons: Cristo Christy DPM Provider: Ilda Rincon MD    Anesthesia Type: general ASA Status: 3          Anesthesia Type: general    Vitals  Vitals Value Taken Time   /75 02/14/23 1237   Temp 36.5 °C (97.7 °F) 02/14/23 1230   Pulse 80 02/14/23 1239   Resp 16 02/14/23 1230   SpO2 97 % 02/14/23 1239   Vitals shown include unvalidated device data.        Post Anesthesia Care and Evaluation    Patient location during evaluation: bedside  Patient participation: complete - patient participated  Level of consciousness: awake  Pain management: adequate    Airway patency: patent  PONV Status: none  Cardiovascular status: acceptable  Respiratory status: acceptable  Hydration status: acceptable    Comments: An Anesthesiologist personally participated in the most demanding procedures (including induction and emergence if applicable) in the anesthesia plan, monitored the course of anesthesia administration at frequent intervals and remained physically present and available for immediate diagnosis and treatment of emergencies.

## 2023-02-14 NOTE — ANESTHESIA POSTPROCEDURE EVALUATION
Patient: Honorio Vigil Jr.    Procedure Summary     Date: 02/14/23 Room / Location: [facility] OR 03 / [facility] MAIN OR    Anesthesia Start: 1110 Anesthesia Stop: 1148    Procedure: INCISION AND DRAINAGE BONE, right great toe (Right: Toe First) Diagnosis:       Abscess of right foot      (Abscess of right foot [L02.611])    Surgeons: Cristo Christy DPM Provider: Ilda Rincon MD    Anesthesia Type: general ASA Status: 3          Anesthesia Type: general    Vitals  Vitals Value Taken Time   /69 02/14/23 1152   Temp     Pulse 79 02/14/23 1157   Resp     SpO2 93 % 02/14/23 1157   Vitals shown include unvalidated device data.        Post Anesthesia Care and Evaluation    Patient location during evaluation: bedside  Patient participation: complete - patient participated  Level of consciousness: awake  Pain management: adequate    Airway patency: patent  Anesthetic complications: No anesthetic complications  PONV Status: none  Cardiovascular status: acceptable and stable  Respiratory status: acceptable  Hydration status: acceptable    Comments: An Anesthesiologist personally participated in the most demanding procedures (including induction and emergence if applicable) in the anesthesia plan, monitored the course of anesthesia administration at frequent intervals and remained physically present and available for immediate diagnosis and treatment of emergencies.

## 2023-02-14 NOTE — ANESTHESIA PREPROCEDURE EVALUATION
Anesthesia Evaluation     Patient summary reviewed and Nursing notes reviewed                Airway   Mallampati: I  TM distance: >3 FB  Neck ROM: full  No difficulty expected  Dental    (+) lower dentures and poor dentition    Pulmonary - negative pulmonary ROS and normal exam    breath sounds clear to auscultation  Cardiovascular - normal exam    Rhythm: regular  Rate: normal    (+) hyperlipidemia,       Neuro/Psych  (+) seizures, psychiatric history Depression,    GI/Hepatic/Renal/Endo - negative ROS     Musculoskeletal     Abdominal    Substance History - negative use     OB/GYN negative ob/gyn ROS         Other   arthritis,                      Anesthesia Plan    ASA 3     general     intravenous induction     Anesthetic plan, risks, benefits, and alternatives have been provided, discussed and informed consent has been obtained with: patient.        CODE STATUS:

## 2023-02-16 LAB
BACTERIA SPEC AEROBE CULT: ABNORMAL
BACTERIA SPEC AEROBE CULT: ABNORMAL
GRAM STN SPEC: ABNORMAL
GRAM STN SPEC: ABNORMAL

## 2023-02-16 RX ORDER — CLINDAMYCIN HYDROCHLORIDE 300 MG/1
300 CAPSULE ORAL 3 TIMES DAILY
Qty: 30 CAPSULE | Refills: 0 | Status: SHIPPED | OUTPATIENT
Start: 2023-02-16

## 2023-02-17 ENCOUNTER — TELEPHONE (OUTPATIENT)
Dept: FAMILY MEDICINE CLINIC | Facility: CLINIC | Age: 58
End: 2023-02-17
Payer: COMMERCIAL

## 2023-02-17 NOTE — TELEPHONE ENCOUNTER
vm box full unable to leave message. Mailing no show letter    Patient missed their appointment scheduled on 2/16/2023 at 2:15pm.   Would patient like to be rescheduled?    No show letter sent to patient either via [employer] or mail.     HUB TO SHARE

## 2023-02-19 LAB — BACTERIA SPEC ANAEROBE CULT: NORMAL

## 2023-02-27 ENCOUNTER — OFFICE VISIT (OUTPATIENT)
Dept: PODIATRY | Facility: CLINIC | Age: 58
End: 2023-02-27
Payer: COMMERCIAL

## 2023-02-27 VITALS
OXYGEN SATURATION: 95 % | TEMPERATURE: 98.7 F | WEIGHT: 228 LBS | DIASTOLIC BLOOD PRESSURE: 94 MMHG | BODY MASS INDEX: 30.88 KG/M2 | HEART RATE: 101 BPM | SYSTOLIC BLOOD PRESSURE: 128 MMHG | HEIGHT: 72 IN

## 2023-02-27 DIAGNOSIS — L60.0 INGROWN TOENAIL: ICD-10-CM

## 2023-02-27 DIAGNOSIS — L03.115 CELLULITIS OF RIGHT LOWER EXTREMITY: Primary | ICD-10-CM

## 2023-02-27 PROCEDURE — 99212 OFFICE O/P EST SF 10 MIN: CPT | Performed by: PODIATRIST

## 2023-02-27 NOTE — PROGRESS NOTES
[facility] - PODIATRY    Today's Date: 02/27/23    Patient Name: Honorio Vigil Jr.  MRN: [id number]  [facility]: [id number]  PCP: Roni Gray MD,  Referring Provider: No ref. provider found    SUBJECTIVE     Chief Complaint   Patient presents with   • Right Foot - Post-op Follow-up     HPI: Honorio Vigil Jr., a 57 y.o.male, presents to clinic.    Patient is a 57-year-old male presenting with a right ingrown toenail to his right great toe.  Patient states has been going on for months.  Patient states that he has had it drained several times in the emergency department.  Patient states it is painful and has been swollen for 6 to 8 weeks.  He is here to have the nail removed.    2/6/2023-patient was doing well stated 2 days ago his toe started swelling up.    2/27/2023-patient is here for follow-up after I&D on his right great toe.  Patient is currently taking clindamycin.    Past Medical History:   Diagnosis Date   • Abscess of foot     rt   • ADHD    • Anxiety disorder    • Backache    • Cellulitis of scrotum    • Elevated cholesterol    • Hyperlipidemia    • Insomnia disorder    • Seizures (HCC)     x2- due to overuse/overdose of meds- years ago per pt. Last one was 15-20 years ago- not on meds   • Substance abuse (HCC)     years ago   • Viral gastroenteritis      Past Surgical History:   Procedure Laterality Date   • EYE SURGERY     • HAND SURGERY Bilateral    • INCISION AND DRAINAGE OF WOUND Right 2/14/2023    Procedure: INCISION AND DRAINAGE BONE, right great toe;  Surgeon: Cristo Christy DPM;  Location: [facility] MAIN OR;  Service: Podiatry;  Laterality: Right;   • KNEE ARTHROSCOPY Right 05/21/2014    Arthroscopy of right knee, debride medial meniscus. x3   • KNEE ARTHROSCOPY W/ MENISCECTOMY Left 08/09/2022    Procedure: KNEE ARTHROSCOPY WITH PARTIAL MEDIAL MENISCECTOMY;  Surgeon: Carlos Hameed MD;  Location: [facility] OR [facility];  Service: Orthopedics;  Laterality: Left;   • SHOULDER  ARTHROSCOPY Right      Family History   Problem Relation Age of Onset   • Thyroid disease Mother    • Diabetes Mother    • Cancer Mother         pancreatic,thyroid   • Anxiety disorder Mother    • Depression Mother    • Heart disease Father    • Cancer Father         colon   • Diabetes Maternal Grandmother    • Cancer Paternal Grandmother         lung   • Hypertension Neg Hx      Social History     Socioeconomic History   • Marital status:    Tobacco Use   • Smoking status: Every Day     Packs/day: 0.25     Types: Cigarettes   • Smokeless tobacco: Never   • Tobacco comments:     INSTRUCTED NO SMOKING 24 HR PRIOR TO SURGERY   Vaping Use   • Vaping Use: Never used   Substance and Sexual Activity   • Alcohol use: No   • Drug use: Not Currently     Frequency: 1.0 times per week     Types: Marijuana, Methamphetamines     Comment: former- LAST USED 2 YEARS AGO   • Sexual activity: Defer     Allergies   Allergen Reactions   • Naproxen GI Intolerance     Current Outpatient Medications   Medication Sig Dispense Refill   • amphetamine-dextroamphetamine (ADDERALL) 30 MG tablet Take 45 mg by mouth Daily. TAKES 1 1/2 TAB     • clindamycin (CLEOCIN) 300 MG capsule Take 1 capsule by mouth 3 (Three) Times a Day. 30 capsule 0   • ibuprofen (ADVIL,MOTRIN) 600 MG tablet Take 1 tablet by mouth Every 6 (Six) Hours As Needed for Moderate Pain. 20 tablet 0   • cloNIDine (CATAPRES) 0.1 MG tablet Take 1 tablet (0.1 mg) by mouth at bedtime     • gabapentin (NEURONTIN) 600 MG tablet Take 600 mg by mouth 2 (Two) Times a Day.     • HYDROcodone-acetaminophen (Norco) 7.5-325 MG per tablet Take 1 tablet by mouth Every 6 (Six) Hours As Needed for Moderate Pain. 20 tablet 0   • hydrOXYzine pamoate (VISTARIL) 50 MG capsule Take 50 mg by mouth 3 (Three) Times a Day As Needed.     • promethazine (PHENERGAN) 12.5 MG tablet Take 1 tablet by mouth Every 6 (Six) Hours As Needed for Nausea or Vomiting (before pain medication). 20 tablet 0     No  current facility-administered medications for this visit.     Review of Systems   Musculoskeletal:        Pain to right great toe       OBJECTIVE     Vitals:    02/27/23 1505   BP: 128/94   Pulse: 101   Temp: 98.7 °F (37.1 °C)   SpO2: 95%       WBC   Date Value Ref Range Status   01/17/2023 15.29 (H) 3.40 - 10.80 10*3/mm3 Final     RBC   Date Value Ref Range Status   01/17/2023 5.52 4.14 - 5.80 10*6/mm3 Final     Hemoglobin   Date Value Ref Range Status   01/17/2023 16.1 13.0 - 17.7 g/dL Final     Hematocrit   Date Value Ref Range Status   01/17/2023 49.2 37.5 - 51.0 % Final     MCV   Date Value Ref Range Status   01/17/2023 89.1 79.0 - 97.0 fL Final     MCH   Date Value Ref Range Status   01/17/2023 29.2 26.6 - 33.0 pg Final     MCHC   Date Value Ref Range Status   01/17/2023 32.7 31.5 - 35.7 g/dL Final     RDW   Date Value Ref Range Status   01/17/2023 13.9 12.3 - 15.4 % Final     RDW-SD   Date Value Ref Range Status   01/17/2023 45.5 37.0 - 54.0 fl Final     MPV   Date Value Ref Range Status   01/17/2023 11.2 6.0 - 12.0 fL Final     Platelets   Date Value Ref Range Status   01/17/2023 324 140 - 450 10*3/mm3 Final     Neutrophil %   Date Value Ref Range Status   01/17/2023 74.7 42.7 - 76.0 % Final     Lymphocyte %   Date Value Ref Range Status   01/17/2023 17.5 (L) 19.6 - 45.3 % Final     Monocyte %   Date Value Ref Range Status   01/17/2023 5.6 5.0 - 12.0 % Final     Eosinophil %   Date Value Ref Range Status   01/17/2023 1.4 0.3 - 6.2 % Final     Basophil %   Date Value Ref Range Status   01/17/2023 0.3 0.0 - 1.5 % Final     Immature Grans %   Date Value Ref Range Status   01/17/2023 0.5 0.0 - 0.5 % Final     Neutrophils, Absolute   Date Value Ref Range Status   01/17/2023 11.43 (H) 1.70 - 7.00 10*3/mm3 Final     Lymphocytes, Absolute   Date Value Ref Range Status   01/17/2023 2.67 0.70 - 3.10 10*3/mm3 Final     Monocytes, Absolute   Date Value Ref Range Status   01/17/2023 0.86 0.10 - 0.90 10*3/mm3 Final      Eosinophils, Absolute   Date Value Ref Range Status   01/17/2023 0.21 0.00 - 0.40 10*3/mm3 Final     Basophils, Absolute   Date Value Ref Range Status   01/17/2023 0.05 0.00 - 0.20 10*3/mm3 Final     Immature Grans, Absolute   Date Value Ref Range Status   01/17/2023 0.07 (H) 0.00 - 0.05 10*3/mm3 Final     nRBC   Date Value Ref Range Status   01/17/2023 0.0 0.0 - 0.2 /100 WBC Final         Lab Results   Component Value Date    GLUCOSE 109 (H) 01/17/2023    BUN 12 01/17/2023    CREATININE 1.04 01/17/2023    EGFRIFNONA 94 05/30/2019    BCR 11.5 01/17/2023    K 4.0 01/17/2023    CO2 28.6 01/17/2023    CALCIUM 9.7 01/17/2023    ALBUMIN 4.5 01/17/2023    AST 21 01/17/2023    ALT 13 01/17/2023       Patient seen in no apparent distress.      PHYSICAL EXAM:     Foot/Ankle Exam:       General:   Appearance: appears stated age and healthy    Orientation: AAOx3    Affect: appropriate    Gait: unimpaired    Shoe Gear:  Casual shoes    VASCULAR      Right Foot Vascularity   Normal vascular exam    Dorsalis pedis:  2+  Posterior tibial:  2+  Skin Temperature: warm    Edema Grading:  None  CFT:  < 3 seconds  Pedal Hair Growth:  Present  Varicosities: none       Left Foot Vascularity   Normal vascular exam    Dorsalis pedis:  2+  Posterior tibial:  2+  Skin Temperature: warm    Edema Grading:  None  CFT:  < 3 seconds  Pedal Hair Growth:  Present  Varicosities: none        NEUROLOGIC     Right Foot Neurologic   Normal sensation    Light touch sensation:  Normal  Vibratory sensation:  Normal  Hot/Cold sensation: normal       Left Foot Neurologic   Normal sensation    Light touch sensation:  Normal  Vibratory sensation:  Normal  Hot/cold sensation: normal       MUSCLE STRENGTH     Right Foot Muscle Strength   Foot dorsiflexion:  4  Foot plantar flexion:  4  Foot inversion:  4  Foot eversion:  4     Left Foot Muscle Strength   Foot dorsiflexion:  4  Foot plantar flexion:  4  Foot inversion:  4  Foot eversion:  4     RANGE OF  MOTION      Right Foot Range of Motion   Foot and ankle ROM within normal limits       Left Foot Range of Motion   Foot and ankle ROM within normal limits       DERMATOLOGIC     Right Foot Dermatologic   Skin: skin intact    Nails: no ingrown toenail    Nails comment:  Sutures intact  With mild periwound erythema, around the nail fold.  No purulent drainage expressed.  Small wound to lateral aspect of nail fold at previous draining sinuses.     Left Foot Dermatologic   Skin: skin intact    Nails: normal          No results found.    ASSESSMENT/PLAN     Diagnoses and all orders for this visit:    1. Ingrown toenail (Primary)    2. Cellulitis of right lower extremity    Patient status post incision and drainage on right great toe.  Patient with small granular wound to area of draining sinuses on lateral aspect proximal nail fold.  Patient currently taking clindamycin.  Patient to finish clindamycin and begin Betadine dressings daily to right great toe.  No significant drainage associated to area.  Swelling is decreased from previous office visit.    Comprehensive lower extremity examination and evaluation was performed.    Discussed findings and treatment plan including risks, benefits, and treatment options with patient in detail. Patient agreed with treatment plan.    Medications and allergies reviewed.  Reviewed available lab values along with other pertinent labs.  These were discussed with the patient.    An After Visit Summary was printed and given to the patient at discharge, including (if requested) any available informative/educational handouts regarding diagnosis, treatment, or medications. All questions were answered to patient/family satisfaction. Should symptoms fail to improve or worsen they agree to call or return to clinic or to go to the Emergency Department. Discussed the importance of following up with any needed screening tests/labs/specialist appointments and any requested follow-up recommended by  me today. Importance of maintaining follow-up discussed and patient accepts that missed appointments can delay diagnosis and potentially lead to worsening of conditions.    Return in about 2 weeks (around 3/13/2023)., or sooner if acute issues arise.    This document has been electronically signed by Cristo Christy DPM on February 27, 2023 15:44 EST

## 2023-02-28 ENCOUNTER — TELEPHONE (OUTPATIENT)
Dept: PODIATRY | Facility: CLINIC | Age: 58
End: 2023-02-28

## 2023-02-28 DIAGNOSIS — L02.619 CELLULITIS AND ABSCESS OF FOOT: Primary | ICD-10-CM

## 2023-02-28 DIAGNOSIS — L03.119 CELLULITIS AND ABSCESS OF FOOT: Primary | ICD-10-CM

## 2023-02-28 RX ORDER — HYDROCODONE BITARTRATE AND ACETAMINOPHEN 5; 325 MG/1; MG/1
1 TABLET ORAL EVERY 8 HOURS PRN
Qty: 16 TABLET | Refills: 0 | Status: SHIPPED | OUTPATIENT
Start: 2023-02-28

## 2023-02-28 NOTE — TELEPHONE ENCOUNTER
Spoke with patient. He states at his follow up appointment Dr. Christy said he has an active infection and that he would send in a refill for his hydrocodone medication.     He would like it sent to [facility] in [place].     Please advise.

## 2023-02-28 NOTE — TELEPHONE ENCOUNTER
Provider: DR BURNETT    Caller: NIR MÉNDEZ    Relationship to Patient: SELF    Pharmacy: [facility] [place]    Phone Number: [phone]    Reason for Call: CHECKING TO SEE IF HIS HYDROCODONE HAS BEEN SENT INTO THE PHARMACY YET. PLEASE CALL HIM BACK AT THE NUMBER ABOVE.

## 2023-03-01 RX ORDER — HYDROCODONE BITARTRATE AND ACETAMINOPHEN 5; 325 MG/1; MG/1
1 TABLET ORAL EVERY 8 HOURS PRN
Qty: 12 TABLET | Refills: 0 | Status: SHIPPED | OUTPATIENT
Start: 2023-03-01

## 2023-03-13 ENCOUNTER — OFFICE VISIT (OUTPATIENT)
Dept: PODIATRY | Facility: CLINIC | Age: 58
End: 2023-03-13
Payer: COMMERCIAL

## 2023-03-13 VITALS
SYSTOLIC BLOOD PRESSURE: 143 MMHG | WEIGHT: 232 LBS | HEIGHT: 72 IN | BODY MASS INDEX: 31.42 KG/M2 | DIASTOLIC BLOOD PRESSURE: 100 MMHG | TEMPERATURE: 98.7 F | HEART RATE: 72 BPM | OXYGEN SATURATION: 99 %

## 2023-03-13 DIAGNOSIS — L60.0 INGROWN TOENAIL: Primary | ICD-10-CM

## 2023-03-13 DIAGNOSIS — M79.2 NERVE PAIN: ICD-10-CM

## 2023-03-13 PROCEDURE — 99212 OFFICE O/P EST SF 10 MIN: CPT | Performed by: PODIATRIST

## 2023-03-13 PROCEDURE — 1160F RVW MEDS BY RX/DR IN RCRD: CPT | Performed by: PODIATRIST

## 2023-03-13 PROCEDURE — 1159F MED LIST DOCD IN RCRD: CPT | Performed by: PODIATRIST

## 2023-03-13 RX ORDER — CLONAZEPAM 1 MG/1
TABLET ORAL
COMMUNITY
Start: 2023-03-03

## 2023-03-13 NOTE — PROGRESS NOTES
[facility] - PODIATRY    Today's Date: 03/15/23    Patient Name: Honorio Vigil Jr.  MRN: [id number]  [facility]: [id number]  PCP: Roni Gray MD,  Referring Provider: No ref. provider found    SUBJECTIVE     Chief Complaint   Patient presents with   • Right Foot - Follow-up, Post-op     Post I&D     HPI: Honorio Vigil Jr., a 57 y.o.male, presents to clinic.    Patient is a 57-year-old male presenting with a right ingrown toenail to his right great toe.  Patient states has been going on for months.  Patient states that he has had it drained several times in the emergency department.  Patient states it is painful and has been swollen for 6 to 8 weeks.  He is here to have the nail removed.    2/6/2023-patient was doing well stated 2 days ago his toe started swelling up.    2/27/2023-patient is here for follow-up after I&D on his right great toe.  Patient is currently taking clindamycin.    3/13/2023-patient is here for suture removal.  Having pain to his great toe.    Past Medical History:   Diagnosis Date   • Abscess of foot     rt   • ADHD    • Anxiety disorder    • Backache    • Cellulitis of scrotum    • Elevated cholesterol    • Hyperlipidemia    • Insomnia disorder    • Seizures (HCC)     x2- due to overuse/overdose of meds- years ago per pt. Last one was 15-20 years ago- not on meds   • Substance abuse (HCC)     years ago   • Viral gastroenteritis      Past Surgical History:   Procedure Laterality Date   • EYE SURGERY     • HAND SURGERY Bilateral    • INCISION AND DRAINAGE OF WOUND Right 2/14/2023    Procedure: INCISION AND DRAINAGE BONE, right great toe;  Surgeon: Cristo Christy DPM;  Location: [facility] MAIN OR;  Service: Podiatry;  Laterality: Right;   • KNEE ARTHROSCOPY Right 05/21/2014    Arthroscopy of right knee, debride medial meniscus. x3   • KNEE ARTHROSCOPY W/ MENISCECTOMY Left 08/09/2022    Procedure: KNEE ARTHROSCOPY WITH PARTIAL MEDIAL MENISCECTOMY;  Surgeon:  Carlos Hameed MD;  [place] [facility] [place] [facility] [place]  Service: [place]opedics;  Laterality: Left;   • SHOULDER ARTHROSCOPY Right      Family History   Problem Relation Age of Onset   • Thyroid disease Mother    • Diabetes Mother    • Cancer Mother         pancreatic,thyroid   • Anxiety disorder Mother    • Depression Mother    • Heart disease Father    • Cancer Father         colon   • Diabetes Maternal Grandmother    • Cancer Paternal Grandmother         lung   • Hypertension Neg Hx      Social History     Socioeconomic History   • Marital status:    Tobacco Use   • Smoking status: Every Day     Packs/day: 0.25     Types: Cigarettes   • Smokeless tobacco: Never   • Tobacco comments:     INSTRUCTED NO SMOKING 24 HR PRIOR TO SURGERY   Vaping Use   • Vaping Use: Never used   Substance and Sexual Activity   • Alcohol use: No   • Drug use: Not Currently     Frequency: 1.0 times per week     Types: Marijuana, Methamphetamines     Comment: former- LAST USED 2 YEARS AGO   • Sexual activity: Defer     Allergies   Allergen Reactions   • Naproxen GI Intolerance     Current Outpatient Medications   Medication Sig Dispense Refill   • amphetamine-dextroamphetamine (ADDERALL) 30 MG tablet Take 1.5 tablets by mouth Daily. TAKES 1 1/2 TAB     • clindamycin (CLEOCIN) 300 MG capsule Take 1 capsule by mouth 3 (Three) Times a Day. 30 capsule 0   • clonazePAM (KlonoPIN) 1 MG tablet 1 qd     • cloNIDine (CATAPRES) 0.1 MG tablet Take 1 tablet (0.1 mg) by mouth at bedtime     • gabapentin (NEURONTIN) 600 MG tablet Take 1 tablet by mouth 2 (Two) Times a Day.     • hydrOXYzine pamoate (VISTARIL) 50 MG capsule Take 1 capsule by mouth 3 (Three) Times a Day As Needed.     • ibuprofen (ADVIL,MOTRIN) 600 MG tablet Take 1 tablet by mouth Every 6 (Six) Hours As Needed for Moderate Pain. 20 tablet 0   • promethazine (PHENERGAN) 12.5 MG tablet Take 1 tablet by mouth Every 6 (Six) Hours As Needed for Nausea or Vomiting (before pain medication). 20 tablet  0   • HYDROcodone-acetaminophen (Norco) 5-325 MG per tablet Take 1 tablet by mouth Every 8 (Eight) Hours As Needed for Mild Pain. 12 tablet 0   • HYDROcodone-acetaminophen (Norco) 5-325 MG per tablet Take 1 tablet by mouth Every 8 (Eight) Hours As Needed for Moderate Pain. 16 tablet 0     No current facility-administered medications for this visit.     Review of Systems   Musculoskeletal:        Pain to right great toe       OBJECTIVE     Vitals:    03/13/23 1419   BP: 143/100   Pulse: 72   Temp: 98.7 °F (37.1 °C)   SpO2: 99%       WBC   Date Value Ref Range Status   01/17/2023 15.29 (H) 3.40 - 10.80 10*3/mm3 Final     RBC   Date Value Ref Range Status   01/17/2023 5.52 4.14 - 5.80 10*6/mm3 Final     Hemoglobin   Date Value Ref Range Status   01/17/2023 16.1 13.0 - 17.7 g/dL Final     Hematocrit   Date Value Ref Range Status   01/17/2023 49.2 37.5 - 51.0 % Final     MCV   Date Value Ref Range Status   01/17/2023 89.1 79.0 - 97.0 fL Final     MCH   Date Value Ref Range Status   01/17/2023 29.2 26.6 - 33.0 pg Final     MCHC   Date Value Ref Range Status   01/17/2023 32.7 31.5 - 35.7 g/dL Final     RDW   Date Value Ref Range Status   01/17/2023 13.9 12.3 - 15.4 % Final     RDW-SD   Date Value Ref Range Status   01/17/2023 45.5 37.0 - 54.0 fl Final     MPV   Date Value Ref Range Status   01/17/2023 11.2 6.0 - 12.0 fL Final     Platelets   Date Value Ref Range Status   01/17/2023 324 140 - 450 10*3/mm3 Final     Neutrophil %   Date Value Ref Range Status   01/17/2023 74.7 42.7 - 76.0 % Final     Lymphocyte %   Date Value Ref Range Status   01/17/2023 17.5 (L) 19.6 - 45.3 % Final     Monocyte %   Date Value Ref Range Status   01/17/2023 5.6 5.0 - 12.0 % Final     Eosinophil %   Date Value Ref Range Status   01/17/2023 1.4 0.3 - 6.2 % Final     Basophil %   Date Value Ref Range Status   01/17/2023 0.3 0.0 - 1.5 % Final     Immature Grans %   Date Value Ref Range Status   01/17/2023 0.5 0.0 - 0.5 % Final     Neutrophils,  Absolute   Date Value Ref Range Status   01/17/2023 11.43 (H) 1.70 - 7.00 10*3/mm3 Final     Lymphocytes, Absolute   Date Value Ref Range Status   01/17/2023 2.67 0.70 - 3.10 10*3/mm3 Final     Monocytes, Absolute   Date Value Ref Range Status   01/17/2023 0.86 0.10 - 0.90 10*3/mm3 Final     Eosinophils, Absolute   Date Value Ref Range Status   01/17/2023 0.21 0.00 - 0.40 10*3/mm3 Final     Basophils, Absolute   Date Value Ref Range Status   01/17/2023 0.05 0.00 - 0.20 10*3/mm3 Final     Immature Grans, Absolute   Date Value Ref Range Status   01/17/2023 0.07 (H) 0.00 - 0.05 10*3/mm3 Final     nRBC   Date Value Ref Range Status   01/17/2023 0.0 0.0 - 0.2 /100 WBC Final         Lab Results   Component Value Date    GLUCOSE 109 (H) 01/17/2023    BUN 12 01/17/2023    CREATININE 1.04 01/17/2023    EGFRIFNONA 94 05/30/2019    BCR 11.5 01/17/2023    K 4.0 01/17/2023    CO2 28.6 01/17/2023    CALCIUM 9.7 01/17/2023    ALBUMIN 4.5 01/17/2023    AST 21 01/17/2023    ALT 13 01/17/2023       Patient seen in no apparent distress.      PHYSICAL EXAM:     Foot/Ankle Exam:       General:   Appearance: appears stated age and healthy    Orientation: AAOx3    Affect: appropriate    Gait: unimpaired    Shoe Gear:  Casual shoes    VASCULAR      Right Foot Vascularity   Normal vascular exam    Dorsalis pedis:  2+  Posterior tibial:  2+  Skin Temperature: warm    Edema Grading:  None  CFT:  < 3 seconds  Pedal Hair Growth:  Present  Varicosities: none       Left Foot Vascularity   Normal vascular exam    Dorsalis pedis:  2+  Posterior tibial:  2+  Skin Temperature: warm    Edema Grading:  None  CFT:  < 3 seconds  Pedal Hair Growth:  Present  Varicosities: none        NEUROLOGIC     Right Foot Neurologic   Normal sensation    Light touch sensation:  Normal  Vibratory sensation:  Normal  Hot/Cold sensation: normal       Left Foot Neurologic   Normal sensation    Light touch sensation:  Normal  Vibratory sensation:  Normal  Hot/cold  sensation: normal       MUSCLE STRENGTH     Right Foot Muscle Strength   Foot dorsiflexion:  4  Foot plantar flexion:  4  Foot inversion:  4  Foot eversion:  4     Left Foot Muscle Strength   Foot dorsiflexion:  4  Foot plantar flexion:  4  Foot inversion:  4  Foot eversion:  4     RANGE OF MOTION      Right Foot Range of Motion   Foot and ankle ROM within normal limits       Left Foot Range of Motion   Foot and ankle ROM within normal limits       DERMATOLOGIC     Right Foot Dermatologic   Skin: skin intact    Nails: no ingrown toenail    Nails comment:  Incisions healed scab to right lateral proximal nail bed.  No drainage no erythema, scab to lateral proximal nail bed. venous congestion to great toe.     Left Foot Dermatologic   Skin: skin intact    Nails: normal          No results found.    ASSESSMENT/PLAN     Diagnoses and all orders for this visit:    1. Ingrown toenail (Primary)    2. Nerve pain    Patient with venous congestion to great toe, likely secondary due to the ingrown toenail/swelling for several months.  No warmth to the toe.  Incision healing.    Comprehensive lower extremity examination and evaluation was performed.    Discussed findings and treatment plan including risks, benefits, and treatment options with patient in detail. Patient agreed with treatment plan.    Medications and allergies reviewed.  Reviewed available lab values along with other pertinent labs.  These were discussed with the patient.    An After Visit Summary was printed and given to the patient at discharge, including (if requested) any available informative/educational handouts regarding diagnosis, treatment, or medications. All questions were answered to patient/family satisfaction. Should symptoms fail to improve or worsen they agree to call or return to clinic or to go to the Emergency Department. Discussed the importance of following up with any needed screening tests/labs/specialist appointments and any requested  follow-up recommended by me today. Importance of maintaining follow-up discussed and patient accepts that missed appointments can delay diagnosis and potentially lead to worsening of conditions.    Return in about 1 month (around 4/13/2023)., or sooner if acute issues arise.    This document has been electronically signed by Cristo Christy DPM on March 15, 2023 07:43 EDT

## 2023-05-18 ENCOUNTER — HOSPITAL ENCOUNTER (EMERGENCY)
Facility: HOSPITAL | Age: 58
Discharge: HOME | End: 2023-05-18
Attending: EMERGENCY MEDICINE
Payer: COMMERCIAL

## 2023-05-18 VITALS
WEIGHT: 216.49 LBS | RESPIRATION RATE: 18 BRPM | BODY MASS INDEX: 30.99 KG/M2 | HEART RATE: 82 BPM | HEIGHT: 70 IN | DIASTOLIC BLOOD PRESSURE: 95 MMHG | TEMPERATURE: 97.9 F | SYSTOLIC BLOOD PRESSURE: 122 MMHG | OXYGEN SATURATION: 97 %

## 2023-05-18 DIAGNOSIS — R10.13 EPIGASTRIC PAIN: Primary | ICD-10-CM

## 2023-05-18 DIAGNOSIS — R51.9 NONINTRACTABLE HEADACHE, UNSPECIFIED CHRONICITY PATTERN, UNSPECIFIED HEADACHE TYPE: ICD-10-CM

## 2023-05-18 LAB
ALBUMIN SERPL-MCNC: 4.4 G/DL (ref 3.5–5.2)
ALBUMIN/GLOB SERPL: 1.3 G/DL
ALP SERPL-CCNC: 117 U/L (ref 39–117)
ALT SERPL W P-5'-P-CCNC: 13 U/L (ref 1–41)
ANION GAP SERPL CALCULATED.3IONS-SCNC: 11.8 MMOL/L (ref 5–15)
AST SERPL-CCNC: 22 U/L (ref 1–40)
BACTERIA UR QL AUTO: NORMAL /HPF
BASOPHILS # BLD AUTO: 0.06 10*3/MM3 (ref 0–0.2)
BASOPHILS NFR BLD AUTO: 0.5 % (ref 0–1.5)
BILIRUB SERPL-MCNC: 0.7 MG/DL (ref 0–1.2)
BILIRUB UR QL STRIP: ABNORMAL
BUN SERPL-MCNC: 10 MG/DL (ref 6–20)
BUN/CREAT SERPL: 11.5 (ref 7–25)
CALCIUM SPEC-SCNC: 9.8 MG/DL (ref 8.6–10.5)
CHLORIDE SERPL-SCNC: 102 MMOL/L (ref 98–107)
CLARITY UR: ABNORMAL
CO2 SERPL-SCNC: 23.2 MMOL/L (ref 22–29)
COD CRY URNS QL: NORMAL /HPF
COLOR UR: ABNORMAL
CREAT SERPL-MCNC: 0.87 MG/DL (ref 0.76–1.27)
D-LACTATE SERPL-SCNC: 1.2 MMOL/L (ref 0.5–2)
DEPRECATED RDW RBC AUTO: 47.8 FL (ref 37–54)
EGFRCR SERPLBLD CKD-EPI 2021: 100.6 ML/MIN/1.73
EOSINOPHIL # BLD AUTO: 0.25 10*3/MM3 (ref 0–0.4)
EOSINOPHIL NFR BLD AUTO: 2 % (ref 0.3–6.2)
ERYTHROCYTE [DISTWIDTH] IN BLOOD BY AUTOMATED COUNT: 15.5 % (ref 12.3–15.4)
GLOBULIN UR ELPH-MCNC: 3.5 GM/DL
GLUCOSE SERPL-MCNC: 134 MG/DL (ref 65–99)
GLUCOSE UR STRIP-MCNC: NEGATIVE MG/DL
HCT VFR BLD AUTO: 53.3 % (ref 37.5–51)
HGB BLD-MCNC: 18.3 G/DL (ref 13–17.7)
HGB UR QL STRIP.AUTO: NEGATIVE
HOLD SPECIMEN: NORMAL
HOLD SPECIMEN: NORMAL
HYALINE CASTS UR QL AUTO: NORMAL /LPF
IMM GRANULOCYTES # BLD AUTO: 0.08 10*3/MM3 (ref 0–0.05)
IMM GRANULOCYTES NFR BLD AUTO: 0.6 % (ref 0–0.5)
KETONES UR QL STRIP: NEGATIVE
LEUKOCYTE ESTERASE UR QL STRIP.AUTO: NEGATIVE
LIPASE SERPL-CCNC: 45 U/L (ref 13–60)
LYMPHOCYTES # BLD AUTO: 3.09 10*3/MM3 (ref 0.7–3.1)
LYMPHOCYTES NFR BLD AUTO: 24.8 % (ref 19.6–45.3)
MCH RBC QN AUTO: 29.8 PG (ref 26.6–33)
MCHC RBC AUTO-ENTMCNC: 34.3 G/DL (ref 31.5–35.7)
MCV RBC AUTO: 86.8 FL (ref 79–97)
MONOCYTES # BLD AUTO: 0.61 10*3/MM3 (ref 0.1–0.9)
MONOCYTES NFR BLD AUTO: 4.9 % (ref 5–12)
MUCOUS THREADS URNS QL MICRO: NORMAL /HPF
NEUTROPHILS NFR BLD AUTO: 67.2 % (ref 42.7–76)
NEUTROPHILS NFR BLD AUTO: 8.38 10*3/MM3 (ref 1.7–7)
NITRITE UR QL STRIP: NEGATIVE
NRBC BLD AUTO-RTO: 0 /100 WBC (ref 0–0.2)
PH UR STRIP.AUTO: 6 [PH] (ref 5–8)
PLATELET # BLD AUTO: 299 10*3/MM3 (ref 140–450)
PMV BLD AUTO: 11.1 FL (ref 6–12)
POTASSIUM SERPL-SCNC: 3.8 MMOL/L (ref 3.5–5.2)
PROT SERPL-MCNC: 7.9 G/DL (ref 6–8.5)
PROT UR QL STRIP: ABNORMAL
RBC # BLD AUTO: 6.14 10*6/MM3 (ref 4.14–5.8)
RBC # UR STRIP: NORMAL /HPF
REF LAB TEST METHOD: NORMAL
SODIUM SERPL-SCNC: 137 MMOL/L (ref 136–145)
SP GR UR STRIP: 1.03 (ref 1–1.03)
SQUAMOUS #/AREA URNS HPF: NORMAL /HPF
UROBILINOGEN UR QL STRIP: ABNORMAL
WBC # UR STRIP: NORMAL /HPF
WBC NRBC COR # BLD: 12.47 10*3/MM3 (ref 3.4–10.8)
WHOLE BLOOD HOLD COAG: NORMAL
WHOLE BLOOD HOLD SPECIMEN: NORMAL

## 2023-05-18 PROCEDURE — 83690 ASSAY OF LIPASE: CPT | Performed by: REGISTERED NURSE

## 2023-05-18 PROCEDURE — 99283 EMERGENCY DEPT VISIT LOW MDM: CPT

## 2023-05-18 PROCEDURE — 25010000002 ONDANSETRON PER 1 MG: Performed by: REGISTERED NURSE

## 2023-05-18 PROCEDURE — 25010000002 KETOROLAC TROMETHAMINE PER 15 MG: Performed by: REGISTERED NURSE

## 2023-05-18 PROCEDURE — 96374 THER/PROPH/DIAG INJ IV PUSH: CPT

## 2023-05-18 PROCEDURE — 96375 TX/PRO/DX INJ NEW DRUG ADDON: CPT

## 2023-05-18 PROCEDURE — 81001 URINALYSIS AUTO W/SCOPE: CPT | Performed by: REGISTERED NURSE

## 2023-05-18 PROCEDURE — 80053 COMPREHEN METABOLIC PANEL: CPT | Performed by: REGISTERED NURSE

## 2023-05-18 PROCEDURE — 83605 ASSAY OF LACTIC ACID: CPT | Performed by: REGISTERED NURSE

## 2023-05-18 PROCEDURE — 85025 COMPLETE CBC W/AUTO DIFF WBC: CPT | Performed by: REGISTERED NURSE

## 2023-05-18 RX ORDER — ALUMINA, MAGNESIA, AND SIMETHICONE 2400; 2400; 240 MG/30ML; MG/30ML; MG/30ML
15 SUSPENSION ORAL ONCE
Status: COMPLETED | OUTPATIENT
Start: 2023-05-18 | End: 2023-05-18

## 2023-05-18 RX ORDER — KETOROLAC TROMETHAMINE 15 MG/ML
15 INJECTION, SOLUTION INTRAMUSCULAR; INTRAVENOUS ONCE
Status: COMPLETED | OUTPATIENT
Start: 2023-05-18 | End: 2023-05-18

## 2023-05-18 RX ORDER — ONDANSETRON 4 MG/1
8 TABLET, ORALLY DISINTEGRATING ORAL EVERY 8 HOURS PRN
Qty: 15 TABLET | Refills: 0 | Status: SHIPPED | OUTPATIENT
Start: 2023-05-18 | End: 2023-05-23

## 2023-05-18 RX ORDER — LIDOCAINE HYDROCHLORIDE 20 MG/ML
15 SOLUTION OROPHARYNGEAL ONCE
Status: COMPLETED | OUTPATIENT
Start: 2023-05-18 | End: 2023-05-18

## 2023-05-18 RX ORDER — ONDANSETRON 2 MG/ML
4 INJECTION INTRAMUSCULAR; INTRAVENOUS ONCE
Status: COMPLETED | OUTPATIENT
Start: 2023-05-18 | End: 2023-05-18

## 2023-05-18 RX ORDER — SODIUM CHLORIDE 0.9 % (FLUSH) 0.9 %
10 SYRINGE (ML) INJECTION AS NEEDED
Status: DISCONTINUED | OUTPATIENT
Start: 2023-05-18 | End: 2023-05-18 | Stop reason: HOSPADM

## 2023-05-18 RX ORDER — PANTOPRAZOLE SODIUM 40 MG/1
40 TABLET, DELAYED RELEASE ORAL DAILY
Qty: 30 TABLET | Refills: 0 | Status: SHIPPED | OUTPATIENT
Start: 2023-05-18 | End: 2023-06-17

## 2023-05-18 RX ADMIN — ALUMINUM HYDROXIDE, MAGNESIUM HYDROXIDE, AND DIMETHICONE 15 ML: 400; 400; 40 SUSPENSION ORAL at 18:26

## 2023-05-18 RX ADMIN — SODIUM CHLORIDE 1000 ML: 9 INJECTION, SOLUTION INTRAVENOUS at 15:48

## 2023-05-18 RX ADMIN — LIDOCAINE HYDROCHLORIDE 15 ML: 20 SOLUTION ORAL; TOPICAL at 18:26

## 2023-05-18 RX ADMIN — ONDANSETRON 4 MG: 2 INJECTION INTRAMUSCULAR; INTRAVENOUS at 15:49

## 2023-05-18 RX ADMIN — KETOROLAC TROMETHAMINE 15 MG: 15 INJECTION, SOLUTION INTRAMUSCULAR; INTRAVENOUS at 15:49

## 2023-05-18 NOTE — DISCHARGE INSTRUCTIONS
Liquid diet only for the next 24 hours and then advance your diet very slowly as tolerated    Please push oral fluids    You may use over-the-counter Mylanta for additional relief    Please discuss possible need for gastroenterology referral and EGD with your primary care physician    Please follow-up with your doctor tomorrow for serial reexamination the abdomen.  Return to the emergency room immediately for intractable pain, intractable vomiting, fever, shaking chills, muscle aches, near passing out, passing out or any new symptoms you are concerned with

## 2023-05-18 NOTE — Clinical Note
[facility] EMERGENCY ROOM  [address]  [place] [address]  Phone: [phone]    Honorio Vigil was seen and treated in our emergency department on 5/18/2023.  He may return to work on 05/19/2023.         Thank you for choosing [facility].    Cristo Donahue DO

## 2023-05-18 NOTE — ED PROVIDER NOTES
Time: 3:28 PM EDT  Date of encounter:  5/18/2023  Independent Historian/Clinical History and Information was obtained by:   Patient  Chief Complaint   Patient presents with   • Abdominal Pain     Epigastric, severe headache       History is limited by: N/A    History of Present Illness:  Patient is a 57 y.o. year old male who presents to the emergency department for evaluation of abdominal pain and headache.  Patient states that both symptoms started about 2 days ago.  He states the pain is in his upper abdomen and is constant but worsens intermittently.  The patient states that the pain initially gets better with food and then worsens.  The patient denies any fever, rigors or myalgias.  Patient states his bowel movements are normal no hematochezia, melena or diarrhea.  He has had nausea but no vomiting.  Patient denies any urinary complaints such as dysuria, hematuria or frequency.  Patient states that he does not abuse alcohol.  The patient states he does not use a large amount of NSAIDs.  The patient's not had a prior EGD.  Patient states that his headache is generalized.  The patient states it was gradual in onset.  Patient has no neck pain, neck stiffness or rash.  Patient has no neurological deficits such as change in vision, change in speech, change in swallowing, numbness, weakness in the extremities, dizziness or imbalance.  The patient was given Toradol and Zofran before I saw him by the nurse practitioner.  The patient states that his headache is completely gone at this time.    HPI    Patient Care Team  Primary Care Provider: Roni Gray MD    Past Medical History:     Allergies   Allergen Reactions   • Naproxen GI Intolerance     Past Medical History:   Diagnosis Date   • Abscess of foot     rt   • ADHD    • Anxiety disorder    • Backache    • Cellulitis of scrotum    • Elevated cholesterol    • Hyperlipidemia    • Insomnia disorder    • Seizures     x2- due to overuse/overdose of meds- years ago per  pt. Last one was 15-20 years ago- not on meds   • Substance abuse     years ago   • Viral gastroenteritis      Past Surgical History:   Procedure Laterality Date   • EYE SURGERY     • HAND SURGERY Bilateral    • INCISION AND DRAINAGE OF WOUND Right 2/14/2023    Procedure: INCISION AND DRAINAGE BONE, right great toe;  Surgeon: Cristo Christy DPM;  Location: [facility] MAIN OR;  Service: Podiatry;  Laterality: Right;   • KNEE ARTHROSCOPY Right 05/21/2014    Arthroscopy of right knee, debride medial meniscus. x3   • KNEE ARTHROSCOPY W/ MENISCECTOMY Left 08/09/2022    Procedure: KNEE ARTHROSCOPY WITH PARTIAL MEDIAL MENISCECTOMY;  Surgeon: Carlos Hameed MD;  Location: [facility] [place] [facility] [place];  Service: Orthopedics;  Laterality: Left;   • SHOULDER ARTHROSCOPY Right      Family History   Problem Relation Age of Onset   • Thyroid disease Mother    • Diabetes Mother    • Cancer Mother         pancreatic,thyroid   • Anxiety disorder Mother    • Depression Mother    • Heart disease Father    • Cancer Father         colon   • Diabetes Maternal Grandmother    • Cancer Paternal Grandmother         lung   • Hypertension Neg Hx        Home Medications:  Prior to Admission medications    Medication Sig Start Date End Date Taking? Authorizing Provider   amphetamine-dextroamphetamine (ADDERALL) 30 MG tablet Take 1.5 tablets by mouth Daily. TAKES 1 1/2 TAB 2/1/23   Charu Del Castillo MD   clindamycin (CLEOCIN) 300 MG capsule Take 1 capsule by mouth 3 (Three) Times a Day. 2/16/23   Cristo Christy DPM   clonazePAM (KlonoPIN) 1 MG tablet 1 qd 3/3/23   Charu Del Castillo MD   cloNIDine (CATAPRES) 0.1 MG tablet Take 1 tablet (0.1 mg) by mouth at bedtime 12/7/22   Charu Del Castillo MD   gabapentin (NEURONTIN) 600 MG tablet Take 1 tablet by mouth 2 (Two) Times a Day. 7/12/22   Charu Del Castillo MD   HYDROcodone-acetaminophen (Norco) 5-325 MG per tablet Take 1 tablet by mouth Every 8 (Eight) Hours As Needed for Mild Pain. 3/1/23    Cristo Christy DPM   HYDROcodone-acetaminophen (Norco) 5-325 MG per tablet Take 1 tablet by mouth Every 8 (Eight) Hours As Needed for Moderate Pain. 2/28/23   Cristo Christy DPM   hydrOXYzine pamoate (VISTARIL) 50 MG capsule Take 1 capsule by mouth 3 (Three) Times a Day As Needed. 11/4/22   Provider, MD Charu   ibuprofen (ADVIL,MOTRIN) 600 MG tablet Take 1 tablet by mouth Every 6 (Six) Hours As Needed for Moderate Pain. 1/17/23   Gemma Michaels APRN   promethazine (PHENERGAN) 12.5 MG tablet Take 1 tablet by mouth Every 6 (Six) Hours As Needed for Nausea or Vomiting (before pain medication). 2/14/23   Cristo Christy DPM        Social History:   Social History     Tobacco Use   • Smoking status: Every Day     Packs/day: 0.25     Types: Cigarettes   • Smokeless tobacco: Never   • Tobacco comments:     INSTRUCTED NO SMOKING 24 HR PRIOR TO SURGERY   Vaping Use   • Vaping Use: Never used   Substance Use Topics   • Alcohol use: No   • Drug use: Not Currently     Frequency: 1.0 times per week     Types: Marijuana, Methamphetamines     Comment: former- LAST USED 2 YEARS AGO         Review of Systems:  Review of Systems   Constitutional: Negative for chills, diaphoresis and fever.   HENT: Negative for congestion, postnasal drip, rhinorrhea and sore throat.    Eyes: Negative for photophobia.   Respiratory: Negative for cough, chest tightness and shortness of breath.    Cardiovascular: Negative for chest pain, palpitations and leg swelling.   Gastrointestinal: Positive for abdominal pain and nausea. Negative for diarrhea and vomiting.   Genitourinary: Negative for difficulty urinating, dysuria, flank pain, frequency, hematuria and urgency.   Musculoskeletal: Negative for neck pain and neck stiffness.   Skin: Negative for pallor and rash.   Neurological: Positive for headaches. Negative for dizziness, syncope, weakness and numbness.   Hematological: Negative for adenopathy. Does not bruise/bleed  "easily.   Psychiatric/Behavioral: Negative.         Physical Exam:  /95 (BP Location: Right arm, Patient Position: Lying)   Pulse 82   Temp 97.9 °F (36.6 °C) (Oral)   Resp 18   Ht 177.8 cm (70\")   Wt 98.2 kg (216 lb 7.9 oz)   SpO2 97%   BMI 31.06 kg/m²     Physical Exam  Vitals and nursing note reviewed.   Constitutional:       General: He is not in acute distress.     Appearance: Normal appearance. He is not ill-appearing, toxic-appearing or diaphoretic.   HENT:      Head: Normocephalic and atraumatic.      Mouth/Throat:      Mouth: Mucous membranes are moist.   Eyes:      Extraocular Movements: Extraocular movements intact.      Conjunctiva/sclera: Conjunctivae normal.      Pupils: Pupils are equal, round, and reactive to light.   Cardiovascular:      Rate and Rhythm: Normal rate and regular rhythm.      Pulses: Normal pulses.           Carotid pulses are 2+ on the right side and 2+ on the left side.       Radial pulses are 2+ on the right side and 2+ on the left side.        Femoral pulses are 2+ on the right side and 2+ on the left side.       Popliteal pulses are 2+ on the right side and 2+ on the left side.        Dorsalis pedis pulses are 2+ on the right side and 2+ on the left side.        Posterior tibial pulses are 2+ on the right side and 2+ on the left side.      Heart sounds: Normal heart sounds. No murmur heard.  Pulmonary:      Effort: Pulmonary effort is normal. No accessory muscle usage, respiratory distress or retractions.      Breath sounds: Normal breath sounds. No wheezing, rhonchi or rales.   Abdominal:      General: Abdomen is flat. There is no distension or abdominal bruit.      Palpations: Abdomen is soft. There is no mass or pulsatile mass.      Tenderness: There is abdominal tenderness in the epigastric area. There is no right CVA tenderness, left CVA tenderness, guarding or rebound. Negative signs include Corrales's sign and McBurney's sign.      Comments: No rigidity "   Musculoskeletal:         General: No swelling, tenderness or deformity.      Cervical back: Neck supple. No rigidity or tenderness. No pain with movement, spinous process tenderness or muscular tenderness. Normal range of motion.      Right lower leg: No edema.      Left lower leg: No edema.   Skin:     General: Skin is warm and dry.      Capillary Refill: Capillary refill takes less than 2 seconds.      Coloration: Skin is not cyanotic, jaundiced or pale.      Findings: No erythema.   Neurological:      General: No focal deficit present.      Mental Status: He is alert and oriented to person, place, and time. Mental status is at baseline.      Cranial Nerves: Cranial nerves 2-12 are intact. No cranial nerve deficit.      Sensory: Sensation is intact. No sensory deficit.      Motor: Motor function is intact. No weakness or pronator drift.      Coordination: Coordination is intact. Coordination normal.   Psychiatric:         Attention and Perception: Attention and perception normal.         Mood and Affect: Mood normal.         Behavior: Behavior normal.                  Procedures:  Procedures      Medical Decision Making:      Comorbidities that affect care:    Substance abuse, ADHD, hyperlipidemia, anxiety, chronic back pain, seizures    External Notes reviewed:    None      The following orders were placed and all results were independently analyzed by me:  Orders Placed This Encounter   Procedures   • [place] Draw   • Comprehensive Metabolic Panel   • Lipase   • Urinalysis With Microscopic If Indicated (No Culture) - Urine, Clean Catch   • Lactic Acid, Plasma   • CBC Auto Differential   • Urinalysis, Microscopic Only - Urine, Clean Catch   • CBC & Differential   • Green Top (Gel)   • Lavender Top   • Gold Top - SST   • Light Blue Top       Medications Given in the Emergency Department:  Medications   sodium chloride 0.9 % bolus 1,000 mL (0 mL Intravenous Stopped 5/18/23 1830)   ketorolac (TORADOL) injection 15  mg (15 mg Intravenous Given 5/18/23 1549)   ondansetron (ZOFRAN) injection 4 mg (4 mg Intravenous Given 5/18/23 1549)   aluminum-magnesium hydroxide-simethicone (MAALOX MAX) 400-400-40 MG/5ML suspension 15 mL (15 mL Oral Given 5/18/23 1826)   Lidocaine Viscous HCl (XYLOCAINE) 2 % solution 15 mL (15 mL Mouth/Throat Given 5/18/23 1826)        ED Course:    The patient was initially evaluated in the triage area where orders were placed. The patient was later dispositioned by Cristo Donahue DO.      The patient was advised to stay for completion of workup which includes but is not limited to communication of labs and radiological results, reassessment and plan. The patient was advised that leaving prior to disposition by a provider could result in critical findings that are not communicated to the patient.          Labs:    Lab Results (last 24 hours)     Procedure Component Value Units Date/Time    CBC & Differential [[id number]]  (Abnormal) Collected: 05/18/23 1544    Specimen: Blood Updated: 05/18/23 1600    Narrative:      The following orders were created for panel order CBC & Differential.  Procedure                               Abnormality         Status                     ---------                               -----------         ------                     CBC Auto Differential[[id number]]        Abnormal            Final result                 Please view results for these tests on the individual orders.    Comprehensive Metabolic Panel [[id number]]  (Abnormal) Collected: 05/18/23 1544    Specimen: Blood Updated: 05/18/23 1624     Glucose 134 mg/dL      BUN 10 mg/dL      Creatinine 0.87 mg/dL      Sodium 137 mmol/L      Potassium 3.8 mmol/L      Chloride 102 mmol/L      CO2 23.2 mmol/L      Calcium 9.8 mg/dL      Total Protein 7.9 g/dL      Albumin 4.4 g/dL      ALT (SGPT) 13 U/L      AST (SGOT) 22 U/L      Alkaline Phosphatase 117 U/L      Total Bilirubin 0.7 mg/dL      Globulin 3.5 gm/dL      A/G Ratio 1.3  g/dL      BUN/Creatinine Ratio 11.5     Anion Gap 11.8 mmol/L      eGFR 100.6 mL/min/1.73     Narrative:      GFR Normal >60  Chronic Kidney Disease <60  Kidney Failure <15      Lipase [[id number]]  (Normal) Collected: 05/18/23 1544    Specimen: Blood Updated: 05/18/23 1624     Lipase 45 U/L     Lactic Acid, Plasma [[id number]]  (Normal) Collected: 05/18/23 1544    Specimen: Blood Updated: 05/18/23 1624     Lactate 1.2 mmol/L     CBC Auto Differential [[id number]]  (Abnormal) Collected: 05/18/23 1544    Specimen: Blood Updated: 05/18/23 1600     WBC 12.47 10*3/mm3      RBC 6.14 10*6/mm3      Hemoglobin 18.3 g/dL      Hematocrit 53.3 %      MCV 86.8 fL      MCH 29.8 pg      MCHC 34.3 g/dL      RDW 15.5 %      RDW-SD 47.8 fl      MPV 11.1 fL      Platelets 299 10*3/mm3      Neutrophil % 67.2 %      Lymphocyte % 24.8 %      Monocyte % 4.9 %      Eosinophil % 2.0 %      Basophil % 0.5 %      Immature Grans % 0.6 %      Neutrophils, Absolute 8.38 10*3/mm3      Lymphocytes, Absolute 3.09 10*3/mm3      Monocytes, Absolute 0.61 10*3/mm3      Eosinophils, Absolute 0.25 10*3/mm3      Basophils, Absolute 0.06 10*3/mm3      Immature Grans, Absolute 0.08 10*3/mm3      nRBC 0.0 /100 WBC     Urinalysis With Microscopic If Indicated (No Culture) - Urine, Clean Catch [[id number]]  (Abnormal) Collected: 05/18/23 1806    Specimen: Urine, Clean Catch Updated: 05/18/23 1825     Color, UA Dark Yellow     Appearance, UA Cloudy     pH, UA 6.0     Specific Gravity, UA 1.029     Glucose, UA Negative     Ketones, UA Negative     Bilirubin, UA Small (1+)     Blood, UA Negative     Protein, UA 30 mg/dL (1+)     Leuk Esterase, UA Negative     Nitrite, UA Negative     Urobilinogen, UA 1.0 E.U./dL    Urinalysis, Microscopic Only - Urine, Clean Catch [[id number]] Collected: 05/18/23 1806    Specimen: Urine, Clean Catch Updated: 05/18/23 1832     RBC, UA None Seen /HPF      WBC, UA None Seen /HPF      Bacteria, UA None Seen /HPF      Squamous Epithelial  Cells, UA 0-2 /HPF      Hyaline Casts, UA 0-2 /LPF      Calcium Oxalate Crystals, UA Small/1+ /HPF      Mucus, UA Trace /HPF      Methodology Manual Light Microscopy           Imaging:    No Radiology Exams Resulted Within Past 24 Hours      Differential Diagnosis and Discussion:      Abdominal Pain: Based on the patient's signs and symptoms, I considered abdominal aortic aneurysm, small bowel obstruction, pancreatitis, acute cholecystitis, acute appendecitis, peptic ulcer disease, gastritis, colitis, endocrine disorders, irritable bowel syndrome and other differential diagnosis an etiology of the patient's abdominal pain.    All labs were reviewed and interpreted by me.    MDM  Number of Diagnoses or Management Options  Epigastric pain  Nonintractable headache, unspecified chronicity pattern, unspecified headache type  Diagnosis management comments: Patient's vital signs are stable while in the emergency room.  The patient had initial tachycardia in triage but that resolved spontaneously    Urinalysis was negative for infection or blood.    The patient's CMP was reviewed and shows no abnormalities of critical concern.  Of note, the patient's sodium and potassium are acceptable.  The patient's liver enzymes are unremarkable.  The patient's renal function including creatinine is preserved.  The patient has a normal anion gap.    The patient's CBC demonstrates a white blood cell count elevated at 12.4 thousand.  The patient's hemoglobin was 18.3 and the patient's hematocrit was 53.  The patient had normal platelets    The patient's headache was resolved before I even saw him.    The patient was treated with Toradol and Zofran before I saw the patient    I gave the patient a GI cocktail for his epigastric pain.  The patient was reassessed afterwards.  The patient states that his pain was completely resolved with a GI cocktail    The patient is resting comfortably and feels better, is alert and in no distress.  Repeat  examination is unremarkable and benign; in particular, there is no discomfort at McBurney's point and there is no pulsatile mass.  The history, exam, diagnostic testing and current condition does not suggest acute appendicitis, bowel obstruction, acute cholecystitis bowel perforation, major gastrointestinal bleeding, severe diverticulitis, abdominal aortic aneurysm, mesenteric ischemia, volvulus, sepsis or other significant pathology that warrants further testing, continued ED treatment, admission for surgical evaluation at this point.  The vital signs have been stable.  The patient does not have uncontrolled pain intractable vomiting or other significant symptoms.  The patient's condition is stable and appropriate for discharge from the emergency department.  The patient will follow up with her primary care physician for serial reexamination of the abdomen tomorrow.  The patient was instructed to return should they have worsening pain, intractable vomiting, fever or new symptoms      The patient appears to have 2 separate complaints.  The patient's headache was completely gone by the time I saw him.  The patient had been given Toradol and Zofran by the nurse practitioner.  The patient did have residual epigastric pain and tenderness.  The patient was given a GI cocktail.  The patient states that his pain was went completely away.  I will place the patient on Protonix and Zofran.  The patient will use Mylanta for additional pain relief.  The patient will follow-up with her doctor tomorrow for serial reexamination of the abdomen..  The patient will discuss need for outpatient referral for EGD with her primary care physician.    The patient was given very specific instructions on when and why to return to the emergency room.  The patient voiced understanding and felt comfortable with the discharge instructions.  They would return to the emergency room if necessary.  The patient appears appropriate for discharge and  outpatient follow-up.         Amount and/or Complexity of Data Reviewed  Clinical lab tests: reviewed  Tests in the medicine section of CPT®: reviewed               Social Determinants of Health:    Patient is independent, reliable, and has access to care.       Disposition and Care Coordination:    Discharged: The patient is suitable and stable for discharge with no need for consideration of observation or admission.    I have explained discharge medications and the need for follow up with the patient/caretakers. This was also printed in the discharge instructions. Patient was discharged with the following medications and follow up:      Medication List      New Prescriptions    ondansetron ODT 4 MG disintegrating tablet  Commonly known as: ZOFRAN-ODT  Place 2 tablets on the tongue Every 8 (Eight) Hours As Needed for Nausea or Vomiting for up to 5 days.     pantoprazole 40 MG EC tablet  Commonly known as: PROTONIX  Take 1 tablet by mouth Daily for 30 days.           Where to Get Your Medications      These medications were sent to [id number] - [place], [place] - [address] [facility] AT [facility] OF [address] & [place] - [phone]  - [phone]   [address] [facility] [address]    Phone: [phone]   · ondansetron ODT 4 MG disintegrating tablet  · pantoprazole 40 MG EC tablet      Roni Gray MD  [address] [address] [phone]    On 5/19/2023  Serial reexamination the abdomen, call for appointment       Final diagnoses:   Epigastric pain   Nonintractable headache, unspecified chronicity pattern, unspecified headache type        ED Disposition     ED Disposition   Discharge    Condition   Stable    Comment   --             This medical record created using voice recognition software.           Cristo Donahue DO  05/19/23 4714

## 2023-05-25 ENCOUNTER — TELEPHONE (OUTPATIENT)
Dept: PODIATRY | Facility: CLINIC | Age: 58
End: 2023-05-25
Payer: COMMERCIAL

## 2023-05-25 ENCOUNTER — HOSPITAL ENCOUNTER (EMERGENCY)
Facility: HOSPITAL | Age: 58
Discharge: HOME | End: 2023-05-25
Attending: EMERGENCY MEDICINE
Payer: COMMERCIAL

## 2023-05-25 ENCOUNTER — APPOINTMENT (OUTPATIENT)
Dept: GENERAL RADIOLOGY | Facility: HOSPITAL | Age: 58
End: 2023-05-25
Payer: COMMERCIAL

## 2023-05-25 VITALS
RESPIRATION RATE: 18 BRPM | BODY MASS INDEX: 31.18 KG/M2 | DIASTOLIC BLOOD PRESSURE: 73 MMHG | SYSTOLIC BLOOD PRESSURE: 107 MMHG | OXYGEN SATURATION: 97 % | HEIGHT: 70 IN | HEART RATE: 80 BPM | TEMPERATURE: 97.5 F | WEIGHT: 217.81 LBS

## 2023-05-25 DIAGNOSIS — L08.9 TOE INFECTION: Primary | ICD-10-CM

## 2023-05-25 LAB
ALBUMIN SERPL-MCNC: 4.1 G/DL (ref 3.5–5.2)
ALBUMIN/GLOB SERPL: 1.2 G/DL
ALP SERPL-CCNC: 110 U/L (ref 39–117)
ALT SERPL W P-5'-P-CCNC: 9 U/L (ref 1–41)
ANION GAP SERPL CALCULATED.3IONS-SCNC: 8.4 MMOL/L (ref 5–15)
AST SERPL-CCNC: 17 U/L (ref 1–40)
BASOPHILS # BLD AUTO: 0.05 10*3/MM3 (ref 0–0.2)
BASOPHILS NFR BLD AUTO: 0.5 % (ref 0–1.5)
BILIRUB SERPL-MCNC: 0.6 MG/DL (ref 0–1.2)
BUN SERPL-MCNC: 10 MG/DL (ref 6–20)
BUN/CREAT SERPL: 11.5 (ref 7–25)
CALCIUM SPEC-SCNC: 9.6 MG/DL (ref 8.6–10.5)
CHLORIDE SERPL-SCNC: 105 MMOL/L (ref 98–107)
CO2 SERPL-SCNC: 25.6 MMOL/L (ref 22–29)
CREAT SERPL-MCNC: 0.87 MG/DL (ref 0.76–1.27)
DEPRECATED RDW RBC AUTO: 49.2 FL (ref 37–54)
EGFRCR SERPLBLD CKD-EPI 2021: 100.6 ML/MIN/1.73
EOSINOPHIL # BLD AUTO: 0.19 10*3/MM3 (ref 0–0.4)
EOSINOPHIL NFR BLD AUTO: 1.9 % (ref 0.3–6.2)
ERYTHROCYTE [DISTWIDTH] IN BLOOD BY AUTOMATED COUNT: 15.1 % (ref 12.3–15.4)
GLOBULIN UR ELPH-MCNC: 3.4 GM/DL
GLUCOSE SERPL-MCNC: 113 MG/DL (ref 65–99)
HCT VFR BLD AUTO: 48.4 % (ref 37.5–51)
HGB BLD-MCNC: 16.2 G/DL (ref 13–17.7)
HOLD SPECIMEN: NORMAL
HOLD SPECIMEN: NORMAL
IMM GRANULOCYTES # BLD AUTO: 0.03 10*3/MM3 (ref 0–0.05)
IMM GRANULOCYTES NFR BLD AUTO: 0.3 % (ref 0–0.5)
LYMPHOCYTES # BLD AUTO: 3.01 10*3/MM3 (ref 0.7–3.1)
LYMPHOCYTES NFR BLD AUTO: 30.7 % (ref 19.6–45.3)
MCH RBC QN AUTO: 29.8 PG (ref 26.6–33)
MCHC RBC AUTO-ENTMCNC: 33.5 G/DL (ref 31.5–35.7)
MCV RBC AUTO: 89 FL (ref 79–97)
MONOCYTES # BLD AUTO: 0.58 10*3/MM3 (ref 0.1–0.9)
MONOCYTES NFR BLD AUTO: 5.9 % (ref 5–12)
NEUTROPHILS NFR BLD AUTO: 5.94 10*3/MM3 (ref 1.7–7)
NEUTROPHILS NFR BLD AUTO: 60.7 % (ref 42.7–76)
NRBC BLD AUTO-RTO: 0 /100 WBC (ref 0–0.2)
PLATELET # BLD AUTO: 264 10*3/MM3 (ref 140–450)
PMV BLD AUTO: 10.8 FL (ref 6–12)
POTASSIUM SERPL-SCNC: 4.6 MMOL/L (ref 3.5–5.2)
PROT SERPL-MCNC: 7.5 G/DL (ref 6–8.5)
RBC # BLD AUTO: 5.44 10*6/MM3 (ref 4.14–5.8)
SODIUM SERPL-SCNC: 139 MMOL/L (ref 136–145)
WBC NRBC COR # BLD: 9.8 10*3/MM3 (ref 3.4–10.8)
WHOLE BLOOD HOLD COAG: NORMAL
WHOLE BLOOD HOLD SPECIMEN: NORMAL

## 2023-05-25 PROCEDURE — 80053 COMPREHEN METABOLIC PANEL: CPT

## 2023-05-25 PROCEDURE — 73620 X-RAY EXAM OF FOOT: CPT

## 2023-05-25 PROCEDURE — 85025 COMPLETE CBC W/AUTO DIFF WBC: CPT

## 2023-05-25 PROCEDURE — 99283 EMERGENCY DEPT VISIT LOW MDM: CPT

## 2023-05-25 PROCEDURE — 36415 COLL VENOUS BLD VENIPUNCTURE: CPT

## 2023-05-25 RX ORDER — SULFAMETHOXAZOLE AND TRIMETHOPRIM 800; 160 MG/1; MG/1
2 TABLET ORAL 2 TIMES DAILY
Qty: 28 TABLET | Refills: 0 | Status: SHIPPED | OUTPATIENT
Start: 2023-05-25 | End: 2023-06-01

## 2023-05-25 RX ORDER — HYDROCODONE BITARTRATE AND ACETAMINOPHEN 7.5; 325 MG/1; MG/1
1 TABLET ORAL ONCE
Status: COMPLETED | OUTPATIENT
Start: 2023-05-25 | End: 2023-05-25

## 2023-05-25 RX ORDER — HYDROCODONE BITARTRATE AND ACETAMINOPHEN 7.5; 325 MG/1; MG/1
1 TABLET ORAL EVERY 4 HOURS PRN
Qty: 14 TABLET | Refills: 0 | Status: SHIPPED | OUTPATIENT
Start: 2023-05-25

## 2023-05-25 RX ORDER — SULFAMETHOXAZOLE AND TRIMETHOPRIM 800; 160 MG/1; MG/1
2 TABLET ORAL ONCE
Status: COMPLETED | OUTPATIENT
Start: 2023-05-25 | End: 2023-05-25

## 2023-05-25 RX ORDER — CEPHALEXIN 250 MG/1
500 CAPSULE ORAL ONCE
Status: COMPLETED | OUTPATIENT
Start: 2023-05-25 | End: 2023-05-25

## 2023-05-25 RX ORDER — CEPHALEXIN 500 MG/1
500 CAPSULE ORAL 3 TIMES DAILY
Qty: 21 CAPSULE | Refills: 0 | Status: SHIPPED | OUTPATIENT
Start: 2023-05-25 | End: 2023-06-01

## 2023-05-25 RX ADMIN — HYDROCODONE BITARTRATE AND ACETAMINOPHEN 1 TABLET: 7.5; 325 TABLET ORAL at 23:03

## 2023-05-25 RX ADMIN — SULFAMETHOXAZOLE AND TRIMETHOPRIM 2 TABLET: 800; 160 TABLET ORAL at 23:04

## 2023-05-25 RX ADMIN — CEPHALEXIN 500 MG: 250 CAPSULE ORAL at 23:03

## 2023-05-25 RX ADMIN — HYDROCODONE BITARTRATE AND ACETAMINOPHEN 1 TABLET: 7.5; 325 TABLET ORAL at 19:00

## 2023-05-25 NOTE — TELEPHONE ENCOUNTER
Caller: Honorio Vigil Jr.    Relationship: Self    Best call back number: [phone]  What is the best time to reach you: ANY     Who are you requesting to speak with (clinical staff, provider,  specific staff member): PROVIDER     Do you know the name of the person who called: YES     What was the call regarding: PATIENT STATES THAT HE HAD AN INFECTION IN THE RIGHT BIG TOE

## 2023-05-25 NOTE — ED PROVIDER NOTES
Time: 6:48 PM EDT  Date of encounter:  5/25/2023  Independent Historian/Clinical History and Information was obtained by:   Patient  Chief Complaint   Patient presents with   • Toe Injury     Wound on right great toe.        History is limited by: N/A    History of Present Illness:  Patient is a 57 y.o. year old male who presents to the emergency department for evaluation of right great toe pain.  On 3/13/2023 pt had an I and D procedure completed in the OR by podiatrist Dr. Christy to the digit.  Today he presents with pain and redness that extends an approximate 1 to 2 inches up his foot.  He also has an open wound that is bleeding.  (Alyssa, [facility] Provider).    Patient has been having right great toe issues for seven months. He had surgery two months ago, where they cut the right great toe on each side, then scraped out a bacterial infection. He waited for scab to fall off, but he walks to work. He noticed that the scab was rubbing in his shoe while walking. Patient states he is currently taking an antibiotic.     Patient denies being diabetic.     HPI    Patient Care Team  Primary Care Provider: Roni Gray MD    Past Medical History:     Allergies   Allergen Reactions   • Naproxen GI Intolerance     Past Medical History:   Diagnosis Date   • Abscess of foot     rt   • ADHD    • Anxiety disorder    • Backache    • Cellulitis of scrotum    • Elevated cholesterol    • Hyperlipidemia    • Insomnia disorder    • Seizures     x2- due to overuse/overdose of meds- years ago per pt. Last one was 15-20 years ago- not on meds   • Substance abuse     years ago   • Viral gastroenteritis      Past Surgical History:   Procedure Laterality Date   • EYE SURGERY     • HAND SURGERY Bilateral    • INCISION AND DRAINAGE OF WOUND Right 2/14/2023    Procedure: INCISION AND DRAINAGE BONE, right great toe;  Surgeon: Cristo Christy DPM;  Location: [facility] MAIN OR;  Service: Podiatry;  Laterality: Right;   • KNEE  ARTHROSCOPY Right 05/21/2014    Arthroscopy of right knee, debride medial meniscus. x3   • KNEE ARTHROSCOPY W/ MENISCECTOMY Left 08/09/2022    Procedure: KNEE ARTHROSCOPY WITH PARTIAL MEDIAL MENISCECTOMY;  Surgeon: Carlos Hameed MD;  [facility] [facility] [place] [facility]  Service: Orthopedics;  Laterality: Left;   • SHOULDER ARTHROSCOPY Right      Family History   Problem Relation Age of Onset   • Thyroid disease Mother    • Diabetes Mother    • Cancer Mother         pancreatic,thyroid   • Anxiety disorder Mother    • Depression Mother    • Heart disease Father    • Cancer Father         colon   • Diabetes Maternal Grandmother    • Cancer Paternal Grandmother         lung   • Hypertension Neg Hx        Home Medications:  Prior to Admission medications    Medication Sig Start Date End Date Taking? Authorizing Provider   amphetamine-dextroamphetamine (ADDERALL) 30 MG tablet Take 1.5 tablets by mouth Daily. TAKES 1 1/2 TAB 2/1/23   Charu Del Castillo MD   clindamycin (CLEOCIN) 300 MG capsule Take 1 capsule by mouth 3 (Three) Times a Day. 2/16/23   Cristo Christy DPM   clonazePAM (KlonoPIN) 1 MG tablet 1 qd 3/3/23   Charu Del Castillo MD   cloNIDine (CATAPRES) 0.1 MG tablet Take 1 tablet (0.1 mg) by mouth at bedtime 12/7/22   Charu Del Castillo MD   gabapentin (NEURONTIN) 600 MG tablet Take 1 tablet by mouth 2 (Two) Times a Day. 7/12/22   Charu Del Castillo MD   HYDROcodone-acetaminophen (Norco) 5-325 MG per tablet Take 1 tablet by mouth Every 8 (Eight) Hours As Needed for Mild Pain. 3/1/23   Cristo Christy DPM   HYDROcodone-acetaminophen (Norco) 5-325 MG per tablet Take 1 tablet by mouth Every 8 (Eight) Hours As Needed for Moderate Pain. 2/28/23   Cristo Christy DPM   hydrOXYzine pamoate (VISTARIL) 50 MG capsule Take 1 capsule by mouth 3 (Three) Times a Day As Needed. 11/4/22   Charu Del Castillo MD   ibuprofen (ADVIL,MOTRIN) 600 MG tablet Take 1 tablet by mouth Every 6 (Six) Hours As Needed for  "Moderate Pain. 1/17/23   Gemma Michaels APRN   pantoprazole (PROTONIX) 40 MG EC tablet Take 1 tablet by mouth Daily for 30 days. 5/18/23 6/17/23  Cristo Donahue DO   promethazine (PHENERGAN) 12.5 MG tablet Take 1 tablet by mouth Every 6 (Six) Hours As Needed for Nausea or Vomiting (before pain medication). 2/14/23   Cristo Christy, DPM        Social History:   Social History     Tobacco Use   • Smoking status: Every Day     Packs/day: 0.25     Types: Cigarettes   • Smokeless tobacco: Never   • Tobacco comments:     INSTRUCTED NO SMOKING 24 HR PRIOR TO SURGERY   Vaping Use   • Vaping Use: Never used   Substance Use Topics   • Alcohol use: No   • Drug use: Not Currently     Frequency: 1.0 times per week     Types: Marijuana, Methamphetamines     Comment: former- LAST USED 2 YEARS AGO         Review of Systems:  Review of Systems   Constitutional: Negative for chills and fever.   HENT: Negative for congestion, ear pain and sore throat.    Eyes: Negative for pain.   Respiratory: Negative for cough, chest tightness and shortness of breath.    Cardiovascular: Negative for chest pain.   Gastrointestinal: Negative for abdominal pain, diarrhea, nausea and vomiting.   Genitourinary: Negative for flank pain and hematuria.   Musculoskeletal: Negative for joint swelling.        Open wound on left great toe   Skin: Negative for pallor.   Neurological: Negative for seizures and headaches.   All other systems reviewed and are negative.       Physical Exam:  /73   Pulse 80   Temp 97.5 °F (36.4 °C) (Oral)   Resp 18   Ht 177.8 cm (70\")   Wt 98.8 kg (217 lb 13 oz)   SpO2 97%   BMI 31.25 kg/m²     Physical Exam  Vitals and nursing note reviewed.   Constitutional:       Appearance: Normal appearance. He is normal weight.   HENT:      Head: Normocephalic and atraumatic.      Nose: Nose normal.   Eyes:      Conjunctiva/sclera: Conjunctivae normal.      Pupils: Pupils are equal, round, and reactive to light. "   Cardiovascular:      Rate and Rhythm: Normal rate and regular rhythm.   Pulmonary:      Effort: Pulmonary effort is normal.      Breath sounds: Normal breath sounds.   Abdominal:      General: Abdomen is flat. Bowel sounds are normal.      Palpations: Abdomen is soft.   Musculoskeletal:         General: Normal range of motion.      Cervical back: Normal range of motion. Rigidity present.   Feet:      Comments: Right great toe: erythema, swelling, area over dorsal portion that is actively draining small amount of blood fluid, no streaking.   Skin:     General: Skin is warm and dry.   Neurological:      General: No focal deficit present.      Mental Status: He is alert and oriented to person, place, and time.   Psychiatric:         Mood and Affect: Mood normal.         Behavior: Behavior normal.         Thought Content: Thought content normal.         Judgment: Judgment normal.                  Procedures:  Procedures      Medical Decision Making:      Comorbidities that affect care:    Hyperlipidemia, seizure disorder, Substance Abuse    External Notes reviewed:    Previous Operation Note: Podiatry op note      The following orders were placed and all results were independently analyzed by me:  Orders Placed This Encounter   Procedures   • XR Foot 2 View Right   • Comprehensive Metabolic Panel   • [place] Draw   • CBC Auto Differential   • CBC & Differential   • Green Top (Gel)   • Lavender Top   • Gold Top - SST   • Light Blue Top       Medications Given in the Emergency Department:  Medications   HYDROcodone-acetaminophen (NORCO) 7.5-325 MG per tablet 1 tablet (1 tablet Oral Given 5/25/23 1900)   sulfamethoxazole-trimethoprim (BACTRIM DS,SEPTRA DS) 800-160 MG per tablet 2 tablet (2 tablets Oral Given 5/25/23 2304)   cephalexin (KEFLEX) capsule 500 mg (500 mg Oral Given 5/25/23 2303)   HYDROcodone-acetaminophen (NORCO) 7.5-325 MG per tablet 1 tablet (1 tablet Oral Given 5/25/23 2303)        ED Course:    The  patient was initially evaluated in the triage area where orders were placed. The patient was later dispositioned by Iglesia Weiner MD.      The patient was advised to stay for completion of workup which includes but is not limited to communication of labs and radiological results, reassessment and plan. The patient was advised that leaving prior to disposition by a provider could result in critical findings that are not communicated to the patient.     ED Course as of 05/27/23 0646   Thu May 25, 2023   2957   --- PROVIDER IN TRIAGE NOTE ---    Patient was seen and evaluated in triage by FLAVIO Sainz.  Orders were written and the patient is currently awaiting disposition.   [MS]   2304 Patient has no evidence of systemic infection or deep tissue or bony infection based on x-ray findings.  Patient wound appears to be actively draining.  We discussed soaking it twice daily we discussed dual antibiotics with Bactrim and Keflex.  Will provide patient with pain control.  Was able to discuss the patient with Dr. Hand of podiatry who will assist in providing the patient with close clinic follow-up.  We discussed return precautions including worsening symptoms or any additional concerns. [JS]      ED Course User Index  [JS] Iglesia Weiner MD  [MS] Gemma Michaels APRN       Labs:    Lab Results (last 24 hours)     ** No results found for the last 24 hours. **           Imaging:    No Radiology Exams Resulted Within Past 24 Hours      Differential Diagnosis and Discussion:      Extremity Pain: Differential diagnosis includes but is not limited to soft tissue sprain, tendonitis, tendon injury, dislocation, fracture, deep vein thrombosis, arterial insufficiency, osteoarthritis, bursitis, and ligamentous damage.    All labs were reviewed and interpreted by me.  All X-rays impressions were independently interpreted by me.    MDM         Patient Care Considerations:    MRI: I considered ordering an MRI  however The patient's symptoms just started and has low likelihood to have caused osteomyelitis at this stage      Consultants/Shared Management Plan:    Consultant: I have discussed the case with Dr. Hand of podiatry who states He will assist in arranging for quick follow-up    Social Determinants of Health:    Patient is independent, reliable, and has access to care.       Disposition and Care Coordination:    Discharged: The patient is suitable and stable for discharge with no need for consideration of observation or admission.    I have explained the patient´s condition, diagnoses and treatment plan based on the information available to me at this time. I have answered questions and addressed any concerns. The patient has a good  understanding of the patient´s diagnosis, condition, and treatment plan as can be expected at this point. The vital signs have been stable. The patient´s condition is stable and appropriate for discharge from the emergency department.      The patient will pursue further outpatient evaluation with the primary care physician or other designated or consulting physician as outlined in the discharge instructions. They are agreeable to this plan of care and follow-up instructions have been explained in detail. The patient has received these instructions in written format and have expressed an understanding of the discharge instructions. The patient is aware that any significant change in condition or worsening of symptoms should prompt an immediate return to this or the closest emergency department or call to 911.  I have explained discharge medications and the need for follow up with the patient/caretakers. This was also printed in the discharge instructions. Patient was discharged with the following medications and follow up:      Medication List      New Prescriptions    cephalexin 500 MG capsule  Commonly known as: KEFLEX  Take 1 capsule by mouth 3 (Three) Times a Day for 7 days.      sulfamethoxazole-trimethoprim 800-160 MG per tablet  Commonly known as: BACTRIM DS,SEPTRA DS  Take 2 tablets by mouth 2 (Two) Times a Day for 7 days.        Changed    * HYDROcodone-acetaminophen 5-325 MG per tablet  Commonly known as: Norco  Take 1 tablet by mouth Every 8 (Eight) Hours As Needed for Moderate Pain.  What changed: Another medication with the same name was added. Make sure you understand how and when to take each.     * HYDROcodone-acetaminophen 5-325 MG per tablet  Commonly known as: Norco  Take 1 tablet by mouth Every 8 (Eight) Hours As Needed for Mild Pain.  What changed: Another medication with the same name was added. Make sure you understand how and when to take each.     * HYDROcodone-acetaminophen 7.5-325 MG per tablet  Commonly known as: NORCO  Take 1 tablet by mouth Every 4 (Four) Hours As Needed for Moderate Pain.  What changed: You were already taking a medication with the same name, and this prescription was added. Make sure you understand how and when to take each.         * This list has 3 medication(s) that are the same as other medications prescribed for you. Read the directions carefully, and ask your doctor or other care provider to review them with you.               Where to Get Your Medications      These medications were [place] [facility] [place] [address] - [phone]  - [phone] FX  [address], [place] [address]    Phone: [phone]   · cephalexin 500 MG capsule  · HYDROcodone-acetaminophen 7.5-325 MG per tablet  · sulfamethoxazole-trimethoprim 800-160 MG per tablet      Cristo Christy, HANNA  [address]  [address]  [place] [address]  [phone]    Schedule an appointment as soon as possible for a visit          Final diagnoses:   Toe infection        ED Disposition     ED Disposition   Discharge    Condition   Stable    Comment   --             This medical record created using  voice recognition software.      Documentation assistance provided by Kaitlin Georges acting as scribe for Iglesia Weiner MD. Information recorded by the scribe was done at my direction and has been verified and validated by me.          Kaitlin Georges  05/25/23 4523       Iglesia Weiner MD  05/27/23 9348

## 2023-05-25 NOTE — Clinical Note
[facility] EMERGENCY ROOM  [address]  [place] [address]  Phone: [phone]    Honorio Vigil was seen and treated in our emergency department on 5/25/2023.  He may return to work on 05/29/2023.         Thank you for choosing [facility].    Iglesia Weiner MD

## 2023-08-12 ENCOUNTER — APPOINTMENT (OUTPATIENT)
Dept: GENERAL RADIOLOGY | Facility: HOSPITAL | Age: 58
End: 2023-08-12
Payer: COMMERCIAL

## 2023-08-12 ENCOUNTER — HOSPITAL ENCOUNTER (EMERGENCY)
Facility: HOSPITAL | Age: 58
Discharge: HOME | End: 2023-08-12
Attending: EMERGENCY MEDICINE
Payer: COMMERCIAL

## 2023-08-12 VITALS
WEIGHT: 205 LBS | BODY MASS INDEX: 27.77 KG/M2 | DIASTOLIC BLOOD PRESSURE: 93 MMHG | OXYGEN SATURATION: 95 % | TEMPERATURE: 98.3 F | SYSTOLIC BLOOD PRESSURE: 137 MMHG | RESPIRATION RATE: 16 BRPM | HEIGHT: 72 IN | HEART RATE: 96 BPM

## 2023-08-12 DIAGNOSIS — M25.562 ACUTE PAIN OF LEFT KNEE: Primary | ICD-10-CM

## 2023-08-12 PROCEDURE — 73562 X-RAY EXAM OF KNEE 3: CPT

## 2023-08-12 PROCEDURE — 99283 EMERGENCY DEPT VISIT LOW MDM: CPT

## 2023-08-12 RX ORDER — KETOROLAC TROMETHAMINE 10 MG/1
10 TABLET, FILM COATED ORAL EVERY 6 HOURS PRN
Qty: 15 TABLET | Refills: 0 | Status: SHIPPED | OUTPATIENT
Start: 2023-08-12

## 2023-08-12 RX ORDER — KETOROLAC TROMETHAMINE 30 MG/ML
30 INJECTION, SOLUTION INTRAMUSCULAR; INTRAVENOUS ONCE
Status: DISCONTINUED | OUTPATIENT
Start: 2023-08-12 | End: 2023-08-13 | Stop reason: HOSPADM

## 2023-08-12 NOTE — Clinical Note
[facility] EMERGENCY ROOM  [address]  [place] [address]  Phone: [phone]    Honorio Vigil was seen and treated in our emergency department on 8/12/2023.  He may return to work on 08/16/2023.         Thank you for choosing [facility].    Darrick Mata, FLAVIO

## 2023-08-13 NOTE — ED PROVIDER NOTES
Time: 9:07 PM EDT  Date of encounter:  8/12/2023  Independent Historian/Clinical History and Information was obtained by:   Patient    History is limited by: N/A    Chief Complaint   Patient presents with    Knee Pain         History of Present Illness:  Patient is a 57 y.o. year old male who presents to the emergency department for evaluation of left knee pain from stepping off the sidewalk yesterday afternoon.  Patient states he had meniscus surgery on this knee 6 months ago.  Patient was seen by provider in triage, Cristo Rubin PA-C        History provided by:  Patient    Patient Care Team  Primary Care Provider: Roni Gray MD    Past Medical History:     Allergies   Allergen Reactions    Naproxen GI Intolerance     Past Medical History:   Diagnosis Date    Abscess of foot     rt    ADHD     Anxiety disorder     Backache     Cellulitis of scrotum     Elevated cholesterol     Hyperlipidemia     Insomnia disorder     Seizures     x2- due to overuse/overdose of meds- years ago per pt. Last one was 15-20 years ago- not on meds    Substance abuse     years ago    Viral gastroenteritis      Past Surgical History:   Procedure Laterality Date    EYE SURGERY      HAND SURGERY Bilateral     INCISION AND DRAINAGE OF WOUND Right 2/14/2023    Procedure: INCISION AND DRAINAGE BONE, right great toe;  Surgeon: Cristo Christy DPM;  Location: [facility] OR;  Service: Podiatry;  Laterality: Right;    KNEE ARTHROSCOPY Right 05/21/2014    Arthroscopy of right knee, debride medial meniscus. x3    KNEE ARTHROSCOPY W/ MENISCECTOMY Left 08/09/2022    Procedure: KNEE ARTHROSCOPY WITH PARTIAL MEDIAL MENISCECTOMY;  Surgeon: Carlos Hameed MD;  [place] [facility] [place] [facility] [place]opedics;  Laterality: Left;    SHOULDER ARTHROSCOPY Right      Family History   Problem Relation Age of Onset    Thyroid disease Mother     Diabetes Mother     Cancer Mother         pancreatic,thyroid    Anxiety disorder Mother     Depression Mother      Heart disease Father     Cancer Father         colon    Diabetes Maternal Grandmother     Cancer Paternal Grandmother         lung    Hypertension Neg Hx        Home Medications:  Prior to Admission medications    Medication Sig Start Date End Date Taking? Authorizing Provider   amphetamine-dextroamphetamine (ADDERALL) 30 MG tablet Take 1.5 tablets by mouth Daily. TAKES 1 1/2 TAB 2/1/23   Charu Del Castillo MD   clindamycin (CLEOCIN) 300 MG capsule Take 1 capsule by mouth 3 (Three) Times a Day. 2/16/23   Cristo Christy DPM   clonazePAM (KlonoPIN) 1 MG tablet 1 qd 3/3/23   Charu Del Castillo MD   cloNIDine (CATAPRES) 0.1 MG tablet Take 1 tablet (0.1 mg) by mouth at bedtime 12/7/22   Charu Del Castillo MD   gabapentin (NEURONTIN) 600 MG tablet Take 1 tablet by mouth 2 (Two) Times a Day. 7/12/22   Charu Del Castillo MD   HYDROcodone-acetaminophen (Norco) 5-325 MG per tablet Take 1 tablet by mouth Every 8 (Eight) Hours As Needed for Mild Pain. 3/1/23   Cristo Christy DPM   HYDROcodone-acetaminophen (Norco) 5-325 MG per tablet Take 1 tablet by mouth Every 8 (Eight) Hours As Needed for Moderate Pain. 2/28/23   Cristo Christy DPM   HYDROcodone-acetaminophen (NORCO) 7.5-325 MG per tablet Take 1 tablet by mouth Every 4 (Four) Hours As Needed for Moderate Pain. 5/25/23   Iglesia Weiner MD   hydrOXYzine pamoate (VISTARIL) 50 MG capsule Take 1 capsule by mouth 3 (Three) Times a Day As Needed. 11/4/22   Charu Del Castillo MD   ibuprofen (ADVIL,MOTRIN) 600 MG tablet Take 1 tablet by mouth Every 6 (Six) Hours As Needed for Moderate Pain. 1/17/23   Gemma Michaels APRN   promethazine (PHENERGAN) 12.5 MG tablet Take 1 tablet by mouth Every 6 (Six) Hours As Needed for Nausea or Vomiting (before pain medication). 2/14/23   Cristo Christy DPM        Social History:   Social History     Tobacco Use    Smoking status: Every Day     Packs/day: 0.25     Types: Cigarettes    Smokeless tobacco:  "Never    Tobacco comments:     INSTRUCTED NO SMOKING 24 HR PRIOR TO SURGERY   Vaping Use    Vaping Use: Never used   Substance Use Topics    Alcohol use: No    Drug use: Not Currently     Frequency: 1.0 times per week     Types: Marijuana, Methamphetamines     Comment: former- LAST USED 2 YEARS AGO         Review of Systems:  Review of Systems   Constitutional:  Negative for chills and fever.   HENT:  Negative for congestion, ear pain and sore throat.    Eyes:  Negative for pain.   Respiratory:  Negative for cough, chest tightness and shortness of breath.    Cardiovascular:  Negative for chest pain.   Gastrointestinal:  Negative for abdominal pain, diarrhea, nausea and vomiting.   Genitourinary:  Negative for flank pain and hematuria.   Musculoskeletal:  Positive for arthralgias, gait problem and joint swelling.   Skin:  Negative for pallor.   Neurological:  Negative for seizures and headaches.   All other systems reviewed and are negative.     Physical Exam:  /93   Pulse 96   Temp 98.3 øF (36.8 øC) (Oral)   Resp 16   Ht 182.9 cm (72\")   Wt 93 kg (205 lb)   SpO2 95%   BMI 27.80 kg/mý         Physical Exam  Vitals and nursing note reviewed.   Constitutional:       General: He is not in acute distress.     Appearance: Normal appearance. He is not toxic-appearing.   HENT:      Head: Normocephalic and atraumatic.      Mouth/Throat:      Mouth: Mucous membranes are moist.   Eyes:      General: No scleral icterus.     Extraocular Movements: Extraocular movements intact.      Conjunctiva/sclera: Conjunctivae normal.   Cardiovascular:      Rate and Rhythm: Normal rate and regular rhythm.      Pulses: Normal pulses.      Heart sounds: Normal heart sounds.   Pulmonary:      Effort: Pulmonary effort is normal. No respiratory distress.      Breath sounds: Normal breath sounds.   Abdominal:      General: Abdomen is flat. There is no distension.      Palpations: Abdomen is soft.      Tenderness: There is no abdominal " tenderness.   Musculoskeletal:      Cervical back: Normal range of motion and neck supple.      Left knee: No swelling, deformity, effusion, erythema, ecchymosis, lacerations or crepitus. Decreased range of motion. Normal pulse.      Comments: Left knee pain affecting ambulation secondary to pain   Skin:     General: Skin is warm and dry.      Coloration: Skin is not cyanotic.   Neurological:      Mental Status: He is alert and oriented to person, place, and time. Mental status is at baseline.   Psychiatric:         Attention and Perception: Attention and perception normal.         Mood and Affect: Mood normal.                    Procedures:  Procedures      Medical Decision Making:      Comorbidities that affect care:    None    External Notes reviewed:    None      The following orders were placed and all results were independently analyzed by me:  Orders Placed This Encounter   Procedures    [facility] 11.  Crutches, 04.  Hinged Knee Brace    XR Knee 3 View Left    Obtain & Apply The Following- Lower extremity; Hinged knee brace       Medications Given in the Emergency Department:  Medications - No data to display     ED Course:    The patient was initially evaluated in the triage area where orders were placed. The patient was later dispositioned by FLAVIO Avila.      The patient was advised to stay for completion of workup which includes but is not limited to communication of labs and radiological results, reassessment and plan. The patient was advised that leaving prior to disposition by a provider could result in critical findings that are not communicated to the patient.     ED Course as of 08/13/23 0156   Sat Aug 12, 2023   7061 Provider In Triage: Patient was evaluated by me in triage, Cristo Rubin PA-C. Orders were placed and the patient is currently awaiting final results and disposition.   [SK]      ED Course User Index  [SK] Cristo Rubin PA-C       Labs:    Lab Results (last  24 hours)       ** No results found for the last 24 hours. **             Imaging:    XR Knee 3 View Left    Result Date: 8/12/2023  PROCEDURE: XR KNEE 3 VW LEFT  COMPARISON: [facility], CR, XR KNEE 3 VW LEFT, 7/09/2022, 16:22.  INDICATIONS: pain FALL  FINDINGS:  BONES: Normal.  No significant arthropathy or acute abnormality.  SOFT TISSUES: Negative.  No visible soft tissue swelling.  EFFUSION: None visible.  OTHER: Negative.        No acute fracture or traumatic malalignment identified.      MAGDALENA MEDRANO MD       Electronically Signed and Approved By: MAGDALENA MEDRANO MD on 8/12/2023 at 21:36                Differential Diagnosis and Discussion:      Extremity Pain: Differential diagnosis includes but is not limited to soft tissue sprain, tendonitis, tendon injury, dislocation, fracture, deep vein thrombosis, arterial insufficiency, osteoarthritis, bursitis, and ligamentous damage.    All X-rays impressions were independently interpreted by me.    MDM  Number of Diagnoses or Management Options  Acute pain of left knee  Diagnosis management comments: Pt spoke to charge nurse, told her he was not happy he was getting an anti-inflammatory for pain. During exam and discussion with pt of treatment he was told an injection of Toradol would be ordered and he didn't complain or question this order at the time. He did not seem upset at all about proposed plan. He has established care with Dr. Hameed, ortho provider, and it was recommended he follow up with him for further evaluation and possible further imaging to assess knee.        Amount and/or Complexity of Data Reviewed  Tests in the radiology section of [facility]: reviewed             Patient Care Considerations:          Consultants/Shared Management Plan:    None    Social Determinants of Health:    Patient is independent, reliable, and has access to care.       Disposition and Care Coordination:    Discharged: The patient is suitable and stable for  discharge with no need for consideration of observation or admission.    I have explained the patient's condition, diagnoses and treatment plan based on the information available to me at this time. I have answered questions and addressed any concerns. The patient has a good  understanding of the patient's diagnosis, condition, and treatment plan as can be expected at this point. The vital signs have been stable. The patient's condition is stable and appropriate for discharge from the emergency department.      The patient will pursue further outpatient evaluation with the primary care physician or other designated or consulting physician as outlined in the discharge instructions. They are agreeable to this plan of care and follow-up instructions have been explained in detail. The patient has received these instructions in written format and have expressed an understanding of the discharge instructions. The patient is aware that any significant change in condition or worsening of symptoms should prompt an immediate return to this or the closest emergency department or call to 911.    Final diagnoses:   Acute pain of left knee        ED Disposition       ED Disposition   Discharge    Condition   Stable    Comment   --               This medical record created using voice recognition software.             Darrick Mata, APRN  08/13/23 0156

## 2023-08-14 ENCOUNTER — TELEPHONE (OUTPATIENT)
Dept: FAMILY MEDICINE CLINIC | Facility: CLINIC | Age: 58
End: 2023-08-14
Payer: COMMERCIAL

## 2023-08-14 ENCOUNTER — TELEPHONE (OUTPATIENT)
Dept: ORTHOPEDIC SURGERY | Facility: CLINIC | Age: 58
End: 2023-08-14
Payer: COMMERCIAL

## 2023-08-14 NOTE — TELEPHONE ENCOUNTER
Provider: ORTEGA  Caller: NIR MÉNDEZ  Relationship to Patient: PATIENT      Phone Number: [phone]    Reason for Call: PATIENT IS HAVING INTENSE KNEE PAIN AFTER STEPPING FROM THE CURB TO THE ROAD - NO MRI BUT FEELS AS THOUGH THERE IS ANOTHER TEAR    When was the patient last seen: 8.22.22  When did it start: 8.11.23    Where is it located: LT KNEE  Characteristics of symptom/severity: 10/10  Timing- Is it constant or intermittent: COMES AND GOES  What makes it worse: MOVING  What makes it better: SITTING STILL    HUB DOES NOT HAVE AVAILABILITY BEFORE 8.21.23

## 2023-08-14 NOTE — TELEPHONE ENCOUNTER
Patient tore his meniscus. Patient wanting in to see dr giles. Let him know he is out of office today and is booked for the week. Patient wanted message sent back to see if he could be worked in this week.

## 2023-08-15 NOTE — TELEPHONE ENCOUNTER
HUB TO READ AND SHARE    Unable to call out to patient. Sent message to patient regarding getting him set up with his provider. Wanting to know if patient can come in tomorrow 8/15/23 with dr giles.

## 2023-08-23 ENCOUNTER — OFFICE VISIT (OUTPATIENT)
Dept: ORTHOPEDIC SURGERY | Facility: CLINIC | Age: 58
End: 2023-08-23
Payer: COMMERCIAL

## 2023-08-23 VITALS
BODY MASS INDEX: 27.77 KG/M2 | SYSTOLIC BLOOD PRESSURE: 120 MMHG | DIASTOLIC BLOOD PRESSURE: 83 MMHG | HEART RATE: 127 BPM | OXYGEN SATURATION: 97 % | WEIGHT: 205 LBS | HEIGHT: 72 IN

## 2023-08-23 DIAGNOSIS — M79.674 GREAT TOE PAIN, RIGHT: ICD-10-CM

## 2023-08-23 DIAGNOSIS — S89.92XA INJURY OF LEFT KNEE, INITIAL ENCOUNTER: Primary | ICD-10-CM

## 2023-08-23 DIAGNOSIS — Z98.890 S/P LEFT KNEE ARTHROSCOPY: ICD-10-CM

## 2023-08-23 NOTE — PROGRESS NOTES
"Chief Complaint  Follow-up and Pain of the Left Knee    Subjective          Honorio Vigil Jr. presents to [facility] for   History of Present Illness    The patient presents here today for follow up evaluation of the left knee. He is S/P Left Knee Arthroscopy With Partial Medial Meniscectomy 8/9/22. He reports a new injury to the knee. He reports his knee feels unstable. He locates pain to the medial posterior knee. He is ambulating with crutches.       Allergies   Allergen Reactions    Naproxen GI Intolerance        Social History     Socioeconomic History    Marital status:    Tobacco Use    Smoking status: Every Day     Packs/day: 0.25     Types: Cigarettes    Smokeless tobacco: Never    Tobacco comments:     INSTRUCTED NO SMOKING 24 HR PRIOR TO SURGERY   Vaping Use    Vaping Use: Never used   Substance and Sexual Activity    Alcohol use: No    Drug use: Not Currently     Frequency: 1.0 times per week     Types: Marijuana, Methamphetamines     Comment: former- LAST USED 2 YEARS AGO    Sexual activity: Defer        I reviewed the patient's chief complaint, history of present illness, review of systems, past medical history, surgical history, family history, social history, medications, and allergy list.     REVIEW OF SYSTEMS    Constitutional: Denies fevers, chills, weight loss  Cardiovascular: Denies chest pain, shortness of breath  Skin: Denies rashes, acute skin changes  Neurologic: Denies headache, loss of consciousness  MSK: left knee pain      Objective   Vital Signs:   /83   Pulse (!) 127   Ht 182.9 cm (72\")   Wt 93 kg (205 lb)   SpO2 97%   BMI 27.80 kg/mý     Body mass index is 27.8 kg/mý.    Physical Exam    General: Alert. No acute distress.   Left knee- Extensor Mechanism  intact. Non-tender to quad a patella tendon. ROM 0-90 degrees. Pain with flexion. Medial joint line tenderness. No lateral joint line tenderness. Neurovascularly intact. " Positive EHL, FHL, GS and TA. Sensation intact to all 5 nerves of the foot. Positive pulses. Stable to varus/valgus stress. Stable to anterior/posterior drawer. Negative Lachman's. Pain with Yuridia's. Well healed scope scars.     Procedures    Imaging Results (Most Recent)       None                     Assessment and Plan        XR Knee 3 View Left    Result Date: 8/12/2023  Narrative: PROCEDURE: XR KNEE 3 VW LEFT  COMPARISON: [facility], CR, XR KNEE 3 VW LEFT, 7/09/2022, 16:22.  INDICATIONS: pain FALL  FINDINGS:  BONES: Normal.  No significant arthropathy or acute abnormality.  SOFT TISSUES: Negative.  No visible soft tissue swelling.  EFFUSION: None visible.  OTHER: Negative.       Impression:  No acute fracture or traumatic malalignment identified.      MAGDALENA MEDRANO MD       Electronically Signed and Approved By: MAGDALENA MEDRANO MD on 8/12/2023 at 21:36               Diagnoses and all orders for this visit:    1. Injury of left knee, initial encounter (Primary)    2. S/P Left Knee Arthroscopy With Partial Medial Meniscectomy         Discussed the treatment plan with the patient.  I reviewed the previous images with the patient. Order for MRI of the left knee given today to re-evaluate the mensicus. Prescription for Voltaren given today, I advised him to not take ibuprofen.  Knee brace given today      Educated on risk of smoking. Discussed options for smoking cessation. and Call or return if worsening symptoms.    Scribed for Carlos Hameed MD by Shanda Bay  08/23/2023   14:16 EDT         Follow Up       MRI results    Patient was given instructions and counseling regarding his condition or for health maintenance advice. Please see specific information pulled into the AVS if appropriate.       I have personally performed the services described in this document as scribed by the above individual and it is both accurate and complete.     Carlos Hameed MD  08/23/23  14:25 EDT

## 2023-11-10 ENCOUNTER — HOSPITAL ENCOUNTER (EMERGENCY)
Facility: HOSPITAL | Age: 58
Discharge: HOME | End: 2023-11-10
Attending: EMERGENCY MEDICINE
Payer: COMMERCIAL

## 2023-11-10 ENCOUNTER — APPOINTMENT (OUTPATIENT)
Dept: GENERAL RADIOLOGY | Facility: HOSPITAL | Age: 58
End: 2023-11-10
Payer: COMMERCIAL

## 2023-11-10 VITALS
OXYGEN SATURATION: 95 % | HEART RATE: 96 BPM | TEMPERATURE: 97.6 F | BODY MASS INDEX: 26.76 KG/M2 | SYSTOLIC BLOOD PRESSURE: 142 MMHG | HEIGHT: 72 IN | DIASTOLIC BLOOD PRESSURE: 90 MMHG | WEIGHT: 197.53 LBS | RESPIRATION RATE: 16 BRPM

## 2023-11-10 DIAGNOSIS — R07.9 CHEST PAIN, UNSPECIFIED TYPE: Primary | ICD-10-CM

## 2023-11-10 LAB
ALBUMIN SERPL-MCNC: 4.4 G/DL (ref 3.5–5.2)
ALBUMIN/GLOB SERPL: 1.2 G/DL
ALP SERPL-CCNC: 132 U/L (ref 39–117)
ALT SERPL W P-5'-P-CCNC: 11 U/L (ref 1–41)
ANION GAP SERPL CALCULATED.3IONS-SCNC: 7.9 MMOL/L (ref 5–15)
AST SERPL-CCNC: 21 U/L (ref 1–40)
BASOPHILS # BLD AUTO: 0.09 10*3/MM3 (ref 0–0.2)
BASOPHILS NFR BLD AUTO: 0.6 % (ref 0–1.5)
BILIRUB SERPL-MCNC: 0.4 MG/DL (ref 0–1.2)
BUN SERPL-MCNC: 13 MG/DL (ref 6–20)
BUN/CREAT SERPL: 14.8 (ref 7–25)
CALCIUM SPEC-SCNC: 10.1 MG/DL (ref 8.6–10.5)
CHLORIDE SERPL-SCNC: 102 MMOL/L (ref 98–107)
CO2 SERPL-SCNC: 27.1 MMOL/L (ref 22–29)
CREAT SERPL-MCNC: 0.88 MG/DL (ref 0.76–1.27)
DEPRECATED RDW RBC AUTO: 47.3 FL (ref 37–54)
EGFRCR SERPLBLD CKD-EPI 2021: 99.7 ML/MIN/1.73
EOSINOPHIL # BLD AUTO: 0.16 10*3/MM3 (ref 0–0.4)
EOSINOPHIL NFR BLD AUTO: 1 % (ref 0.3–6.2)
ERYTHROCYTE [DISTWIDTH] IN BLOOD BY AUTOMATED COUNT: 14.3 % (ref 12.3–15.4)
GEN 5 2HR TROPONIN T REFLEX: 7 NG/L
GLOBULIN UR ELPH-MCNC: 3.6 GM/DL
GLUCOSE SERPL-MCNC: 95 MG/DL (ref 65–99)
HCT VFR BLD AUTO: 46.7 % (ref 37.5–51)
HGB BLD-MCNC: 15.4 G/DL (ref 13–17.7)
HOLD SPECIMEN: NORMAL
HOLD SPECIMEN: NORMAL
IMM GRANULOCYTES # BLD AUTO: 0.09 10*3/MM3 (ref 0–0.05)
IMM GRANULOCYTES NFR BLD AUTO: 0.6 % (ref 0–0.5)
LIPASE SERPL-CCNC: 30 U/L (ref 13–60)
LYMPHOCYTES # BLD AUTO: 2.52 10*3/MM3 (ref 0.7–3.1)
LYMPHOCYTES NFR BLD AUTO: 15.9 % (ref 19.6–45.3)
MAGNESIUM SERPL-MCNC: 2.2 MG/DL (ref 1.6–2.6)
MCH RBC QN AUTO: 29.7 PG (ref 26.6–33)
MCHC RBC AUTO-ENTMCNC: 33 G/DL (ref 31.5–35.7)
MCV RBC AUTO: 90 FL (ref 79–97)
MONOCYTES # BLD AUTO: 0.74 10*3/MM3 (ref 0.1–0.9)
MONOCYTES NFR BLD AUTO: 4.7 % (ref 5–12)
NEUTROPHILS NFR BLD AUTO: 12.23 10*3/MM3 (ref 1.7–7)
NEUTROPHILS NFR BLD AUTO: 77.2 % (ref 42.7–76)
NRBC BLD AUTO-RTO: 0 /100 WBC (ref 0–0.2)
NT-PROBNP SERPL-MCNC: <36 PG/ML (ref 0–900)
PLATELET # BLD AUTO: 332 10*3/MM3 (ref 140–450)
PMV BLD AUTO: 10.8 FL (ref 6–12)
POTASSIUM SERPL-SCNC: 4.1 MMOL/L (ref 3.5–5.2)
PROT SERPL-MCNC: 8 G/DL (ref 6–8.5)
QT INTERVAL: 357 MS
QT INTERVAL: 378 MS
QTC INTERVAL: 441 MS
QTC INTERVAL: 443 MS
RBC # BLD AUTO: 5.19 10*6/MM3 (ref 4.14–5.8)
SODIUM SERPL-SCNC: 137 MMOL/L (ref 136–145)
TROPONIN T DELTA: -2 NG/L
TROPONIN T SERPL HS-MCNC: 9 NG/L
WBC NRBC COR # BLD: 15.83 10*3/MM3 (ref 3.4–10.8)
WHOLE BLOOD HOLD COAG: NORMAL
WHOLE BLOOD HOLD SPECIMEN: NORMAL

## 2023-11-10 PROCEDURE — 94799 UNLISTED PULMONARY SVC/PX: CPT

## 2023-11-10 PROCEDURE — 93005 ELECTROCARDIOGRAM TRACING: CPT | Performed by: EMERGENCY MEDICINE

## 2023-11-10 PROCEDURE — 83690 ASSAY OF LIPASE: CPT | Performed by: EMERGENCY MEDICINE

## 2023-11-10 PROCEDURE — 83735 ASSAY OF MAGNESIUM: CPT | Performed by: EMERGENCY MEDICINE

## 2023-11-10 PROCEDURE — 99284 EMERGENCY DEPT VISIT MOD MDM: CPT

## 2023-11-10 PROCEDURE — 85025 COMPLETE CBC W/AUTO DIFF WBC: CPT | Performed by: EMERGENCY MEDICINE

## 2023-11-10 PROCEDURE — 80053 COMPREHEN METABOLIC PANEL: CPT | Performed by: EMERGENCY MEDICINE

## 2023-11-10 PROCEDURE — 93010 ELECTROCARDIOGRAM REPORT: CPT | Performed by: INTERNAL MEDICINE

## 2023-11-10 PROCEDURE — 84484 ASSAY OF TROPONIN QUANT: CPT | Performed by: EMERGENCY MEDICINE

## 2023-11-10 PROCEDURE — 36415 COLL VENOUS BLD VENIPUNCTURE: CPT

## 2023-11-10 PROCEDURE — 83880 ASSAY OF NATRIURETIC PEPTIDE: CPT | Performed by: EMERGENCY MEDICINE

## 2023-11-10 PROCEDURE — 94640 AIRWAY INHALATION TREATMENT: CPT

## 2023-11-10 PROCEDURE — 71045 X-RAY EXAM CHEST 1 VIEW: CPT

## 2023-11-10 RX ORDER — IPRATROPIUM BROMIDE AND ALBUTEROL SULFATE 2.5; .5 MG/3ML; MG/3ML
3 SOLUTION RESPIRATORY (INHALATION) ONCE
Status: COMPLETED | OUTPATIENT
Start: 2023-11-10 | End: 2023-11-10

## 2023-11-10 RX ORDER — SODIUM CHLORIDE 0.9 % (FLUSH) 0.9 %
10 SYRINGE (ML) INJECTION AS NEEDED
Status: DISCONTINUED | OUTPATIENT
Start: 2023-11-10 | End: 2023-11-10 | Stop reason: HOSPADM

## 2023-11-10 RX ORDER — ASPIRIN 81 MG/1
324 TABLET, CHEWABLE ORAL ONCE
Status: COMPLETED | OUTPATIENT
Start: 2023-11-10 | End: 2023-11-10

## 2023-11-10 RX ADMIN — IPRATROPIUM BROMIDE AND ALBUTEROL SULFATE 3 ML: .5; 3 SOLUTION RESPIRATORY (INHALATION) at 06:44

## 2023-11-10 RX ADMIN — ASPIRIN 81 MG CHEWABLE TABLET 324 MG: 81 TABLET CHEWABLE at 05:01

## 2023-11-10 NOTE — ED PROVIDER NOTES
Time: 4:41 AM EST  Date of encounter:  11/10/2023  Independent Historian/Clinical History and Information was obtained by:   Patient    History is limited by: N/A    Chief Complaint: Chest pain      History of Present Illness:  Patient is a 58 y.o. year old male who presents to the emergency department for evaluation of chest pain and shortness of breath    Patient states that approximately 9 PM last night he began to feel tightness in his chest and some mild shortness of breath.  He has felt somewhat confused and off throughout the night at work.  He has had difficulty remembering the task at hand.  He has no radiation of the chest tightness.  He denies cough or fever.  He has no pedal edema or leg pain.    HPI    Patient Care Team  Primary Care Provider: Roni Gray MD    Past Medical History:     Allergies   Allergen Reactions    Naproxen GI Intolerance     Past Medical History:   Diagnosis Date    Abscess of foot     rt    ADHD     Anxiety disorder     Backache     Cellulitis of scrotum     Elevated cholesterol     Hyperlipidemia     Hypertension     Insomnia disorder     Seizures     x2- due to overuse/overdose of meds- years ago per pt. Last one was 15-20 years ago- not on meds    Substance abuse     years ago    Viral gastroenteritis      Past Surgical History:   Procedure Laterality Date    EYE SURGERY      HAND SURGERY Bilateral     INCISION AND DRAINAGE OF WOUND Right 2/14/2023    Procedure: INCISION AND DRAINAGE BONE, right great toe;  Surgeon: Cristo Christy DPM;  Location: [facility] OR;  Service: Podiatry;  Laterality: Right;    KNEE ARTHROSCOPY Right 05/21/2014    Arthroscopy of right knee, debride medial meniscus. x3    KNEE ARTHROSCOPY W/ MENISCECTOMY Left 08/09/2022    Procedure: KNEE ARTHROSCOPY WITH PARTIAL MEDIAL MENISCECTOMY;  Surgeon: Carlos Hameed MD;  Location: [facility];  Service: Orthopedics;  Laterality: Left;    SHOULDER ARTHROSCOPY Right      Family History   Problem  Relation Age of Onset    Thyroid disease Mother     Diabetes Mother     Cancer Mother         pancreatic,thyroid    Anxiety disorder Mother     Depression Mother     Heart disease Father     Cancer Father         colon    Diabetes Maternal Grandmother     Cancer Paternal Grandmother         lung    Hypertension Neg Hx        Home Medications:  Prior to Admission medications    Medication Sig Start Date End Date Taking? Authorizing Provider   amphetamine-dextroamphetamine (ADDERALL) 30 MG tablet Take 1.5 tablets by mouth Daily. TAKES 1 1/2 TAB 2/1/23   Charu Del Castillo MD   clindamycin (CLEOCIN) 300 MG capsule Take 1 capsule by mouth 3 (Three) Times a Day. 2/16/23   Cristo Christy DPM   clonazePAM (KlonoPIN) 1 MG tablet 1 qd 3/3/23   Charu Del Castillo MD   cloNIDine (CATAPRES) 0.1 MG tablet Take 1 tablet (0.1 mg) by mouth at bedtime 12/7/22   Charu Del Castillo MD   diclofenac (VOLTAREN) 50 MG EC tablet Take 1 tablet by mouth 2 (Two) Times a Day. 8/23/23   Carlos Hameed MD   gabapentin (NEURONTIN) 600 MG tablet Take 1 tablet by mouth 2 (Two) Times a Day. 7/12/22   Charu Del Castillo MD   HYDROcodone-acetaminophen (Norco) 5-325 MG per tablet Take 1 tablet by mouth Every 8 (Eight) Hours As Needed for Mild Pain. 3/1/23   Cristo Christy DPM   HYDROcodone-acetaminophen (Norco) 5-325 MG per tablet Take 1 tablet by mouth Every 8 (Eight) Hours As Needed for Moderate Pain. 2/28/23   Cristo Christy DPM   HYDROcodone-acetaminophen (NORCO) 7.5-325 MG per tablet Take 1 tablet by mouth Every 4 (Four) Hours As Needed for Moderate Pain. 5/25/23   Iglesia Weiner MD   hydrOXYzine pamoate (VISTARIL) 50 MG capsule Take 1 capsule by mouth 3 (Three) Times a Day As Needed. 11/4/22   Charu Del Castillo MD   ibuprofen (ADVIL,MOTRIN) 600 MG tablet Take 1 tablet by mouth Every 6 (Six) Hours As Needed for Moderate Pain. 1/17/23   Gemma Michaels APRN   ketorolac (TORADOL) 10 MG tablet Take 1 tablet by  "mouth Every 6 (Six) Hours As Needed for Moderate Pain. 8/12/23   Darrick Mata APRN   promethazine (PHENERGAN) 12.5 MG tablet Take 1 tablet by mouth Every 6 (Six) Hours As Needed for Nausea or Vomiting (before pain medication). 2/14/23   Cristo Christy DPM        Social History:   Social History     Tobacco Use    Smoking status: Every Day     Packs/day: .5     Types: Cigarettes    Smokeless tobacco: Never    Tobacco comments:     INSTRUCTED NO SMOKING 24 HR PRIOR TO SURGERY   Vaping Use    Vaping Use: Never used   Substance Use Topics    Alcohol use: No    Drug use: Not Currently     Frequency: 1.0 times per week     Types: Marijuana, Methamphetamines     Comment: former- LAST USED 2 YEARS AGO         Review of Systems:  Review of Systems   Constitutional:  Negative for chills and fever.   HENT:  Negative for congestion, ear pain and sore throat.    Eyes:  Negative for pain.   Respiratory:  Positive for chest tightness and shortness of breath. Negative for cough.    Cardiovascular:  Negative for chest pain.   Gastrointestinal:  Negative for abdominal pain, diarrhea, nausea and vomiting.   Genitourinary:  Negative for flank pain and hematuria.   Musculoskeletal:  Negative for joint swelling.   Skin:  Negative for pallor.   Neurological:  Negative for seizures and headaches.   Psychiatric/Behavioral:  Positive for confusion.    All other systems reviewed and are negative.       Physical Exam:  /90   Pulse 96   Temp 97.6 °F (36.4 °C) (Oral)   Resp 16   Ht 182.9 cm (72\")   Wt 89.6 kg (197 lb 8.5 oz)   SpO2 95%   BMI 26.79 kg/m²     Physical Exam  Vitals and nursing note reviewed.   Constitutional:       General: He is not in acute distress.     Appearance: Normal appearance. He is not toxic-appearing.   HENT:      Head: Normocephalic and atraumatic.      Jaw: There is normal jaw occlusion.   Eyes:      General: Lids are normal.      Extraocular Movements: Extraocular movements intact.      " Conjunctiva/sclera: Conjunctivae normal.      Pupils: Pupils are equal, round, and reactive to light.   Cardiovascular:      Rate and Rhythm: Normal rate and regular rhythm.      Pulses: Normal pulses.      Heart sounds: Normal heart sounds.   Pulmonary:      Effort: Pulmonary effort is normal. No respiratory distress.      Breath sounds: Normal breath sounds. No wheezing or rhonchi.   Abdominal:      General: Abdomen is flat.      Palpations: Abdomen is soft.      Tenderness: There is no abdominal tenderness. There is no guarding or rebound.   Musculoskeletal:         General: Normal range of motion.      Cervical back: Normal range of motion and neck supple.      Right lower leg: No edema.      Left lower leg: No edema.   Skin:     General: Skin is warm and dry.   Neurological:      Mental Status: He is alert and oriented to person, place, and time. Mental status is at baseline.   Psychiatric:         Mood and Affect: Mood normal.                Procedures:  Procedures      Medical Decision Making:      Comorbidities that affect care:    Anxiety, ADHD, foot abscess, hypercholesterolemia, hypertension    External Notes reviewed:    Previous Clinic Note: Outpatient orthopedic visit for left knee pain August 23, 2023      The following orders were placed and all results were independently analyzed by me:  Orders Placed This Encounter   Procedures    XR Chest 1 View    [place] Draw    High Sensitivity Troponin T    Comprehensive Metabolic Panel    Lipase    BNP    Magnesium    CBC Auto Differential    High Sensitivity Troponin T 2Hr    NPO Diet NPO Type: Strict NPO    Undress & Gown    Continuous Pulse Oximetry    Oxygen Therapy- Nasal Cannula; Titrate 1-6 LPM Per SpO2; 90 - 95%    ECG 12 Lead ED Triage Standing Order; Chest Pain    ECG 12 Lead ED Triage Standing Order; Chest Pain    Insert Peripheral IV    CBC & Differential    Green Top (Gel)    Lavender Top    Gold Top - SST    Light Blue Top       Medications  Given in the Emergency Department:  Medications   sodium chloride 0.9 % flush 10 mL (has no administration in time range)   aspirin chewable tablet 324 mg (324 mg Oral Given 11/10/23 0501)   ipratropium-albuterol (DUO-NEB) nebulizer solution 3 mL (3 mL Nebulization Given 11/10/23 0644)        ED Course:    ED Course as of 11/10/23 0757   Fri Nov 10, 2023   0515 My interpretation of EKG: Sinus rhythm 92, no ST segment elevation, questionable peaked T waves in lateral leads [JS]      ED Course User Index  [JS] Iglesia Weiner MD       Labs:    Lab Results (last 24 hours)       Procedure Component Value Units Date/Time    High Sensitivity Troponin T [[id number]]  (Normal) Collected: 11/10/23 0503    Specimen: Blood Updated: 11/10/23 0544     HS Troponin T 9 ng/L     Narrative:      High Sensitive Troponin T Reference Range:  <14.0 ng/L- Negative Female for AMI  <22.0 ng/L- Negative Male for AMI  >=14 - Abnormal Female indicating possible myocardial injury.  >=22 - Abnormal Male indicating possible myocardial injury.   Clinicians would have to utilize clinical acumen, EKG, Troponin, and serial changes to determine if it is an Acute Myocardial Infarction or myocardial injury due to an underlying chronic condition.         CBC & Differential [[id number]]  (Abnormal) Collected: 11/10/23 0503    Specimen: Blood Updated: 11/10/23 0513    Narrative:      The following orders were created for panel order CBC & Differential.  Procedure                               Abnormality         Status                     ---------                               -----------         ------                     CBC Auto Differential[[id number]]        Abnormal            Final result                 Please view results for these tests on the individual orders.    Comprehensive Metabolic Panel [[id number]]  (Abnormal) Collected: 11/10/23 0503    Specimen: Blood Updated: 11/10/23 0544     Glucose 95 mg/dL      BUN 13 mg/dL      Creatinine 0.88 mg/dL       Sodium 137 mmol/L      Potassium 4.1 mmol/L      Chloride 102 mmol/L      CO2 27.1 mmol/L      Calcium 10.1 mg/dL      Total Protein 8.0 g/dL      Albumin 4.4 g/dL      ALT (SGPT) 11 U/L      AST (SGOT) 21 U/L      Alkaline Phosphatase 132 U/L      Total Bilirubin 0.4 mg/dL      Globulin 3.6 gm/dL      A/G Ratio 1.2 g/dL      BUN/Creatinine Ratio 14.8     Anion Gap 7.9 mmol/L      eGFR 99.7 mL/min/1.73     Narrative:      GFR Normal >60  Chronic Kidney Disease <60  Kidney Failure <15      Lipase [[id number]]  (Normal) Collected: 11/10/23 0503    Specimen: Blood Updated: 11/10/23 0544     Lipase 30 U/L     BNP [[id number]]  (Normal) Collected: 11/10/23 0503    Specimen: Blood Updated: 11/10/23 0528     proBNP <36.0 pg/mL     Narrative:      This assay is used as an aid in the diagnosis of individuals suspected of having heart failure. It can be used as an aid in the diagnosis of acute decompensated heart failure (ADHF) in patients presenting with signs and symptoms of ADHF to the emergency department (ED). In addition, NT-proBNP of <300 pg/mL indicates ADHF is not likely.    Age Range Result Interpretation  NT-proBNP Concentration (pg/mL:      <50             Positive            >450                   Gray                 300-450                    Negative             <300    50-75           Positive            >900                  Gray                300-900                  Negative            <300      >75             Positive            >1800                  Gray                300-1800                  Negative            <300    Magnesium [[id number]]  (Normal) Collected: 11/10/23 0503    Specimen: Blood Updated: 11/10/23 0544     Magnesium 2.2 mg/dL     CBC Auto Differential [[id number]]  (Abnormal) Collected: 11/10/23 0503    Specimen: Blood Updated: 11/10/23 0513     WBC 15.83 10*3/mm3      RBC 5.19 10*6/mm3      Hemoglobin 15.4 g/dL      Hematocrit 46.7 %      MCV 90.0 fL      MCH 29.7 pg      MCHC 33.0  g/dL      RDW 14.3 %      RDW-SD 47.3 fl      MPV 10.8 fL      Platelets 332 10*3/mm3      Neutrophil % 77.2 %      Lymphocyte % 15.9 %      Monocyte % 4.7 %      Eosinophil % 1.0 %      Basophil % 0.6 %      Immature Grans % 0.6 %      Neutrophils, Absolute 12.23 10*3/mm3      Lymphocytes, Absolute 2.52 10*3/mm3      Monocytes, Absolute 0.74 10*3/mm3      Eosinophils, Absolute 0.16 10*3/mm3      Basophils, Absolute 0.09 10*3/mm3      Immature Grans, Absolute 0.09 10*3/mm3      nRBC 0.0 /100 WBC     High Sensitivity Troponin T 2Hr [[id number]]  (Normal) Collected: 11/10/23 0653    Specimen: Blood Updated: 11/10/23 0719     HS Troponin T 7 ng/L      Troponin T Delta -2 ng/L     Narrative:      High Sensitive Troponin T Reference Range:  <14.0 ng/L- Negative Female for AMI  <22.0 ng/L- Negative Male for AMI  >=14 - Abnormal Female indicating possible myocardial injury.  >=22 - Abnormal Male indicating possible myocardial injury.   Clinicians would have to utilize clinical acumen, EKG, Troponin, and serial changes to determine if it is an Acute Myocardial Infarction or myocardial injury due to an underlying chronic condition.                  Imaging:    XR Chest 1 View    Result Date: 11/10/2023  PROCEDURE: XR CHEST 1 VW  COMPARISON: None.  That is, none of the prior relevant comparison studies is able to be retrieved from the Imaging Archive during the time of this interpretation for correlation.  INDICATIONS: CHEST PAIN.  FINDINGS: A single frontal (AP or PA upright portable) chest radiograph reveals no cardiac enlargement and no acute infiltrate. No pneumothorax is seen.  There is pulmonary hypoinflation.  External artifacts obscure detail.  Chronic calcified granulomatous disease involves the chest.  Degenerative changes involve the bilateral shoulders and the imaged spine.  The thoracic aorta is atherosclerotic.       No acute cardiopulmonary disease is seen radiographically.     Please note that portions of this  note were completed with a voice recognition program.  CL SNYDER JR, MD       Electronically Signed and Approved By: CL SNYDER JR, MD on 11/10/2023 at 5:23                 Differential Diagnosis and Discussion:    Chest Pain:  Based on the patient's signs and symptoms, I considered aortic dissection, myocardial infaction, pulmonary embolism, cardiac tamponade, pericarditis, pneumothorax, musculoskeletal chest pain and other differential diagnosis as an etiology of the patient's chest pain.     All labs were reviewed and interpreted by me.  All X-rays impressions were independently interpreted by me.  EKG was interpreted by me.    MDM           Patient Care Considerations:    NARCOTICS: I considered prescribing opiate pain medication as an outpatient, however no pain control required in the ED      Consultants/Shared Management Plan:    None    Social Determinants of Health:    Patient is independent, reliable, and has access to care.       Disposition and Care Coordination:    Discharged: The patient is suitable and stable for discharge with no need for consideration of observation or admission.    I have explained the patient´s condition, diagnoses and treatment plan based on the information available to me at this time. I have answered questions and addressed any concerns. The patient has a good  understanding of the patient´s diagnosis, condition, and treatment plan as can be expected at this point. The vital signs have been stable. The patient´s condition is stable and appropriate for discharge from the emergency department.      The patient will pursue further outpatient evaluation with the primary care physician or other designated or consulting physician as outlined in the discharge instructions. They are agreeable to this plan of care and follow-up instructions have been explained in detail. The patient has received these instructions in written format and have expressed an understanding of the  discharge instructions. The patient is aware that any significant change in condition or worsening of symptoms should prompt an immediate return to this or the closest emergency department or call to 911.    Final diagnoses:   Chest pain, unspecified type        ED Disposition       ED Disposition   Discharge    Condition   Stable    Comment   --               This medical record created using voice recognition software.             Iglesia Weiner MD  11/10/23 0756

## 2023-12-08 ENCOUNTER — HOSPITAL ENCOUNTER (EMERGENCY)
Facility: HOSPITAL | Age: 58
Discharge: PSYCHIATRIC OR SUBSTANCE USE FACILITY | DRG: 885 | End: 2023-12-09
Attending: EMERGENCY MEDICINE
Payer: COMMERCIAL

## 2023-12-08 VITALS
BODY MASS INDEX: 27.14 KG/M2 | OXYGEN SATURATION: 100 % | DIASTOLIC BLOOD PRESSURE: 88 MMHG | TEMPERATURE: 98.6 F | SYSTOLIC BLOOD PRESSURE: 146 MMHG | HEIGHT: 72 IN | HEART RATE: 91 BPM | WEIGHT: 200.4 LBS | RESPIRATION RATE: 16 BRPM

## 2023-12-08 DIAGNOSIS — F33.2 SEVERE EPISODE OF RECURRENT MAJOR DEPRESSIVE DISORDER, WITHOUT PSYCHOTIC FEATURES: ICD-10-CM

## 2023-12-08 DIAGNOSIS — R45.851 SUICIDAL IDEATION: Primary | ICD-10-CM

## 2023-12-08 LAB
ALBUMIN SERPL-MCNC: 4.3 G/DL (ref 3.5–5.2)
ALBUMIN/GLOB SERPL: 1.3 G/DL
ALP SERPL-CCNC: 128 U/L (ref 39–117)
ALT SERPL W P-5'-P-CCNC: 11 U/L (ref 1–41)
ANION GAP SERPL CALCULATED.3IONS-SCNC: 11.1 MMOL/L (ref 5–15)
APAP SERPL-MCNC: <5 MCG/ML (ref 0–30)
AST SERPL-CCNC: 17 U/L (ref 1–40)
BASOPHILS # BLD AUTO: 0.08 10*3/MM3 (ref 0–0.2)
BASOPHILS NFR BLD AUTO: 0.6 % (ref 0–1.5)
BILIRUB SERPL-MCNC: 0.3 MG/DL (ref 0–1.2)
BUN SERPL-MCNC: 8 MG/DL (ref 6–20)
BUN/CREAT SERPL: 8.3 (ref 7–25)
CALCIUM SPEC-SCNC: 10 MG/DL (ref 8.6–10.5)
CHLORIDE SERPL-SCNC: 100 MMOL/L (ref 98–107)
CO2 SERPL-SCNC: 26.9 MMOL/L (ref 22–29)
CREAT SERPL-MCNC: 0.96 MG/DL (ref 0.76–1.27)
DEPRECATED RDW RBC AUTO: 48.1 FL (ref 37–54)
EGFRCR SERPLBLD CKD-EPI 2021: 91.6 ML/MIN/1.73
EOSINOPHIL # BLD AUTO: 0.37 10*3/MM3 (ref 0–0.4)
EOSINOPHIL NFR BLD AUTO: 2.8 % (ref 0.3–6.2)
ERYTHROCYTE [DISTWIDTH] IN BLOOD BY AUTOMATED COUNT: 14.3 % (ref 12.3–15.4)
ETHANOL BLD-MCNC: <10 MG/DL (ref 0–10)
ETHANOL UR QL: <0.01 %
GLOBULIN UR ELPH-MCNC: 3.3 GM/DL
GLUCOSE SERPL-MCNC: 75 MG/DL (ref 65–99)
HCT VFR BLD AUTO: 47.5 % (ref 37.5–51)
HGB BLD-MCNC: 15.6 G/DL (ref 13–17.7)
HOLD SPECIMEN: NORMAL
HOLD SPECIMEN: NORMAL
IMM GRANULOCYTES # BLD AUTO: 0.05 10*3/MM3 (ref 0–0.05)
IMM GRANULOCYTES NFR BLD AUTO: 0.4 % (ref 0–0.5)
LYMPHOCYTES # BLD AUTO: 3.63 10*3/MM3 (ref 0.7–3.1)
LYMPHOCYTES NFR BLD AUTO: 27.8 % (ref 19.6–45.3)
MCH RBC QN AUTO: 30 PG (ref 26.6–33)
MCHC RBC AUTO-ENTMCNC: 32.8 G/DL (ref 31.5–35.7)
MCV RBC AUTO: 91.3 FL (ref 79–97)
MONOCYTES # BLD AUTO: 0.79 10*3/MM3 (ref 0.1–0.9)
MONOCYTES NFR BLD AUTO: 6 % (ref 5–12)
NEUTROPHILS NFR BLD AUTO: 62.4 % (ref 42.7–76)
NEUTROPHILS NFR BLD AUTO: 8.16 10*3/MM3 (ref 1.7–7)
NRBC BLD AUTO-RTO: 0 /100 WBC (ref 0–0.2)
PLATELET # BLD AUTO: 352 10*3/MM3 (ref 140–450)
PMV BLD AUTO: 11 FL (ref 6–12)
POTASSIUM SERPL-SCNC: 3.3 MMOL/L (ref 3.5–5.2)
PROT SERPL-MCNC: 7.6 G/DL (ref 6–8.5)
RBC # BLD AUTO: 5.2 10*6/MM3 (ref 4.14–5.8)
SALICYLATES SERPL-MCNC: <0.3 MG/DL
SODIUM SERPL-SCNC: 138 MMOL/L (ref 136–145)
T4 FREE SERPL-MCNC: 1.32 NG/DL (ref 0.93–1.7)
TSH SERPL DL<=0.05 MIU/L-ACNC: 0.42 UIU/ML (ref 0.27–4.2)
WBC NRBC COR # BLD AUTO: 13.08 10*3/MM3 (ref 3.4–10.8)
WHOLE BLOOD HOLD COAG: NORMAL
WHOLE BLOOD HOLD SPECIMEN: NORMAL

## 2023-12-08 PROCEDURE — 84443 ASSAY THYROID STIM HORMONE: CPT | Performed by: NURSE PRACTITIONER

## 2023-12-08 PROCEDURE — 36415 COLL VENOUS BLD VENIPUNCTURE: CPT

## 2023-12-08 PROCEDURE — 80179 DRUG ASSAY SALICYLATE: CPT | Performed by: EMERGENCY MEDICINE

## 2023-12-08 PROCEDURE — 85025 COMPLETE CBC W/AUTO DIFF WBC: CPT | Performed by: EMERGENCY MEDICINE

## 2023-12-08 PROCEDURE — 82077 ASSAY SPEC XCP UR&BREATH IA: CPT | Performed by: EMERGENCY MEDICINE

## 2023-12-08 PROCEDURE — 80143 DRUG ASSAY ACETAMINOPHEN: CPT | Performed by: EMERGENCY MEDICINE

## 2023-12-08 PROCEDURE — 84439 ASSAY OF FREE THYROXINE: CPT | Performed by: NURSE PRACTITIONER

## 2023-12-08 PROCEDURE — 80053 COMPREHEN METABOLIC PANEL: CPT | Performed by: EMERGENCY MEDICINE

## 2023-12-08 PROCEDURE — 99284 EMERGENCY DEPT VISIT MOD MDM: CPT

## 2023-12-08 RX ORDER — SODIUM CHLORIDE 0.9 % (FLUSH) 0.9 %
10 SYRINGE (ML) INJECTION AS NEEDED
Status: DISCONTINUED | OUTPATIENT
Start: 2023-12-08 | End: 2023-12-09 | Stop reason: HOSPADM

## 2023-12-09 ENCOUNTER — HOSPITAL ENCOUNTER (INPATIENT)
Facility: HOSPITAL | Age: 58
LOS: 5 days | Discharge: PSYCHIATRIC OR SUBSTANCE USE FACILITY | DRG: 885 | End: 2023-12-14
Attending: PSYCHIATRY & NEUROLOGY | Admitting: PSYCHIATRY & NEUROLOGY
Payer: COMMERCIAL

## 2023-12-09 DIAGNOSIS — L03.116 CELLULITIS AND ABSCESS OF LEFT LEG: Primary | ICD-10-CM

## 2023-12-09 DIAGNOSIS — L02.416 CELLULITIS AND ABSCESS OF LEFT LEG: Primary | ICD-10-CM

## 2023-12-09 PROBLEM — F32.A DEPRESSION: Status: ACTIVE | Noted: 2023-12-09

## 2023-12-09 PROBLEM — D72.829 LEUKOCYTOSIS: Status: ACTIVE | Noted: 2023-12-09

## 2023-12-09 PROBLEM — E87.6 HYPOKALEMIA: Status: ACTIVE | Noted: 2023-12-09

## 2023-12-09 PROBLEM — F17.200 NICOTINE ADDICTION: Status: ACTIVE | Noted: 2023-12-09

## 2023-12-09 PROBLEM — L30.9 DERMATITIS: Status: ACTIVE | Noted: 2023-12-09

## 2023-12-09 PROBLEM — I10 HTN (HYPERTENSION): Status: ACTIVE | Noted: 2023-12-09

## 2023-12-09 PROBLEM — F33.2 SEVERE RECURRENT MAJOR DEPRESSION WITHOUT PSYCHOTIC FEATURES: Status: ACTIVE | Noted: 2023-12-09

## 2023-12-09 LAB
AMPHET+METHAMPHET UR QL: POSITIVE
BARBITURATES UR QL SCN: NEGATIVE
BENZODIAZ UR QL SCN: NEGATIVE
CANNABINOIDS SERPL QL: POSITIVE
COCAINE UR QL: NEGATIVE
FENTANYL UR-MCNC: NEGATIVE NG/ML
METHADONE UR QL SCN: NEGATIVE
OPIATES UR QL: NEGATIVE
OXYCODONE UR QL SCN: NEGATIVE

## 2023-12-09 PROCEDURE — 80307 DRUG TEST PRSMV CHEM ANLYZR: CPT | Performed by: EMERGENCY MEDICINE

## 2023-12-09 PROCEDURE — 99222 1ST HOSP IP/OBS MODERATE 55: CPT | Performed by: HOSPITALIST

## 2023-12-09 PROCEDURE — 87040 BLOOD CULTURE FOR BACTERIA: CPT | Performed by: HOSPITALIST

## 2023-12-09 RX ORDER — HALOPERIDOL 5 MG/1
5 TABLET ORAL EVERY 4 HOURS PRN
Status: DISCONTINUED | OUTPATIENT
Start: 2023-12-09 | End: 2023-12-14 | Stop reason: HOSPADM

## 2023-12-09 RX ORDER — AMLODIPINE BESYLATE 5 MG/1
5 TABLET ORAL
Status: DISCONTINUED | OUTPATIENT
Start: 2023-12-09 | End: 2023-12-10

## 2023-12-09 RX ORDER — POTASSIUM CHLORIDE 750 MG/1
40 CAPSULE, EXTENDED RELEASE ORAL ONCE
Status: COMPLETED | OUTPATIENT
Start: 2023-12-09 | End: 2023-12-09

## 2023-12-09 RX ORDER — DIPHENHYDRAMINE HCL 50 MG
50 CAPSULE ORAL EVERY 4 HOURS PRN
Status: DISCONTINUED | OUTPATIENT
Start: 2023-12-09 | End: 2023-12-14 | Stop reason: HOSPADM

## 2023-12-09 RX ORDER — TRAZODONE HYDROCHLORIDE 50 MG/1
100 TABLET ORAL NIGHTLY PRN
Status: DISCONTINUED | OUTPATIENT
Start: 2023-12-09 | End: 2023-12-14 | Stop reason: HOSPADM

## 2023-12-09 RX ORDER — HYDROXYZINE PAMOATE 50 MG/1
50 CAPSULE ORAL EVERY 6 HOURS PRN
Status: DISCONTINUED | OUTPATIENT
Start: 2023-12-09 | End: 2023-12-14 | Stop reason: HOSPADM

## 2023-12-09 RX ORDER — DIPHENHYDRAMINE HYDROCHLORIDE 50 MG/ML
50 INJECTION INTRAMUSCULAR; INTRAVENOUS EVERY 4 HOURS PRN
Status: DISCONTINUED | OUTPATIENT
Start: 2023-12-09 | End: 2023-12-14 | Stop reason: HOSPADM

## 2023-12-09 RX ORDER — LORAZEPAM 2 MG/ML
2 INJECTION INTRAMUSCULAR EVERY 4 HOURS PRN
Status: DISCONTINUED | OUTPATIENT
Start: 2023-12-09 | End: 2023-12-14 | Stop reason: HOSPADM

## 2023-12-09 RX ORDER — NICOTINE 21 MG/24HR
1 PATCH, TRANSDERMAL 24 HOURS TRANSDERMAL DAILY PRN
Status: DISCONTINUED | OUTPATIENT
Start: 2023-12-09 | End: 2023-12-14 | Stop reason: HOSPADM

## 2023-12-09 RX ORDER — LORAZEPAM 2 MG/1
2 TABLET ORAL EVERY 4 HOURS PRN
Status: DISCONTINUED | OUTPATIENT
Start: 2023-12-09 | End: 2023-12-14 | Stop reason: HOSPADM

## 2023-12-09 RX ORDER — LOPERAMIDE HYDROCHLORIDE 2 MG/1
2 CAPSULE ORAL
Status: DISCONTINUED | OUTPATIENT
Start: 2023-12-09 | End: 2023-12-14 | Stop reason: HOSPADM

## 2023-12-09 RX ORDER — HALOPERIDOL 5 MG/ML
5 INJECTION INTRAMUSCULAR EVERY 4 HOURS PRN
Status: DISCONTINUED | OUTPATIENT
Start: 2023-12-09 | End: 2023-12-14 | Stop reason: HOSPADM

## 2023-12-09 RX ORDER — ALUMINA, MAGNESIA, AND SIMETHICONE 2400; 2400; 240 MG/30ML; MG/30ML; MG/30ML
15 SUSPENSION ORAL EVERY 6 HOURS PRN
Status: DISCONTINUED | OUTPATIENT
Start: 2023-12-09 | End: 2023-12-14 | Stop reason: HOSPADM

## 2023-12-09 RX ORDER — DOXYCYCLINE 100 MG/1
100 CAPSULE ORAL 2 TIMES DAILY
Status: DISCONTINUED | OUTPATIENT
Start: 2023-12-09 | End: 2023-12-14 | Stop reason: HOSPADM

## 2023-12-09 RX ORDER — ACETAMINOPHEN 325 MG/1
650 TABLET ORAL EVERY 4 HOURS PRN
Status: DISCONTINUED | OUTPATIENT
Start: 2023-12-09 | End: 2023-12-14 | Stop reason: HOSPADM

## 2023-12-09 RX ADMIN — SERTRALINE HYDROCHLORIDE 50 MG: 50 TABLET ORAL at 13:03

## 2023-12-09 RX ADMIN — DOXYCYCLINE 100 MG: 100 CAPSULE ORAL at 20:24

## 2023-12-09 RX ADMIN — TRAZODONE HYDROCHLORIDE 100 MG: 50 TABLET ORAL at 20:30

## 2023-12-09 RX ADMIN — AMLODIPINE BESYLATE 5 MG: 5 TABLET ORAL at 13:03

## 2023-12-09 RX ADMIN — DOXYCYCLINE 100 MG: 100 CAPSULE ORAL at 13:03

## 2023-12-09 RX ADMIN — POTASSIUM CHLORIDE 40 MEQ: 10 CAPSULE, COATED, EXTENDED RELEASE ORAL at 13:03

## 2023-12-09 NOTE — PLAN OF CARE
Goal Outcome Evaluation:  Plan of Care Reviewed With: patient  Patient Agreement with Plan of Care: agrees   Patient alert and oriented and cooperative with staff. Patient compliant with medications. Patient denies s/I, h/I or hallucinations. Patient has been in bed majority of shift sleeping. No inappropriate or aggressive behavior noted. Will continue to monitor.

## 2023-12-09 NOTE — CONSULTS
[facility]   Hospitalist Consult Note  Date: 2023   Patient Name: Honorio Vgiil Jr.  : 1965  MRN: [id number]  Primary Care Physician:  Roni Gray MD  Referring Physician: Mynor Vela MD  Date of admission: 2023    Subjective we were consulted for poorly controlled hypertension and abscess  Subjective     Reason for Consult/ Chief Complaint:  we were consulted for poorly controlled hypertension and abscess    HPI: Patient is a 58-year-old male who presents to the emergency room with depression and suicidal ideation.  Patient is homeless.  He has relocated here several years ago from his hometown for drug rehab.  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  He recently just lost his job.    We were consulted because patient's blood pressure is around 140/102.  He is not on any blood pressure medications.  Additionally, patient has a wound like abscess in the left shin which is open to air.  He had 1 that was similar on the right outer thigh but did not want to show his nurse.    Review of Systems   All systems were reviewed and negative except for: Depression anxiety suicidal ideation and skin lesions.    Personal History     Past Medical History:  Past Medical History:   Diagnosis Date    Abscess of foot     rt    ADHD     Anxiety disorder     Backache     Cellulitis of scrotum     Elevated cholesterol     Hyperlipidemia     Hypertension     Insomnia disorder     Seizures     x2- due to overuse/overdose of meds- years ago per pt. Last one was 15-20 years ago- not on meds    Substance abuse     years ago    Viral gastroenteritis        Past Surgical History:  Past Surgical History:   Procedure Laterality Date    EYE SURGERY      HAND SURGERY Bilateral     INCISION AND DRAINAGE OF WOUND Right 2023    Procedure: INCISION AND DRAINAGE BONE, right great toe;  Surgeon: Cristo Christy DPM;  Location: [facility] OR;  Service: Podiatry;  Laterality: Right;     KNEE ARTHROSCOPY Right 05/21/2014    Arthroscopy of right knee, debride medial meniscus. x3    KNEE ARTHROSCOPY W/ MENISCECTOMY Left 08/09/2022    Procedure: KNEE ARTHROSCOPY WITH PARTIAL MEDIAL MENISCECTOMY;  Surgeon: Carlos Hameed MD;  [place] [facility] [place] [facility]  Service: Orthopedics;  Laterality: Left;    SHOULDER ARTHROSCOPY Right        Family History:   Family History   Problem Relation Age of Onset    Thyroid disease Mother     Diabetes Mother     Cancer Mother         pancreatic,thyroid    Anxiety disorder Mother     Depression Mother     Heart disease Father     Cancer Father         colon    Diabetes Maternal Grandmother     Cancer Paternal Grandmother         lung    Hypertension Neg Hx        Social History:   Social History     Socioeconomic History    Marital status:    Tobacco Use    Smoking status: Every Day     Packs/day: .5     Types: Cigarettes    Smokeless tobacco: Never   Vaping Use    Vaping Use: Never used   Substance and Sexual Activity    Alcohol use: No    Drug use: Yes     Frequency: 1.0 times per week     Types: Marijuana     Comment: last used today 12/8/2023    Sexual activity: Defer       Home Medications:  HYDROcodone-acetaminophen, amphetamine-dextroamphetamine, clindamycin, cloNIDine, clonazePAM, diclofenac, gabapentin, hydrOXYzine pamoate, ibuprofen, ketorolac, and promethazine    Allergies:  Allergies   Allergen Reactions    Naproxen GI Intolerance       Review of Systems   All systems were reviewed and negative except for: Depression anxiety, abscess    Objective    Objective     Vitals:   Temp:  [97.7 °F (36.5 °C)-98.6 °F (37 °C)] 97.7 °F (36.5 °C)  Heart Rate:  [83-91] 85  Resp:  [16] 16  BP: (134-146)/() 140/102    Physical Exam:   Constitutional: Awake, alert, no acute distress   Eyes: Pupils equal, sclerae anicteric, no conjunctival injection   HENT: NCAT, mucous membranes moist   Neck: Supple, no thyromegaly, no lymphadenopathy, trachea  midline   Respiratory: Clear to auscultation bilaterally, nonlabored respirations    Cardiovascular: RRR, no murmurs, rubs, or gallops, palpable pedal pulses bilaterally   Gastrointestinal: Positive bowel sounds, soft, nontender, nondistended   Musculoskeletal: No bilateral ankle edema, no clubbing or cyanosis to extremities   Psychiatric: Appropriate affect, cooperative   Neurologic: Oriented x 3, strength symmetric in all extremities, Cranial Nerves grossly intact to confrontation, speech clear   Skin: No rashes     Result Review    Result Review:  I have personally reviewed the results from the time of this admission to 12/9/2023 11:44 EST and agree with these findings:  [x]  Laboratory  []  Microbiology  []  Radiology  [x]  EKG/Telemetry   []  Cardiology/Vascular   []  Pathology  [x]  Old records  []  Other:    Assessment & Plan   Assessment / Plan   Assessment/Plan:  #1 Uncontrolled HTN  -start amlodipine    #2 Multiple wounds and abscess on left shin  -consult wound care nurse. If she feels it needs to be drained will consult surgery.    -start doxycycline on 12/9/2023.  -Check CBC tomorrow  -Blood cultures ordered.    #3 low potassium replete      DVT prophylaxis:  Mechanical DVT prophylaxis orders are present.    CODE STATUS:    Code Status (Patient has no pulse and is not breathing): CPR (Attempt to Resuscitate)  Medical Interventions (Patient has pulse or is breathing): Full Support      Electronically signed by Jillian Gray DO, 12/09/23, 11:44 AM EST.

## 2023-12-09 NOTE — H&P
" [facility]   PSYCHIATRIC  HISTORY AND PHYSICAL    Patient Name: Honorio Vigil Jr.  : 1965  MRN: [id number]  Primary Care Physician:  Roni Gray MD  Date of admission: 2023    Subjective   Subjective     Chief Complaint: \"Letting the past control me\"    HPI:     Honorio Vigil Jr. is a 58 y.o. male with a history of hypertension, hyperlipidemia, known to have dermatitis, cellulitis on the left leg, admitted on a voluntary basis for depression with suicidal ideations.    Asked the patient how the past is controlling him and he sits and thinks for a long time after a lengthy period of silence as the patient how he got to the hospital.  States he was brought in by a friend for \"ongoing things.\"  However, he is unable to state what his ongoing things are.  The patient does report feeling sad and down.  He has been  for 7 years.  Does not get to see his kids or grandkids.  Reports having family issues with siblings.  Reports he had a hard time finding a place to stay and he has been staying with different friends.  He was recently kicked out of a friend's house.  He also reports he recently lost a job.  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    He makes very minimal eye contact.  He is difficult to engage.  He is vague and rather evasive.  Takes a long time to formulate responses and when he does that he remain superficial.  He is very guarded.  The patient states that he does know what is wrong with him or cannot identify any specific problems.    He reports his sleep is broken and he never feels rested in the mornings.  Does acknowledge feeling depressed and states, \"evidently I am, or I would not be here.\"  He reports that his mind is all over the place and reports ruminations and racing thoughts.  He has low energy.  States he is always on the edge feeling like something bad is going to " happen to him.  He reports not being on any medications for 3 days.          Review of Systems:      CONSTITUTIONAL: Reports generally not feeling well  DERM: Arm itching, sore on the left leg  PSYCHIATRIC: As documented in HPI    Personal History     Past Medical History:   Diagnosis Date    Abscess of foot     rt    ADHD     Anxiety disorder     Backache     Cellulitis of scrotum     Elevated cholesterol     Hyperlipidemia     Hypertension     Insomnia disorder     Seizures     x2- due to overuse/overdose of meds- years ago per pt. Last one was 15-20 years ago- not on meds    Substance abuse     years ago    Viral gastroenteritis        Past Surgical History:   Procedure Laterality Date    EYE SURGERY      HAND SURGERY Bilateral     INCISION AND DRAINAGE OF WOUND Right 2/14/2023    Procedure: INCISION AND DRAINAGE BONE, right great toe;  Surgeon: Cristo Christy DPM;  Location: [facility] MAIN OR;  Service: Podiatry;  Laterality: Right;    KNEE ARTHROSCOPY Right 05/21/2014    Arthroscopy of right knee, debride medial meniscus. x3    KNEE ARTHROSCOPY W/ MENISCECTOMY Left 08/09/2022    Procedure: KNEE ARTHROSCOPY WITH PARTIAL MEDIAL MENISCECTOMY;  Surgeon: Carlos Hameed MD;  Location: [facility] OR [facility];  Service: Orthopedics;  Laterality: Left;    SHOULDER ARTHROSCOPY Right        Past Psychiatric History: Reports currently under the care of Sharivenice Layne and last saw her 1 month ago.  Cannot tell me diagnosis he is being treated for.  Does state he has anxiety    Psychiatric Hospitalizations: Previous hospitalizations, record of hospitalization in 2017 at [facility]    Suicide Attempts: Denies any history of attempts    Prior Treatment and Medications Tried: States he is currently on gabapentin and clonazepam, does not recall previous medications      Family History: family history includes Anxiety disorder in his mother; Cancer in his father, mother, and paternal grandmother; Depression in his mother;  Diabetes in his maternal grandmother and mother; Heart disease in his father; Thyroid disease in his mother. Otherwise pertinent FHx was reviewed and not pertinent to current issue.    Family Substance Abuse History:None known to patient      Family Suicide History:None known to patient      Social History:     Born and raised in [place].  Currently homeless.  States that he finished 5 years of probation in February 2023 but recently found out he has a warrant because he never paid a fine of $120 that is still outstanding.  Denies any other legal charges.    Has never been in the     Identifies a spiritual    Denies any history of abuse      Social History     Socioeconomic History    Marital status:     Number of children: 3    Highest education level: High school graduate   Tobacco Use    Smoking status: Every Day     Packs/day: .5     Types: Cigarettes    Smokeless tobacco: Never   Vaping Use    Vaping Use: Never used   Substance and Sexual Activity    Alcohol use: No    Drug use: Yes     Frequency: 1.0 times per week     Types: Marijuana     Comment: last used today 12/8/2023    Sexual activity: Defer       Substance Abuse History: reports that he has been smoking cigarettes. He has been smoking an average of .5 packs per day. He has never used smokeless tobacco. He reports current drug use. Frequency: 1.00 time per week. Drug: Marijuana. He reports that he does not drink alcohol.    Patient denies substance use with the exception of occasional marijuana.  Appears to possibly have some picking sores because of methamphetamine misuse, but he denies.  Review of Sharad shows that he has not had recent fills of stimulant and positive amphetamine could be due to Adderall prescription.    Toxicology is negative for benzodiazepines but he has been receiving regular prescriptions for clonazepam    Home Medications:   HYDROcodone-acetaminophen, amphetamine-dextroamphetamine, clindamycin,  "cloNIDine, clonazePAM, diclofenac, gabapentin, hydrOXYzine pamoate, ibuprofen, ketorolac, and promethazine      Allergies:  Allergies   Allergen Reactions    Naproxen GI Intolerance       Objective   Objective     Vitals:   Temp:  [97.7 °F (36.5 °C)-98.6 °F (37 °C)] 97.7 °F (36.5 °C)  Heart Rate:  [83-91] 85  Resp:  [16] 16  BP: (134-146)/() 140/102    Physical Exam:      CONSTITUTIONAL: Patient is well developed, awake and alert, disheveled, unkempt.  HEENT: Head and neck are normocephalic and atraumatic, bony defect.  Between eyebrows  LUNGS: Even unlabored respirations.  SKIN: Excoriation anterior left forearm, 5 to 6 cm eschar with linear erythema circumferentially around his calf  EXTREMITIES: No clubbing, cyanosis, edema.  MUSCULOSKELETAL: Symmetric body habitus. Spine straight. Strength intact,  NEUROLOGIC: Appropriate. No abnormal movements, good muscle tone.                              Cerebellar: station and gait steady.    Mental Status Exam:     Patient sleeping, easily arousable, participates in interview but is difficult to engage and evasive with guarded responses.  Limited eye contact.  Appears to be of average intelligence.  Is a limited somewhat unreliable historian.       Hygiene:   poor  Cooperation:  Guarded, evasive, difficult to engage  Eye Contact:  Downcast  Psychomotor Behavior:  Appropriate  Affect:  Restricted and Blunted  Mood: \"Tired\"  Speech:  Normal, Minimal, and decreased tone  Language: Appropriate, relevant  Thought Process:  Goal directed and guarded  Thought Content:  Normal  Suicidal:  None  Homicidal:  None  Hallucinations:  None  Delusion:  None  Memory:  Intact  Orientation:  Person, Place, Time, and Situation  Reliability:  poor  Insight:  Poor  Judgement:  Fair  Impulse Control:  Fair        Result Review    Result Review:  I have personally reviewed the results from the time of this admission to 12/9/2023 12:15 EST and agree with these findings:  [x]  Laboratory  [] "  Microbiology  []  Radiology  []  EKG/Telemetry   []  Cardiology/Vascular   []  Pathology  []  Old records  []  Other:  Most notable findings include: Toxicology positive for amphetamines but has an active prescription for Adderall, positive for marijuana, leukocytosis    Assessment & Plan   Assessment / Plan     Brief Patient Summary:  Honorio Vigil Jr. is a 58 y.o. male who been on voluntary basis for depression.  Patient also with untreated hypertension and possible dermatitis and cellulitis of leg.    Active Hospital Problems:  Active Hospital Problems    Diagnosis     Severe recurrent major depression without psychotic features     Cellulitis of left lower leg     HTN (hypertension)     Dermatitis     Nicotine addiction     Leukocytosis     Cannabis abuse        Plan:   Initiate sertraline for depression  Hospitalist consult for hypertension not treated, leukocytosis, as well as dermatological issues.  Attempted to discern underlying substance use versus positive tox screen for amphetamine  Admit for safety and stabilization and begin treatment for underlying mood disorder or psychosis with appropriate medications  Attempt to gain collateral information of possible  Work on safety plan  Provide supportive therapy  Patient to engage in all group and individual treatment modalities available including milieu therapy  Work on appropriate disposition follow-up  Estimated length of stay in hospital 4 to 5 days      DVT prophylaxis:  Mechanical DVT prophylaxis orders are present.    CODE STATUS:    Code Status (Patient has no pulse and is not breathing): CPR (Attempt to Resuscitate)  Medical Interventions (Patient has pulse or is breathing): Full Support      Admission Status:  I believe this patient meets inpatient status.      Part of this note may be an electronic transcription/translation of spoken language to printed text using the Dragon dictation system.        Electronically signed by Mynor Vela  MD, 12/09/23, 11:51 AM EST.

## 2023-12-09 NOTE — ED PROVIDER NOTES
"Time: 9:29 PM EST  Date of encounter:  12/8/2023  Independent Historian/Clinical History and Information was obtained by:   Patient    History is limited by:  Patient vague with history    Chief Complaint   Patient presents with    Suicidal    Depression         History of Present Illness:  The patient is a 58 y.o. year old male who presents to the emergency department for depression. Pt approached the registration desk stating that \" he does not want to live,\" and tells the triage provider that \" he cannot keep going on like this.\"  He is very tearful and states he does not feel normal.  He is requesting help with his feelings.  Patient states that he is relocated here several years ago from his hometown for drug rehab.  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  States that recently he had lost his job on Tuesday.  He states that the people he was staying with kicked him out of their house on Wednesday and kept his medications.  He states that he currently has 5 granddaughters but has not met 3 of them because he is estranged from his family.  He states that one of them has a birthday today and that it has been weighing very heavy on his mind.  He states that he does have previous hospitalizations back in [place] where he was from this been several a while back.  He states that he does see Dr. Gray here in town but is only seen him a couple times and states that they have not got into treating his depression yet.        Patient Care Team  Primary Care Provider: Roni Gray MD    Past Medical History:     Allergies   Allergen Reactions    Naproxen GI Intolerance     Past Medical History:   Diagnosis Date    Abscess of foot     rt    ADHD     Anxiety disorder     Backache     Cellulitis of scrotum     Elevated cholesterol     Hyperlipidemia     Hypertension     Insomnia disorder     Seizures     x2- due to overuse/overdose of meds- years ago per pt. Last one was 15-20 years " ago- not on meds    Substance abuse     years ago    Viral gastroenteritis      Past Surgical History:   Procedure Laterality Date    EYE SURGERY      HAND SURGERY Bilateral     INCISION AND DRAINAGE OF WOUND Right 2/14/2023    Procedure: INCISION AND DRAINAGE BONE, right great toe;  Surgeon: Cristo Christy DPM;  Location: [facility] MAIN OR;  Service: Podiatry;  Laterality: Right;    KNEE ARTHROSCOPY Right 05/21/2014    Arthroscopy of right knee, debride medial meniscus. x3    KNEE ARTHROSCOPY W/ MENISCECTOMY Left 08/09/2022    Procedure: KNEE ARTHROSCOPY WITH PARTIAL MEDIAL MENISCECTOMY;  Surgeon: Carlos Hameed MD;  Location: [facility] OR [facility];  Service: Orthopedics;  Laterality: Left;    SHOULDER ARTHROSCOPY Right      Family History   Problem Relation Age of Onset    Thyroid disease Mother     Diabetes Mother     Cancer Mother         pancreatic,thyroid    Anxiety disorder Mother     Depression Mother     Heart disease Father     Cancer Father         colon    Diabetes Maternal Grandmother     Cancer Paternal Grandmother         lung    Hypertension Neg Hx        Home Medications:  Prior to Admission medications    Medication Sig Start Date End Date Taking? Authorizing Provider   amphetamine-dextroamphetamine (ADDERALL) 30 MG tablet Take 1.5 tablets by mouth Daily. TAKES 1 1/2 TAB 2/1/23   Charu Del Castillo MD   clindamycin (CLEOCIN) 300 MG capsule Take 1 capsule by mouth 3 (Three) Times a Day. 2/16/23   Cristo Christy DPM   clonazePAM (KlonoPIN) 1 MG tablet 1 qd 3/3/23   Charu Del Castillo MD   cloNIDine (CATAPRES) 0.1 MG tablet Take 1 tablet (0.1 mg) by mouth at bedtime 12/7/22   Charu Del Csatillo MD   diclofenac (VOLTAREN) 50 MG EC tablet Take 1 tablet by mouth 2 (Two) Times a Day. 8/23/23   Carlos Hameed MD   gabapentin (NEURONTIN) 600 MG tablet Take 1 tablet by mouth 2 (Two) Times a Day. 7/12/22   Charu Del Castillo MD   HYDROcodone-acetaminophen (Norco) 5-325 MG per tablet Take 1 tablet  by mouth Every 8 (Eight) Hours As Needed for Mild Pain. 3/1/23   Cristo Christy DPM   HYDROcodone-acetaminophen (Norco) 5-325 MG per tablet Take 1 tablet by mouth Every 8 (Eight) Hours As Needed for Moderate Pain. 2/28/23   Cristo Christy DPM   HYDROcodone-acetaminophen (NORCO) 7.5-325 MG per tablet Take 1 tablet by mouth Every 4 (Four) Hours As Needed for Moderate Pain. 5/25/23   Iglesia Weiner MD   hydrOXYzine pamoate (VISTARIL) 50 MG capsule Take 1 capsule by mouth 3 (Three) Times a Day As Needed. 11/4/22   Charu Del Castillo MD   ibuprofen (ADVIL,MOTRIN) 600 MG tablet Take 1 tablet by mouth Every 6 (Six) Hours As Needed for Moderate Pain. 1/17/23   Gemma Michaels APRN   ketorolac (TORADOL) 10 MG tablet Take 1 tablet by mouth Every 6 (Six) Hours As Needed for Moderate Pain. 8/12/23   Darrick Mata APRN   promethazine (PHENERGAN) 12.5 MG tablet Take 1 tablet by mouth Every 6 (Six) Hours As Needed for Nausea or Vomiting (before pain medication). 2/14/23   Cristo Christy DPM        Social History:   Social History     Tobacco Use    Smoking status: Every Day     Packs/day: .5     Types: Cigarettes    Smokeless tobacco: Never   Vaping Use    Vaping Use: Never used   Substance Use Topics    Alcohol use: No    Drug use: Yes     Frequency: 1.0 times per week     Types: Marijuana     Comment: last used today 12/8/2023         Review of Systems:  Review of Systems   Constitutional:  Negative for chills and fever.   HENT:  Negative for congestion, ear pain and sore throat.    Eyes:  Negative for pain.   Respiratory:  Negative for cough, chest tightness and shortness of breath.    Cardiovascular:  Negative for chest pain.   Gastrointestinal:  Negative for abdominal pain, diarrhea, nausea and vomiting.   Genitourinary:  Negative for flank pain and hematuria.   Musculoskeletal:  Negative for joint swelling.   Skin:  Negative for pallor.   Neurological:  Negative for seizures and headaches.  "  Psychiatric/Behavioral:  Positive for dysphoric mood and suicidal ideas. The patient is nervous/anxious.    All other systems reviewed and are negative.       Physical Exam:  /88 (BP Location: Left arm, Patient Position: Lying)   Pulse 91   Temp 98.6 °F (37 °C) (Oral)   Resp 16   Ht 182.9 cm (72\")   Wt 90.9 kg (200 lb 6.4 oz)   SpO2 100%   BMI 27.18 kg/m²         Physical Exam  Vitals and nursing note reviewed.   Constitutional:       General: He is not in acute distress.     Appearance: Normal appearance. He is not ill-appearing or toxic-appearing.   HENT:      Head: Normocephalic and atraumatic.   Eyes:      General: No scleral icterus.     Conjunctiva/sclera: Conjunctivae normal.      Pupils: Pupils are equal, round, and reactive to light.   Cardiovascular:      Rate and Rhythm: Normal rate and regular rhythm.      Pulses: Normal pulses.   Pulmonary:      Effort: Pulmonary effort is normal. No respiratory distress.   Musculoskeletal:         General: Normal range of motion.      Cervical back: Normal range of motion.   Skin:     General: Skin is warm and dry.      Capillary Refill: Capillary refill takes less than 2 seconds.   Neurological:      General: No focal deficit present.      Mental Status: He is alert and oriented to person, place, and time. Mental status is at baseline.   Psychiatric:         Attention and Perception: Attention and perception normal.         Mood and Affect: Mood is depressed. Affect is flat and tearful.         Speech: Speech is delayed.         Behavior: Behavior is withdrawn.         Thought Content: Thought content is not paranoid or delusional. Thought content includes suicidal ideation. Thought content does not include homicidal ideation. Thought content does not include homicidal or suicidal plan.         Cognition and Memory: Cognition and memory normal.                Procedures:  Procedures      Medical Decision Making:      Comorbidities that affect " care:    Substance abuse, cellulitis of scrotum, anxiety, ADHD, hyperlipidemia, abscess of foot, backache, viral gastroenteritis, insomnia, seizures after an overdose, hyperlipidemia, hypertension    External Notes reviewed:    Previous Clinic Note: Recent clinic notes from Dr. Gray's office      The following orders were placed and all results were independently analyzed by me:  Orders Placed This Encounter   Procedures    [place] Draw    Comprehensive Metabolic Panel    Acetaminophen Level    Ethanol    Salicylate Level    Urine Drug Screen - Urine, Clean Catch    CBC Auto Differential    TSH    T4, Free    Continuous Pulse Oximetry    Vital Signs    Undress & Gown    CBC & Differential    Green Top (Gel)    Lavender Top    Gold Top - SST    Light Blue Top       Medications Given in the Emergency Department:  Medications - No data to display       ED Course:    The patient was initially evaluated in the triage area where orders were placed. The patient was later dispositioned by FLAVIO Awad.      The patient was advised to stay for completion of workup which includes but is not limited to communication of labs and radiological results, reassessment and plan. The patient was advised that leaving prior to disposition by a provider could result in critical findings that are not communicated to the patient.     ED Course as of 12/09/23 0607   Fri Dec 08, 2023   2129   --- PROVIDER IN TRIAGE NOTE ---    Patient was seen and evaluated in triage by me FLAVIO Noriega.  Orders were written and the patient is currently awaiting disposition.   [MS]   Sat Dec 09, 2023   0234 RJ LUX evaluated the patient via telehealth and feels that he needs inpatient admission.  She states that the patient has a history of major depression.  He has had prior hospitalizations with suicidal ideations and states that he is unable to contract for safety at this time and feels that he needs to be inpatient and  then possibly stepdown to the  [facility] from there. [TC]   0310 I spoke with Dr. Vela and he will [facility] the [facility].  The patient was made aware of the admission. [TC]      ED Course User Index  [MS] Gemma Michaels FLAVIO Madrigal  [TC] Shari Mcmanus APRN       Labs:    Lab Results (last 24 hours)       Procedure Component Value Units Date/Time    CBC & Differential [[id number]]  (Abnormal) Collected: 12/08/23 2136    Specimen: Blood from Arm, Right Updated: 12/08/23 2159    Narrative:      The following orders were created for panel order CBC & Differential.  Procedure                               Abnormality         Status                     ---------                               -----------         ------                     CBC Auto Differential[[id number]]        Abnormal            Final result                 Please view results for these tests on the individual orders.    Comprehensive Metabolic Panel [[id number]]  (Abnormal) Collected: 12/08/23 2136    Specimen: Blood from Arm, Right Updated: 12/08/23 2221     Glucose 75 mg/dL      BUN 8 mg/dL      Creatinine 0.96 mg/dL      Sodium 138 mmol/L      Potassium 3.3 mmol/L      Chloride 100 mmol/L      CO2 26.9 mmol/L      Calcium 10.0 mg/dL      Total Protein 7.6 g/dL      Albumin 4.3 g/dL      ALT (SGPT) 11 U/L      AST (SGOT) 17 U/L      Alkaline Phosphatase 128 U/L      Total Bilirubin 0.3 mg/dL      Globulin 3.3 gm/dL      A/G Ratio 1.3 g/dL      BUN/Creatinine Ratio 8.3     Anion Gap 11.1 mmol/L      eGFR 91.6 mL/min/1.73     Narrative:      GFR Normal >60  Chronic Kidney Disease <60  Kidney Failure <15      Acetaminophen Level [[id number]]  (Normal) Collected: 12/08/23 2136    Specimen: Blood from Arm, Right Updated: 12/08/23 2221     Acetaminophen <5.0 mcg/mL     Ethanol [[id number]] Collected: 12/08/23 2136    Specimen: Blood from Arm, Right Updated: 12/08/23 2221     Ethanol <10 mg/dL      Ethanol % <0.010 %     Narrative:      Ethanol  (Plasma)  <10 Essentially Negative    Toxic Concentrations           mg/dL    Flushing, slowing of reflexes    Impaired visual activity         Depression of CNS              >100  Possible Coma                  >300       Salicylate Level [[id number]]  (Normal) Collected: 12/08/23 2136    Specimen: Blood from Arm, Right Updated: 12/08/23 2221     Salicylate <0.3 mg/dL     CBC Auto Differential [[id number]]  (Abnormal) Collected: 12/08/23 2136    Specimen: Blood from Arm, Right Updated: 12/08/23 2159     WBC 13.08 10*3/mm3      RBC 5.20 10*6/mm3      Hemoglobin 15.6 g/dL      Hematocrit 47.5 %      MCV 91.3 fL      MCH 30.0 pg      MCHC 32.8 g/dL      RDW 14.3 %      RDW-SD 48.1 fl      MPV 11.0 fL      Platelets 352 10*3/mm3      Neutrophil % 62.4 %      Lymphocyte % 27.8 %      Monocyte % 6.0 %      Eosinophil % 2.8 %      Basophil % 0.6 %      Immature Grans % 0.4 %      Neutrophils, Absolute 8.16 10*3/mm3      Lymphocytes, Absolute 3.63 10*3/mm3      Monocytes, Absolute 0.79 10*3/mm3      Eosinophils, Absolute 0.37 10*3/mm3      Basophils, Absolute 0.08 10*3/mm3      Immature Grans, Absolute 0.05 10*3/mm3      nRBC 0.0 /100 WBC     TSH [[id number]]  (Normal) Collected: 12/08/23 2136    Specimen: Blood from Arm, Right Updated: 12/08/23 2244     TSH 0.424 uIU/mL     T4, Free [[id number]]  (Normal) Collected: 12/08/23 2136    Specimen: Blood from Arm, Right Updated: 12/08/23 2244     Free T4 1.32 ng/dL     Narrative:      Results may be falsely increased if patient taking Biotin.      Urine Drug Screen - Urine, Clean Catch [[id number]]  (Abnormal) Collected: 12/09/23 0032    Specimen: Urine, Clean Catch Updated: 12/09/23 0100     Amphet/Methamphet, Screen Positive     Barbiturates Screen, Urine Negative     Benzodiazepine Screen, Urine Negative     Cocaine Screen, Urine Negative     Opiate Screen Negative     THC, Screen, Urine Positive     Methadone Screen, Urine Negative     Oxycodone Screen, Urine  Negative     Fentanyl, Urine Negative    Narrative:      Negative Thresholds Per Drugs Screened:    Amphetamines                 500 ng/ml  Barbiturates                 200 ng/ml  Benzodiazepines              100 ng/ml  Cocaine                      300 ng/ml  Methadone                    300 ng/ml  Opiates                      300 ng/ml  Oxycodone                    100 ng/ml  THC                           50 ng/ml  Fentanyl                       5 ng/ml      The Normal Value for all drugs tested is negative. This report includes final unconfirmed screening results to be used for medical treatment purposes only. Unconfirmed results must not be used for non-medical purposes such as employment or legal testing. Clinical consideration should be applied to any drug of abuse test, particularly when unconfirmed results are used.                     Imaging:    No Radiology Exams Resulted Within Past 24 Hours      Differential Diagnosis and Discussion:      Psychiatric: Differential diagnosis includes but is not limited to depression, psychosis, bipolar disorder, anxiety, manic episode, schizophrenia, and substance abuse.    All labs were reviewed and interpreted by me.    MDM  Number of Diagnoses or Management Options  Severe episode of recurrent major depressive disorder, without psychotic features: established and worsening  Suicidal ideation: new and requires workup     Amount and/or Complexity of Data Reviewed  Clinical lab tests: reviewed    Risk of Complications, Morbidity, and/or Mortality  Presenting problems: low  Diagnostic procedures: low  Management options: low    Patient Progress  Patient progress: stable         Patient Care Considerations:    PSYCH: I considered ordering anxiolytic and or antipsychotic medications, however patient was able to facilitate the medical screening exam and disposition without further medications.      Consultants/Shared Management Plan:    Consultant: I have discussed the case  with Dr. Sarkar who states will admit to [facility]    Social Determinants of Health:    Patient is independent, reliable, and has access to care.       Disposition and Care Coordination:    Psychiatric Admission: Through independent evaluation of the patient's history and physical and consultation with psychiatry, the patient meets criteria for admission to a psychiatric facility.        Final diagnoses:   Suicidal ideation   Severe episode of recurrent major depressive disorder, without psychotic features        ED Disposition       ED Disposition   DC/Transfer to Behavioral Health    Condition   Stable    Comment   --               This medical record created using voice recognition software.             Shari Mcmanus, APRN  12/09/23 0607

## 2023-12-09 NOTE — PLAN OF CARE
Goal Outcome Evaluation:  Plan of Care Reviewed With: patient  Patient Agreement with Plan of Care: agrees      See admit note

## 2023-12-09 NOTE — NURSING NOTE
"Voluntary admit from ED, arrived at 0358 via w/c accompanied by security staff and ED staff. Male security staff completed safety search. Pt cooperative with assessment and search. Per ED nurse, Carlos VALENCIA, pt presented to triage tearful stating that he couldn't do it anymore and was ready to give up. ED Nurse stated that the pt was evasive and guarded when asking questions. Pt UDS positive for amphetamines/methamphetamines and THC. WBC 13.08, Potassium 3.3. Pt has hx of Depression. Per pt, has history of one psych admission approx. 15 years ago in [place], [place]. Pt cooperatives with assessment, minimizes at times. Pt stated that he is not taking any medications at the present time. Pt stated that he has had several sx to knees, hands, and shoulder. Pt has a wound that is abscess like on the left shin which is open to air. Pt stated that he has one similar on the right outer thigh but did not show to this nurse. Wound consult has been ordered. Pt is currently homeless, since last week, and stays with a friend but is unsure of where he will go at discharge. Pt stated that he has trouble concentrating, uses glasses at times but has none with him. Pt is a smoker, smokes 1PPD. Denies vape, etoh, and stated, \"I only smoke weed.\" Pt rated anxiety 7, depression 9; denied SI, HI, AVH. Contracts for safety. Pt stated his goal for treatment/admission: \"Straighten things out in my head that haven't been.\" Pt added to CARMELO: Trish (Friend) and Marc (Son).  "

## 2023-12-09 NOTE — ED TRIAGE NOTES
"Pt comes to the ER [facility] for depression and SI. Pt is withdrawn and crying in triage. Pt states \"I can't live like this anymore.\" When nurse asked pt if he wanted to harm himself he does not answer. When nurse asked if he wants to harm other people he nods his head no. Pt states \"can yall put me on a 72hr hold and get me back to being able to talk and live everyday.\" Pt stated to nurse at  \"I just don't want to live anymore.\" Pt states that he doesn't have a plan.   "

## 2023-12-09 NOTE — ED NOTES
Patient states he feels like he's just about to give up. Patient states that just life in general is making him want to give up. No suicidal plan as of this moment. Hx of depression per patient. No hx of suicidal attempts. Patient is not answering questions as to what lead up to him feeling this way. Patient did state that he has been homeless since last Wednesday but has had a place to stay. Patient placed in paper scrubs. Belongings placed outside room in belonging bags. Patient given urinal for sample.

## 2023-12-10 PROBLEM — L03.116 CELLULITIS AND ABSCESS OF LEFT LEG: Status: ACTIVE | Noted: 2023-12-10

## 2023-12-10 PROBLEM — L02.416 CELLULITIS AND ABSCESS OF LEFT LEG: Status: ACTIVE | Noted: 2023-12-10

## 2023-12-10 LAB
BASOPHILS # BLD AUTO: 0.08 10*3/MM3 (ref 0–0.2)
BASOPHILS NFR BLD AUTO: 0.6 % (ref 0–1.5)
DEPRECATED RDW RBC AUTO: 46.6 FL (ref 37–54)
EOSINOPHIL # BLD AUTO: 0.3 10*3/MM3 (ref 0–0.4)
EOSINOPHIL NFR BLD AUTO: 2.3 % (ref 0.3–6.2)
ERYTHROCYTE [DISTWIDTH] IN BLOOD BY AUTOMATED COUNT: 14 % (ref 12.3–15.4)
HCT VFR BLD AUTO: 48.8 % (ref 37.5–51)
HGB BLD-MCNC: 16 G/DL (ref 13–17.7)
IMM GRANULOCYTES # BLD AUTO: 0.07 10*3/MM3 (ref 0–0.05)
IMM GRANULOCYTES NFR BLD AUTO: 0.5 % (ref 0–0.5)
LYMPHOCYTES # BLD AUTO: 3.14 10*3/MM3 (ref 0.7–3.1)
LYMPHOCYTES NFR BLD AUTO: 24 % (ref 19.6–45.3)
MCH RBC QN AUTO: 29.6 PG (ref 26.6–33)
MCHC RBC AUTO-ENTMCNC: 32.8 G/DL (ref 31.5–35.7)
MCV RBC AUTO: 90.4 FL (ref 79–97)
MONOCYTES # BLD AUTO: 0.78 10*3/MM3 (ref 0.1–0.9)
MONOCYTES NFR BLD AUTO: 6 % (ref 5–12)
NEUTROPHILS NFR BLD AUTO: 66.6 % (ref 42.7–76)
NEUTROPHILS NFR BLD AUTO: 8.69 10*3/MM3 (ref 1.7–7)
NRBC BLD AUTO-RTO: 0 /100 WBC (ref 0–0.2)
PLATELET # BLD AUTO: 368 10*3/MM3 (ref 140–450)
PMV BLD AUTO: 10.7 FL (ref 6–12)
RBC # BLD AUTO: 5.4 10*6/MM3 (ref 4.14–5.8)
WBC NRBC COR # BLD AUTO: 13.06 10*3/MM3 (ref 3.4–10.8)

## 2023-12-10 PROCEDURE — 87205 SMEAR GRAM STAIN: CPT | Performed by: FAMILY MEDICINE

## 2023-12-10 PROCEDURE — 99232 SBSQ HOSP IP/OBS MODERATE 35: CPT | Performed by: FAMILY MEDICINE

## 2023-12-10 PROCEDURE — 87070 CULTURE OTHR SPECIMN AEROBIC: CPT | Performed by: FAMILY MEDICINE

## 2023-12-10 PROCEDURE — 85025 COMPLETE CBC W/AUTO DIFF WBC: CPT | Performed by: HOSPITALIST

## 2023-12-10 PROCEDURE — 87147 CULTURE TYPE IMMUNOLOGIC: CPT | Performed by: FAMILY MEDICINE

## 2023-12-10 PROCEDURE — 87186 SC STD MICRODIL/AGAR DIL: CPT | Performed by: FAMILY MEDICINE

## 2023-12-10 RX ORDER — DEXTROAMPHETAMINE SACCHARATE, AMPHETAMINE ASPARTATE, DEXTROAMPHETAMINE SULFATE AND AMPHETAMINE SULFATE 7.5; 7.5; 7.5; 7.5 MG/1; MG/1; MG/1; MG/1
15 TABLET ORAL NIGHTLY
COMMUNITY
End: 2023-12-14 | Stop reason: HOSPADM

## 2023-12-10 RX ORDER — AMLODIPINE BESYLATE 10 MG/1
10 TABLET ORAL
Status: DISCONTINUED | OUTPATIENT
Start: 2023-12-11 | End: 2023-12-14 | Stop reason: HOSPADM

## 2023-12-10 RX ORDER — QUETIAPINE FUMARATE 50 MG/1
50 TABLET, FILM COATED ORAL DAILY
COMMUNITY
End: 2023-12-14 | Stop reason: HOSPADM

## 2023-12-10 RX ADMIN — DOXYCYCLINE 100 MG: 100 CAPSULE ORAL at 21:07

## 2023-12-10 RX ADMIN — SERTRALINE HYDROCHLORIDE 50 MG: 50 TABLET ORAL at 08:34

## 2023-12-10 RX ADMIN — DOXYCYCLINE 100 MG: 100 CAPSULE ORAL at 08:34

## 2023-12-10 RX ADMIN — AMLODIPINE BESYLATE 5 MG: 5 TABLET ORAL at 08:34

## 2023-12-10 RX ADMIN — TRAZODONE HYDROCHLORIDE 100 MG: 50 TABLET ORAL at 21:08

## 2023-12-10 NOTE — PLAN OF CARE
Goal Outcome Evaluation:  Plan of Care Reviewed With: patient  Patient Agreement with Plan of Care: agrees   Pt withdrawn to room, pleasant upon approach. Guarded. Pt showed right outer thigh, two scabbed small areas that patient stated looked as bad as his left moyer. Dr. Gray called back to unit this shift and told other nurse to have wound nurse look at wounds, if needs drained to have hospitalist consult surgeon. Pt med compliant. Mood stable. Pt rated anxiety and depression 7. Denied SI, HI, AVH. Pt slept through the night.

## 2023-12-10 NOTE — PROGRESS NOTES
" [facility]     Psychiatric Progress Note    Patient Name: Honorio Vigil Jr.  : 1965  MRN: [id number]  Primary Care Physician:  Roni Gray MD  Date of admission: 2023    Subjective   Subjective     Patient seen and chart reviewed, discussed with staff.    Chief Complaint: Depression      HPI:     Staff reports the patient has been irritable.  Rated his anxiety depression as a 7.  Has been isolative and staying mostly in bed.  Seen by the medicine team.    Patient is mostly he was yesterday and is difficult to engage and gives minimal responses.  He reports that she slept \"so-so\" last night.  Reports his mood is about the same and appears very dysthymic and down.  Denies suicidal ideations this morning.  Denies any side effects from medications.  Continues to be on general discount.    Was seen by the medicine team and may consult wound nurse.      Objective   Objective     Vitals:   Temp:  [97.5 °F (36.4 °C)-97.9 °F (36.6 °C)] 97.5 °F (36.4 °C)  Heart Rate:  [] 110  Resp:  [16-18] 18  BP: (115-151)/(70-85) 151/85    Blood pressure better control        Mental Status Exam:     Isolate the room, difficult to engage in mental irresponsive.  Unkempt, disheveled, malodorous     Appearance:   Unkempt  Reliability:   Limited  Eye Contact:   Limited  Concentration/Focus:    Attentive but minimal responses  Behaviors:    No agitation, actually moving slow and some psychomotor retardation  Memory :    Intact  Speech:    Minimal, normal rate and volume  Language:   Appropriate  Mood :    \"Same\" which is very dysthymic and depressed  Affect:    Congruent with mood and flat  Thought process:    Guarded, goal directed, no thought disorganization  Thought Content:    States he does not have any suicidal ideations this morning, no homicidal ideation, no hallucinations  Insight:   Fair  Judgement:    No behavioral disturbance, stays regressed to his room      Result Review    Result Review:  I " have personally reviewed the results from the time of this admission to 12/10/2023 09:44 EST and agree with these findings:  [x]  Laboratory  []  Microbiology  []  Radiology  []  EKG/Telemetry   []  Cardiology/Vascular   []  Pathology  []  Old records  []  Other:  Most notable findings include:     Lab Results (last 24 hours)       Procedure Component Value Units Date/Time    CBC & Differential [[id number]]  (Abnormal) Collected: 12/10/23 0610    Specimen: Blood from Arm, Left Updated: 12/10/23 0625    Narrative:      The following orders were created for panel order CBC & Differential.  Procedure                               Abnormality         Status                     ---------                               -----------         ------                     CBC Auto Differential[[id number]]        Abnormal            Final result                 Please view results for these tests on the individual orders.    CBC Auto Differential [[id number]]  (Abnormal) Collected: 12/10/23 0610    Specimen: Blood from Arm, Left Updated: 12/10/23 0625     WBC 13.06 10*3/mm3      RBC 5.40 10*6/mm3      Hemoglobin 16.0 g/dL      Hematocrit 48.8 %      MCV 90.4 fL      MCH 29.6 pg      MCHC 32.8 g/dL      RDW 14.0 %      RDW-SD 46.6 fl      MPV 10.7 fL      Platelets 368 10*3/mm3      Neutrophil % 66.6 %      Lymphocyte % 24.0 %      Monocyte % 6.0 %      Eosinophil % 2.3 %      Basophil % 0.6 %      Immature Grans % 0.5 %      Neutrophils, Absolute 8.69 10*3/mm3      Lymphocytes, Absolute 3.14 10*3/mm3      Monocytes, Absolute 0.78 10*3/mm3      Eosinophils, Absolute 0.30 10*3/mm3      Basophils, Absolute 0.08 10*3/mm3      Immature Grans, Absolute 0.07 10*3/mm3      nRBC 0.0 /100 WBC     Blood Culture - Blood, Arm, Right [[id number]] Collected: 12/09/23 1620    Specimen: Blood from Arm, Right Updated: 12/09/23 1639    Blood Culture - Blood, Arm, Left [[id number]] Collected: 12/09/23 1620    Specimen: Blood from Arm, Left Updated:  12/09/23 1638                Medications:   amLODIPine, 5 mg, Oral, Q24H  doxycycline, 100 mg, Oral, BID  sertraline, 50 mg, Oral, Daily          Assessment / Plan       Active Hospital Problems:  Active Hospital Problems    Diagnosis     Severe recurrent major depression without psychotic features     Cellulitis of left lower leg     HTN (hypertension)     Dermatitis     Nicotine addiction     Leukocytosis     Hypokalemia     Cannabis abuse        Plan:     Continue current treatment protocol and titrate medications as clinically indicated  Medicine following for control of blood pressure and monitoring leg wound and abscess  Work on mood stabilization and abatement of any suicidal ideation or psychosis.  Work on appropriate safety plan  Continue supportive therapy  Patient to engage in all group and individual treatment modalities available on the unit  Obtain collateral information if possible  Titrate medications as clinically indicated  Work on appropriate disposition follow-up including referrals to substance abuse treatment if indicated      Disposition:  I expect patient to be discharged 2 to 3 days.    Part of this note may be an electronic transcription/translation of spoken language to printed text using the Dragon dictation system.         Electronically signed by Mynor Vela MD, 12/10/23, 9:44 AM EST.

## 2023-12-10 NOTE — PROGRESS NOTES
[facility]   Hospitalist Progress Note  Date: 12/10/2023  Patient Name: Honorio Vigil Jr.  : 1965  MRN: [id number]  Date of admission: 2023      Subjective   Subjective     Chief complaint: Medical consultation for hypertension and wound with concern for abscess left    Summary:  58-year-old male with history of hypertension, ADHD, anxiety, dyslipidemia, seizures, substance abuse, history of abscess on his right foot, hospitalized under psychiatric circumstances for treatment of management of depression, medicine team consulted for cellulitis and abscess of the left lower extremity and uncontrolled hypertension, wound culture ordered, placed on doxycycline, started Norvasc for blood pressure control      Interval follow-up: Seen and examined, no acute distress, no acute major night events, resting comfortably.  Left leg continues to have signs of cellulitis, with additional right outer thigh area with 2 scabbed small areas, advised nursing staff to apply warm compresses and once pus is expelled, culture at.  White blood cell count 13,000, blood pressures after starting amlodipine are better but there is still room for improvement.    Review of systems:  All systems reviewed negative except for depression, left leg pain, right thigh pain    Objective   Objective     Vitals:   Temp:  [97.5 °F (36.4 °C)-97.9 °F (36.6 °C)] 97.5 °F (36.4 °C)  Heart Rate:  [] 110  Resp:  [16-18] 18  BP: (115-151)/(70-85) 151/85  Physical Exam      Constitutional: Awake, alert, no acute distress   Eyes: EOMI   HENT: NCAT, mucous membranes moist   Neck: Supple, full range of motion   Respiratory: Air entry bilaterally, no adventitious breath sound   Cardiovascular: Regular rate rhythm   Gastrointestinal: No distention, abdomen appears soft   Musculoskeletal: Cellulitis left shin with area of convalescence raised, scabbed, tender and warm   Psychiatric: Appropriate affect, cooperative   Neurologic: Oriented  "x 3, strength symmetric in all extremities, Cranial Nerves grossly intact to confrontation, speech clear   Skin: Right thigh redness, left leg redness    Result Review    Result Review:  I have personally reviewed the pertinent results from the past 24 hours to 12/10/2023 11:35 EST and agree with these findings:  [x]  Laboratory   CBC          11/10/2023    05:03 12/8/2023    21:36 12/10/2023    06:10   CBC   WBC 15.83  13.08  13.06    RBC 5.19  5.20  5.40    Hemoglobin 15.4  15.6  16.0    Hematocrit 46.7  47.5  48.8    MCV 90.0  91.3  90.4    MCH 29.7  30.0  29.6    MCHC 33.0  32.8  32.8    RDW 14.3  14.3  14.0    Platelets 332  352  368      BMP          5/25/2023    19:01 11/10/2023    05:03 12/8/2023    21:36   BMP   BUN 10  13  8    Creatinine 0.87  0.88  0.96    Sodium 139  137  138    Potassium 4.6  4.1  3.3    Chloride 105  102  100    CO2 25.6  27.1  26.9    Calcium 9.6  10.1  10.0      LIVER FUNCTION TESTS:      Lab 12/08/23  2136   TOTAL PROTEIN 7.6   ALBUMIN 4.3   GLOBULIN 3.3   ALT (SGPT) 11   AST (SGOT) 17   BILIRUBIN 0.3   ALK PHOS 128*       [x]  Microbiology No results found for: \"ACANTHNAEG\", \"AFBCX\", \"BPERTUSSISCX\", \"BLOODCX\"  No results found for: \"BCIDPCR\", \"CXREFLEX\", \"CSFCX\", \"CULTURETIS\"  No results found for: \"CULTURES\", \"HSVCX\", \"URCX\"  No results found for: \"EYECULTURE\", \"GCCX\", \"HSVCULTURE\", \"LABHSV\"  No results found for: \"LEGIONELLA\", \"MRSACX\", \"MUMPSCX\", \"MYCOPLASCX\"  No results found for: \"NOCARDIACX\", \"STOOLCX\"  No results found for: \"THROATCX\", \"UNSTIMCULT\", \"URINECX\", \"CULTURE\", \"VZVCULTUR\"  No results found for: \"VIRALCULTU\", \"WOUNDCX\"    [x]  Radiology No results found.    []  EKG/Telemetry   []  Cardiology/Vascular   []  Pathology  [x]  Old records  []  Other:    Assessment & Plan   Assessment / Plan     Assessment:  Cellulitis of the left lower extremity and right thigh, with concern for staphylococcal infection  Uncontrolled hypertension    Plan:  Labs and imaging " reviewed  Continue doxycycline 100 mg twice a day  Wound culture after warm compresses applied to left leg area  Norvasc increased to 10 mg daily  Wound nurse consult  Will continue to follow while hospitalized  Clinical course to dictate further management  Discussed with nurse at the bedside    DVT prophylaxis:  Mechanical DVT prophylaxis orders are present.    CODE STATUS:   Code Status (Patient has no pulse and is not breathing): CPR (Attempt to Resuscitate)  Medical Interventions (Patient has pulse or is breathing): Full Support        Electronically signed by Areli Leal MD, 12/10/23, 11:35 AM EST.    Portions of this documentation were transcribed electronically from a voice recognition software.  I confirm all data accurately represents the service(s) I performed at today's visit.

## 2023-12-10 NOTE — PLAN OF CARE
Goal Outcome Evaluation:  Plan of Care Reviewed With: patient  Patient Agreement with Plan of Care: agrees   PATIENT ALERT AND ORIENTED AND COMPLIANT WITH MEDICATIONS. PATIENT DENIES S/I, H/I OR HALLUCINATIONS. PATIENT HAS BEEN WITHDRAWN TO HER ROOM MAJORITY OF DAY AND SEEMS MORE IRRITABLE TODAY. PATIENT'S WOUND ON LEFT LEG OPENED UP AND WOUND CULTURE COLLECTED AND BAND AID APPLIED. NO INAPPROPRIATE OR AGGRESSIVE BEHAVIOR NOTED. WILL CONTINUE TO MONITOR FOR ANY CHANGES IN MOOD OR BEHAVIOR.

## 2023-12-11 LAB
ANION GAP SERPL CALCULATED.3IONS-SCNC: 10.9 MMOL/L (ref 5–15)
BASOPHILS # BLD AUTO: 0.07 10*3/MM3 (ref 0–0.2)
BASOPHILS NFR BLD AUTO: 0.6 % (ref 0–1.5)
BUN SERPL-MCNC: 9 MG/DL (ref 6–20)
BUN/CREAT SERPL: 10 (ref 7–25)
CALCIUM SPEC-SCNC: 9.9 MG/DL (ref 8.6–10.5)
CHLORIDE SERPL-SCNC: 103 MMOL/L (ref 98–107)
CO2 SERPL-SCNC: 24.1 MMOL/L (ref 22–29)
CREAT SERPL-MCNC: 0.9 MG/DL (ref 0.76–1.27)
DEPRECATED RDW RBC AUTO: 45.5 FL (ref 37–54)
EGFRCR SERPLBLD CKD-EPI 2021: 99 ML/MIN/1.73
EOSINOPHIL # BLD AUTO: 0.29 10*3/MM3 (ref 0–0.4)
EOSINOPHIL NFR BLD AUTO: 2.7 % (ref 0.3–6.2)
ERYTHROCYTE [DISTWIDTH] IN BLOOD BY AUTOMATED COUNT: 13.9 % (ref 12.3–15.4)
GLUCOSE SERPL-MCNC: 111 MG/DL (ref 65–99)
HCT VFR BLD AUTO: 50.5 % (ref 37.5–51)
HGB BLD-MCNC: 16.9 G/DL (ref 13–17.7)
IMM GRANULOCYTES # BLD AUTO: 0.06 10*3/MM3 (ref 0–0.05)
IMM GRANULOCYTES NFR BLD AUTO: 0.5 % (ref 0–0.5)
LYMPHOCYTES # BLD AUTO: 3.52 10*3/MM3 (ref 0.7–3.1)
LYMPHOCYTES NFR BLD AUTO: 32.3 % (ref 19.6–45.3)
MAGNESIUM SERPL-MCNC: 2.2 MG/DL (ref 1.6–2.6)
MCH RBC QN AUTO: 30 PG (ref 26.6–33)
MCHC RBC AUTO-ENTMCNC: 33.5 G/DL (ref 31.5–35.7)
MCV RBC AUTO: 89.7 FL (ref 79–97)
MONOCYTES # BLD AUTO: 0.68 10*3/MM3 (ref 0.1–0.9)
MONOCYTES NFR BLD AUTO: 6.2 % (ref 5–12)
NEUTROPHILS NFR BLD AUTO: 57.7 % (ref 42.7–76)
NEUTROPHILS NFR BLD AUTO: 6.29 10*3/MM3 (ref 1.7–7)
NRBC BLD AUTO-RTO: 0 /100 WBC (ref 0–0.2)
PHOSPHATE SERPL-MCNC: 2.4 MG/DL (ref 2.5–4.5)
PLATELET # BLD AUTO: 367 10*3/MM3 (ref 140–450)
PMV BLD AUTO: 11.1 FL (ref 6–12)
POTASSIUM SERPL-SCNC: 3.5 MMOL/L (ref 3.5–5.2)
RBC # BLD AUTO: 5.63 10*6/MM3 (ref 4.14–5.8)
SODIUM SERPL-SCNC: 138 MMOL/L (ref 136–145)
WBC NRBC COR # BLD AUTO: 10.91 10*3/MM3 (ref 3.4–10.8)

## 2023-12-11 PROCEDURE — 80048 BASIC METABOLIC PNL TOTAL CA: CPT | Performed by: FAMILY MEDICINE

## 2023-12-11 PROCEDURE — 99232 SBSQ HOSP IP/OBS MODERATE 35: CPT | Performed by: STUDENT IN AN ORGANIZED HEALTH CARE EDUCATION/TRAINING PROGRAM

## 2023-12-11 PROCEDURE — 84100 ASSAY OF PHOSPHORUS: CPT | Performed by: FAMILY MEDICINE

## 2023-12-11 PROCEDURE — 85025 COMPLETE CBC W/AUTO DIFF WBC: CPT | Performed by: FAMILY MEDICINE

## 2023-12-11 PROCEDURE — 83735 ASSAY OF MAGNESIUM: CPT | Performed by: FAMILY MEDICINE

## 2023-12-11 RX ORDER — FENTANYL/ROPIVACAINE/NS/PF 2-625MCG/1
15 PLASTIC BAG, INJECTION (ML) EPIDURAL
Status: DISCONTINUED | OUTPATIENT
Start: 2023-12-11 | End: 2023-12-11

## 2023-12-11 RX ADMIN — DOXYCYCLINE 100 MG: 100 CAPSULE ORAL at 20:37

## 2023-12-11 RX ADMIN — AMLODIPINE BESYLATE 10 MG: 10 TABLET ORAL at 09:16

## 2023-12-11 RX ADMIN — TRAZODONE HYDROCHLORIDE 100 MG: 50 TABLET ORAL at 20:37

## 2023-12-11 RX ADMIN — DOXYCYCLINE 100 MG: 100 CAPSULE ORAL at 09:16

## 2023-12-11 RX ADMIN — POTASSIUM & SODIUM PHOSPHATES POWDER PACK 280-160-250 MG 2 PACKET: 280-160-250 PACK at 11:41

## 2023-12-11 RX ADMIN — SERTRALINE HYDROCHLORIDE 50 MG: 50 TABLET ORAL at 09:16

## 2023-12-11 NOTE — SIGNIFICANT NOTE
Wound Eval / Progress Noted     [facility]     Patient Name: Honorio Vigil Jr.  : 1965  MRN: [id number]  Today's Date: 2023                 Admit Date: 2023    Visit Dx:  No diagnosis found.      Cannabis abuse    Severe recurrent major depression without psychotic features    HTN (hypertension)    Dermatitis    Nicotine addiction    Leukocytosis    Hypokalemia    Cellulitis and abscess of left leg        Past Medical History:   Diagnosis Date    Abscess of foot     rt    ADHD     Anxiety disorder     Backache     Cellulitis of scrotum     Elevated cholesterol     Hyperlipidemia     Hypertension     Insomnia disorder     Seizures     x2- due to overuse/overdose of meds- years ago per pt. Last one was 15-20 years ago- not on meds    Substance abuse     years ago    Viral gastroenteritis         Past Surgical History:   Procedure Laterality Date    EYE SURGERY      HAND SURGERY Bilateral     INCISION AND DRAINAGE OF WOUND Right 2023    Procedure: INCISION AND DRAINAGE BONE, right great toe;  Surgeon: Cristo Christy DPM;  Location: [facility] MAIN OR;  Service: Podiatry;  Laterality: Right;    KNEE ARTHROSCOPY Right 2014    Arthroscopy of right knee, debride medial meniscus. x3    KNEE ARTHROSCOPY W/ MENISCECTOMY Left 2022    Procedure: KNEE ARTHROSCOPY WITH PARTIAL MEDIAL MENISCECTOMY;  Surgeon: Carlos Hameed MD;  [place] [facility] [place] [facility]  Service: Orthopedics;  Laterality: Left;    SHOULDER ARTHROSCOPY Right          Physical Assessment:  Wound Left lower leg Abcess (Active)   Wound Image   23   Dressing Appearance moist drainage;intact 23   Closure None 23   Base moist;red 23   Periwound redness;warm 23   Periwound Temperature warm 23   Periwound Skin Turgor soft 23   Edges open 23   Drainage Characteristics/Odor serosanguineous;sanguineous 23   Drainage Amount small  "12/11/23 0934   Care, Wound cleansed with;sterile normal saline 12/11/23 0934   Dressing Care dressing applied;hydrofiber;silver impregnated;silicone;border dressing 12/11/23 0934   Periwound Care absorptive dressing applied 12/11/23 0934      Wound Check / Follow-up: Patient seen today for wound consult. Patient is awake, alert, and oriented at time of visit. Per primary RN, patient had an area appearing as an abscess to left lower leg yesterday and after warm compress was applied, area opened. Patient now with open wound to left lower leg. Wound base is red and moist. Periwound tissue is with erythema and warmth. Cleansed with normal saline and gauze. Recommending daily dressing changes with silver impregnated hydrofiber and silicone border dressing. Patient unsure of etiology of wound. States it started as a \"bump.\" Patient denies any other wounds/skin issues at this time. Spoke with attending physician and primary RN regarding assessment findings.       Impression: Wound to left lower leg.       Short term goals: Regain skin integrity, skin protection, daily dressing changes.     Precious Wahl RN    12/11/2023    11:20 EST   "

## 2023-12-11 NOTE — PROGRESS NOTES
" [facility]     Psychiatric Progress Note    Patient Name: Honorio Vigil Jr.  : 1965  MRN: [id number]  Primary Care Physician:  Roni Gray MD  Date of admission: 2023    Subjective   Subjective     Patient seen and chart reviewed, discussed with staff.    Chief Complaint: Depression, suicidal ideations      HPI:     Staff reports the patient slept well last night with trazodone.  Rated anxiety as a 7 depression as an 8.  Denies suicidal homicidal ideation as well as hallucinations with staff.    Patient is left leg wound open overnight.  Has bloody drainage.  Complains of some tenderness.  Is on oral antibiotics from the medicine team.  Wound nurse is seeing the patient this morning.  She is up a lot Aquacel and dressed,and they will follow throughout his stay.    Patient reports he slept better last night.  He continues to be guarded and somewhat difficult to engage but is little bit more engaging.  States that last night he \"had some thoughts\" and states they were suicidal type thoughts but \"no plan or anything like that.\"  Calm and cooperative.  Continues to appear very dysthymic and depressed    Blood pressure is improving      Objective   Objective     Vitals:   Temp:  [97.3 °F (36.3 °C)-98.6 °F (37 °C)] 97.3 °F (36.3 °C)  Heart Rate:  [] 111  Resp:  [18] 18  BP: (123-125)/(95-96) 125/96    Extremities: Nonpurulent bloody drainage from abscess      Mental Status Exam:      Appearance:   Well-developed, well-nourished, regressed and isolative to his room, unkempt, disheveled  Reliability:   Fair  Eye Contact:   Fair, improved  Concentration/Focus:    Attentive to the interview  Behaviors:    No restlessness or agitation  Memory :    Intact  Speech:    Minimal, normal  Language:   Appropriate  Mood :    \"I do not know, just tired\"  Affect:    Flat  Thought process:    Goal directed, guarded  Thought Content:    Vague suicidal ideations last night with no plan or intent, no " homicidal ideations, no hallucinations  Insight:   Fair  Judgement:    Intact, no behavioral disturbance      Result Review    Result Review:  I have personally reviewed the results from the time of this admission to 12/11/2023 09:31 EST and agree with these findings:  [x]  Laboratory  []  Microbiology  []  Radiology  []  EKG/Telemetry   []  Cardiology/Vascular   []  Pathology  []  Old records  []  Other:  Most notable findings include: Wound culture pending    Lab Results (last 24 hours)       Procedure Component Value Units Date/Time    Basic Metabolic Panel [[id number]]  (Abnormal) Collected: 12/11/23 0418    Specimen: Blood from Arm, Right Updated: 12/11/23 0506     Glucose 111 mg/dL      BUN 9 mg/dL      Creatinine 0.90 mg/dL      Sodium 138 mmol/L      Potassium 3.5 mmol/L      Chloride 103 mmol/L      CO2 24.1 mmol/L      Calcium 9.9 mg/dL      BUN/Creatinine Ratio 10.0     Anion Gap 10.9 mmol/L      eGFR 99.0 mL/min/1.73     Narrative:      GFR Normal >60  Chronic Kidney Disease <60  Kidney Failure <15      Magnesium [[id number]]  (Normal) Collected: 12/11/23 0418    Specimen: Blood from Arm, Right Updated: 12/11/23 0506     Magnesium 2.2 mg/dL     Phosphorus [[id number]]  (Abnormal) Collected: 12/11/23 0418    Specimen: Blood from Arm, Right Updated: 12/11/23 0506     Phosphorus 2.4 mg/dL     CBC & Differential [[id number]]  (Abnormal) Collected: 12/11/23 0418    Specimen: Blood from Arm, Right Updated: 12/11/23 0452    Narrative:      The following orders were created for panel order CBC & Differential.  Procedure                               Abnormality         Status                     ---------                               -----------         ------                     CBC Auto Differential[[id number]]        Abnormal            Final result                 Please view results for these tests on the individual orders.    CBC Auto Differential [[id number]]  (Abnormal) Collected: 12/11/23 0418    Specimen:  Blood from Arm, Right Updated: 12/11/23 0452     WBC 10.91 10*3/mm3      RBC 5.63 10*6/mm3      Hemoglobin 16.9 g/dL      Hematocrit 50.5 %      MCV 89.7 fL      MCH 30.0 pg      MCHC 33.5 g/dL      RDW 13.9 %      RDW-SD 45.5 fl      MPV 11.1 fL      Platelets 367 10*3/mm3      Neutrophil % 57.7 %      Lymphocyte % 32.3 %      Monocyte % 6.2 %      Eosinophil % 2.7 %      Basophil % 0.6 %      Immature Grans % 0.5 %      Neutrophils, Absolute 6.29 10*3/mm3      Lymphocytes, Absolute 3.52 10*3/mm3      Monocytes, Absolute 0.68 10*3/mm3      Eosinophils, Absolute 0.29 10*3/mm3      Basophils, Absolute 0.07 10*3/mm3      Immature Grans, Absolute 0.06 10*3/mm3      nRBC 0.0 /100 WBC     Wound Culture - Wound, Leg, Left [[id number]] Collected: 12/10/23 1559    Specimen: Wound from Leg, Left Updated: 12/10/23 1746     Gram Stain Few (2+) WBCs seen      Few (2+) Gram positive cocci in pairs and clusters    Blood Culture - Blood, Arm, Left [[id number]]  (Normal) Collected: 12/09/23 1620    Specimen: Blood from Arm, Left Updated: 12/10/23 1645     Blood Culture No growth at 24 hours    Blood Culture - Blood, Arm, Right [[id number]]  (Normal) Collected: 12/09/23 1620    Specimen: Blood from Arm, Right Updated: 12/10/23 1645     Blood Culture No growth at 24 hours                Medications:   amLODIPine, 10 mg, Oral, Q24H  doxycycline, 100 mg, Oral, BID  sertraline, 50 mg, Oral, Daily          Assessment / Plan       Active Hospital Problems:  Active Hospital Problems    Diagnosis     Cellulitis and abscess of left leg     Severe recurrent major depression without psychotic features     HTN (hypertension)     Dermatitis     Nicotine addiction     Leukocytosis     Hypokalemia     Cannabis abuse        Plan:     Continue sertraline and titrate as clinically indicated  Wound nurse to follow continue antibiotics and treatment as prescribed from wound nurse and hospitalist  Blood pressure improving, continue to monitor and treat as  indicated  Work on mood stabilization and abatement of any suicidal ideation or psychosis.  Work on appropriate safety plan  Continue supportive therapy  Patient to engage in all group and individual treatment modalities available on the unit  Obtain collateral information if possible  Titrate medications as clinically indicated  Work on appropriate disposition follow-up including referrals to substance abuse treatment if indicated      Disposition:  I expect patient to be discharged 2 to 3 days.    Part of this note may be an electronic transcription/translation of spoken language to printed text using the Dragon dictation system.         Electronically signed by Mynor Vela MD, 12/11/23, 9:31 AM EST.

## 2023-12-11 NOTE — PLAN OF CARE
"Goal Outcome Evaluation:  Plan of Care Reviewed With: patient  Patient Agreement with Plan of Care: agrees     Progress: no change   Pt describes his mood as \"tired and ready to give up on everything\".States he has suicidal thoughts that \"comes and goes\".States he can come to staff for thoughts of self harm with plan and intent.Pt rates depression a 8/10 and anxiety a 7/10.Treatment plan reviewed and is ongoing.         "

## 2023-12-11 NOTE — PROGRESS NOTES
[facility]   Hospitalist Progress Note  Date: 2023  Patient Name: Honorio Vigil Jr.  : 1965  MRN: [id number]  Date of admission: 2023  Room/Bed: [facility]/1      Subjective   Subjective     Chief Complaint: Hypertension and left lower extremity wound with concern for abscess    Summary:Honorio Vigil Jr. is a 58 y.o. male with history of hypertension, ADHD, anxiety, dyslipidemia, seizures, substance abuse, history of abscess on his right foot, hospitalized under psychiatric circumstances for treatment of management of depression, medicine team consulted for cellulitis and abscess of the left lower extremity and uncontrolled hypertension, wound culture ordered, placed on doxycycline, started Norvasc for blood pressure control.    Interval Followup: No acute events overnight.  Blood pressure stable.  Receiving wound care for lower extremity wound.  Stated he feels better today.  No suicidal ideation.  Denies any fever, chills, rigors, headache, chest pain or difficulty breathing.    Review of Systems    All systems reviewed and negative except for what is outlined above.      Objective   Objective     Vitals:   Temp:  [97.5 °F (36.4 °C)-98.6 °F (37 °C)] 98.6 °F (37 °C)  Heart Rate:  [] 93  Resp:  [18] 18  BP: (123-151)/(85-95) 123/95    Physical Exam   General: Awake, alert, NAD  Cardiovascular: RRR, no murmurs   Pulmonary: CTA bilaterally; no wheezes; no conversational dyspnea  Gastrointestinal: S/ND/NT, +BS  Musculoskeletal: No gross deformities  Skin: Lower extremity wound, dressing present.  No signs of cellulitis.  Neuro: Alert, awake, oriented x 3 speech clear; no tremor  : No Abreu catheter; no suprapubic tenderness    Result Review    Result Review:  I have personally reviewed these results:  [x]  Laboratory      Lab 23  0418 12/10/23  0610 23  2136   WBC 10.91* 13.06* 13.08*   HEMOGLOBIN 16.9 16.0 15.6   HEMATOCRIT 50.5 48.8 47.5   PLATELETS 367 368 352    NEUTROS ABS 6.29 8.69* 8.16*   IMMATURE GRANS (ABS) 0.06* 0.07* 0.05   LYMPHS ABS 3.52* 3.14* 3.63*   MONOS ABS 0.68 0.78 0.79   EOS ABS 0.29 0.30 0.37   MCV 89.7 90.4 91.3         Lab 12/11/23  0418 12/08/23  2136   SODIUM 138 138   POTASSIUM 3.5 3.3*   CHLORIDE 103 100   CO2 24.1 26.9   ANION GAP 10.9 11.1   BUN 9 8   CREATININE 0.90 0.96   EGFR 99.0 91.6   GLUCOSE 111* 75   CALCIUM 9.9 10.0   MAGNESIUM 2.2  --    PHOSPHORUS 2.4*  --    TSH  --  0.424         Lab 12/08/23  2136   TOTAL PROTEIN 7.6   ALBUMIN 4.3   GLOBULIN 3.3   ALT (SGPT) 11   AST (SGOT) 17   BILIRUBIN 0.3   ALK PHOS 128*                     Brief Urine Lab Results  (Last result in the past 365 days)        Color   Clarity   Blood   Leuk Est   Nitrite   Protein   CREAT   Urine HCG        05/18/23 1806 Dark Yellow   Cloudy   Negative   Negative   Negative   30 mg/dL (1+)                 [x]  Microbiology   Microbiology Results (last 10 days)       Procedure Component Value - Date/Time    Wound Culture - Wound, Leg, Left [[id number]] Collected: 12/10/23 1559    Lab Status: Preliminary result Specimen: Wound from Leg, Left Updated: 12/10/23 1746     Gram Stain Few (2+) WBCs seen      Few (2+) Gram positive cocci in pairs and clusters    Blood Culture - Blood, Arm, Left [[id number]]  (Normal) Collected: 12/09/23 1620    Lab Status: Preliminary result Specimen: Blood from Arm, Left Updated: 12/10/23 1645     Blood Culture No growth at 24 hours    Blood Culture - Blood, Arm, Right [[id number]]  (Normal) Collected: 12/09/23 1620    Lab Status: Preliminary result Specimen: Blood from Arm, Right Updated: 12/10/23 1645     Blood Culture No growth at 24 hours          [x]  Radiology  No radiology results for the last 7 days  []  EKG/Telemetry   []  Cardiology/Vascular   []  Pathology  []  Old records  []  Other:    Assessment & Plan   Assessment / Plan     Assessment:  Cellulitis of the left lower extremity and right thigh, with concern for staphylococcal  infection  Uncontrolled hypertension  Hypophosphatemia  Severe recurrent major depression with psychotic features  History of hypertension  Dermatitis  Current infection  Cannabis abuse      Plan:  Blood culture showing no growth at 2 days.  Wound culture preliminary result showing few WBCs along with few gram-positive cocci in pairs and clusters.  Currently on doxycycline 100 mg twice daily.  Continue.  Norvasc increased to 10 mg this hospitalization.  Blood pressure stable today.  Continue current dose.  Continue wound care.  Hypophosphatemia with phosphorus of 2.4.  Repleted.  We will continue to follow the patient this hospitalization.  Continue rest of the current management.       DVT prophylaxis:  Mechanical DVT prophylaxis orders are present.    CODE STATUS:   Code Status (Patient has no pulse and is not breathing): CPR (Attempt to Resuscitate)  Medical Interventions (Patient has pulse or is breathing): Full Support      Electronically signed by Floyd Torrez MD, 12/11/23, 8:27 AM EST.

## 2023-12-11 NOTE — PLAN OF CARE
Goal Outcome Evaluation:    Patient has been awake alert, clear speech, withdrawn to room, poor eye contact, hypo-verbal, able to make needs known, guarded, hard to engage, pt denies SI/HI and denies A/V/H. Pt rates anxiety 8/10 and depression 9/10. Patient was seen by treated by would care, wound orders in place, images in media, pt is medication, compliant, no s/s of distress at this time.

## 2023-12-12 LAB
ANION GAP SERPL CALCULATED.3IONS-SCNC: 13.2 MMOL/L (ref 5–15)
BASOPHILS # BLD AUTO: 0.1 10*3/MM3 (ref 0–0.2)
BASOPHILS NFR BLD AUTO: 0.9 % (ref 0–1.5)
BUN SERPL-MCNC: 11 MG/DL (ref 6–20)
BUN/CREAT SERPL: 12.5 (ref 7–25)
CALCIUM SPEC-SCNC: 10 MG/DL (ref 8.6–10.5)
CHLORIDE SERPL-SCNC: 101 MMOL/L (ref 98–107)
CO2 SERPL-SCNC: 23.8 MMOL/L (ref 22–29)
CREAT SERPL-MCNC: 0.88 MG/DL (ref 0.76–1.27)
DEPRECATED RDW RBC AUTO: 44.8 FL (ref 37–54)
EGFRCR SERPLBLD CKD-EPI 2021: 99.7 ML/MIN/1.73
EOSINOPHIL # BLD AUTO: 0.36 10*3/MM3 (ref 0–0.4)
EOSINOPHIL NFR BLD AUTO: 3.2 % (ref 0.3–6.2)
ERYTHROCYTE [DISTWIDTH] IN BLOOD BY AUTOMATED COUNT: 13.5 % (ref 12.3–15.4)
GLUCOSE SERPL-MCNC: 109 MG/DL (ref 65–99)
HCT VFR BLD AUTO: 51.1 % (ref 37.5–51)
HGB BLD-MCNC: 16.8 G/DL (ref 13–17.7)
IMM GRANULOCYTES # BLD AUTO: 0.05 10*3/MM3 (ref 0–0.05)
IMM GRANULOCYTES NFR BLD AUTO: 0.4 % (ref 0–0.5)
LYMPHOCYTES # BLD AUTO: 4.46 10*3/MM3 (ref 0.7–3.1)
LYMPHOCYTES NFR BLD AUTO: 40 % (ref 19.6–45.3)
MAGNESIUM SERPL-MCNC: 2.2 MG/DL (ref 1.6–2.6)
MCH RBC QN AUTO: 29.5 PG (ref 26.6–33)
MCHC RBC AUTO-ENTMCNC: 32.9 G/DL (ref 31.5–35.7)
MCV RBC AUTO: 89.6 FL (ref 79–97)
MONOCYTES # BLD AUTO: 0.77 10*3/MM3 (ref 0.1–0.9)
MONOCYTES NFR BLD AUTO: 6.9 % (ref 5–12)
NEUTROPHILS NFR BLD AUTO: 48.6 % (ref 42.7–76)
NEUTROPHILS NFR BLD AUTO: 5.41 10*3/MM3 (ref 1.7–7)
NRBC BLD AUTO-RTO: 0 /100 WBC (ref 0–0.2)
PHOSPHATE SERPL-MCNC: 2.7 MG/DL (ref 2.5–4.5)
PLATELET # BLD AUTO: 399 10*3/MM3 (ref 140–450)
PMV BLD AUTO: 11 FL (ref 6–12)
POTASSIUM SERPL-SCNC: 3.9 MMOL/L (ref 3.5–5.2)
RBC # BLD AUTO: 5.7 10*6/MM3 (ref 4.14–5.8)
SODIUM SERPL-SCNC: 138 MMOL/L (ref 136–145)
WBC NRBC COR # BLD AUTO: 11.15 10*3/MM3 (ref 3.4–10.8)

## 2023-12-12 PROCEDURE — 85025 COMPLETE CBC W/AUTO DIFF WBC: CPT | Performed by: STUDENT IN AN ORGANIZED HEALTH CARE EDUCATION/TRAINING PROGRAM

## 2023-12-12 PROCEDURE — 83735 ASSAY OF MAGNESIUM: CPT | Performed by: STUDENT IN AN ORGANIZED HEALTH CARE EDUCATION/TRAINING PROGRAM

## 2023-12-12 PROCEDURE — 99232 SBSQ HOSP IP/OBS MODERATE 35: CPT | Performed by: INTERNAL MEDICINE

## 2023-12-12 PROCEDURE — 84100 ASSAY OF PHOSPHORUS: CPT | Performed by: STUDENT IN AN ORGANIZED HEALTH CARE EDUCATION/TRAINING PROGRAM

## 2023-12-12 PROCEDURE — 80048 BASIC METABOLIC PNL TOTAL CA: CPT | Performed by: STUDENT IN AN ORGANIZED HEALTH CARE EDUCATION/TRAINING PROGRAM

## 2023-12-12 RX ORDER — ARIPIPRAZOLE 5 MG/1
5 TABLET ORAL DAILY
Status: DISCONTINUED | OUTPATIENT
Start: 2023-12-12 | End: 2023-12-14 | Stop reason: HOSPADM

## 2023-12-12 RX ADMIN — TRAZODONE HYDROCHLORIDE 100 MG: 50 TABLET ORAL at 20:22

## 2023-12-12 RX ADMIN — ARIPIPRAZOLE 5 MG: 5 TABLET ORAL at 11:14

## 2023-12-12 RX ADMIN — AMLODIPINE BESYLATE 10 MG: 10 TABLET ORAL at 08:36

## 2023-12-12 RX ADMIN — DOXYCYCLINE 100 MG: 100 CAPSULE ORAL at 20:22

## 2023-12-12 RX ADMIN — SERTRALINE HYDROCHLORIDE 50 MG: 50 TABLET ORAL at 08:36

## 2023-12-12 RX ADMIN — DOXYCYCLINE 100 MG: 100 CAPSULE ORAL at 08:36

## 2023-12-12 NOTE — PLAN OF CARE
Goal Outcome Evaluation:  Plan of Care Reviewed With: patient  Patient Agreement with Plan of Care: agrees     Progress: improving    Patient was awake and alert during some of shift, pt showered today after much encouragement and education, pt denies SI/HI and denies A/V/H, poor eye contact, cooperative, depressed mood, poor eye contact, dismissive at time,rates anxiety 8/10 and depression 9/10. Pt contracts for safety and able to makes needs known, withdrawn to room, hard to engage. Patient dressing was changed today on left shin and assessed by Hospitalists. No increased redness or s/s of infection noted, see wound care note. Pt is med complaint. No s/s of distress at this time.

## 2023-12-12 NOTE — PLAN OF CARE
"Goal Outcome Evaluation:  Plan of Care Reviewed With: patient  Patient Agreement with Plan of Care: agrees     Progress: no change  Pt describes his mood as \"same\".Reports feeling depressed, rates depression  9/10 and anxiety 7/10.Pt denies suicidal ideations and homicidal ideations,denies hallucinations.Treatment plan reviewed and is ongoing.            "

## 2023-12-12 NOTE — PROGRESS NOTES
" [facility]     Psychiatric Progress Note    Patient Name: Honorio Vigil Jr.  : 1965  MRN: [id number]  Primary Care Physician:  Roni Gray MD  Date of admission: 2023    Subjective   Subjective     Patient seen and chart reviewed, discussed with staff.    Chief Complaint: Depression, leg abscess      HPI:     The patient reported anxiety is in the depression and staff.  Continues to have poor self-care with decreased ADLs.    Wound being dressed daily.  Patient reports some discomfort and pain when walking but not debilitating.    Patient states he does not feel he has made much progress with his mood.  However he looks a little brighter and is more engaging talks little bit more.  Continues to have limited self-care and encouraged to get up and shower today.  Reports his mood is the same.  Reports that he continues to ruminate about his life not seeing his kids did not have anywhere to go.  Feels hopeless.  States he begins having suicidal ideations when he thinks about his life.    Medicine team following to assist with medical issues including antibiotic therapy for abscess      Objective   Objective     Vitals:   Temp:  [98.2 °F (36.8 °C)-98.4 °F (36.9 °C)] 98.4 °F (36.9 °C)  Heart Rate:  [] 103  Resp:  [16-18] 16  BP: (128-133)/(81-84) 133/81          Mental Status Exam:      Appearance:   Disheveled, unkempt  Reliability:   Fair  Eye Contact:   Good, improved  Concentration/Focus:    Attentive to the interview  Behaviors:    No agitation  Memory :    Intact  Speech:    Normal rate Future volume but a little stronger voice today  Language:   Appropriate  Mood :    \"Same\" reports depression  Affect:    Blunted  Thought process:    Goal directed, linear, no thought disorganization  Thought Content:    Reports having thoughts of death and suicide but no plan or intent, no homicidal ideations, no hallucinations  Insight:   Fair  Judgement:    Intact, no behavioral " disturbance      Result Review    Result Review:  I have personally reviewed the results from the time of this admission to 12/12/2023 10:31 EST and agree with these findings:  [x]  Laboratory  [x]  Microbiology  []  Radiology  []  EKG/Telemetry   []  Cardiology/Vascular   []  Pathology  []  Old records  []  Other:  Most notable findings include: Culture results    Lab Results (last 24 hours)       Procedure Component Value Units Date/Time    Wound Culture - Wound, Leg, Left [[id number]]  (Abnormal) Collected: 12/10/23 1559    Specimen: Wound from Leg, Left Updated: 12/12/23 0825     Wound Culture Moderate growth (3+) Gram Positive Cocci      Moderate growth (3+) Normal Skin Carlie     Gram Stain Few (2+) WBCs seen      Few (2+) Gram positive cocci in pairs and clusters    Basic Metabolic Panel [[id number]]  (Abnormal) Collected: 12/12/23 0441    Specimen: Blood from Arm, Left Updated: 12/12/23 0538     Glucose 109 mg/dL      BUN 11 mg/dL      Creatinine 0.88 mg/dL      Sodium 138 mmol/L      Potassium 3.9 mmol/L      Chloride 101 mmol/L      CO2 23.8 mmol/L      Calcium 10.0 mg/dL      BUN/Creatinine Ratio 12.5     Anion Gap 13.2 mmol/L      eGFR 99.7 mL/min/1.73     Narrative:      GFR Normal >60  Chronic Kidney Disease <60  Kidney Failure <15      Magnesium [[id number]]  (Normal) Collected: 12/12/23 0441    Specimen: Blood from Arm, Left Updated: 12/12/23 0538     Magnesium 2.2 mg/dL     Phosphorus [[id number]]  (Normal) Collected: 12/12/23 0441    Specimen: Blood from Arm, Left Updated: 12/12/23 0538     Phosphorus 2.7 mg/dL     CBC & Differential [[id number]]  (Abnormal) Collected: 12/12/23 0441    Specimen: Blood from Arm, Left Updated: 12/12/23 0512    Narrative:      The following orders were created for panel order CBC & Differential.  Procedure                               Abnormality         Status                     ---------                               -----------         ------                     CBC  Auto Differential[[id number]]        Abnormal            Final result                 Please view results for these tests on the individual orders.    CBC Auto Differential [[id number]]  (Abnormal) Collected: 12/12/23 0441    Specimen: Blood from Arm, Left Updated: 12/12/23 0512     WBC 11.15 10*3/mm3      RBC 5.70 10*6/mm3      Hemoglobin 16.8 g/dL      Hematocrit 51.1 %      MCV 89.6 fL      MCH 29.5 pg      MCHC 32.9 g/dL      RDW 13.5 %      RDW-SD 44.8 fl      MPV 11.0 fL      Platelets 399 10*3/mm3      Neutrophil % 48.6 %      Lymphocyte % 40.0 %      Monocyte % 6.9 %      Eosinophil % 3.2 %      Basophil % 0.9 %      Immature Grans % 0.4 %      Neutrophils, Absolute 5.41 10*3/mm3      Lymphocytes, Absolute 4.46 10*3/mm3      Monocytes, Absolute 0.77 10*3/mm3      Eosinophils, Absolute 0.36 10*3/mm3      Basophils, Absolute 0.10 10*3/mm3      Immature Grans, Absolute 0.05 10*3/mm3      nRBC 0.0 /100 WBC     Blood Culture - Blood, Arm, Left [[id number]]  (Normal) Collected: 12/09/23 1620    Specimen: Blood from Arm, Left Updated: 12/11/23 1645     Blood Culture No growth at 2 days    Blood Culture - Blood, Arm, Right [[id number]]  (Normal) Collected: 12/09/23 1620    Specimen: Blood from Arm, Right Updated: 12/11/23 1645     Blood Culture No growth at 2 days                Medications:   amLODIPine, 10 mg, Oral, Q24H  doxycycline, 100 mg, Oral, BID  sertraline, 50 mg, Oral, Daily          Assessment / Plan       Active Hospital Problems:  Active Hospital Problems    Diagnosis     Cellulitis and abscess of left leg     Severe recurrent major depression without psychotic features     HTN (hypertension)     Dermatitis     Nicotine addiction     Leukocytosis     Hypokalemia     Cannabis abuse        Plan:     Add aripiprazole to her regimen for depression augmentation  Awaits sensitivity of wound culture for appropriateness of antibiotic  Patient get up and move about the unit and be more engaged, attend to ADLs  Work  on mood stabilization and abatement of any suicidal ideation or psychosis.  Work on appropriate safety plan  Continue supportive therapy  Patient to engage in all group and individual treatment modalities available on the unit  Obtain collateral information if possible  Titrate medications as clinically indicated  Work on appropriate disposition follow-up including referrals to substance abuse treatment if indicated      Disposition:  I expect patient to be discharged 2 to 3 days.    Part of this note may be an electronic transcription/translation of spoken language to printed text using the Dragon dictation system.         Electronically signed by Mynor Vela MD, 12/12/23, 10:31 AM EST.

## 2023-12-12 NOTE — PROGRESS NOTES
[facility]   Hospitalist Progress Note  Date: 2023  Patient Name: Honorio Vigil Jr.  : 1965  MRN: [id number]  Date of admission: 2023  Room/Bed: [facility]/1      Subjective   Subjective     Chief Complaint: Hypertension and left lower extremity wound with concern for abscess    Summary:Honorio Vigil Jr. is a 58 y.o. male with history of hypertension, ADHD, anxiety, dyslipidemia, seizures, substance abuse, history of abscess on his right foot, hospitalized under psychiatric circumstances for treatment of management of depression, medicine team consulted for cellulitis and abscess of the left lower extremity and uncontrolled hypertension, wound culture ordered, placed on doxycycline, started Norvasc for blood pressure control.    Interval Followup:   Able to be present during wound change today.  Minimal surrounding erythema from wound, from an infectious standpoint looking much better.  However patient with rough edges to wound, will likely require prolonged healing.  Encouraging patient to follow-up with wound care on discharge to ensure appropriate healing and no return of cellulitis.    Objective   Objective     Vitals:   Temp:  [98.2 °F (36.8 °C)-98.4 °F (36.9 °C)] 98.4 °F (36.9 °C)  Heart Rate:  [] 103  Resp:  [16-18] 16  BP: (128-133)/(81-84) 133/81    Physical Exam   General: Awake, alert, NAD  Cardiovascular: RRR, no murmurs   Pulmonary: CTA bilaterally; no wheezes; no conversational dyspnea  Gastrointestinal: S/ND/NT, +BS  Musculoskeletal: No gross deformities  Skin: Minimal surrounding erythema, no purulent drainage noted.  Neuro: Alert, awake, oriented x 3 speech clear; no tremor  : No Abreu catheter; no suprapubic tenderness    Result Review    Result Review:  I have personally reviewed these results:  [x]  Laboratory      Lab 23  0441 23  0418 12/10/23  0610   WBC 11.15* 10.91* 13.06*   HEMOGLOBIN 16.8 16.9 16.0   HEMATOCRIT 51.1* 50.5 48.8    PLATELETS 399 367 368   NEUTROS ABS 5.41 6.29 8.69*   IMMATURE GRANS (ABS) 0.05 0.06* 0.07*   LYMPHS ABS 4.46* 3.52* 3.14*   MONOS ABS 0.77 0.68 0.78   EOS ABS 0.36 0.29 0.30   MCV 89.6 89.7 90.4         Lab 12/12/23  0441 12/11/23  0418 12/08/23  2136   SODIUM 138 138 138   POTASSIUM 3.9 3.5 3.3*   CHLORIDE 101 103 100   CO2 23.8 24.1 26.9   ANION GAP 13.2 10.9 11.1   BUN 11 9 8   CREATININE 0.88 0.90 0.96   EGFR 99.7 99.0 91.6   GLUCOSE 109* 111* 75   CALCIUM 10.0 9.9 10.0   MAGNESIUM 2.2 2.2  --    PHOSPHORUS 2.7 2.4*  --    TSH  --   --  0.424         Lab 12/08/23  2136   TOTAL PROTEIN 7.6   ALBUMIN 4.3   GLOBULIN 3.3   ALT (SGPT) 11   AST (SGOT) 17   BILIRUBIN 0.3   ALK PHOS 128*                     Brief Urine Lab Results  (Last result in the past 365 days)        Color   Clarity   Blood   Leuk Est   Nitrite   Protein   CREAT   Urine HCG        05/18/23 1806 Dark Yellow   Cloudy   Negative   Negative   Negative   30 mg/dL (1+)                 [x]  Microbiology   Microbiology Results (last 10 days)       Procedure Component Value - Date/Time    Wound Culture - Wound, Leg, Left [[id number]]  (Abnormal) Collected: 12/10/23 1559    Lab Status: Preliminary result Specimen: Wound from Leg, Left Updated: 12/12/23 0825     Wound Culture Moderate growth (3+) Gram Positive Cocci      Moderate growth (3+) Normal Skin Carlie     Gram Stain Few (2+) WBCs seen      Few (2+) Gram positive cocci in pairs and clusters    Blood Culture - Blood, Arm, Left [[id number]]  (Normal) Collected: 12/09/23 1620    Lab Status: Preliminary result Specimen: Blood from Arm, Left Updated: 12/11/23 1645     Blood Culture No growth at 2 days    Blood Culture - Blood, Arm, Right [[id number]]  (Normal) Collected: 12/09/23 1620    Lab Status: Preliminary result Specimen: Blood from Arm, Right Updated: 12/11/23 1645     Blood Culture No growth at 2 days          [x]  Radiology  No radiology results for the last 7 days  []  EKG/Telemetry   []   Cardiology/Vascular   []  Pathology  []  Old records  []  Other:    Assessment & Plan   Assessment / Plan     Assessment:  Cellulitis of the left lower extremity and right thigh, with concern for staphylococcal infection  Uncontrolled hypertension  Hypophosphatemia  Severe recurrent major depression with psychotic features  History of hypertension  Dermatitis  Current infection  Cannabis abuse      Plan:  Culture remains no growth to date  Wound culture preliminary result showing few WBCs along with few gram-positive cocci in pairs and clusters.  Currently on doxycycline 100 mg twice daily.  Continue.  Norvasc increased to 10 mg this hospitalization.  Blood pressure remained stable on current dose  Continue wound care.  Patient will need outpatient wound care follow-up to ensure appropriate healing  Phosphate appropriate today on labs  We will continue to follow the patient this hospitalization.  Continue rest of the current management.       DVT prophylaxis:  Mechanical DVT prophylaxis orders are present.    CODE STATUS:   Code Status (Patient has no pulse and is not breathing): CPR (Attempt to Resuscitate)  Medical Interventions (Patient has pulse or is breathing): Full Support

## 2023-12-13 RX ADMIN — AMLODIPINE BESYLATE 10 MG: 10 TABLET ORAL at 08:16

## 2023-12-13 RX ADMIN — TRAZODONE HYDROCHLORIDE 100 MG: 50 TABLET ORAL at 20:35

## 2023-12-13 RX ADMIN — ARIPIPRAZOLE 5 MG: 5 TABLET ORAL at 08:16

## 2023-12-13 RX ADMIN — SERTRALINE HYDROCHLORIDE 50 MG: 50 TABLET ORAL at 08:17

## 2023-12-13 RX ADMIN — DOXYCYCLINE 100 MG: 100 CAPSULE ORAL at 08:16

## 2023-12-13 RX ADMIN — DOXYCYCLINE 100 MG: 100 CAPSULE ORAL at 20:34

## 2023-12-13 NOTE — PLAN OF CARE
"Goal Outcome Evaluation:  Plan of Care Reviewed With: patient  Patient Agreement with Plan of Care: agrees      Pt withdrawn to room, pleasant upon approach. Pt stated that he \"doesn't feel any different yet\" when talking about medication. Pt stated that his anxiety was rated at 7, and depression 9. Denied SI, HI, AVH.             "

## 2023-12-13 NOTE — PLAN OF CARE
Goal Outcome Evaluation:  Plan of Care Reviewed With: patient  Patient Agreement with Plan of Care: agrees   PATIENT ALERT AND ORIENTED AND COMPLIANT WITH MEDICATIONS. PATIENT DENIES S/I, H/I OR HALLUCINATIONS. PATIENT REPORTS HIS MOOD IS ABOUT THE SAME AND HAS BEEN WITHDRAWN TO BED MOST OF THE DAY. NO INAPPROPRIATE OR AGGRESSIVE BEHAVIOR NOTED. WILL CONTINUE TO MONITOR.

## 2023-12-13 NOTE — PROGRESS NOTES
" [facility]     Psychiatric Progress Note    Patient Name: Honorio Vgiil Jr.  : 1965  MRN: [id number]  Primary Care Physician:  Roni Gray MD  Date of admission: 2023    Subjective   Subjective     Patient seen and chart reviewed, discussed with staff.    Chief Complaint: Depression, suicidal ideations      HPI:     Staff jarett the patient has told them he is not doing any better.  Rated anxiety as a 7 depression as a 9.  Reports that he did not sleep well, but staff reports he slept well.  He has been isolative and regressed to his room.    Patient reports he slept poorly last night.  Asked about his day yesterday and acknowledges that he was up and out of his room very rarely.  He stays regressed and isolative to his room.  Discussed that little activity and napping throughout the day will interfere with sleep.  Also discussed that hospitals not comfortable places to sleep.  Was given trazodone for sleep.  He does not appear to be overly tired fatigued this morning.    Patient is calm and cooperative.  Makes fair eye contact.  When asked about suicidal ideations he states, \"a couple times.\"  Has no support system.    Get up and showered yesterday his ADLs are improved today.  Continues to be somewhat difficult to engage.  He does appear somewhat brighter and is more talkative.  He is showing some improvement    Discussed with the patient concerns of further infection in his leg because it cannot be sutured.  Discussed leg wound with Dr. Early yesterday and treatment with antibiotics.  Concerned about his being homeless and developing chronic infection or worsening infection.    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  Also had prescriptions for benzodiazepines and opiates but has tested positive for these substances.    Objective   Objective     Vitals: " "  Temp:  [97.5 °F (36.4 °C)-97.9 °F (36.6 °C)] 97.5 °F (36.4 °C)  Heart Rate:  [] 101  Resp:  [18] 18  BP: (112-119)/(83-95) 119/95          Mental Status Exam:      Appearance:   Well-developed, well nourished, calm, cooperative  Reliability:   Fair  Eye Contact:   Fair, improving and more engaging  Concentration/Focus:    Attentive  Behaviors:    No restlessness or agitation, isolative to his room  Memory :    Intact  Speech:    Minimal, normal rate  Language:   Appropriate  Mood :    \"About the same\" and described it as depressed  Affect:    Congruent with stated mood  Thought process:    Guarded, goal directed, linear  Thought Content:    Reports some vague suicidal ideations on 2 occasions yesterday, no homicidal ideations, no hallucinations  Insight:   Fair  Judgement:    No behavioral disturbance      Result Review    Result Review:  I have personally reviewed the results from the time of this admission to 12/13/2023 09:57 EST and agree with these findings:  [x]  Laboratory  []  Microbiology  []  Radiology  []  EKG/Telemetry   []  Cardiology/Vascular   []  Pathology  []  Old records  []  Other:  Most notable findings include: Leg wound culture grew Staph aureus, sensitivity not back    Lab Results (last 24 hours)       Procedure Component Value Units Date/Time    Wound Culture - Wound, Leg, Left [[id number]]  (Abnormal) Collected: 12/10/23 1559    Specimen: Wound from Leg, Left Updated: 12/13/23 0717     Wound Culture Moderate growth (3+) Staphylococcus aureus      Moderate growth (3+) Normal Skin Carlie     Gram Stain Few (2+) WBCs seen      Few (2+) Gram positive cocci in pairs and clusters    Blood Culture - Blood, Arm, Left [[id number]]  (Normal) Collected: 12/09/23 1620    Specimen: Blood from Arm, Left Updated: 12/12/23 1645     Blood Culture No growth at 3 days    Blood Culture - Blood, Arm, Right [[id number]]  (Normal) Collected: 12/09/23 1620    Specimen: Blood from Arm, Right Updated: 12/12/23 " 1645     Blood Culture No growth at 3 days                Medications:   amLODIPine, 10 mg, Oral, Q24H  ARIPiprazole, 5 mg, Oral, Daily  doxycycline, 100 mg, Oral, BID  sertraline, 50 mg, Oral, Daily          Assessment / Plan       Active Hospital Problems:  Active Hospital Problems    Diagnosis     Cellulitis and abscess of left leg     Severe recurrent major depression without psychotic features     HTN (hypertension)     Dermatitis     Nicotine addiction     Leukocytosis     Hypokalemia     Cannabis abuse        Plan:     Continue current treatment protocol and titrate medications as clinically indicated  Continue antibiotic therapy  Work on appropriate disposition continue to discuss need to keep wound clean and dry and maintain antibiotic therapy  Work on mood stabilization and abatement of any suicidal ideation or psychosis.  Work on appropriate safety plan  Continue supportive therapy  Patient to engage in all group and individual treatment modalities available on the unit  Obtain collateral information if possible  Titrate medications as clinically indicated  Work on appropriate disposition follow-up including referrals to substance abuse treatment if indicated      Disposition:  I expect patient to be discharged 1 to 2 days.    Part of this note may be an electronic transcription/translation of spoken language to printed text using the Dragon dictation system.         Electronically signed by Mynor Vela MD, 12/13/23, 9:57 AM EST.

## 2023-12-14 VITALS
WEIGHT: 195.2 LBS | RESPIRATION RATE: 18 BRPM | TEMPERATURE: 97.9 F | HEIGHT: 72 IN | BODY MASS INDEX: 26.44 KG/M2 | OXYGEN SATURATION: 98 % | DIASTOLIC BLOOD PRESSURE: 86 MMHG | HEART RATE: 102 BPM | SYSTOLIC BLOOD PRESSURE: 134 MMHG

## 2023-12-14 PROBLEM — E87.6 HYPOKALEMIA: Status: RESOLVED | Noted: 2023-12-09 | Resolved: 2023-12-14

## 2023-12-14 LAB
BACTERIA SPEC AEROBE CULT: ABNORMAL
BACTERIA SPEC AEROBE CULT: ABNORMAL
BACTERIA SPEC AEROBE CULT: NORMAL
BACTERIA SPEC AEROBE CULT: NORMAL
GRAM STN SPEC: ABNORMAL
GRAM STN SPEC: ABNORMAL

## 2023-12-14 RX ORDER — AMLODIPINE BESYLATE 10 MG/1
10 TABLET ORAL
Qty: 30 TABLET | Refills: 0 | Status: SHIPPED | OUTPATIENT
Start: 2023-12-15

## 2023-12-14 RX ORDER — ARIPIPRAZOLE 5 MG/1
5 TABLET ORAL DAILY
Qty: 30 TABLET | Refills: 1 | Status: SHIPPED | OUTPATIENT
Start: 2023-12-15

## 2023-12-14 RX ORDER — DOXYCYCLINE 100 MG/1
100 CAPSULE ORAL 2 TIMES DAILY
Qty: 3 CAPSULE | Refills: 0 | Status: SHIPPED | OUTPATIENT
Start: 2023-12-14 | End: 2023-12-16

## 2023-12-14 RX ORDER — TRAZODONE HYDROCHLORIDE 100 MG/1
100 TABLET ORAL NIGHTLY PRN
Qty: 30 TABLET | Refills: 0 | Status: SHIPPED | OUTPATIENT
Start: 2023-12-14

## 2023-12-14 RX ADMIN — AMLODIPINE BESYLATE 10 MG: 10 TABLET ORAL at 08:19

## 2023-12-14 RX ADMIN — ARIPIPRAZOLE 5 MG: 5 TABLET ORAL at 08:19

## 2023-12-14 RX ADMIN — SERTRALINE HYDROCHLORIDE 50 MG: 50 TABLET ORAL at 08:19

## 2023-12-14 RX ADMIN — DOXYCYCLINE 100 MG: 100 CAPSULE ORAL at 08:19

## 2023-12-14 NOTE — PLAN OF CARE
Goal Outcome Evaluation:  Plan of Care Reviewed With: patient  Patient Agreement with Plan of Care: agrees   Pt endorsed talking to [facility] this evening, stating that he planned to go there after discharge. Pt rated anxiety and depression 7. Denied SI, HI, AVH. Pt mood stable. Up to dayroom. Pt med compliant.

## 2023-12-14 NOTE — SIGNIFICANT NOTE
12/13/23 1503   Plan   Plan Pt accepted to [facility]. Pt reports he made contact with program today and has a bed.   Patient/Family in Agreement with Plan yes   Plan Comments Anticipate Pt discharge to [facility] 12-14-23   Final Discharge Disposition Code 30 - still a patient

## 2023-12-14 NOTE — SIGNIFICANT NOTE
12/14/23 1011   Plan   Patient/Family in Agreement with Plan yes   Provided Post Acute Provider List? Yes   Post Acute Provider List Outpatient Therapy   Provided Post Acute Provider Quality & Resource List? Yes   Delivered To Patient   Method of Delivery In person   Final Discharge Disposition Code   ([facility])   Final Note Patient discharging voluntarily to sober living. Pt requesting transportation assistance, we will provide him with a cab voucher. Pt will follow treatment recommendations established by [facility] to include follow up  services for medication management.

## 2023-12-14 NOTE — SIGNIFICANT NOTE
12/13/23 1503   Plan   Plan Pt accepted to [facility]. Pt reports he made contact with program today and has a bed.   Patient/Family in Agreement with Plan yes   Plan Comments Anticipate Pt discharge to [facility] 12-14-23   Final Discharge Disposition Code 30 - still a patient

## 2023-12-14 NOTE — DISCHARGE SUMMARY
[facility]         DISCHARGE SUMMARY    Patient Name: Honorio Vigil Jr.  : 1965  MRN: [id number]    Date of Admission: 2023  Date of Discharge: 2023  Primary Care Physician: Roni Gray MD    Consults       Date and Time Order Name Status Description    2023 11:25 AM Inpatient Hospitalist Consult              Presenting Problem:   Depression [F32.A]    Active and Resolved Hospital Problems:  Active Hospital Problems    Diagnosis POA   • Cellulitis and abscess of left leg [L03.116, L02.416] Yes   • Severe recurrent major depression without psychotic features [F33.2] Yes   • HTN (hypertension) [I10] Yes   • Dermatitis [L30.9] Yes   • Nicotine addiction [F17.200] Yes   • Leukocytosis [D72.829] Yes   • Cannabis abuse [F12.10] Yes      Resolved Hospital Problems    Diagnosis POA   • Hypokalemia [E87.6] Yes         Hospital Course     Hospital Course:  Honorio Vigil Jr. is a 58 y.o. male with a voluntary basis for depression with suicidal ideations.  Patient also with possible cellulitis and abscess of his lower leg and medicine consult was obtained.  Patient has history of hypertension and has been off medications and had elevated blood pressure on admission.    Medicine team saw the patient as well as wound care.  Patient's abscess did break open during his stay.  Cultures were grew Staph aureus.  He had been started on doxycycline and will need to complete a 7-day course for treatment of the like.  He had daily wound cleansing and dressings.  He will continue these at discharge.  He will need follow-up with his primary care provider in 1 week and also made a referral to wound care.    Patient initially was very difficult to engage and a very flat affect.  He has a limited support system.  He has a history of substance use and reported being sober.  He had a toxicology screen positive for amphetamines, but has been getting prescriptions for  "stimulants.    Patient was started on sertraline for his depression.  He tolerates medication well with no side effects.    Given the severity of his depression as well as his chronicity is felt he needed an adjunct to his antidepressant therapy and he was started on aripiprazole 5 mg daily.    Patient's been calm and cooperative throughout his stay.  He has been rather regressed and isolative to his room.  He had no restlessness or agitation.  Patient has had an improvement in his mood.  Initially he was not intended to his ADLs, but as his stay went on his affect became brighter, he became more engaging and was more talkative.  Began taking better care of his daily hygiene.    Patient is future oriented goal directed.  Patient is asking for refills of his antihypertensive before he leaves.  Patient took the initiative to call [facility] on his own.  The patient understands importance of keeping his wound clean and asking about doing dressing changes.    On day of discharge he is calm, cooperative, no acute psychomotor restlessness or agitation.  He is future oriented goal directed.  Speech is articulate, fluent, normal rate and volume.  Language is appropriate relevant.  Mood described as \"better, but still down.\"  Affect is blunted but improved from time of admission.  Thought processes are linear and goal directed there is no thought disorder disorganization noted.  Thought content is negative for suicidal homicidal ideations and there is no hallucinations.  Insight and judgment are intact      DISCHARGE Follow Up Recommendations for labs and diagnostics: Routine labs for primary care for general health, primary care, wound care, sober living, [facility]      Day of Discharge     Vital Signs:  Temp:  [97.9 °F (36.6 °C)-98.4 °F (36.9 °C)] 97.9 °F (36.6 °C)  Heart Rate:  [] 102  Resp:  [18] 18  BP: (134-141)/(82-86) 134/86      Pertinent  and/or Most Recent Results     LAB RESULTS:      Lab 12/12/23  0441 " 12/11/23 0418 12/10/23  0610 12/08/23  2136   WBC 11.15* 10.91* 13.06* 13.08*   HEMOGLOBIN 16.8 16.9 16.0 15.6   HEMATOCRIT 51.1* 50.5 48.8 47.5   PLATELETS 399 367 368 352   NEUTROS ABS 5.41 6.29 8.69* 8.16*   IMMATURE GRANS (ABS) 0.05 0.06* 0.07* 0.05   LYMPHS ABS 4.46* 3.52* 3.14* 3.63*   MONOS ABS 0.77 0.68 0.78 0.79   EOS ABS 0.36 0.29 0.30 0.37   MCV 89.6 89.7 90.4 91.3         Lab 12/12/23  0441 12/11/23 0418 12/08/23 2136   SODIUM 138 138 138   POTASSIUM 3.9 3.5 3.3*   CHLORIDE 101 103 100   CO2 23.8 24.1 26.9   ANION GAP 13.2 10.9 11.1   BUN 11 9 8   CREATININE 0.88 0.90 0.96   EGFR 99.7 99.0 91.6   GLUCOSE 109* 111* 75   CALCIUM 10.0 9.9 10.0   MAGNESIUM 2.2 2.2  --    PHOSPHORUS 2.7 2.4*  --    TSH  --   --  0.424         Lab 12/08/23 2136   TOTAL PROTEIN 7.6   ALBUMIN 4.3   GLOBULIN 3.3   ALT (SGPT) 11   AST (SGOT) 17   BILIRUBIN 0.3   ALK PHOS 128*                                     Lab 12/08/23 2136   ETHANOL PCT <0.010   ETHANOL MGDL <10         Lab 12/09/23  0032   AMPH/METHAM SCREEN, URINE Positive*   BENZODIAZEPINE SCREEN, URINE Negative   COCAINE SCREEN, URINE Negative   OPIATES Negative   THC URINE SCREEN Positive*   METHADONE SCREEN, URINE Negative     Brief Urine Lab Results  (Last result in the past 365 days)        Color   Clarity   Blood   Leuk Est   Nitrite   Protein   CREAT   Urine HCG        05/18/23 1806 Dark Yellow   Cloudy   Negative   Negative   Negative   30 mg/dL (1+)                                       Imaging Results (Last 7 Days)       ** No results found for the last 168 hours. **             Labs Pending at Discharge:  Pending Labs       Order Current Status    Blood Culture - Blood, Arm, Left Preliminary result    Blood Culture - Blood, Arm, Right Preliminary result                 Discharge Details        Discharge Medications        New Medications        Instructions Start Date   amLODIPine 10 MG tablet  Commonly known as: NORVASC   10 mg, Oral, Every 24 Hours  Scheduled   Start Date: December 15, 2023     ARIPiprazole 5 MG tablet  Commonly known as: ABILIFY   5 mg, Oral, Daily   Start Date: December 15, 2023     doxycycline 100 MG capsule  Commonly known as: MONODOX   100 mg, Oral, 2 Times Daily      sertraline 50 MG tablet  Commonly known as: ZOLOFT   50 mg, Oral, Daily   Start Date: December 15, 2023     traZODone 100 MG tablet  Commonly known as: DESYREL   100 mg, Oral, Nightly PRN             Stop These Medications      Adderall 30 MG tablet  Generic drug: amphetamine-dextroamphetamine     amphetamine-dextroamphetamine 30 MG tablet  Commonly known as: ADDERALL     clonazePAM 1 MG tablet  Commonly known as: KlonoPIN     gabapentin 600 MG tablet  Commonly known as: NEURONTIN     QUEtiapine 50 MG tablet  Commonly known as: SEROquel              Allergies   Allergen Reactions   • Naproxen GI Intolerance         Discharge Disposition:  Psychiatric Hospital or Unit (DC - External or [facility])    Diet:  Hospital:  Diet Order   Procedures   • Diet: Regular/House Diet; Texture: Regular Texture (IDDSI 7); Fluid Consistency: Thin (IDDSI 0)         Discharge Activity: Ad constantin.  Activity Instructions       Activity as Tolerated              Discharge Condition: Stable    CODE STATUS:  Code Status and Medical Interventions:   Ordered at: 12/09/23 0319     Code Status (Patient has no pulse and is not breathing):    CPR (Attempt to Resuscitate)     Medical Interventions (Patient has pulse or is breathing):    Full Support         No future appointments.    Additional Instructions for the Follow-ups that You Need to Schedule       Ambulatory Referral to Wound Clinic   As directed      Discharge Follow-up with PCP   As directed       Currently Documented PCP:    Roni Gray MD    PCP Phone Number:    [phone]     Follow Up Details: 1 week        Discharge Follow-up with Specified Provider: Dave   As directed      To: [facility]        Discharge Follow-up with Specified  Provider: [facility]   As directed      To: [facility]                Time spent on Discharge including face to face service: 35 minutes    Part of this note may be an electronic transcription/translation of spoken language to printed text using the Dragon dictation system.        Electronically signed by Mynor Vela MD, 12/14/23, 9:41 AM EST.

## 2023-12-14 NOTE — PLAN OF CARE
Goal Outcome Evaluation:  Plan of Care Reviewed With: patient  Patient Agreement with Plan of Care: agrees   PATIENT HAS REACHED ALL GOALS AND WILL BE DISCHARGED FROM UNIT. PATIENT GOING TO [facility] VIA CAB FOR FURTHER TREATMENT.

## 2024-03-06 ENCOUNTER — HOSPITAL ENCOUNTER (INPATIENT)
Facility: HOSPITAL | Age: 59
LOS: 4 days | Discharge: HOME | DRG: 504 | End: 2024-03-10
Attending: EMERGENCY MEDICINE | Admitting: FAMILY MEDICINE
Payer: COMMERCIAL

## 2024-03-06 ENCOUNTER — APPOINTMENT (OUTPATIENT)
Dept: GENERAL RADIOLOGY | Facility: HOSPITAL | Age: 59
DRG: 504 | End: 2024-03-06
Payer: COMMERCIAL

## 2024-03-06 DIAGNOSIS — M86.9 OSTEOMYELITIS OF GREAT TOE OF RIGHT FOOT: Primary | ICD-10-CM

## 2024-03-06 DIAGNOSIS — L03.031 CELLULITIS OF GREAT TOE OF RIGHT FOOT: ICD-10-CM

## 2024-03-06 PROBLEM — M46.28 OSTEOMYELITIS OF COCCYX: Status: ACTIVE | Noted: 2024-03-06

## 2024-03-06 LAB
ALBUMIN SERPL-MCNC: 4.2 G/DL (ref 3.5–5.2)
ALBUMIN/GLOB SERPL: 1.2 G/DL
ALP SERPL-CCNC: 143 U/L (ref 39–117)
ALT SERPL W P-5'-P-CCNC: 10 U/L (ref 1–41)
ANION GAP SERPL CALCULATED.3IONS-SCNC: 10.9 MMOL/L (ref 5–15)
AST SERPL-CCNC: 19 U/L (ref 1–40)
BASOPHILS # BLD AUTO: 0.08 10*3/MM3 (ref 0–0.2)
BASOPHILS NFR BLD AUTO: 0.7 % (ref 0–1.5)
BILIRUB SERPL-MCNC: 0.3 MG/DL (ref 0–1.2)
BUN SERPL-MCNC: 10 MG/DL (ref 6–20)
BUN/CREAT SERPL: 10.6 (ref 7–25)
CALCIUM SPEC-SCNC: 9.9 MG/DL (ref 8.6–10.5)
CHLORIDE SERPL-SCNC: 102 MMOL/L (ref 98–107)
CO2 SERPL-SCNC: 25.1 MMOL/L (ref 22–29)
CREAT SERPL-MCNC: 0.94 MG/DL (ref 0.76–1.27)
D-LACTATE SERPL-SCNC: 0.8 MMOL/L (ref 0.5–2)
DEPRECATED RDW RBC AUTO: 47.6 FL (ref 37–54)
EGFRCR SERPLBLD CKD-EPI 2021: 94 ML/MIN/1.73
EOSINOPHIL # BLD AUTO: 0.24 10*3/MM3 (ref 0–0.4)
EOSINOPHIL NFR BLD AUTO: 2.1 % (ref 0.3–6.2)
ERYTHROCYTE [DISTWIDTH] IN BLOOD BY AUTOMATED COUNT: 14.1 % (ref 12.3–15.4)
GLOBULIN UR ELPH-MCNC: 3.4 GM/DL
GLUCOSE SERPL-MCNC: 84 MG/DL (ref 65–99)
HCT VFR BLD AUTO: 47.9 % (ref 37.5–51)
HGB BLD-MCNC: 15.7 G/DL (ref 13–17.7)
HOLD SPECIMEN: NORMAL
IMM GRANULOCYTES # BLD AUTO: 0.1 10*3/MM3 (ref 0–0.05)
IMM GRANULOCYTES NFR BLD AUTO: 0.9 % (ref 0–0.5)
LYMPHOCYTES # BLD AUTO: 3.37 10*3/MM3 (ref 0.7–3.1)
LYMPHOCYTES NFR BLD AUTO: 29.2 % (ref 19.6–45.3)
MCH RBC QN AUTO: 30 PG (ref 26.6–33)
MCHC RBC AUTO-ENTMCNC: 32.8 G/DL (ref 31.5–35.7)
MCV RBC AUTO: 91.4 FL (ref 79–97)
MONOCYTES # BLD AUTO: 0.59 10*3/MM3 (ref 0.1–0.9)
MONOCYTES NFR BLD AUTO: 5.1 % (ref 5–12)
NEUTROPHILS NFR BLD AUTO: 62 % (ref 42.7–76)
NEUTROPHILS NFR BLD AUTO: 7.15 10*3/MM3 (ref 1.7–7)
NRBC BLD AUTO-RTO: 0 /100 WBC (ref 0–0.2)
PLATELET # BLD AUTO: 328 10*3/MM3 (ref 140–450)
PMV BLD AUTO: 10.9 FL (ref 6–12)
POTASSIUM SERPL-SCNC: 3.9 MMOL/L (ref 3.5–5.2)
PROT SERPL-MCNC: 7.6 G/DL (ref 6–8.5)
RBC # BLD AUTO: 5.24 10*6/MM3 (ref 4.14–5.8)
SODIUM SERPL-SCNC: 138 MMOL/L (ref 136–145)
WBC NRBC COR # BLD AUTO: 11.53 10*3/MM3 (ref 3.4–10.8)
WHOLE BLOOD HOLD COAG: NORMAL

## 2024-03-06 PROCEDURE — 87040 BLOOD CULTURE FOR BACTERIA: CPT

## 2024-03-06 PROCEDURE — 25810000003 SODIUM CHLORIDE 0.9 % SOLUTION: Performed by: NURSE PRACTITIONER

## 2024-03-06 PROCEDURE — 25010000002 MORPHINE PER 10 MG: Performed by: FAMILY MEDICINE

## 2024-03-06 PROCEDURE — 99285 EMERGENCY DEPT VISIT HI MDM: CPT

## 2024-03-06 PROCEDURE — 85025 COMPLETE CBC W/AUTO DIFF WBC: CPT

## 2024-03-06 PROCEDURE — 80053 COMPREHEN METABOLIC PANEL: CPT

## 2024-03-06 PROCEDURE — 73660 X-RAY EXAM OF TOE(S): CPT

## 2024-03-06 PROCEDURE — 25010000002 KETOROLAC TROMETHAMINE PER 15 MG: Performed by: NURSE PRACTITIONER

## 2024-03-06 PROCEDURE — 25010000002 VANCOMYCIN 5 G RECONSTITUTED SOLUTION: Performed by: NURSE PRACTITIONER

## 2024-03-06 PROCEDURE — 25810000003 LACTATED RINGERS PER 1000 ML: Performed by: FAMILY MEDICINE

## 2024-03-06 PROCEDURE — 36415 COLL VENOUS BLD VENIPUNCTURE: CPT

## 2024-03-06 PROCEDURE — 25010000002 ONDANSETRON PER 1 MG: Performed by: NURSE PRACTITIONER

## 2024-03-06 PROCEDURE — 25010000002 PIPERACILLIN SOD-TAZOBACTAM PER 1 G: Performed by: NURSE PRACTITIONER

## 2024-03-06 PROCEDURE — 86140 C-REACTIVE PROTEIN: CPT | Performed by: FAMILY MEDICINE

## 2024-03-06 PROCEDURE — 25010000002 CEFAZOLIN PER 500 MG: Performed by: NURSE PRACTITIONER

## 2024-03-06 PROCEDURE — 83605 ASSAY OF LACTIC ACID: CPT

## 2024-03-06 RX ORDER — SERTRALINE HYDROCHLORIDE 25 MG/1
50 TABLET, FILM COATED ORAL DAILY
Status: DISCONTINUED | OUTPATIENT
Start: 2024-03-07 | End: 2024-03-10 | Stop reason: HOSPADM

## 2024-03-06 RX ORDER — SODIUM CHLORIDE, SODIUM LACTATE, POTASSIUM CHLORIDE, CALCIUM CHLORIDE 600; 310; 30; 20 MG/100ML; MG/100ML; MG/100ML; MG/100ML
75 INJECTION, SOLUTION INTRAVENOUS CONTINUOUS
Status: ACTIVE | OUTPATIENT
Start: 2024-03-06 | End: 2024-03-07

## 2024-03-06 RX ORDER — HYDRALAZINE HYDROCHLORIDE 20 MG/ML
10 INJECTION INTRAMUSCULAR; INTRAVENOUS EVERY 6 HOURS PRN
Status: DISCONTINUED | OUTPATIENT
Start: 2024-03-06 | End: 2024-03-10 | Stop reason: HOSPADM

## 2024-03-06 RX ORDER — SODIUM CHLORIDE 9 MG/ML
40 INJECTION, SOLUTION INTRAVENOUS AS NEEDED
Status: DISCONTINUED | OUTPATIENT
Start: 2024-03-06 | End: 2024-03-10 | Stop reason: HOSPADM

## 2024-03-06 RX ORDER — SODIUM CHLORIDE 0.9 % (FLUSH) 0.9 %
10 SYRINGE (ML) INJECTION AS NEEDED
Status: DISCONTINUED | OUTPATIENT
Start: 2024-03-06 | End: 2024-03-10 | Stop reason: HOSPADM

## 2024-03-06 RX ORDER — ENOXAPARIN SODIUM 100 MG/ML
40 INJECTION SUBCUTANEOUS DAILY
Status: DISCONTINUED | OUTPATIENT
Start: 2024-03-07 | End: 2024-03-10 | Stop reason: HOSPADM

## 2024-03-06 RX ORDER — AMOXICILLIN 250 MG
2 CAPSULE ORAL 2 TIMES DAILY PRN
Status: DISCONTINUED | OUTPATIENT
Start: 2024-03-06 | End: 2024-03-10 | Stop reason: HOSPADM

## 2024-03-06 RX ORDER — BISACODYL 10 MG
10 SUPPOSITORY, RECTAL RECTAL DAILY PRN
Status: DISCONTINUED | OUTPATIENT
Start: 2024-03-06 | End: 2024-03-10 | Stop reason: HOSPADM

## 2024-03-06 RX ORDER — KETOROLAC TROMETHAMINE 30 MG/ML
30 INJECTION, SOLUTION INTRAMUSCULAR; INTRAVENOUS ONCE
Status: COMPLETED | OUTPATIENT
Start: 2024-03-06 | End: 2024-03-06

## 2024-03-06 RX ORDER — GABAPENTIN 300 MG/1
600 CAPSULE ORAL EVERY 8 HOURS SCHEDULED
Status: DISCONTINUED | OUTPATIENT
Start: 2024-03-06 | End: 2024-03-10 | Stop reason: HOSPADM

## 2024-03-06 RX ORDER — BISACODYL 5 MG/1
5 TABLET, DELAYED RELEASE ORAL DAILY PRN
Status: DISCONTINUED | OUTPATIENT
Start: 2024-03-06 | End: 2024-03-10 | Stop reason: HOSPADM

## 2024-03-06 RX ORDER — SODIUM CHLORIDE 0.9 % (FLUSH) 0.9 %
10 SYRINGE (ML) INJECTION EVERY 12 HOURS SCHEDULED
Status: DISCONTINUED | OUTPATIENT
Start: 2024-03-06 | End: 2024-03-10 | Stop reason: HOSPADM

## 2024-03-06 RX ORDER — TRAZODONE HYDROCHLORIDE 100 MG/1
100 TABLET ORAL NIGHTLY PRN
Status: DISCONTINUED | OUTPATIENT
Start: 2024-03-06 | End: 2024-03-06

## 2024-03-06 RX ORDER — KETOROLAC TROMETHAMINE 30 MG/ML
15 INJECTION, SOLUTION INTRAMUSCULAR; INTRAVENOUS EVERY 6 HOURS PRN
Status: DISCONTINUED | OUTPATIENT
Start: 2024-03-07 | End: 2024-03-09

## 2024-03-06 RX ORDER — ONDANSETRON 2 MG/ML
4 INJECTION INTRAMUSCULAR; INTRAVENOUS ONCE
Status: COMPLETED | OUTPATIENT
Start: 2024-03-06 | End: 2024-03-06

## 2024-03-06 RX ORDER — ARIPIPRAZOLE 5 MG/1
5 TABLET ORAL DAILY
Status: DISCONTINUED | OUTPATIENT
Start: 2024-03-07 | End: 2024-03-10 | Stop reason: HOSPADM

## 2024-03-06 RX ORDER — AMLODIPINE BESYLATE 5 MG/1
10 TABLET ORAL
Status: DISCONTINUED | OUTPATIENT
Start: 2024-03-07 | End: 2024-03-10 | Stop reason: HOSPADM

## 2024-03-06 RX ORDER — QUETIAPINE FUMARATE 100 MG/1
100 TABLET, FILM COATED ORAL NIGHTLY
Status: DISCONTINUED | OUTPATIENT
Start: 2024-03-06 | End: 2024-03-10 | Stop reason: HOSPADM

## 2024-03-06 RX ORDER — MORPHINE SULFATE 2 MG/ML
2 INJECTION, SOLUTION INTRAMUSCULAR; INTRAVENOUS EVERY 4 HOURS PRN
Status: DISCONTINUED | OUTPATIENT
Start: 2024-03-06 | End: 2024-03-09

## 2024-03-06 RX ORDER — VANCOMYCIN 2 GRAM/500 ML IN 0.9 % SODIUM CHLORIDE INTRAVENOUS
20 ONCE
Status: COMPLETED | OUTPATIENT
Start: 2024-03-06 | End: 2024-03-07

## 2024-03-06 RX ORDER — POLYETHYLENE GLYCOL 3350 17 G/17G
17 POWDER, FOR SOLUTION ORAL DAILY PRN
Status: DISCONTINUED | OUTPATIENT
Start: 2024-03-06 | End: 2024-03-10 | Stop reason: HOSPADM

## 2024-03-06 RX ORDER — ONDANSETRON 2 MG/ML
4 INJECTION INTRAMUSCULAR; INTRAVENOUS EVERY 6 HOURS PRN
Status: DISCONTINUED | OUTPATIENT
Start: 2024-03-06 | End: 2024-03-10 | Stop reason: HOSPADM

## 2024-03-06 RX ORDER — NALOXONE HCL 0.4 MG/ML
0.4 VIAL (ML) INJECTION
Status: DISCONTINUED | OUTPATIENT
Start: 2024-03-06 | End: 2024-03-10 | Stop reason: HOSPADM

## 2024-03-06 RX ADMIN — SODIUM CHLORIDE, POTASSIUM CHLORIDE, SODIUM LACTATE AND CALCIUM CHLORIDE 75 ML/HR: 600; 310; 30; 20 INJECTION, SOLUTION INTRAVENOUS at 22:17

## 2024-03-06 RX ADMIN — QUETIAPINE FUMARATE 100 MG: 100 TABLET ORAL at 22:56

## 2024-03-06 RX ADMIN — Medication 10 ML: at 22:21

## 2024-03-06 RX ADMIN — MORPHINE SULFATE 2 MG: 2 INJECTION, SOLUTION INTRAMUSCULAR; INTRAVENOUS at 22:14

## 2024-03-06 RX ADMIN — CEFAZOLIN 1000 MG: 1 INJECTION, POWDER, FOR SOLUTION INTRAMUSCULAR; INTRAVENOUS at 19:47

## 2024-03-06 RX ADMIN — GABAPENTIN 600 MG: 300 CAPSULE ORAL at 22:14

## 2024-03-06 RX ADMIN — VANCOMYCIN HYDROCHLORIDE 2000 MG: 5 INJECTION, POWDER, LYOPHILIZED, FOR SOLUTION INTRAVENOUS at 23:36

## 2024-03-06 RX ADMIN — PIPERACILLIN AND TAZOBACTAM 3.38 G: 3; .375 INJECTION, POWDER, FOR SOLUTION INTRAVENOUS at 20:52

## 2024-03-06 RX ADMIN — KETOROLAC TROMETHAMINE 30 MG: 30 INJECTION, SOLUTION INTRAMUSCULAR; INTRAVENOUS at 19:47

## 2024-03-06 RX ADMIN — ONDANSETRON 4 MG: 2 INJECTION INTRAMUSCULAR; INTRAVENOUS at 19:46

## 2024-03-06 NOTE — Clinical Note
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

## 2024-03-06 NOTE — ED PROVIDER NOTES
Time: 6:13 PM EST  Date of encounter:  3/6/2024  Independent Historian/Clinical History and Information was obtained by:   Patient    History is limited by: N/A    Chief Complaint   Patient presents with    Toe Pain         History of Present Illness:  Patient is a 58 y.o. year old male who presents to the emergency department for evaluation of right great toe pain and erythema that started last night.  Patient states he has a history of this and had surgery due to it.    Patient Care Team  Primary Care Provider: Roni Gray MD    Past Medical History:     Allergies   Allergen Reactions    Naproxen GI Intolerance     Past Medical History:   Diagnosis Date    Abscess of foot     rt    ADHD     Anxiety disorder     Backache     Cellulitis of scrotum     Elevated cholesterol     Hyperlipidemia     Hypertension     Insomnia disorder     Seizures     x2- due to overuse/overdose of meds- years ago per pt. Last one was 15-20 years ago- not on meds    Substance abuse     years ago    Viral gastroenteritis      Past Surgical History:   Procedure Laterality Date    EYE SURGERY      HAND SURGERY Bilateral     INCISION AND DRAINAGE OF WOUND Right 2/14/2023    Procedure: INCISION AND DRAINAGE BONE, right great toe;  Surgeon: Cristo Christy DPM;  Location: [facility] MAIN OR;  Service: Podiatry;  Laterality: Right;    KNEE ARTHROSCOPY Right 05/21/2014    Arthroscopy of right knee, debride medial meniscus. x3    KNEE ARTHROSCOPY W/ MENISCECTOMY Left 08/09/2022    Procedure: KNEE ARTHROSCOPY WITH PARTIAL MEDIAL MENISCECTOMY;  Surgeon: Carlos Hameed MD;  [place] [facility] [place] [facility] [place]  Service: Orthopedics;  Laterality: Left;    SHOULDER ARTHROSCOPY Right      Family History   Problem Relation Age of Onset    Thyroid disease Mother     Diabetes Mother     Cancer Mother         pancreatic,thyroid    Anxiety disorder Mother     Depression Mother     Heart disease Father     Cancer Father         colon    Diabetes Maternal Grandmother      Cancer Paternal Grandmother         lung    Hypertension Neg Hx        Home Medications:  Prior to Admission medications    Medication Sig Start Date End Date Taking? Authorizing Provider   amLODIPine (NORVASC) 10 MG tablet Take 1 tablet by mouth Daily. Indications: High Blood Pressure Disorder 12/15/23   Mynor Vela MD   amphetamine-dextroamphetamine (ADDERALL) 30 MG tablet TAKE ONE TABLET BY MOUTH AT 8 A.M. AND 1/2 TABLET BY MOUTH AT ONE P.M. 12/26/23   Charu Del Castillo MD   ARIPiprazole (ABILIFY) 5 MG tablet Take 1 tablet by mouth Daily. Indications: Major Depressive Disorder 12/15/23   Mynor Vela MD   benzonatate (TESSALON) 100 MG capsule Take 2 capsules by mouth 3 (Three) Times a Day As Needed for Cough for up to 30 doses. 1/22/24   Jaime Palafox PA-C   gabapentin (NEURONTIN) 600 MG tablet Take 1 tablet by mouth 3 times a day. 12/22/23   Charu Del Castillo MD   QUEtiapine (SEROquel) 100 MG tablet  1/22/24   Charu Del Castillo MD   sertraline (ZOLOFT) 50 MG tablet Take 1 tablet by mouth Daily. Indications: Major Depressive Disorder 12/15/23   Mynor Vela MD   traZODone (DESYREL) 100 MG tablet Take 1 tablet by mouth At Night As Needed for Sleep. Indications: Trouble Sleeping 12/14/23   Mynor Vela MD        Social History:   Social History     Tobacco Use    Smoking status: Every Day     Current packs/day: 0.50     Types: Cigarettes     Passive exposure: Current    Smokeless tobacco: Never   Vaping Use    Vaping status: Never Used   Substance Use Topics    Alcohol use: No    Drug use: Not Currently     Frequency: 1.0 times per week     Types: Marijuana     Comment: last used today 12/8/2023         Review of Systems:  Review of Systems   Constitutional:  Negative for chills and fever.   HENT:  Negative for congestion, ear pain and sore throat.    Eyes:  Negative for pain.   Respiratory:  Negative for cough, chest tightness and shortness of breath.    Cardiovascular:  Negative for chest  "pain.   Gastrointestinal:  Negative for abdominal pain, diarrhea, nausea and vomiting.   Genitourinary:  Negative for flank pain and hematuria.   Musculoskeletal:  Positive for arthralgias. Negative for joint swelling.   Skin:  Positive for color change. Negative for pallor.   Neurological:  Negative for seizures and headaches.   All other systems reviewed and are negative.       Physical Exam:  /95   Pulse 100   Temp 98.2 °F (36.8 °C) (Oral)   Resp 16   Ht 182.9 cm (72\")   Wt 104 kg (230 lb 6.1 oz)   SpO2 100%   BMI 31.25 kg/m²         Physical Exam  Vitals and nursing note reviewed.   Constitutional:       General: He is not in acute distress.     Appearance: Normal appearance. He is not toxic-appearing.   HENT:      Head: Normocephalic and atraumatic.      Mouth/Throat:      Mouth: Mucous membranes are moist.   Eyes:      General: No scleral icterus.     Extraocular Movements: Extraocular movements intact.      Conjunctiva/sclera: Conjunctivae normal.   Cardiovascular:      Rate and Rhythm: Normal rate and regular rhythm.      Pulses: Normal pulses.      Heart sounds: Normal heart sounds. No murmur heard.  Pulmonary:      Effort: Pulmonary effort is normal. No respiratory distress.      Breath sounds: Normal breath sounds.   Abdominal:      General: Abdomen is flat. There is no distension.      Palpations: Abdomen is soft.      Tenderness: There is no abdominal tenderness.   Musculoskeletal:         General: Normal range of motion.      Cervical back: Normal range of motion and neck supple.      Comments: Erythema and swelling to right great toe.   Skin:     General: Skin is warm and dry.      Coloration: Skin is not cyanotic.   Neurological:      Mental Status: He is alert and oriented to person, place, and time. Mental status is at baseline.   Psychiatric:         Attention and Perception: Attention and perception normal.         Mood and Affect: Mood normal.         Judgment: Judgment normal. "                 Medical Decision Making:      Comorbidities that affect care:    Substance abuse, cellulitis, hyperlipidemia, hypertension    External Notes reviewed:    Previous ED Note: 1/17/2023 chief complaint of right great toe pain diagnosed with cellulitis of toe of right foot and toe infection      The following orders were placed and all results were independently analyzed by me:  Orders Placed This Encounter   Procedures    Blood Culture - Blood,    Blood Culture - Blood,    XR Toe 2+ View Right    MRI Foot Right With & Without Contrast    Comprehensive Metabolic Panel    CBC Auto Differential    Lactic Acid, Plasma    IP General Consult (Use specialty-specific consult if known)    Hospitalist (on-call MD unless specified)    Inpatient Admission    CBC & Differential    Extra Tubes    Gold Top - SST    Light Blue Top       Medications Given in the Emergency Department:  Medications   vancomycin IVPB 2000 mg in 0.9% Sodium Chloride 500 mL (has no administration in time range)   ceFAZolin (ANCEF) 1000 mg/100 mL 0.9% NS IVPB (mbp) (0 mg Intravenous Stopped 3/6/24 2017)   ketorolac (TORADOL) injection 30 mg (30 mg Intravenous Given 3/6/24 1947)   ondansetron (ZOFRAN) injection 4 mg (4 mg Intravenous Given 3/6/24 1946)   piperacillin-tazobactam (ZOSYN) 3.375 g/100 mL 0.9% NS IVPB (mbp) (0 g Intravenous Stopped 3/6/24 2122)        ED Course:    The patient was initially evaluated in the triage area where orders were placed. The patient was later dispositioned by FLAVIO Curiel.      The patient was advised to stay for completion of workup which includes but is not limited to communication of labs and radiological results, reassessment and plan. The patient was advised that leaving prior to disposition by a provider could result in critical findings that are not communicated to the patient.     ED Course as of 03/06/24 2135   Wed Mar 06, 2024   1814 --- PROVIDER IN TRIAGE NOTE ---    The patient was evaluated  by me, Calderon Devine in triage. Orders were placed and the patient is currently awaiting disposition.    [AJ]   2027 XR Toe 2+ View Right  Right toe distal phalanx suspicious for osteomyelitis [DS]      ED Course User Index  [AJ] Calderon Devine PA-C  [DS] Renay Jung APRN       Labs:    Lab Results (last 24 hours)       Procedure Component Value Units Date/Time    CBC & Differential [[id number]]  (Abnormal) Collected: 03/06/24 1929    Specimen: Blood Updated: 03/06/24 2007    Narrative:      The following orders were created for panel order CBC & Differential.  Procedure                               Abnormality         Status                     ---------                               -----------         ------                     CBC Auto Differential[[id number]]        Abnormal            Final result                 Please view results for these tests on the individual orders.    Comprehensive Metabolic Panel [[id number]]  (Abnormal) Collected: 03/06/24 1929    Specimen: Blood Updated: 03/06/24 2034     Glucose 84 mg/dL      BUN 10 mg/dL      Creatinine 0.94 mg/dL      Sodium 138 mmol/L      Potassium 3.9 mmol/L      Chloride 102 mmol/L      CO2 25.1 mmol/L      Calcium 9.9 mg/dL      Total Protein 7.6 g/dL      Albumin 4.2 g/dL      ALT (SGPT) 10 U/L      AST (SGOT) 19 U/L      Alkaline Phosphatase 143 U/L      Total Bilirubin 0.3 mg/dL      Globulin 3.4 gm/dL      A/G Ratio 1.2 g/dL      BUN/Creatinine Ratio 10.6     Anion Gap 10.9 mmol/L      eGFR 94.0 mL/min/1.73     Narrative:      GFR Normal >60  Chronic Kidney Disease <60  Kidney Failure <15      CBC Auto Differential [[id number]]  (Abnormal) Collected: 03/06/24 1929    Specimen: Blood Updated: 03/06/24 2007     WBC 11.53 10*3/mm3      RBC 5.24 10*6/mm3      Hemoglobin 15.7 g/dL      Hematocrit 47.9 %      MCV 91.4 fL      MCH 30.0 pg      MCHC 32.8 g/dL      RDW 14.1 %      RDW-SD 47.6 fl      MPV 10.9 fL      Platelets 328 10*3/mm3       Neutrophil % 62.0 %      Lymphocyte % 29.2 %      Monocyte % 5.1 %      Eosinophil % 2.1 %      Basophil % 0.7 %      Immature Grans % 0.9 %      Neutrophils, Absolute 7.15 10*3/mm3      Lymphocytes, Absolute 3.37 10*3/mm3      Monocytes, Absolute 0.59 10*3/mm3      Eosinophils, Absolute 0.24 10*3/mm3      Basophils, Absolute 0.08 10*3/mm3      Immature Grans, Absolute 0.10 10*3/mm3      nRBC 0.0 /100 WBC     Lactic Acid, Plasma [[id number]]  (Normal) Collected: 03/06/24 1929    Specimen: Blood Updated: 03/06/24 2037     Lactate 0.8 mmol/L     Blood Culture - Blood, Arm, Right [[id number]] Collected: 03/06/24 1929    Specimen: Blood from Arm, Right Updated: 03/06/24 1955    Blood Culture - Blood, Arm, Left [[id number]] Collected: 03/06/24 1929    Specimen: Blood from Arm, Left Updated: 03/06/24 1955             Imaging:    XR Toe 2+ View Right    Result Date: 3/6/2024  PROCEDURE: XR TOE 2+ VW RIGHT  COMPARISON: [facility], CR, XR TOE 2+ VW RIGHT, 1/17/2023, 13:08.  INDICATIONS: GENERALIZED RIGHT 1ST DIGIT PAIN AND SWELLING SINCE YESTERDAY  FINDINGS:  There is soft tissue edema of the great toe and forefoot.  No definite foreign body or significant soft tissue gas is seen.   There appears to be new osseous defect at the distal tuft of the great toe distal phalanx.  Findings are suspicious for osteomyelitis.  No definite acute fracture.  Mild degenerative changes are present at the 1st MTP joint.        1. New osseous defect at the great toe distal phalanx suspicious for osteomyelitis. 2. Soft tissue edema of the great toe and forefoot which may indicate skin and soft tissue infection.      ZACH DUQUE MD       Electronically Signed and Approved By: ZACH DUQUE MD on 3/06/2024 at 20:05                Differential Diagnosis and Discussion:      Extremity Pain: Differential diagnosis includes but is not limited to soft tissue sprain, tendonitis, tendon injury, dislocation, fracture, deep vein thrombosis,  arterial insufficiency, osteoarthritis, bursitis, and ligamentous damage.    All labs were reviewed and interpreted by me.  All X-rays impressions were independently interpreted by me.    MDM  Number of Diagnoses or Management Options  Cellulitis of great toe of right foot  Osteomyelitis of great toe of right foot  Diagnosis management comments: Patient was seen and evaluated in the emergency department with workup showing concerns for osteomyelitis.  The on-call podiatrist Dr. Hand was consulted and he advised placement of the patient in the hospital with IV antibiotics and he will consult on them tomorrow.  The on-call hospitalist has accepted the patient.  Patient's vital signs are normal.  Patient is in agreement with this plan and is stable       Amount and/or Complexity of Data Reviewed  Clinical lab tests: reviewed and ordered  Tests in the radiology section of CPT®: reviewed and ordered  Tests in the medicine section of CPT®: ordered and reviewed  Discuss the patient with other providers: yes (Hospitalist and podiatrist)    Risk of Complications, Morbidity, and/or Mortality  Presenting problems: moderate  Diagnostic procedures: moderate  Management options: moderate    Patient Progress  Patient progress: stable         Patient Care Considerations:    SEPSIS was considered but is NOT present in the emergency department as SIRS criteria is not present.      Consultants/Shared Management Plan:    Hospitalist: I have discussed the case with Dr. Palm who agrees to accept the patient for admission.  Consultant: I have discussed the case with Dr. Hand the on-call podiatrist who states okay to admit the patient and placed him on vancomycin and Zosyn and get an MRI with and without contrast and he will consult on him in the hospital    Social Determinants of Health:    Patient is independent, reliable, and has access to care.       Disposition and Care Coordination:    Admit:   Through independent  evaluation of the patient's history, physical, and imperical data, the patient meets criteria for inpatient admission to the hospital.        Final diagnoses:   Osteomyelitis of great toe of right foot   Cellulitis of great toe of right foot        ED Disposition       ED Disposition   Decision to Admit    Condition   --    Comment   Level of Care: Remote Telemetry [26]   Diagnosis: Osteomyelitis [[id number]]   Admitting Physician: LONNIE JAY [[id number]]   Certification: I Certify That Inpatient Hospital Services Are Medically Necessary For Greater Than 2 Midnights                 This medical record created using voice recognition software.             Renay Jung, APRN  03/06/24 6202

## 2024-03-07 ENCOUNTER — APPOINTMENT (OUTPATIENT)
Dept: MRI IMAGING | Facility: HOSPITAL | Age: 59
DRG: 504 | End: 2024-03-07
Payer: COMMERCIAL

## 2024-03-07 LAB
ANION GAP SERPL CALCULATED.3IONS-SCNC: 8.1 MMOL/L (ref 5–15)
BUN SERPL-MCNC: 15 MG/DL (ref 6–20)
BUN/CREAT SERPL: 14.6 (ref 7–25)
CALCIUM SPEC-SCNC: 9.1 MG/DL (ref 8.6–10.5)
CHLORIDE SERPL-SCNC: 106 MMOL/L (ref 98–107)
CO2 SERPL-SCNC: 22.9 MMOL/L (ref 22–29)
CREAT SERPL-MCNC: 1.03 MG/DL (ref 0.76–1.27)
CRP SERPL-MCNC: <0.3 MG/DL (ref 0–0.5)
DEPRECATED RDW RBC AUTO: 49.2 FL (ref 37–54)
EGFRCR SERPLBLD CKD-EPI 2021: 84.2 ML/MIN/1.73
ERYTHROCYTE [DISTWIDTH] IN BLOOD BY AUTOMATED COUNT: 14.3 % (ref 12.3–15.4)
ERYTHROCYTE [SEDIMENTATION RATE] IN BLOOD: 8 MM/HR (ref 0–20)
GLUCOSE SERPL-MCNC: 128 MG/DL (ref 65–99)
HCT VFR BLD AUTO: 43.8 % (ref 37.5–51)
HGB BLD-MCNC: 14 G/DL (ref 13–17.7)
MCH RBC QN AUTO: 29.8 PG (ref 26.6–33)
MCHC RBC AUTO-ENTMCNC: 32 G/DL (ref 31.5–35.7)
MCV RBC AUTO: 93.2 FL (ref 79–97)
PLATELET # BLD AUTO: 267 10*3/MM3 (ref 140–450)
PMV BLD AUTO: 11.2 FL (ref 6–12)
POTASSIUM SERPL-SCNC: 3.8 MMOL/L (ref 3.5–5.2)
RBC # BLD AUTO: 4.7 10*6/MM3 (ref 4.14–5.8)
SODIUM SERPL-SCNC: 137 MMOL/L (ref 136–145)
WBC NRBC COR # BLD AUTO: 8.98 10*3/MM3 (ref 3.4–10.8)

## 2024-03-07 PROCEDURE — 0 GADOBENATE DIMEGLUMINE 529 MG/ML SOLUTION: Performed by: INTERNAL MEDICINE

## 2024-03-07 PROCEDURE — A9577 INJ MULTIHANCE: HCPCS | Performed by: INTERNAL MEDICINE

## 2024-03-07 PROCEDURE — 25810000003 SODIUM CHLORIDE 0.9 % SOLUTION: Performed by: FAMILY MEDICINE

## 2024-03-07 PROCEDURE — 73720 MRI LWR EXTREMITY W/O&W/DYE: CPT

## 2024-03-07 PROCEDURE — 25810000003 SODIUM CHLORIDE 0.9 % SOLUTION 500 ML FLEX CONT: Performed by: FAMILY MEDICINE

## 2024-03-07 PROCEDURE — 99222 1ST HOSP IP/OBS MODERATE 55: CPT | Performed by: PODIATRIST

## 2024-03-07 PROCEDURE — 25010000002 VANCOMYCIN 5 G RECONSTITUTED SOLUTION: Performed by: FAMILY MEDICINE

## 2024-03-07 PROCEDURE — 25010000002 ENOXAPARIN PER 10 MG: Performed by: FAMILY MEDICINE

## 2024-03-07 PROCEDURE — 25010000002 MORPHINE PER 10 MG: Performed by: FAMILY MEDICINE

## 2024-03-07 PROCEDURE — 85652 RBC SED RATE AUTOMATED: CPT | Performed by: FAMILY MEDICINE

## 2024-03-07 PROCEDURE — 25010000002 CEFTRIAXONE PER 250 MG: Performed by: FAMILY MEDICINE

## 2024-03-07 PROCEDURE — 80048 BASIC METABOLIC PNL TOTAL CA: CPT | Performed by: FAMILY MEDICINE

## 2024-03-07 PROCEDURE — 85027 COMPLETE CBC AUTOMATED: CPT | Performed by: FAMILY MEDICINE

## 2024-03-07 RX ADMIN — MORPHINE SULFATE 2 MG: 2 INJECTION, SOLUTION INTRAMUSCULAR; INTRAVENOUS at 17:05

## 2024-03-07 RX ADMIN — SERTRALINE HYDROCHLORIDE 50 MG: 25 TABLET ORAL at 08:57

## 2024-03-07 RX ADMIN — MORPHINE SULFATE 2 MG: 2 INJECTION, SOLUTION INTRAMUSCULAR; INTRAVENOUS at 04:40

## 2024-03-07 RX ADMIN — CEFTRIAXONE SODIUM 2000 MG: 2 INJECTION, POWDER, FOR SOLUTION INTRAMUSCULAR; INTRAVENOUS at 08:58

## 2024-03-07 RX ADMIN — QUETIAPINE FUMARATE 100 MG: 100 TABLET ORAL at 21:14

## 2024-03-07 RX ADMIN — GADOBENATE DIMEGLUMINE 20 ML: 529 INJECTION, SOLUTION INTRAVENOUS at 07:39

## 2024-03-07 RX ADMIN — ENOXAPARIN SODIUM 40 MG: 100 INJECTION SUBCUTANEOUS at 17:05

## 2024-03-07 RX ADMIN — MORPHINE SULFATE 2 MG: 2 INJECTION, SOLUTION INTRAMUSCULAR; INTRAVENOUS at 21:14

## 2024-03-07 RX ADMIN — MORPHINE SULFATE 2 MG: 2 INJECTION, SOLUTION INTRAMUSCULAR; INTRAVENOUS at 13:00

## 2024-03-07 RX ADMIN — ARIPIPRAZOLE 5 MG: 5 TABLET ORAL at 08:57

## 2024-03-07 RX ADMIN — VANCOMYCIN HYDROCHLORIDE 1250 MG: 5 INJECTION, POWDER, LYOPHILIZED, FOR SOLUTION INTRAVENOUS at 23:42

## 2024-03-07 RX ADMIN — VANCOMYCIN HYDROCHLORIDE 1250 MG: 5 INJECTION, POWDER, LYOPHILIZED, FOR SOLUTION INTRAVENOUS at 11:41

## 2024-03-07 RX ADMIN — Medication 10 ML: at 08:46

## 2024-03-07 RX ADMIN — GABAPENTIN 600 MG: 300 CAPSULE ORAL at 08:58

## 2024-03-07 RX ADMIN — GABAPENTIN 600 MG: 300 CAPSULE ORAL at 21:14

## 2024-03-07 RX ADMIN — Medication 10 ML: at 21:16

## 2024-03-07 RX ADMIN — MORPHINE SULFATE 2 MG: 2 INJECTION, SOLUTION INTRAMUSCULAR; INTRAVENOUS at 08:46

## 2024-03-07 RX ADMIN — GABAPENTIN 600 MG: 300 CAPSULE ORAL at 14:59

## 2024-03-07 RX ADMIN — AMLODIPINE BESYLATE 10 MG: 5 TABLET ORAL at 08:57

## 2024-03-07 RX ADMIN — SODIUM CHLORIDE 40 ML: 9 INJECTION, SOLUTION INTRAVENOUS at 09:03

## 2024-03-07 NOTE — PROGRESS NOTES
"[facility] Clinical Pharmacy Services: Vancomycin Monitoring Note    Honorio Vigil Jr. is a 58 y.o. male who is on day  of pharmacy to dose vancomycin for Bone and/or Joint Infection and Skin and Soft Tissue.    Previous Vancomycin Dose:   2000 mg IV on 3/6 @1206  Imaging Reviewed?: Yes  Updated Cultures and Sensitivities:   3/6 Blood cultures- in process    Vitals/Labs  Ht: 182.9 cm (72.01\"); Wt: 105 kg (230 lb 9.6 oz)   Temp (24hrs), Av.6 °F (36.4 °C), Min:97.3 °F (36.3 °C), Max:98.2 °F (36.8 °C)   Estimated Creatinine Clearance: 98 mL/min (by C-G formula based on SCr of 1.03 mg/dL).       Results from last 7 days   Lab Units 24  0511 24  1929   CREATININE mg/dL 1.03 0.94   WBC 10*3/mm3 8.98 11.53*     Assessment/Plan    Current Vancomycin Dose:  1250 mg IV every 12 hours; which provides the following predicted parameters:  AUC24,ss: 561 mg/L.hr  PAUC*: 83 %  Ctrough,ss: 18.3 mg/L  Pconc*: 42 %  Tox.: 15 %  Next Vanc Random ordered for 3/8 at 0600.  We will continue to monitor patient changes and renal function     Thank you for involving pharmacy in this patient's care. Please contact pharmacy with any questions or concerns.    Clover Rhodes  Clinical Pharmacist    "

## 2024-03-07 NOTE — H&P (VIEW-ONLY)
[facility]   HISTORY AND PHYSICAL    Patient Name: Honorio Vigil Jr.  : 1965  MRN: [id number]  Primary Care Physician:  Roni Gray MD  Date of admission: 3/6/2024    Subjective   Subjective     Chief Complaint: Chronic right great toe ulceration    HPI:    Honorio Vigil Jr. is a 58 y.o. male with past medical history of hypertension, substance abuse in remission, anxiety disorder, ADHD, hyperlipidemia, and GERD presented to ED with complaints of right toe pain and redness.  Patient states that around 7 PM last night he noticed some redness and pain from his right hallux.  When he woke up the pain and redness had worsened.  Patient had similar episode last year that needed to be drained and started on antibiotics.  Patient stated the pain became unbearable so he came to the ED for further evaluation.  In the ED patient's vitals were within normal limits on arrival.  Showed that he had leukocytosis with remaining being unremarkable including a normal lactic acid level.  X-ray of the toe showed new osseous defect at the great toe distal phalanx suspicion for osteo with tissue edema.  Podiatry agreed to see the patient as an in-patient.    The patient states he has been seen by podiatry at [facility] over the past year with multiple occurrences of infection of the right great toe.    Patient denies any fevers, chills, nausea, vomiting, shortness of breathe, nor any other constitutional signs nor symptoms.    Review of Systems   All systems were reviewed and negative except for: Right great toe chronic wound    Personal History     Past Medical History:   Diagnosis Date    Abscess of foot     rt    ADHD     Anxiety disorder     Backache     Cellulitis of scrotum     Elevated cholesterol     Hyperlipidemia     Hypertension     Insomnia disorder     Seizures     x2- due to overuse/overdose of meds- years ago per pt. Last one was 15-20 years ago- not on meds     Substance abuse     years ago    Viral gastroenteritis        Past Surgical History:   Procedure Laterality Date    EYE SURGERY      HAND SURGERY Bilateral     INCISION AND DRAINAGE OF WOUND Right 2/14/2023    Procedure: INCISION AND DRAINAGE BONE, right great toe;  Surgeon: Cristo Christy DPM;  Location: [facility] MAIN OR;  Service: Podiatry;  Laterality: Right;    KNEE ARTHROSCOPY Right 05/21/2014    Arthroscopy of right knee, debride medial meniscus. x3    KNEE ARTHROSCOPY W/ MENISCECTOMY Left 08/09/2022    Procedure: KNEE ARTHROSCOPY WITH PARTIAL MEDIAL MENISCECTOMY;  Surgeon: Carlos Hameed MD;  Location: [facility] OR [facility] [place];  Service: Orthopedics;  Laterality: Left;    SHOULDER ARTHROSCOPY Right        Family History: family history includes Anxiety disorder in his mother; Cancer in his father, mother, and paternal grandmother; Depression in his mother; Diabetes in his maternal grandmother and mother; Heart disease in his father; Thyroid disease in his mother. Otherwise pertinent FHx was reviewed and not pertinent to current issue.    Social History:  reports that he has been smoking cigarettes. He has been exposed to tobacco smoke. He has never used smokeless tobacco. He reports that he does not currently use drugs after having used the following drugs: Marijuana. Frequency: 1.00 time per week. He reports that he does not drink alcohol.    Home Medications:  ARIPiprazole, QUEtiapine, amphetamine-dextroamphetamine, gabapentin, and sertraline      Allergies:  Allergies   Allergen Reactions    Naproxen GI Intolerance       Objective   Objective     Vitals:   Temp:  [97.3 °F (36.3 °C)-98.2 °F (36.8 °C)] 98.1 °F (36.7 °C)  Heart Rate:  [] 74  Resp:  [16-18] 16  BP: ()/(56-95) 112/67  Physical Exam      GEN:   A&Ox3, Patient seen at bedside in no apparent distress.    Physical Exam  Vitals and nursing note reviewed.   Cardiovascular:      Pulses:           Dorsalis pedis pulses are 2+ on the right side and  2+ on the left side.        Posterior tibial pulses are 2+ on the right side and 2+ on the left side.   Feet:      Right foot:      Protective Sensation: 10 sites tested.        Skin integrity: Erythema and warmth present.      Left foot:      Protective Sensation: 10 sites tested.        Skin integrity: Warmth present.         Foot/Ankle Exam    GENERAL  Appearance:  appears stated age  Orientation:  AAOx3  Affect:  appropriate    VASCULAR     Right Foot Vascularity   Normal vascular exam    Dorsalis pedis:  2+  Posterior tibial:  2+  Skin temperature:  warm  Edema grading:  None  CFT:  < 3 seconds  Pedal hair growth:  Present  Varicosities:  none     Left Foot Vascularity   Normal vascular exam    Dorsalis pedis:  2+  Posterior tibial:  2+  Skin temperature:  warm  Edema grading:  None  CFT:  < 3 seconds  Pedal hair growth:  Present  Varicosities:  none     NEUROLOGIC     Right Foot Neurologic   Normal sensation    Light touch sensation: normal  Vibratory sensation: normal  Hot/Cold sensation: normal  Protective Sensation using [place] Monofilament:   Sites intact: 10  Sites tested: 10     Left Foot Neurologic   Normal sensation    Light touch sensation: normal  Vibratory sensation: normal  Hot/Cold sensation:  normal  Protective Sensation using [place] Monofilament:   Sites intact: 10  Sites tested: 10    MUSCULOSKELETAL     Right Foot Musculoskeletal   Tenderness:  toe 1 tenderness      MUSCLE STRENGTH     Right Foot Muscle Strength   Foot dorsiflexion:  4  Foot plantar flexion:  4  Foot inversion:  4  Foot eversion:  4     Left Foot Muscle Strength   Foot dorsiflexion:  4  Foot plantar flexion:  4  Foot inversion:  4  Foot eversion:  4    RANGE OF MOTION     Right Foot Range of Motion   Foot and ankle ROM within normal limits       Left Foot Range of Motion   Foot and ankle ROM within normal limits      DERMATOLOGIC      Right Foot Dermatologic   Skin  Positive for cellulitis, drainage,  erythema and wound. (Right great toe)     Left Foot Dermatologic   Skin  Left foot skin is intact.       MRI Foot Right With & Without Contrast    Result Date: 3/7/2024  Narrative: PROCEDURE: MRI FOOT RIGHT W WO CONTRAST  COMPARISON: None  INDICATIONS: REDDNESS AND PAIN TO DORSAL SURFACE OF BIG TOE X 2 DAYS.      TECHNIQUE: A complete multi-planar MRI of the foot was performed with and without intravenous gadolinium contrast.   FINDINGS:  Edema and enhancement are observed throughout the subcutaneous soft tissues at the mid to distal great toe.  These findings are more pronounced at the dorsal aspect.  The findings suggest changes of soft tissue cellulitis.  There appears to be involvement of the nail bed.  There is a well-defined fluid collection with peripheral enhancement noted within the dorsal lateral soft tissues of the great toe directly adjacent to the nail bed of the proximal aspect of the nail bed.  This finding measures approximately 1.5 x 0.8 x 1.1 cm and suggests a focal abscess.  There is abnormal edema and enhancement throughout the underlying distal phalanx of the great toe.  There appear to be areas of cortical erosion.  These findings indicate changes of osteomyelitis.  There is also increased fluid signal with enhancement at the interphalangeal joint which may indicate developing changes of septic arthropathy.  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      Impression:   1. Evidence for soft tissue cellulitis throughout the great toe.  There appears to be involvement of the nail bed. 2. There is evidence for small focal abscess formation in the soft tissues at the dorsal aspect of the great toe directly adjacent to the proximal portion of the nail bed.  This finding measures approximately 1.5 cm. 3. Findings consistent with osteomyelitis involving the distal phalanx of the great toe.  There may also be developing septic arthropathy of the interphalangeal joint.       MAGDALENA URENA MD       Electronically Signed and Approved By: MAGDALENA URENA MD on 3/07/2024 at 8:47             XR Toe 2+ View Right    Result Date: 3/6/2024  Narrative: PROCEDURE: XR TOE 2+ VW RIGHT  COMPARISON: [facility], [facility], XR TOE 2+ VW RIGHT, 1/17/2023, 13:08.  INDICATIONS: GENERALIZED RIGHT 1ST DIGIT PAIN AND SWELLING SINCE YESTERDAY  FINDINGS:  There is soft tissue edema of the great toe and forefoot.  No definite foreign body or significant soft tissue gas is seen.   There appears to be new osseous defect at the distal tuft of the great toe distal phalanx.  Findings are suspicious for osteomyelitis.  No definite acute fracture.  Mild degenerative changes are present at the 1st MTP joint.      Impression:   1. New osseous defect at the great toe distal phalanx suspicious for osteomyelitis. 2. Soft tissue edema of the great toe and forefoot which may indicate skin and soft tissue infection.      ZACH DUQUE MD       Electronically Signed and Approved By: ZACH DUQUE MD on 3/06/2024 at 20:05                Result Review    Result Review:  I have personally reviewed the results from the time of this admission to 3/7/2024 17:06 EST and agree with these findings:  [x]  Laboratory  []  Microbiology  [x]  Radiology  []  EKG/Telemetry   []  Cardiology/Vascular   []  Pathology  []  Old records  []  Other:      WBC   Date Value Ref Range Status   03/07/2024 8.98 3.40 - 10.80 10*3/mm3 Final     RBC   Date Value Ref Range Status   03/07/2024 4.70 4.14 - 5.80 10*6/mm3 Final     Hemoglobin   Date Value Ref Range Status   03/07/2024 14.0 13.0 - 17.7 g/dL Final     Hematocrit   Date Value Ref Range Status   03/07/2024 43.8 37.5 - 51.0 % Final     MCV   Date Value Ref Range Status   03/07/2024 93.2 79.0 - 97.0 fL Final     MCH   Date Value Ref Range Status   03/07/2024 29.8 26.6 - 33.0 pg Final     MCHC   Date Value Ref Range Status   03/07/2024 32.0 31.5 - 35.7 g/dL Final     RDW   Date Value Ref  Range Status   03/07/2024 14.3 12.3 - 15.4 % Final     RDW-SD   Date Value Ref Range Status   03/07/2024 49.2 37.0 - 54.0 fl Final     MPV   Date Value Ref Range Status   03/07/2024 11.2 6.0 - 12.0 fL Final     Platelets   Date Value Ref Range Status   03/07/2024 267 140 - 450 10*3/mm3 Final     Neutrophil %   Date Value Ref Range Status   03/06/2024 62.0 42.7 - 76.0 % Final     Lymphocyte %   Date Value Ref Range Status   03/06/2024 29.2 19.6 - 45.3 % Final     Monocyte %   Date Value Ref Range Status   03/06/2024 5.1 5.0 - 12.0 % Final     Eosinophil %   Date Value Ref Range Status   03/06/2024 2.1 0.3 - 6.2 % Final     Basophil %   Date Value Ref Range Status   03/06/2024 0.7 0.0 - 1.5 % Final     Immature Grans %   Date Value Ref Range Status   03/06/2024 0.9 (H) 0.0 - 0.5 % Final     Neutrophils, Absolute   Date Value Ref Range Status   03/06/2024 7.15 (H) 1.70 - 7.00 10*3/mm3 Final     Lymphocytes, Absolute   Date Value Ref Range Status   03/06/2024 3.37 (H) 0.70 - 3.10 10*3/mm3 Final     Monocytes, Absolute   Date Value Ref Range Status   03/06/2024 0.59 0.10 - 0.90 10*3/mm3 Final     Eosinophils, Absolute   Date Value Ref Range Status   03/06/2024 0.24 0.00 - 0.40 10*3/mm3 Final     Basophils, Absolute   Date Value Ref Range Status   03/06/2024 0.08 0.00 - 0.20 10*3/mm3 Final     Immature Grans, Absolute   Date Value Ref Range Status   03/06/2024 0.10 (H) 0.00 - 0.05 10*3/mm3 Final     nRBC   Date Value Ref Range Status   03/06/2024 0.0 0.0 - 0.2 /100 WBC Final        Lab Results   Component Value Date    GLUCOSE 128 (H) 03/07/2024    BUN 15 03/07/2024    CREATININE 1.03 03/07/2024    EGFRIFNONA 94 05/30/2019    BCR 14.6 03/07/2024    K 3.8 03/07/2024    CO2 22.9 03/07/2024    CALCIUM 9.1 03/07/2024    ALBUMIN 4.2 03/06/2024    AST 19 03/06/2024    ALT 10 03/06/2024             Most notable findings include: Chronic wound on right great toe    Assessment & Plan   Assessment / Plan       Active Hospital  Problems:  Active Hospital Problems    Diagnosis     **Osteomyelitis of great toe of right foot     Cellulitis of great toe of right foot     Nicotine addiction     Depression     HTN (hypertension)        Plan:     Comprehensive lower extremity examination and evaluation was performed.    Discussed findings and treatment plan including risks, benefits, and treatment options with patient in detail. Patient agreed with treatment plan.    Planned surgery: Right great toe amputation    The risks and benefits of the surgery were discussed with the patient.  This discussion included possible complications of requiring further surgery, possible delayed wound healing, further surgery requiring amputation of the foot or leg, and also included the complication of death.  All the patient's questions were answered to their satisfaction.  Patient states they would like to proceed with the procedure.    Discussed with the patient at length surgical versus nonsurgical options.  Explained to the patient in detail that surgical intervention is only indicated when the level of pain is such that it negatively affects her daily life.    It was explained to the patient that surgical interventions often times do not relieve all of the pain associated with the deformity    Risks and complications associated with surgical versus nonsurgical options were explained.  The patient understands that the problem will most likely continue to evolve and surgical intervention is the only treatment plan that actually corrects the deformities.    Upon discussion of non-surgical conservative option, surgical correction, post-operative requirements along with risk and benefits of the surgery along with expected outcomes, the patient states they would like to proceed with the scheduling surgery.      The patient has decided to move forward with surgical intervention understanding all of these risks and complications.    Surgery is planned for for  tomorrow, 8 March 2024.    Discussed with patient's nurse.    Discussed with patient's hospitalist, Dr. Ceron.    DVT prophylaxis:  Medical DVT prophylaxis orders are present.        CODE STATUS:    Level Of Support Discussed With: Patient  Code Status (Patient has no pulse and is not breathing): CPR (Attempt to Resuscitate)  Medical Interventions (Patient has pulse or is breathing): Full Support      Electronically signed by Nelson Hand DPM, 03/07/24, 5:02 PM EST.

## 2024-03-07 NOTE — SIGNIFICANT NOTE
Wound Eval / Progress Noted     [facility]     Patient Name: Honorio Vigil Jr.  : 1965  MRN: [id number]  Today's Date: 3/7/2024                 Admit Date: 3/6/2024    Visit Dx:    ICD-10-CM ICD-9-CM   1. Osteomyelitis of great toe of right foot  M86.9 730.27   2. Cellulitis of great toe of right foot  L03.031 681.10         Osteomyelitis of great toe of right foot    Depression    HTN (hypertension)    Nicotine addiction    Cellulitis of great toe of right foot        Past Medical History:   Diagnosis Date    Abscess of foot     rt    ADHD     Anxiety disorder     Backache     Cellulitis of scrotum     Elevated cholesterol     Hyperlipidemia     Hypertension     Insomnia disorder     Seizures     x2- due to overuse/overdose of meds- years ago per pt. Last one was 15-20 years ago- not on meds    Substance abuse     years ago    Viral gastroenteritis         Past Surgical History:   Procedure Laterality Date    EYE SURGERY      HAND SURGERY Bilateral     INCISION AND DRAINAGE OF WOUND Right 2023    Procedure: INCISION AND DRAINAGE BONE, right great toe;  Surgeon: Cristo Christy DPM;  Location: [facility] MAIN OR;  Service: Podiatry;  Laterality: Right;    KNEE ARTHROSCOPY Right 2014    Arthroscopy of right knee, debride medial meniscus. x3    KNEE ARTHROSCOPY W/ MENISCECTOMY Left 2022    Procedure: KNEE ARTHROSCOPY WITH PARTIAL MEDIAL MENISCECTOMY;  Surgeon: Carlos Hameed MD;  [place] [facility] [place] [facility] [place];  Service: Orthopedics;  Laterality: Left;    SHOULDER ARTHROSCOPY Right          Physical Assessment:  Wound 24 2350 Right anterior great toe Abcess (Active)   Wound Image   24 1305   Dressing Appearance open to air 24 1305   Closure None 24 1305   Base dry;scab;yellow;red 24 1305   Periwound intact;dry;edematous;redness;swelling;warm 24 1305   Periwound Temperature warm 24 1305   Periwound Skin Turgor firm 24 1305   Edges rolled/closed  03/07/24 1305   Wound Length (cm) 1.5 cm 03/07/24 1305   Wound Width (cm) 1.2 cm 03/07/24 1305   Wound Depth (cm) 0 cm 03/07/24 1305   Wound Surface Area (cm^2) 1.8 cm^2 03/07/24 1305   Wound Volume (cm^3) 0 cm^3 03/07/24 1305   Drainage Amount none 03/07/24 1305   Care, Wound cleansed with;sterile normal saline 03/07/24 1305   Dressing Care dressing applied;gauze;petroleum-based 03/07/24 1305   Periwound Care absorptive dressing applied;topical treatment applied 03/07/24 1305        Wound Check / Follow-up:  Patient seen today for new wound care consult. Patient admitted on 03/06/2024 for cellulitis of the right great toe with possible osteomyelitis. Patient had a MRI obtained of the right foot on 03/07/24 to r/o osteomyelitis and pending MD review. Patient laying in his room when wound RN arrived. Patient complaining of severe pain and notified primary RN of need for pain medication.     RN assessed patient's right anterior great toe. Patient's toe is edematous, reddened, with visible yellow cloudy drainage under the skin at the base of the toenail. On the MRI it was also stated that there was evidence for a small focal abscess in the soft tissue at the dorsal aspect of the great toe. RN cleansed the anterior right great toe with NS, blot dry and obtained measurements and photos. RN recommending to paint the right anterior great toe with betadine, apply petroleum impregnated gauze to the wound and cover with dry gauze and secure with a gauze roll. Patient tolerated wound care without issues.     Recommending to keep right foot elevated to elevate swelling to the right great toe.     Impression: Right anterior great toe abscess    Short term goals:  Regain skin integrity, skin care, skin protection, RLE elevation, daily wound dressing changes.     Chel Hernandez RN    3/7/2024    15:24 EST

## 2024-03-07 NOTE — SIGNIFICANT NOTE
03/07/24 1245   Coping/Psychosocial   Observed Emotional State calm;cooperative   Verbalized Emotional State anxiety   Trust Relationship/Rapport empathic listening provided   Involvement in Care interacting with patient   Additional Documentation Spiritual Care (Group)   Spiritual Care   Use of Spiritual Resources non-Mandaen use of spiritual care   Spiritual Care Source  initiative   Spiritual Care Follow-Up follow-up, none required as presently assessed   Response to Spiritual Care receptive of support   Spiritual Care Interventions supportive conversation provided   Spiritual Care Visit Type initial   Receptivity to Spiritual Care visit welcomed

## 2024-03-07 NOTE — H&P
[facility]   HISTORY AND PHYSICAL    Patient Name: Honorio Vigil Jr.  : 1965  MRN: [id number]  Primary Care Physician:  Roni Gray MD  Date of admission: 3/6/2024    Subjective   Subjective     Chief Complaint: Right great toe pain    HPI:    Honorio Vigil Jr. is a 58 y.o. male with past medical history of hypertension, substance abuse in remission, anxiety disorder, ADHD, hyperlipidemia, and GERD presented to ED with complaints of right toe pain and redness.  Patient states that around 7 PM last night he noticed some redness and pain from his right hallux.  When he woke up the pain and redness had worsened.  Patient had similar episode last year that needed to be drained and started on antibiotics.  Patient stated the pain became unbearable so he came to the ED for further evaluation.  In the ED patient's vitals were within normal limits on arrival.  Showed that he had leukocytosis with remaining being unremarkable including a normal lactic acid level.  X-ray of the toe showed new osseous defect at the great toe distal phalanx suspicion for osteo with tissue edema.  Podiatry agreed to see the patient in the morning.  When asked he denied any recent fevers, chills, headaches, focal weakness, chest pain, palpitation, shortness of breath, cough, abdominal pain, nausea, vomiting, diarrhea, constipation, dysuria, hematuria, hematochezia, melena, or anxiety.  Patient admitted for further evaluation and treatment    Review of Systems   All systems were reviewed and negative except for: As per HPI    Personal History     Past Medical History:   Diagnosis Date    Abscess of foot     rt    ADHD     Anxiety disorder     Backache     Cellulitis of scrotum     Elevated cholesterol     Hyperlipidemia     Hypertension     Insomnia disorder     Seizures     x2- due to overuse/overdose of meds- years ago per pt. Last one was 15-20 years ago- not on meds    Substance abuse     years ago    Viral  gastroenteritis        Past Surgical History:   Procedure Laterality Date    EYE SURGERY      HAND SURGERY Bilateral     INCISION AND DRAINAGE OF WOUND Right 2/14/2023    Procedure: INCISION AND DRAINAGE BONE, right great toe;  Surgeon: Cristo Christy DPM;  Location: [facility] MAIN OR;  Service: Podiatry;  Laterality: Right;    KNEE ARTHROSCOPY Right 05/21/2014    Arthroscopy of right knee, debride medial meniscus. x3    KNEE ARTHROSCOPY W/ MENISCECTOMY Left 08/09/2022    Procedure: KNEE ARTHROSCOPY WITH PARTIAL MEDIAL MENISCECTOMY;  Surgeon: Carlos Hameed MD;  [place] [facility] [place] [facility] [place]  Service: Orthopedics;  Laterality: Left;    SHOULDER ARTHROSCOPY Right        Family History: family history includes Anxiety disorder in his mother; Cancer in his father, mother, and paternal grandmother; Depression in his mother; Diabetes in his maternal grandmother and mother; Heart disease in his father; Thyroid disease in his mother. Otherwise pertinent FHx was reviewed and not pertinent to current issue.    Social History:  reports that he has been smoking cigarettes. He has been exposed to tobacco smoke. He has never used smokeless tobacco. He reports that he does not currently use drugs after having used the following drugs: Marijuana. Frequency: 1.00 time per week. He reports that he does not drink alcohol.    Home Medications:  ARIPiprazole, QUEtiapine, amLODIPine, amphetamine-dextroamphetamine, benzonatate, gabapentin, sertraline, and traZODone      Allergies:  Allergies   Allergen Reactions    Naproxen GI Intolerance       Objective   Objective     Vitals:   Temp:  [98.2 °F (36.8 °C)] 98.2 °F (36.8 °C)  Heart Rate:  [100] 100  Resp:  [16] 16  BP: (149)/(95) 149/95  Physical Exam    Constitutional: Awake, alert   Eyes: PERRLA, sclerae anicteric, no conjunctival injection   HENT: NCAT, mucous membranes moist   Neck: Supple, no thyromegaly, no lymphadenopathy, trachea midline   Respiratory: Clear to auscultation  bilaterally, nonlabored respirations    Cardiovascular: RRR, no murmurs, rubs, or gallops, palpable pedal pulses bilaterally   Gastrointestinal: Positive bowel sounds, soft, nontender, nondistended   Musculoskeletal: No bilateral ankle edema, no clubbing or cyanosis to extremities   Psychiatric: Appropriate affect, cooperative   Neurologic: Oriented x 3, strength symmetric in all extremities, Cranial Nerves grossly intact to confrontation, speech clear   Skin: Right hallux erythema, edema, and tenderness    Result Review    Result Review:  I have personally reviewed the results from the time of this admission to 3/6/2024 21:53 EST and agree with these findings:  [x]  Laboratory list / accordion  []  Microbiology  [x]  Radiology  [x]  EKG/Telemetry   []  Cardiology/Vascular   []  Pathology  []  Old records  []  Other:  Most notable findings include: Leukocytosis, x-ray with findings suspicious for osteomyelitis of the great toe      Assessment & Plan   Assessment / Plan     Brief Patient Summary:  Honorio Vigil Jr. is a 58 y.o. male with past medical history of hypertension, substance abuse in remission, anxiety disorder, ADHD, hyperlipidemia, and GERD presented to ED with complaints of right toe pain and redness.    Active Hospital Problems:  Active Hospital Problems    Diagnosis     **Osteomyelitis of great toe of right foot     Cellulitis of great toe of right foot     Nicotine addiction     Depression     HTN (hypertension)      Plan:     Likely osteomyelitis of the great toe  -Admit to medical floor  -X-ray reviewed  -Empiric antibiotics  -Blood cultures  -Pain meds as needed  -Sed rate and CRP pending  -Podiatry consulted  -MRI ordered and pending    HTN  -Currently well controlled  -PRN BP meds  -Resume home meds when available  -Titrate if needed    Anxiety  -Resume home meds    GI ppx  DVT ppx      CODE STATUS: Full code     Admission Status:  I believe this patient meets inpatient  status.      Electronically signed by Derek Palm MD, 03/06/24, 9:53 PM EST.

## 2024-03-07 NOTE — PLAN OF CARE
Goal Outcome Evaluation:  Plan of Care Reviewed With: patient        Progress: improving  Outcome Evaluation: patient is alert and oriented x4 and on room air. admit from ED this shift. admission skin assessment performed with Lety Posadas RN. no pressure injuries present. wound care consult placed for abcess in right great toe, pictures uploaded in chart. wound is currently open to air with no drainage. continous IV fluids started and IV antibiotics given per mar. prn pain medication given twice for c/o pain in the right great toe. strict NPO since midnight. am gabapentin dose held. MRI scheduled for this morning. no other issues or concerns noted at this time.

## 2024-03-07 NOTE — CONSULTS
[facility]   HISTORY AND PHYSICAL    Patient Name: Honorio Vigil Jr.  : 1965  MRN: [id number]  Primary Care Physician:  Roni Gray MD  Date of admission: 3/6/2024    Subjective   Subjective     Chief Complaint: Chronic right great toe ulceration    HPI:    Honorio Vigil Jr. is a 58 y.o. male with past medical history of hypertension, substance abuse in remission, anxiety disorder, ADHD, hyperlipidemia, and GERD presented to ED with complaints of right toe pain and redness.  Patient states that around 7 PM last night he noticed some redness and pain from his right hallux.  When he woke up the pain and redness had worsened.  Patient had similar episode last year that needed to be drained and started on antibiotics.  Patient stated the pain became unbearable so he came to the ED for further evaluation.  In the ED patient's vitals were within normal limits on arrival.  Showed that he had leukocytosis with remaining being unremarkable including a normal lactic acid level.  X-ray of the toe showed new osseous defect at the great toe distal phalanx suspicion for osteo with tissue edema.  Podiatry agreed to see the patient as an in-patient.    The patient states he has been seen by podiatry at [facility] over the past year with multiple occurrences of infection of the right great toe.    Patient denies any fevers, chills, nausea, vomiting, shortness of breathe, nor any other constitutional signs nor symptoms.    Review of Systems   All systems were reviewed and negative except for: Right great toe chronic wound    Personal History     Past Medical History:   Diagnosis Date    Abscess of foot     rt    ADHD     Anxiety disorder     Backache     Cellulitis of scrotum     Elevated cholesterol     Hyperlipidemia     Hypertension     Insomnia disorder     Seizures     x2- due to overuse/overdose of meds- years ago per pt. Last one was 15-20 years ago- not on meds     Substance abuse     years ago    Viral gastroenteritis        Past Surgical History:   Procedure Laterality Date    EYE SURGERY      HAND SURGERY Bilateral     INCISION AND DRAINAGE OF WOUND Right 2/14/2023    Procedure: INCISION AND DRAINAGE BONE, right great toe;  Surgeon: Cristo Christy DPM;  Location: [facility] MAIN OR;  Service: Podiatry;  Laterality: Right;    KNEE ARTHROSCOPY Right 05/21/2014    Arthroscopy of right knee, debride medial meniscus. x3    KNEE ARTHROSCOPY W/ MENISCECTOMY Left 08/09/2022    Procedure: KNEE ARTHROSCOPY WITH PARTIAL MEDIAL MENISCECTOMY;  Surgeon: Carlos Hameed MD;  [place] [facility] [place] [facility] [place]  Service: Orthopedics;  Laterality: Left;    SHOULDER ARTHROSCOPY Right        Family History: family history includes Anxiety disorder in his mother; Cancer in his father, mother, and paternal grandmother; Depression in his mother; Diabetes in his maternal grandmother and mother; Heart disease in his father; Thyroid disease in his mother. Otherwise pertinent FHx was reviewed and not pertinent to current issue.    Social History:  reports that he has been smoking cigarettes. He has been exposed to tobacco smoke. He has never used smokeless tobacco. He reports that he does not currently use drugs after having used the following drugs: Marijuana. Frequency: 1.00 time per week. He reports that he does not drink alcohol.    Home Medications:  ARIPiprazole, QUEtiapine, amphetamine-dextroamphetamine, gabapentin, and sertraline      Allergies:  Allergies   Allergen Reactions    Naproxen GI Intolerance       Objective   Objective     Vitals:   Temp:  [97.3 °F (36.3 °C)-98.2 °F (36.8 °C)] 98.1 °F (36.7 °C)  Heart Rate:  [] 74  Resp:  [16-18] 16  BP: ()/(56-95) 112/67  Physical Exam      GEN:   A&Ox3, Patient seen at bedside in no apparent distress.    Physical Exam  Vitals and nursing note reviewed.   Cardiovascular:      Pulses:           Dorsalis pedis pulses are 2+ on the right side and  2+ on the left side.        Posterior tibial pulses are 2+ on the right side and 2+ on the left side.   Feet:      Right foot:      Protective Sensation: 10 sites tested.        Skin integrity: Erythema and warmth present.      Left foot:      Protective Sensation: 10 sites tested.        Skin integrity: Warmth present.         Foot/Ankle Exam    GENERAL  Appearance:  appears stated age  Orientation:  AAOx3  Affect:  appropriate    VASCULAR     Right Foot Vascularity   Normal vascular exam    Dorsalis pedis:  2+  Posterior tibial:  2+  Skin temperature:  warm  Edema grading:  None  CFT:  < 3 seconds  Pedal hair growth:  Present  Varicosities:  none     Left Foot Vascularity   Normal vascular exam    Dorsalis pedis:  2+  Posterior tibial:  2+  Skin temperature:  warm  Edema grading:  None  CFT:  < 3 seconds  Pedal hair growth:  Present  Varicosities:  none     NEUROLOGIC     Right Foot Neurologic   Normal sensation    Light touch sensation: normal  Vibratory sensation: normal  Hot/Cold sensation: normal  Protective Sensation using [place] Monofilament:   Sites intact: 10  Sites tested: 10     Left Foot Neurologic   Normal sensation    Light touch sensation: normal  Vibratory sensation: normal  Hot/Cold sensation:  normal  Protective Sensation using [place] Monofilament:   Sites intact: 10  Sites tested: 10    MUSCULOSKELETAL     Right Foot Musculoskeletal   Tenderness:  toe 1 tenderness      MUSCLE STRENGTH     Right Foot Muscle Strength   Foot dorsiflexion:  4  Foot plantar flexion:  4  Foot inversion:  4  Foot eversion:  4     Left Foot Muscle Strength   Foot dorsiflexion:  4  Foot plantar flexion:  4  Foot inversion:  4  Foot eversion:  4    RANGE OF MOTION     Right Foot Range of Motion   Foot and ankle ROM within normal limits       Left Foot Range of Motion   Foot and ankle ROM within normal limits      DERMATOLOGIC      Right Foot Dermatologic   Skin  Positive for cellulitis, drainage,  erythema and wound. (Right great toe)     Left Foot Dermatologic   Skin  Left foot skin is intact.       MRI Foot Right With & Without Contrast    Result Date: 3/7/2024  Narrative: PROCEDURE: MRI FOOT RIGHT W WO CONTRAST  COMPARISON: None  INDICATIONS: REDDNESS AND PAIN TO DORSAL SURFACE OF BIG TOE X 2 DAYS.      TECHNIQUE: A complete multi-planar MRI of the foot was performed with and without intravenous gadolinium contrast.   FINDINGS:  Edema and enhancement are observed throughout the subcutaneous soft tissues at the mid to distal great toe.  These findings are more pronounced at the dorsal aspect.  The findings suggest changes of soft tissue cellulitis.  There appears to be involvement of the nail bed.  There is a well-defined fluid collection with peripheral enhancement noted within the dorsal lateral soft tissues of the great toe directly adjacent to the nail bed of the proximal aspect of the nail bed.  This finding measures approximately 1.5 x 0.8 x 1.1 cm and suggests a focal abscess.  There is abnormal edema and enhancement throughout the underlying distal phalanx of the great toe.  There appear to be areas of cortical erosion.  These findings indicate changes of osteomyelitis.  There is also increased fluid signal with enhancement at the interphalangeal joint which may indicate developing changes of septic arthropathy.  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      Impression:   1. Evidence for soft tissue cellulitis throughout the great toe.  There appears to be involvement of the nail bed. 2. There is evidence for small focal abscess formation in the soft tissues at the dorsal aspect of the great toe directly adjacent to the proximal portion of the nail bed.  This finding measures approximately 1.5 cm. 3. Findings consistent with osteomyelitis involving the distal phalanx of the great toe.  There may also be developing septic arthropathy of the interphalangeal joint.       MAGDALENA URENA MD       Electronically Signed and Approved By: MAGDALENA URENA MD on 3/07/2024 at 8:47             XR Toe 2+ View Right    Result Date: 3/6/2024  Narrative: PROCEDURE: XR TOE 2+ VW RIGHT  COMPARISON: [facility], CR, XR TOE 2+ VW RIGHT, 1/17/2023, 13:08.  INDICATIONS: GENERALIZED RIGHT 1ST DIGIT PAIN AND SWELLING SINCE YESTERDAY  FINDINGS:  There is soft tissue edema of the great toe and forefoot.  No definite foreign body or significant soft tissue gas is seen.   There appears to be new osseous defect at the distal tuft of the great toe distal phalanx.  Findings are suspicious for osteomyelitis.  No definite acute fracture.  Mild degenerative changes are present at the 1st MTP joint.      Impression:   1. New osseous defect at the great toe distal phalanx suspicious for osteomyelitis. 2. Soft tissue edema of the great toe and forefoot which may indicate skin and soft tissue infection.      ZACH DUQUE MD       Electronically Signed and Approved By: ZACH DUQUE MD on 3/06/2024 at 20:05                Result Review    Result Review:  I have personally reviewed the results from the time of this admission to 3/7/2024 17:06 EST and agree with these findings:  [x]  Laboratory  []  Microbiology  [x]  Radiology  []  EKG/Telemetry   []  Cardiology/Vascular   []  Pathology  []  Old records  []  Other:      WBC   Date Value Ref Range Status   03/07/2024 8.98 3.40 - 10.80 10*3/mm3 Final     RBC   Date Value Ref Range Status   03/07/2024 4.70 4.14 - 5.80 10*6/mm3 Final     Hemoglobin   Date Value Ref Range Status   03/07/2024 14.0 13.0 - 17.7 g/dL Final     Hematocrit   Date Value Ref Range Status   03/07/2024 43.8 37.5 - 51.0 % Final     MCV   Date Value Ref Range Status   03/07/2024 93.2 79.0 - 97.0 fL Final     MCH   Date Value Ref Range Status   03/07/2024 29.8 26.6 - 33.0 pg Final     MCHC   Date Value Ref Range Status   03/07/2024 32.0 31.5 - 35.7 g/dL Final     RDW   Date Value Ref  Range Status   03/07/2024 14.3 12.3 - 15.4 % Final     RDW-SD   Date Value Ref Range Status   03/07/2024 49.2 37.0 - 54.0 fl Final     MPV   Date Value Ref Range Status   03/07/2024 11.2 6.0 - 12.0 fL Final     Platelets   Date Value Ref Range Status   03/07/2024 267 140 - 450 10*3/mm3 Final     Neutrophil %   Date Value Ref Range Status   03/06/2024 62.0 42.7 - 76.0 % Final     Lymphocyte %   Date Value Ref Range Status   03/06/2024 29.2 19.6 - 45.3 % Final     Monocyte %   Date Value Ref Range Status   03/06/2024 5.1 5.0 - 12.0 % Final     Eosinophil %   Date Value Ref Range Status   03/06/2024 2.1 0.3 - 6.2 % Final     Basophil %   Date Value Ref Range Status   03/06/2024 0.7 0.0 - 1.5 % Final     Immature Grans %   Date Value Ref Range Status   03/06/2024 0.9 (H) 0.0 - 0.5 % Final     Neutrophils, Absolute   Date Value Ref Range Status   03/06/2024 7.15 (H) 1.70 - 7.00 10*3/mm3 Final     Lymphocytes, Absolute   Date Value Ref Range Status   03/06/2024 3.37 (H) 0.70 - 3.10 10*3/mm3 Final     Monocytes, Absolute   Date Value Ref Range Status   03/06/2024 0.59 0.10 - 0.90 10*3/mm3 Final     Eosinophils, Absolute   Date Value Ref Range Status   03/06/2024 0.24 0.00 - 0.40 10*3/mm3 Final     Basophils, Absolute   Date Value Ref Range Status   03/06/2024 0.08 0.00 - 0.20 10*3/mm3 Final     Immature Grans, Absolute   Date Value Ref Range Status   03/06/2024 0.10 (H) 0.00 - 0.05 10*3/mm3 Final     nRBC   Date Value Ref Range Status   03/06/2024 0.0 0.0 - 0.2 /100 WBC Final        Lab Results   Component Value Date    GLUCOSE 128 (H) 03/07/2024    BUN 15 03/07/2024    CREATININE 1.03 03/07/2024    EGFRIFNONA 94 05/30/2019    BCR 14.6 03/07/2024    K 3.8 03/07/2024    CO2 22.9 03/07/2024    CALCIUM 9.1 03/07/2024    ALBUMIN 4.2 03/06/2024    AST 19 03/06/2024    ALT 10 03/06/2024             Most notable findings include: Chronic wound on right great toe    Assessment & Plan   Assessment / Plan       Active Hospital  Problems:  Active Hospital Problems    Diagnosis     **Osteomyelitis of great toe of right foot     Cellulitis of great toe of right foot     Nicotine addiction     Depression     HTN (hypertension)        Plan:     Comprehensive lower extremity examination and evaluation was performed.    Discussed findings and treatment plan including risks, benefits, and treatment options with patient in detail. Patient agreed with treatment plan.    Planned surgery: Right great toe amputation    The risks and benefits of the surgery were discussed with the patient.  This discussion included possible complications of requiring further surgery, possible delayed wound healing, further surgery requiring amputation of the foot or leg, and also included the complication of death.  All the patient's questions were answered to their satisfaction.  Patient states they would like to proceed with the procedure.    Discussed with the patient at length surgical versus nonsurgical options.  Explained to the patient in detail that surgical intervention is only indicated when the level of pain is such that it negatively affects her daily life.    It was explained to the patient that surgical interventions often times do not relieve all of the pain associated with the deformity    Risks and complications associated with surgical versus nonsurgical options were explained.  The patient understands that the problem will most likely continue to evolve and surgical intervention is the only treatment plan that actually corrects the deformities.    Upon discussion of non-surgical conservative option, surgical correction, post-operative requirements along with risk and benefits of the surgery along with expected outcomes, the patient states they would like to proceed with the scheduling surgery.      The patient has decided to move forward with surgical intervention understanding all of these risks and complications.    Surgery is planned for for  tomorrow, 8 March 2024.    Discussed with patient's nurse.    Discussed with patient's hospitalist, Dr. Ceron.    DVT prophylaxis:  Medical DVT prophylaxis orders are present.        CODE STATUS:    Level Of Support Discussed With: Patient  Code Status (Patient has no pulse and is not breathing): CPR (Attempt to Resuscitate)  Medical Interventions (Patient has pulse or is breathing): Full Support      Electronically signed by Nelson Hand DPM, 03/07/24, 5:02 PM EST.

## 2024-03-07 NOTE — PROGRESS NOTES
Patient Care Team:  Roni Gray MD as PCP - General (Family Medicine)    Date: 3/7/2024  Patient Name: Honorio Vigil Jr.  : 1965  MRN: [id number]  Date of admission: 3/6/2024  Room/Bed: [facility] [place]      Subjective   Subjective         Chief Complaint: f/u right great toe pain    Summary:Honorio Vigil Jr. is a 58 y.o. male  with past medical history of hypertension, substance abuse in remission, anxiety disorder, ADHD, hyperlipidemia, and GERD presented to ED with complaints of right toe pain and redness.  Patient states that around 7 PM last night he noticed some redness and pain from his right hallux.  When he woke up the pain and redness had worsened.  Patient had similar episode last year that needed to be drained and started on antibiotics.  Patient stated the pain became unbearable so he came to the ED for further evaluation.  In the ED patient's vitals were within normal limits on arrival.  Showed that he had leukocytosis with remaining being unremarkable including a normal lactic acid level.  X-ray of the toe showed new osseous defect at the great toe distal phalanx suspicion for osteo with tissue edema.  Podiatry agreed to see the patient in the morning.  When asked he denied any recent fevers, chills, headaches, focal weakness, chest pain, palpitation, shortness of breath, cough, abdominal pain, nausea, vomiting, diarrhea, constipation, dysuria, hematuria, hematochezia, melena, or anxiety.  Patient admitted for further evaluation and treatment       Interval Followup:   Patient denies any fever/chills, nausea, or vomiting. Wants to eat. Nursing reports no acute overnight events.       Review of Systems   Skin:  Positive for wound.   All other systems reviewed and are negative.      Objective   Objective       Vital Signs  Temp:  [97.3 °F (36.3 °C)-98.2 °F (36.8 °C)] 97.5 °F (36.4 °C)  Heart Rate:  [] 78  Resp:  [16-18] 18  BP: ()/(56-95) 98/64  Oxygen Therapy  SpO2: 95  "%  Pulse Oximetry Type: Intermittent  Device (Oxygen Therapy): room air  Flowsheet Rows      Flowsheet Row First Filed Value   Admission Height 182.9 cm (72\") Documented at 03/06/2024 1812   Admission Weight 104 kg (230 lb 6.1 oz) Documented at 03/06/2024 1812          Intake & Output (last 3 days)         03/04 0701 03/05 0700 03/05 0701 03/06 0700 03/06 0701 03/07 0700 03/07 0701 03/08 0700    IV Piggyback    175    Total Intake(mL/kg)    175 (1.7)    Net    +175                  Lines, Drains & Airways       Active LDAs       Name Placement date Placement time Site Days    Peripheral IV 03/06/24 1930 Anterior;Distal;Left;Upper Arm 03/06/24 1930  Arm  less than 1                      Physical Exam  Vitals reviewed.   Constitutional:       General: He is not in acute distress.     Appearance: He is not toxic-appearing.   HENT:      Head: Normocephalic and atraumatic.      Mouth/Throat:      Mouth: Mucous membranes are moist.   Cardiovascular:      Rate and Rhythm: Normal rate and regular rhythm.   Pulmonary:      Effort: Pulmonary effort is normal. No respiratory distress.   Abdominal:      General: Bowel sounds are normal.      Palpations: Abdomen is soft.   Skin:     General: Skin is warm and dry.      Comments: Erythema and swelling to the right great toe    Neurological:      Mental Status: He is alert.   Psychiatric:         Mood and Affect: Mood normal.         Behavior: Behavior normal.           Results Review:      Results from last 7 days   Lab Units 03/07/24  0511 03/06/24 1929   WBC 10*3/mm3 8.98 11.53*   HEMOGLOBIN g/dL 14.0 15.7   HEMATOCRIT % 43.8 47.9   PLATELETS 10*3/mm3 267 328     Results from last 7 days   Lab Units 03/07/24  0511 03/06/24 1929   SODIUM mmol/L 137 138   POTASSIUM mmol/L 3.8 3.9   CHLORIDE mmol/L 106 102   CO2 mmol/L 22.9 25.1   BUN mg/dL 15 10   CREATININE mg/dL 1.03 0.94   CALCIUM mg/dL 9.1 9.9   BILIRUBIN mg/dL  --  0.3   ALK PHOS U/L  --  143*   ALT (SGPT) U/L  --  10 " "  AST (SGOT) U/L  --  19   GLUCOSE mg/dL 128* 84             No results found for: \"BLOODCX\"    No results found for: \"MRSACX\"    No results found for: \"STOOLCX\"    No results found for: \"URINECX\"    No results found for: \"WOUNDCX\"    Imaging Results (Last 24 Hours)       Procedure Component Value Units Date/Time    MRI Foot Right With & Without Contrast [[id number]] Collected: 03/07/24 0847     Updated: 03/07/24 0850    Narrative:      PROCEDURE: MRI FOOT RIGHT W WO CONTRAST     COMPARISON: None     INDICATIONS: REDDNESS AND PAIN TO DORSAL SURFACE OF BIG TOE X 2 DAYS.             TECHNIQUE: A complete multi-planar MRI of the foot was performed with and without intravenous   gadolinium contrast.       FINDINGS:   Edema and enhancement are observed throughout the subcutaneous soft tissues at the mid to distal   great toe.  These findings are more pronounced at the dorsal aspect.  The findings suggest changes   of soft tissue cellulitis.  There appears to be involvement of the nail bed.     There is a well-defined fluid collection with peripheral enhancement noted within the dorsal   lateral soft tissues of the great toe directly adjacent to the nail bed of the proximal aspect of   the nail bed.  This finding measures approximately 1.5 x 0.8 x 1.1 cm and suggests a focal abscess.     There is abnormal edema and enhancement throughout the underlying distal phalanx of the great toe.    There appear to be areas of cortical erosion.  These findings indicate changes of osteomyelitis.    There is also increased fluid signal with enhancement at the interphalangeal joint which may   indicate developing changes of septic arthropathy.     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       Impression:         1. Evidence for soft tissue cellulitis throughout the great toe.  There appears to be involvement   of the nail bed.  2. There is evidence for small focal abscess formation in the soft " tissues at the dorsal aspect of   the great toe directly adjacent to the proximal portion of the nail bed.  This finding measures   approximately 1.5 cm.  3. Findings consistent with osteomyelitis involving the distal phalanx of the great toe.  There may   also be developing septic arthropathy of the interphalangeal joint.              MAGDALENA URENA MD         Electronically Signed and Approved By: MAGDALENA URENA MD on 3/07/2024 at 8:47                     XR Toe 2+ View Right [[id number]] Collected: 03/06/24 2005     Updated: 03/06/24 2008    Narrative:      PROCEDURE: XR TOE 2+ VW RIGHT     COMPARISON: [facility], CR, XR TOE 2+ VW RIGHT, 1/17/2023, 13:08.     INDICATIONS: GENERALIZED RIGHT 1ST DIGIT PAIN AND SWELLING SINCE YESTERDAY     FINDINGS:   There is soft tissue edema of the great toe and forefoot.  No definite foreign body or significant   soft tissue gas is seen.       There appears to be new osseous defect at the distal tuft of the great toe distal phalanx.    Findings are suspicious for osteomyelitis.  No definite acute fracture.  Mild degenerative changes   are present at the 1st MTP joint.       Impression:         1. New osseous defect at the great toe distal phalanx suspicious for osteomyelitis.  2. Soft tissue edema of the great toe and forefoot which may indicate skin and soft tissue   infection.               ZACH DUQUE MD         Electronically Signed and Approved By: ZACH DUQUE MD on 3/06/2024 at 20:05                             I have personally reviewed the patient's new imaging and lab results.          ECG/EMG Results (most recent)       None                    Medication Review:     Scheduled Meds:amLODIPine, 10 mg, Oral, Q24H  ARIPiprazole, 5 mg, Oral, Daily  cefTRIAXone, 2,000 mg, Intravenous, Q24H  enoxaparin, 40 mg, Subcutaneous, Daily  gabapentin, 600 mg, Oral, Q8H  QUEtiapine, 100 mg, Oral, Nightly  sertraline, 50 mg, Oral, Daily  sodium chloride, 10 mL,  Intravenous, Q12H  vancomycin, 1,250 mg, Intravenous, Q12H      Continuous Infusions:Pharmacy to dose vancomycin,       PRN Meds:.  senna-docusate sodium **AND** polyethylene glycol **AND** bisacodyl **AND** bisacodyl    hydrALAZINE    ketorolac    Morphine **AND** naloxone    ondansetron    Pharmacy to dose vancomycin    sodium chloride    sodium chloride      I have reviewed the patient's current medication list    Assessment & Plan   Assessment / Plan       Active Hospital Problems    Diagnosis  POA    **Osteomyelitis of great toe of right foot [M86.9]  Yes    Cellulitis of great toe of right foot [L03.031]  Unknown    Nicotine addiction [F17.200]  Yes    Depression [F32.A]  Yes    HTN (hypertension) [I10]  Yes         Right great toe cellulitis, abscess and osteomyelitis  -Blood cultures- pending  - MRI reviewed  - Discussed with podiatry and will likely need surgery tomorrow   - Sed rate and CRP wnl  - continue Rocephin and Vancomycin for now   - review of cultures show history of staph aureus     HTN  - continue amlodipine  - monitor bp with routine vs and make adjustments as needed     Anxiety/Depression  -Resume home abilify, seroquel, and zoloft  - hold adderall     DVT Prophylaxis  - enoxaparin     Code Status  - full Code    Plan for disposition: pending progress     Tosin Ceron,   03/07/24  13:21 EST          Note disclaimer: At [facility], we believe that sharing information builds trust and better relationships. You are receiving this note because you recently visited [facility]. It is possible you will see health information before a provider has talked with you about it. This kind of information can be easy to misunderstand. To help you fully understand what it means for your health, we urge you to discuss this note with your provider.

## 2024-03-08 ENCOUNTER — APPOINTMENT (OUTPATIENT)
Dept: GENERAL RADIOLOGY | Facility: HOSPITAL | Age: 59
DRG: 504 | End: 2024-03-08
Payer: COMMERCIAL

## 2024-03-08 ENCOUNTER — ANESTHESIA (OUTPATIENT)
Dept: PERIOP | Facility: HOSPITAL | Age: 59
End: 2024-03-08
Payer: COMMERCIAL

## 2024-03-08 ENCOUNTER — ANESTHESIA EVENT (OUTPATIENT)
Dept: PERIOP | Facility: HOSPITAL | Age: 59
End: 2024-03-08
Payer: COMMERCIAL

## 2024-03-08 LAB
ANION GAP SERPL CALCULATED.3IONS-SCNC: 8 MMOL/L (ref 5–15)
BUN SERPL-MCNC: 14 MG/DL (ref 6–20)
BUN/CREAT SERPL: 15.7 (ref 7–25)
CALCIUM SPEC-SCNC: 8.9 MG/DL (ref 8.6–10.5)
CHLORIDE SERPL-SCNC: 107 MMOL/L (ref 98–107)
CO2 SERPL-SCNC: 23 MMOL/L (ref 22–29)
CREAT SERPL-MCNC: 0.89 MG/DL (ref 0.76–1.27)
EGFRCR SERPLBLD CKD-EPI 2021: 99.3 ML/MIN/1.73
GLUCOSE SERPL-MCNC: 138 MG/DL (ref 65–99)
POTASSIUM SERPL-SCNC: 4.4 MMOL/L (ref 3.5–5.2)
SODIUM SERPL-SCNC: 138 MMOL/L (ref 136–145)
VANCOMYCIN SERPL-MCNC: 25.1 MCG/ML (ref 5–40)

## 2024-03-08 PROCEDURE — 80048 BASIC METABOLIC PNL TOTAL CA: CPT | Performed by: FAMILY MEDICINE

## 2024-03-08 PROCEDURE — 25010000002 MIDAZOLAM PER 1MG: Performed by: PODIATRIST

## 2024-03-08 PROCEDURE — 88305 TISSUE EXAM BY PATHOLOGIST: CPT | Performed by: PODIATRIST

## 2024-03-08 PROCEDURE — 94799 UNLISTED PULMONARY SVC/PX: CPT

## 2024-03-08 PROCEDURE — 25010000002 MORPHINE PER 10 MG: Performed by: FAMILY MEDICINE

## 2024-03-08 PROCEDURE — 73620 X-RAY EXAM OF FOOT: CPT

## 2024-03-08 PROCEDURE — 28825 PARTIAL AMPUTATION OF TOE: CPT | Performed by: PODIATRIST

## 2024-03-08 PROCEDURE — 25010000002 DEXAMETHASONE PER 1 MG

## 2024-03-08 PROCEDURE — 88311 DECALCIFY TISSUE: CPT | Performed by: PODIATRIST

## 2024-03-08 PROCEDURE — 25010000002 CEFTRIAXONE PER 250 MG: Performed by: FAMILY MEDICINE

## 2024-03-08 PROCEDURE — 25010000002 BUPIVACAINE (PF) 0.25 % SOLUTION: Performed by: PODIATRIST

## 2024-03-08 PROCEDURE — 25010000002 ONDANSETRON PER 1 MG

## 2024-03-08 PROCEDURE — 25010000002 PROPOFOL 10 MG/ML EMULSION

## 2024-03-08 PROCEDURE — 80202 ASSAY OF VANCOMYCIN: CPT | Performed by: FAMILY MEDICINE

## 2024-03-08 PROCEDURE — 25810000003 LACTATED RINGERS PER 1000 ML

## 2024-03-08 PROCEDURE — 25010000002 MORPHINE PER 10 MG: Performed by: PODIATRIST

## 2024-03-08 PROCEDURE — 25010000002 VANCOMYCIN 5 G RECONSTITUTED SOLUTION: Performed by: FAMILY MEDICINE

## 2024-03-08 PROCEDURE — 25810000003 SODIUM CHLORIDE 0.9 % SOLUTION: Performed by: FAMILY MEDICINE

## 2024-03-08 PROCEDURE — 0Y6P0Z0 DETACHMENT AT RIGHT 1ST TOE, COMPLETE, OPEN APPROACH: ICD-10-PCS | Performed by: PODIATRIST

## 2024-03-08 PROCEDURE — 25010000002 VANCOMYCIN 5 G RECONSTITUTED SOLUTION: Performed by: PODIATRIST

## 2024-03-08 PROCEDURE — 25810000003 SODIUM CHLORIDE 0.9 % SOLUTION: Performed by: PODIATRIST

## 2024-03-08 RX ORDER — PROMETHAZINE HYDROCHLORIDE 25 MG/1
25 SUPPOSITORY RECTAL ONCE AS NEEDED
Status: DISCONTINUED | OUTPATIENT
Start: 2024-03-08 | End: 2024-03-08 | Stop reason: HOSPADM

## 2024-03-08 RX ORDER — ONDANSETRON 2 MG/ML
INJECTION INTRAMUSCULAR; INTRAVENOUS AS NEEDED
Status: DISCONTINUED | OUTPATIENT
Start: 2024-03-08 | End: 2024-03-08 | Stop reason: SURG

## 2024-03-08 RX ORDER — PROPOFOL 10 MG/ML
VIAL (ML) INTRAVENOUS AS NEEDED
Status: DISCONTINUED | OUTPATIENT
Start: 2024-03-08 | End: 2024-03-08 | Stop reason: SURG

## 2024-03-08 RX ORDER — MIDAZOLAM HYDROCHLORIDE 2 MG/2ML
2 INJECTION, SOLUTION INTRAMUSCULAR; INTRAVENOUS ONCE
Status: COMPLETED | OUTPATIENT
Start: 2024-03-08 | End: 2024-03-08

## 2024-03-08 RX ORDER — PHENYLEPHRINE HCL IN 0.9% NACL 1 MG/10 ML
SYRINGE (ML) INTRAVENOUS AS NEEDED
Status: DISCONTINUED | OUTPATIENT
Start: 2024-03-08 | End: 2024-03-08 | Stop reason: SURG

## 2024-03-08 RX ORDER — ONDANSETRON 2 MG/ML
4 INJECTION INTRAMUSCULAR; INTRAVENOUS ONCE AS NEEDED
Status: DISCONTINUED | OUTPATIENT
Start: 2024-03-08 | End: 2024-03-08 | Stop reason: HOSPADM

## 2024-03-08 RX ORDER — BUPIVACAINE HYDROCHLORIDE 2.5 MG/ML
INJECTION, SOLUTION EPIDURAL; INFILTRATION; INTRACAUDAL AS NEEDED
Status: DISCONTINUED | OUTPATIENT
Start: 2024-03-08 | End: 2024-03-08 | Stop reason: HOSPADM

## 2024-03-08 RX ORDER — EPHEDRINE SULFATE 50 MG/ML
INJECTION INTRAVENOUS AS NEEDED
Status: DISCONTINUED | OUTPATIENT
Start: 2024-03-08 | End: 2024-03-08 | Stop reason: SURG

## 2024-03-08 RX ORDER — MAGNESIUM HYDROXIDE 1200 MG/15ML
LIQUID ORAL AS NEEDED
Status: DISCONTINUED | OUTPATIENT
Start: 2024-03-08 | End: 2024-03-08 | Stop reason: HOSPADM

## 2024-03-08 RX ORDER — PROMETHAZINE HYDROCHLORIDE 12.5 MG/1
25 TABLET ORAL ONCE AS NEEDED
Status: DISCONTINUED | OUTPATIENT
Start: 2024-03-08 | End: 2024-03-08 | Stop reason: HOSPADM

## 2024-03-08 RX ORDER — LIDOCAINE HYDROCHLORIDE 20 MG/ML
INJECTION, SOLUTION EPIDURAL; INFILTRATION; INTRACAUDAL; PERINEURAL AS NEEDED
Status: DISCONTINUED | OUTPATIENT
Start: 2024-03-08 | End: 2024-03-08 | Stop reason: SURG

## 2024-03-08 RX ORDER — OXYCODONE HYDROCHLORIDE 5 MG/1
5 TABLET ORAL
Status: DISCONTINUED | OUTPATIENT
Start: 2024-03-08 | End: 2024-03-08 | Stop reason: HOSPADM

## 2024-03-08 RX ORDER — SODIUM CHLORIDE, SODIUM LACTATE, POTASSIUM CHLORIDE, CALCIUM CHLORIDE 600; 310; 30; 20 MG/100ML; MG/100ML; MG/100ML; MG/100ML
INJECTION, SOLUTION INTRAVENOUS CONTINUOUS PRN
Status: DISCONTINUED | OUTPATIENT
Start: 2024-03-08 | End: 2024-03-08 | Stop reason: SURG

## 2024-03-08 RX ORDER — DEXAMETHASONE SODIUM PHOSPHATE 4 MG/ML
INJECTION, SOLUTION INTRA-ARTICULAR; INTRALESIONAL; INTRAMUSCULAR; INTRAVENOUS; SOFT TISSUE AS NEEDED
Status: DISCONTINUED | OUTPATIENT
Start: 2024-03-08 | End: 2024-03-08 | Stop reason: SURG

## 2024-03-08 RX ORDER — DEXMEDETOMIDINE HYDROCHLORIDE 100 UG/ML
INJECTION, SOLUTION INTRAVENOUS AS NEEDED
Status: DISCONTINUED | OUTPATIENT
Start: 2024-03-08 | End: 2024-03-08 | Stop reason: SURG

## 2024-03-08 RX ADMIN — MORPHINE SULFATE 2 MG: 2 INJECTION, SOLUTION INTRAMUSCULAR; INTRAVENOUS at 19:23

## 2024-03-08 RX ADMIN — VANCOMYCIN HYDROCHLORIDE 1250 MG: 5 INJECTION, POWDER, LYOPHILIZED, FOR SOLUTION INTRAVENOUS at 23:27

## 2024-03-08 RX ADMIN — MORPHINE SULFATE 2 MG: 2 INJECTION, SOLUTION INTRAMUSCULAR; INTRAVENOUS at 09:31

## 2024-03-08 RX ADMIN — MORPHINE SULFATE 2 MG: 2 INJECTION, SOLUTION INTRAMUSCULAR; INTRAVENOUS at 05:27

## 2024-03-08 RX ADMIN — ONDANSETRON HYDROCHLORIDE 4 MG: 2 SOLUTION INTRAMUSCULAR; INTRAVENOUS at 17:22

## 2024-03-08 RX ADMIN — Medication 100 MCG: at 17:30

## 2024-03-08 RX ADMIN — SODIUM CHLORIDE, POTASSIUM CHLORIDE, SODIUM LACTATE AND CALCIUM CHLORIDE: 600; 310; 30; 20 INJECTION, SOLUTION INTRAVENOUS at 17:14

## 2024-03-08 RX ADMIN — GABAPENTIN 600 MG: 300 CAPSULE ORAL at 05:27

## 2024-03-08 RX ADMIN — MORPHINE SULFATE 2 MG: 2 INJECTION, SOLUTION INTRAMUSCULAR; INTRAVENOUS at 14:48

## 2024-03-08 RX ADMIN — Medication 10 ML: at 08:44

## 2024-03-08 RX ADMIN — DEXAMETHASONE SODIUM PHOSPHATE 4 MG: 4 INJECTION, SOLUTION INTRAMUSCULAR; INTRAVENOUS at 17:22

## 2024-03-08 RX ADMIN — MORPHINE SULFATE 2 MG: 2 INJECTION, SOLUTION INTRAMUSCULAR; INTRAVENOUS at 01:25

## 2024-03-08 RX ADMIN — LIDOCAINE HYDROCHLORIDE 100 MG: 20 INJECTION, SOLUTION INTRAVENOUS at 17:17

## 2024-03-08 RX ADMIN — CEFTRIAXONE SODIUM 2000 MG: 2 INJECTION, POWDER, FOR SOLUTION INTRAMUSCULAR; INTRAVENOUS at 08:44

## 2024-03-08 RX ADMIN — GABAPENTIN 600 MG: 300 CAPSULE ORAL at 21:10

## 2024-03-08 RX ADMIN — MIDAZOLAM HYDROCHLORIDE 2 MG: 1 INJECTION, SOLUTION INTRAMUSCULAR; INTRAVENOUS at 16:45

## 2024-03-08 RX ADMIN — Medication 10 ML: at 21:10

## 2024-03-08 RX ADMIN — EPHEDRINE SULFATE 10 MG: 50 INJECTION INTRAVENOUS at 17:45

## 2024-03-08 RX ADMIN — DEXMEDETOMIDINE 10 MCG: 100 INJECTION, SOLUTION INTRAVENOUS at 17:26

## 2024-03-08 RX ADMIN — Medication 100 MCG: at 17:37

## 2024-03-08 RX ADMIN — PROPOFOL 200 MG: 10 INJECTION, EMULSION INTRAVENOUS at 17:19

## 2024-03-08 RX ADMIN — DEXMEDETOMIDINE 20 MCG: 100 INJECTION, SOLUTION INTRAVENOUS at 17:17

## 2024-03-08 RX ADMIN — Medication 100 MCG: at 17:40

## 2024-03-08 RX ADMIN — QUETIAPINE FUMARATE 100 MG: 100 TABLET ORAL at 21:10

## 2024-03-08 RX ADMIN — VANCOMYCIN HYDROCHLORIDE 1250 MG: 5 INJECTION, POWDER, LYOPHILIZED, FOR SOLUTION INTRAVENOUS at 11:11

## 2024-03-08 RX ADMIN — MORPHINE SULFATE 2 MG: 2 INJECTION, SOLUTION INTRAMUSCULAR; INTRAVENOUS at 23:27

## 2024-03-08 NOTE — PLAN OF CARE
Goal Outcome Evaluation:  Plan of Care Reviewed With: patient        Progress: no change  Outcome Evaluation: Pt remained A&Ox4 this shift. Also, pt remained on room air maintaining stable oxygen saturation. Pt was medicated with PRN Morphine to help achieve an acceptable paint tolerance level. Wound care was not completed this shift, as patient went down for right great toe ampuation. Pt is currently in recovery and has not returned to floor. All other vital signs remained stable.

## 2024-03-08 NOTE — PROGRESS NOTES
Patient Care Team:  Roni Gray MD as PCP - General (Family Medicine)    Date: 3/8/2024  Patient Name: Honorio Vigil Jr.  : 1965  MRN: [id number]  Date of admission: 3/6/2024  Room/Bed: [facility] MAIN OR/MAIN OR      Subjective   Subjective         Chief Complaint: f/u right great toe pain    Summary:Honorio Vigil Jr. is a 58 y.o. male  with past medical history of hypertension, substance abuse in remission, anxiety disorder, ADHD, hyperlipidemia, and GERD presented to ED with complaints of right toe pain and redness.  Patient states that around 7 PM last night he noticed some redness and pain from his right hallux.  When he woke up the pain and redness had worsened.  Patient had similar episode last year that needed to be drained and started on antibiotics.  Patient stated the pain became unbearable so he came to the ED for further evaluation.  In the ED patient's vitals were within normal limits on arrival.  Showed that he had leukocytosis with remaining being unremarkable including a normal lactic acid level.  X-ray of the toe showed new osseous defect at the great toe distal phalanx suspicion for osteo with tissue edema.  Podiatry agreed to see the patient in the morning.  When asked he denied any recent fevers, chills, headaches, focal weakness, chest pain, palpitation, shortness of breath, cough, abdominal pain, nausea, vomiting, diarrhea, constipation, dysuria, hematuria, hematochezia, melena, or anxiety.  Patient admitted for further evaluation and treatment       Interval Followup:   Patient lying in bed, denies any complaints. Reports he had some drainage from toe wound.       Review of Systems   Skin:  Positive for wound.   All other systems reviewed and are negative.      Objective   Objective       Vital Signs  Temp:  [97.5 °F (36.4 °C)-98.4 °F (36.9 °C)] 98 °F (36.7 °C)  Heart Rate:  [71-94] 74  Resp:  [14-20] 15  BP: ()/(62-78) 119/74  Oxygen Therapy  SpO2: 95 %  Pulse  "Oximetry Type: Continuous  Device (Oxygen Therapy): nasal cannula  Flow (L/min): 2  Flowsheet Rows      Flowsheet Row First Filed Value   Admission Height 182.9 cm (72\") Documented at 03/06/2024 1812   Admission Weight 104 kg (230 lb 6.1 oz) Documented at 03/06/2024 1812          Intake & Output (last 3 days)         03/04 0701 03/05 0700 03/05 0701 03/06 0700 03/06 0701 03/07 0700 03/07 0701 03/08 0700    IV Piggyback    175    Total Intake(mL/kg)    175 (1.7)    Net    +175                  Lines, Drains & Airways       Active LDAs       Name Placement date Placement time Site Days    Peripheral IV 03/06/24 1930 Anterior;Distal;Left;Upper Arm 03/06/24 1930  Arm  less than 1                      Physical Exam  Vitals reviewed.   Constitutional:       General: He is not in acute distress.     Appearance: He is not toxic-appearing.   HENT:      Head: Normocephalic and atraumatic.      Mouth/Throat:      Mouth: Mucous membranes are moist.   Cardiovascular:      Rate and Rhythm: Normal rate and regular rhythm.   Pulmonary:      Effort: Pulmonary effort is normal. No respiratory distress.   Abdominal:      General: Bowel sounds are normal.      Palpations: Abdomen is soft.   Skin:     General: Skin is warm and dry.      Comments: Erythema and swelling to the right great toe, with some dried blood   Neurological:      Mental Status: He is alert.   Psychiatric:         Mood and Affect: Mood normal.         Behavior: Behavior normal.           Results Review:      Results from last 7 days   Lab Units 03/07/24  0511 03/06/24 1929   WBC 10*3/mm3 8.98 11.53*   HEMOGLOBIN g/dL 14.0 15.7   HEMATOCRIT % 43.8 47.9   PLATELETS 10*3/mm3 267 328     Results from last 7 days   Lab Units 03/08/24  0421 03/07/24  0511 03/06/24 1929   SODIUM mmol/L 138 137 138   POTASSIUM mmol/L 4.4 3.8 3.9   CHLORIDE mmol/L 107 106 102   CO2 mmol/L 23.0 22.9 25.1   BUN mg/dL 14 15 10   CREATININE mg/dL 0.89 1.03 0.94   CALCIUM mg/dL 8.9 9.1 9.9 " "  BILIRUBIN mg/dL  --   --  0.3   ALK PHOS U/L  --   --  143*   ALT (SGPT) U/L  --   --  10   AST (SGOT) U/L  --   --  19   GLUCOSE mg/dL 138* 128* 84             No results found for: \"BLOODCX\"    No results found for: \"MRSACX\"    No results found for: \"STOOLCX\"    No results found for: \"URINECX\"    No results found for: \"WOUNDCX\"    Imaging Results (Last 24 Hours)       Procedure Component Value Units Date/Time    XR Foot 2 View Right [[id number]] Resulted: 03/08/24 1827     Updated: 03/08/24 1828            I have personally reviewed the patient's new imaging and lab results.          ECG/EMG Results (most recent)       None                    Medication Review:     Scheduled Meds:amLODIPine, 10 mg, Oral, Q24H  [Transfer Hold] ARIPiprazole, 5 mg, Oral, Daily  cefTRIAXone, 2,000 mg, Intravenous, Q24H  [Transfer Hold] enoxaparin, 40 mg, Subcutaneous, Daily  gabapentin, 600 mg, Oral, Q8H  [Transfer Hold] QUEtiapine, 100 mg, Oral, Nightly  [Transfer Hold] sertraline, 50 mg, Oral, Daily  [Transfer Hold] sodium chloride, 10 mL, Intravenous, Q12H  vancomycin, 1,250 mg, Intravenous, Q12H      Continuous Infusions:Pharmacy to dose vancomycin,       PRN Meds:.  [Transfer Hold] senna-docusate sodium **AND** [Transfer Hold] polyethylene glycol **AND** [Transfer Hold] bisacodyl **AND** [Transfer Hold] bisacodyl    hydrALAZINE    HYDROmorphone    HYDROmorphone    [Transfer Hold] ketorolac    [Transfer Hold] Morphine **AND** [Transfer Hold] naloxone    [Transfer Hold] ondansetron    ondansetron    oxyCODONE    Pharmacy to dose vancomycin    promethazine **OR** promethazine    [Transfer Hold] sodium chloride    [Transfer Hold] sodium chloride      I have reviewed the patient's current medication list    Assessment & Plan   Assessment / Plan       Active Hospital Problems    Diagnosis  POA    **Osteomyelitis of great toe of right foot [M86.9]  Yes    Cellulitis of great toe of right foot [L03.031]  Yes    Nicotine addiction " [F17.200]  Yes    Depression [F32.A]  Yes    HTN (hypertension) [I10]  Yes         Right great toe cellulitis, abscess and osteomyelitis  -Blood cultures- NGTD  - MRI reviewed  - Discussed with podiatry and plan on amputation this afternoon  - Sed rate and CRP wnl  - continue IV Rocephin and Vancomycin for now   - review of cultures show history of staph aureus     HTN  - continue amlodipine  - monitor bp with routine vs and make adjustments as needed     Anxiety/Depression  - continue home abilify, seroquel, and zoloft  - hold adderall     DVT Prophylaxis  - enoxaparin     Code Status  - full Code    Plan for disposition: home likely in 1-2 days      Tosin Ceron DO  03/08/24  18:41 EST          Note disclaimer: At [facility], we believe that sharing information builds trust and better relationships. You are receiving this note because you recently visited [facility]. It is possible you will see health information before a provider has talked with you about it. This kind of information can be easy to misunderstand. To help you fully understand what it means for your health, we urge you to discuss this note with your provider.

## 2024-03-08 NOTE — OP NOTE
PREOPERATIVE DIAGNOSES:  Right great toe osteomyelitis     PROCEDURES PERFORMED:  Right great toe amputation at the interphalangeal joint    ANESTHESIA:  Local with MAC     HEMOSTASIS:   Padded pneumatic calf tourniquet 2050 mmHg x 21 minutes     ESTIMATED BLOOD LOSS:  None     SPECIMENS:  Soft tissue and bone from distal phalanx of the right toe     DRAINS:  None     COMPLICATIONS:  None.     IMPLANTS:  None     SUTURES:  3-0 nylon     INJECTABLES:  10 mL of 0.25% Marcaine plain     DESCRIPTION OF PROCEDURE:  Written consent was obtained from the patient prior to any medications or sedation being administered.     Under mild sedation, the patient was brought to the operating room and placed on the operating table in the supine position.  A well padded calf tourniquet was placed about the patient's calf on the surgical limb.    Following IV anesthesia, local anesthesia was obtained around the distal right first ray utilizing a total of 10 mL of quarter percent Marcaine plain.      The foot was then scrubbed, prepped and draped in the usual aseptic manner.  Tourniquet was not utilized throughout the case     Attention was directed to the right first toe where a fishmouth incision was made immediately distal to the interphalangeal phalangeal joint.      Care was taken to identify and retract all vital and neurovascular structures.  All bleeders were ligated and cauterized as necessary.     The incision was deepened to the metatarsal phalangeal joint which was disarticulated with crown and collar scissors.    The bone and soft tissue of the right first toe were removed in toto and sent for pathologic identification.    The wound was flushed with copious amounts of sterile normal saline.     The skin edges were approximated and coapted utilizing 3-0 nylon.     He was deflated with total tourniquet time of 21 minutes.    Upon completion of the procedure, capillary fill time is seen to the surgical site of the right first  toe amputation site and the 2 through 5 toes of the right foot.       The surgical site was dressed with a sterile compressive dressings consisting of Adaptic, 4 x 4's, Kerlix and 4 inch Ace wrap.     The patient tolerated the procedure and anesthesia well. The patient was transferred to the recovery room with vital signs stable and vascular status intact the 2 through 5 toes of the right foot.      Following a period of postoperative monitoring, the patient will be answered to his inpatient room and followed accordingly.

## 2024-03-08 NOTE — ANESTHESIA PREPROCEDURE EVALUATION
Anesthesia Evaluation     Patient summary reviewed and Nursing notes reviewed                Airway   Mallampati: I  TM distance: >3 FB  Neck ROM: full  No difficulty expected  Dental    (+) lower dentures and poor dentition    Pulmonary - negative pulmonary ROS and normal exam    breath sounds clear to auscultation  Cardiovascular - normal exam    Rhythm: regular  Rate: normal    (+) hypertension, hyperlipidemia      Neuro/Psych  (+) seizures, psychiatric history Depression  GI/Hepatic/Renal/Endo    (+) obesity    Musculoskeletal     Abdominal    Substance History - negative use     OB/GYN negative ob/gyn ROS         Other   arthritis,                       Anesthesia Plan    ASA 3     general     intravenous induction     Anesthetic plan, risks, benefits, and alternatives have been provided, discussed and informed consent has been obtained with: patient.        CODE STATUS:    Level Of Support Discussed With: Patient  Code Status (Patient has no pulse and is not breathing): CPR (Attempt to Resuscitate)  Medical Interventions (Patient has pulse or is breathing): Full Support

## 2024-03-08 NOTE — PLAN OF CARE
Goal Outcome Evaluation:         Pt A/Ox4 on RA. PRN pain medication administered per MAR for R toe pain. NPO for toe amputation today.

## 2024-03-08 NOTE — PROGRESS NOTES
"[facility] Clinical Pharmacy Services: Vancomycin Monitoring Note    Honorio Vigil Jr. is a 58 y.o. male who is on day 3/7 of pharmacy to dose vancomycin for Bone and/or Joint Infection and Skin and Soft Tissue.    Previous Vancomycin Dose:  1250mg iv 12h  Imaging Reviewed?: Yes  Updated Cultures and Sensitivities:   3/ Blood cultures- NGTD    Vitals/Labs  Ht: 182.9 cm (72.01\"); Wt: 105 kg (230 lb 9.6 oz)   Temp (24hrs), Av.9 °F (36.6 °C), Min:97.5 °F (36.4 °C), Max:98.3 °F (36.8 °C)   Estimated Creatinine Clearance: 113.4 mL/min (by C-G formula based on SCr of 0.89 mg/dL).       Results from last 7 days   Lab Units 24  0421 24  0511 24  1929   VANCOMYCIN RM mcg/mL 25.10  --   --    CREATININE mg/dL 0.89 1.03 0.94   WBC 10*3/mm3  --  8.98 11.53*     Assessment/Plan    Current Vancomycin Dose:  1250 mg IV every 12 hours; which provides the following predicted parameters:  AUC24,ss: 564 mg/L.hr  PAUC*: 96 %  Ctrough,ss: 17.6 mg/L  Pconc*: 34 %  Tox.: 13 %    Continue with current regimen.  We will continue to monitor patient changes and renal function     Thank you for involving pharmacy in this patient's care. Please contact pharmacy with any questions or concerns.    Rayne Eldridge  Clinical Pharmacist      "

## 2024-03-09 LAB
ANION GAP SERPL CALCULATED.3IONS-SCNC: 9.7 MMOL/L (ref 5–15)
BUN SERPL-MCNC: 11 MG/DL (ref 6–20)
BUN/CREAT SERPL: 11.3 (ref 7–25)
CALCIUM SPEC-SCNC: 9.4 MG/DL (ref 8.6–10.5)
CHLORIDE SERPL-SCNC: 104 MMOL/L (ref 98–107)
CO2 SERPL-SCNC: 26.3 MMOL/L (ref 22–29)
CREAT SERPL-MCNC: 0.97 MG/DL (ref 0.76–1.27)
EGFRCR SERPLBLD CKD-EPI 2021: 90.5 ML/MIN/1.73
GLUCOSE SERPL-MCNC: 128 MG/DL (ref 65–99)
POTASSIUM SERPL-SCNC: 4.9 MMOL/L (ref 3.5–5.2)
SODIUM SERPL-SCNC: 140 MMOL/L (ref 136–145)

## 2024-03-09 PROCEDURE — 94799 UNLISTED PULMONARY SVC/PX: CPT

## 2024-03-09 PROCEDURE — 25810000003 SODIUM CHLORIDE 0.9 % SOLUTION: Performed by: INTERNAL MEDICINE

## 2024-03-09 PROCEDURE — 25010000002 MORPHINE PER 10 MG: Performed by: PODIATRIST

## 2024-03-09 PROCEDURE — 25010000002 CEFTRIAXONE PER 250 MG: Performed by: PODIATRIST

## 2024-03-09 PROCEDURE — 80048 BASIC METABOLIC PNL TOTAL CA: CPT | Performed by: PODIATRIST

## 2024-03-09 PROCEDURE — 99232 SBSQ HOSP IP/OBS MODERATE 35: CPT | Performed by: PODIATRIST

## 2024-03-09 PROCEDURE — 25010000002 ENOXAPARIN PER 10 MG: Performed by: PODIATRIST

## 2024-03-09 PROCEDURE — 25010000002 VANCOMYCIN 5 G RECONSTITUTED SOLUTION: Performed by: INTERNAL MEDICINE

## 2024-03-09 RX ORDER — HYDROCODONE BITARTRATE AND ACETAMINOPHEN 10; 325 MG/1; MG/1
1 TABLET ORAL EVERY 6 HOURS PRN
Status: DISCONTINUED | OUTPATIENT
Start: 2024-03-09 | End: 2024-03-09

## 2024-03-09 RX ORDER — VANCOMYCIN 2 GRAM/500 ML IN 0.9 % SODIUM CHLORIDE INTRAVENOUS
2000 EVERY 24 HOURS
Status: DISCONTINUED | OUTPATIENT
Start: 2024-03-09 | End: 2024-03-09

## 2024-03-09 RX ORDER — HYDROCODONE BITARTRATE AND ACETAMINOPHEN 10; 325 MG/1; MG/1
1 TABLET ORAL EVERY 4 HOURS PRN
Status: DISCONTINUED | OUTPATIENT
Start: 2024-03-09 | End: 2024-03-10 | Stop reason: HOSPADM

## 2024-03-09 RX ORDER — CEPHALEXIN 500 MG/1
500 CAPSULE ORAL EVERY 6 HOURS SCHEDULED
Status: DISCONTINUED | OUTPATIENT
Start: 2024-03-09 | End: 2024-03-10 | Stop reason: HOSPADM

## 2024-03-09 RX ADMIN — HYDROCODONE BITARTRATE AND ACETAMINOPHEN 1 TABLET: 10; 325 TABLET ORAL at 23:58

## 2024-03-09 RX ADMIN — MORPHINE SULFATE 2 MG: 2 INJECTION, SOLUTION INTRAMUSCULAR; INTRAVENOUS at 03:35

## 2024-03-09 RX ADMIN — CEPHALEXIN 500 MG: 500 CAPSULE ORAL at 23:58

## 2024-03-09 RX ADMIN — QUETIAPINE FUMARATE 100 MG: 100 TABLET ORAL at 21:07

## 2024-03-09 RX ADMIN — HYDROCODONE BITARTRATE AND ACETAMINOPHEN 1 TABLET: 10; 325 TABLET ORAL at 10:05

## 2024-03-09 RX ADMIN — Medication 10 ML: at 21:07

## 2024-03-09 RX ADMIN — HYDROCODONE BITARTRATE AND ACETAMINOPHEN 1 TABLET: 10; 325 TABLET ORAL at 15:27

## 2024-03-09 RX ADMIN — Medication 10 ML: at 09:21

## 2024-03-09 RX ADMIN — MORPHINE SULFATE 2 MG: 2 INJECTION, SOLUTION INTRAMUSCULAR; INTRAVENOUS at 07:36

## 2024-03-09 RX ADMIN — VANCOMYCIN HYDROCHLORIDE 2000 MG: 5 INJECTION, POWDER, LYOPHILIZED, FOR SOLUTION INTRAVENOUS at 15:12

## 2024-03-09 RX ADMIN — CEFTRIAXONE SODIUM 2000 MG: 2 INJECTION, POWDER, FOR SOLUTION INTRAMUSCULAR; INTRAVENOUS at 09:19

## 2024-03-09 RX ADMIN — CEPHALEXIN 500 MG: 500 CAPSULE ORAL at 18:21

## 2024-03-09 RX ADMIN — GABAPENTIN 600 MG: 300 CAPSULE ORAL at 06:45

## 2024-03-09 RX ADMIN — HYDROCODONE BITARTRATE AND ACETAMINOPHEN 1 TABLET: 10; 325 TABLET ORAL at 19:40

## 2024-03-09 RX ADMIN — SERTRALINE HYDROCHLORIDE 50 MG: 25 TABLET ORAL at 09:20

## 2024-03-09 RX ADMIN — ARIPIPRAZOLE 5 MG: 5 TABLET ORAL at 09:20

## 2024-03-09 RX ADMIN — ENOXAPARIN SODIUM 40 MG: 100 INJECTION SUBCUTANEOUS at 09:19

## 2024-03-09 RX ADMIN — GABAPENTIN 600 MG: 300 CAPSULE ORAL at 15:09

## 2024-03-09 RX ADMIN — Medication 10 ML: at 07:36

## 2024-03-09 RX ADMIN — GABAPENTIN 600 MG: 300 CAPSULE ORAL at 21:07

## 2024-03-09 NOTE — PLAN OF CARE
Goal Outcome Evaluation:      Patient A/Ox4 on RA. PRN pain medication administered per MAR for incisional foot pain. Dressing clean/dry/intact.

## 2024-03-09 NOTE — ANESTHESIA POSTPROCEDURE EVALUATION
Patient: Honorio Vigil Jr.    Procedure Summary       Date: 03/08/24 Room / Location: [facility] OR 02 / [facility] MAIN OR    Anesthesia Start: 1714 Anesthesia Stop: 1807    Procedure: TOE NAIL SYME TERMINAL AMPUTATION (Right: Toes) Diagnosis:       Osteomyelitis of great toe of right foot      (Osteomyelitis of great toe of right foot [M86.9])    Surgeons: Nelson Hand DPM Provider: Vinh Groves MD    Anesthesia Type: general ASA Status: 3            Anesthesia Type: general    Vitals  Vitals Value Taken Time   /78 03/08/24 1831   Temp 36.7 °C (98 °F) 03/08/24 1825   Pulse 73 03/08/24 1835   Resp 15 03/08/24 1825   SpO2 93 % 03/08/24 1834   Vitals shown include unfiled device data.        Post Anesthesia Care and Evaluation    Patient location during evaluation: bedside  Patient participation: complete - patient participated  Level of consciousness: awake  Pain management: adequate    Airway patency: patent  PONV Status: none  Cardiovascular status: acceptable and stable  Respiratory status: acceptable  Hydration status: acceptable    Comments: An Anesthesiologist personally participated in the most demanding procedures (including induction and emergence if applicable) in the anesthesia plan, monitored the course of anesthesia administration at frequent intervals and remained physically present and available for immediate diagnosis and treatment of emergencies.

## 2024-03-09 NOTE — PROGRESS NOTES
Patient Care Team:  Roni Gray MD as PCP - General (Family Medicine)    Date: 3/9/2024  Patient Name: Honorio Vigil Jr.  : 1965  MRN: [id number]  Date of admission: 3/6/2024  Room/Bed: [facility]      Subjective   Subjective         Chief Complaint: f/u right great toe pain    Summary:Honorio Vigil Jr. is a 58 y.o. male  with past medical history of hypertension, substance abuse in remission, anxiety disorder, ADHD, hyperlipidemia, and GERD presented to ED with complaints of right toe pain and redness.  Patient states that around 7 PM last night he noticed some redness and pain from his right hallux.  When he woke up the pain and redness had worsened.  Patient had similar episode last year that needed to be drained and started on antibiotics.  Patient stated the pain became unbearable so he came to the ED for further evaluation.  In the ED patient's vitals were within normal limits on arrival.  Showed that he had leukocytosis with remaining being unremarkable including a normal lactic acid level.  X-ray of the toe showed new osseous defect at the great toe distal phalanx suspicion for osteo with tissue edema.  Podiatry agreed to see the patient in the morning.  When asked he denied any recent fevers, chills, headaches, focal weakness, chest pain, palpitation, shortness of breath, cough, abdominal pain, nausea, vomiting, diarrhea, constipation, dysuria, hematuria, hematochezia, melena, or anxiety.  Patient admitted for further evaluation and treatment       Interval Followup:   Patient is lying in bed complaining of uncontrolled pain in his foot/toe.       Review of Systems   Skin:  Positive for wound.   All other systems reviewed and are negative.      Objective   Objective       Vital Signs  Temp:  [97.5 °F (36.4 °C)-98.1 °F (36.7 °C)] 98.1 °F (36.7 °C)  Heart Rate:  [69-81] 80  Resp:  [14-20] 20  BP: ()/(62-83) 127/72  Oxygen Therapy  SpO2: 98 %  Pulse Oximetry Type:  "Intermittent  Device (Oxygen Therapy): room air  Flow (L/min): 2  Flowsheet Rows      Flowsheet Row First Filed Value   Admission Height 182.9 cm (72\") Documented at 03/06/2024 1812   Admission Weight 104 kg (230 lb 6.1 oz) Documented at 03/06/2024 1812          Intake & Output (last 3 days)         03/04 0701 03/05 0700 03/05 0701 03/06 0700 03/06 0701 03/07 0700 03/07 0701 03/08 0700    IV Piggyback    175    Total Intake(mL/kg)    175 (1.7)    Net    +175                  Lines, Drains & Airways       Active LDAs       Name Placement date Placement time Site Days    Peripheral IV 03/06/24 1930 Anterior;Distal;Left;Upper Arm 03/06/24 1930  Arm  less than 1                      Physical Exam  Vitals reviewed.   Constitutional:       General: He is not in acute distress.     Appearance: He is not toxic-appearing.   HENT:      Head: Normocephalic and atraumatic.      Mouth/Throat:      Mouth: Mucous membranes are moist.   Cardiovascular:      Rate and Rhythm: Normal rate and regular rhythm.   Pulmonary:      Effort: Pulmonary effort is normal. No respiratory distress.   Abdominal:      General: Bowel sounds are normal.      Palpations: Abdomen is soft.   Skin:     General: Skin is warm and dry.      Comments: Dressing in place to right foot not removed, pictures in chart reviewed    Neurological:      Mental Status: He is alert.   Psychiatric:         Mood and Affect: Mood normal.         Behavior: Behavior normal.           Results Review:      Results from last 7 days   Lab Units 03/07/24  0511 03/06/24 1929   WBC 10*3/mm3 8.98 11.53*   HEMOGLOBIN g/dL 14.0 15.7   HEMATOCRIT % 43.8 47.9   PLATELETS 10*3/mm3 267 328     Results from last 7 days   Lab Units 03/09/24  0543 03/08/24  0421 03/07/24  0511 03/06/24 1929   SODIUM mmol/L 140 138 137 138   POTASSIUM mmol/L 4.9 4.4 3.8 3.9   CHLORIDE mmol/L 104 107 106 102   CO2 mmol/L 26.3 23.0 22.9 25.1   BUN mg/dL 11 14 15 10   CREATININE mg/dL 0.97 0.89 1.03 0.94 " "  CALCIUM mg/dL 9.4 8.9 9.1 9.9   BILIRUBIN mg/dL  --   --   --  0.3   ALK PHOS U/L  --   --   --  143*   ALT (SGPT) U/L  --   --   --  10   AST (SGOT) U/L  --   --   --  19   GLUCOSE mg/dL 128* 138* 128* 84             No results found for: \"BLOODCX\"    No results found for: \"MRSACX\"    No results found for: \"STOOLCX\"    No results found for: \"URINECX\"    No results found for: \"WOUNDCX\"    Imaging Results (Last 24 Hours)       Procedure Component Value Units Date/Time    XR Foot 2 View Right [[id number]] Collected: 03/08/24 1855     Updated: 03/08/24 1858    Narrative:      PROCEDURE: XR FOOT 2 VW RIGHT     COMPARISON: [facility], CR, XR TOE 2+ VW RIGHT, 12/22/2022, 15:37.  [facility], MR, MRI FOOT RIGHT W WO CONTRAST, 3/07/2024, 7:11.  [facility], CR, XR TOE   2+ VW RIGHT, 3/06/2024, 18:58.  [facility], CR, XR FOOT 2 VW RIGHT, 5/25/2023, 20:03.     INDICATIONS: Post Surgical Procedure; Partial right great toe amputation     FINDINGS:   Status post amputation of the 1st distal phalanx.  There is soft tissue prominence at the   amputation site.  Small chronic subcortical lucency at the volar distal 5th metatarsal, unchanged.    No definite acute osseous abnormality.       Impression:         1. Status post amputation of the 1st distal phalanx.  2. No definite immediate postoperative complication is identified.               ZACH DUQUE MD         Electronically Signed and Approved By: ZACH DUQUE MD on 3/08/2024 at 18:55                             I have personally reviewed the patient's new imaging and lab results.          ECG/EMG Results (most recent)       None                    Medication Review:     Scheduled Meds:amLODIPine, 10 mg, Oral, Q24H  ARIPiprazole, 5 mg, Oral, Daily  cefTRIAXone, 2,000 mg, Intravenous, Q24H  enoxaparin, 40 mg, Subcutaneous, Daily  gabapentin, 600 mg, Oral, Q8H  QUEtiapine, 100 mg, Oral, Nightly  sertraline, 50 mg, Oral, " Daily  sodium chloride, 10 mL, Intravenous, Q12H  vancomycin, 2,000 mg, Intravenous, Q24H      Continuous Infusions:Pharmacy to dose vancomycin,       PRN Meds:.  senna-docusate sodium **AND** polyethylene glycol **AND** bisacodyl **AND** bisacodyl    hydrALAZINE    HYDROcodone-acetaminophen    [DISCONTINUED] Morphine **AND** naloxone    ondansetron    Pharmacy to dose vancomycin    sodium chloride    sodium chloride      I have reviewed the patient's current medication list    Assessment & Plan   Assessment / Plan       Active Hospital Problems    Diagnosis  POA    **Osteomyelitis of great toe of right foot [M86.9]  Yes    Cellulitis of great toe of right foot [L03.031]  Yes    Nicotine addiction [F17.200]  Yes    Depression [F32.A]  Yes    HTN (hypertension) [I10]  Yes         Right great toe cellulitis, abscess and osteomyelitis s/p right great toe amputation at the interphalangeal joint  - Blood cultures- NGTD  - MRI reviewed  - Discussed with podiatry and ok to dc from their standpoint  - Sed rate and CRP wnl  - change IV Rocephin and Vancomycin to keflex  - review of cultures show history of MSSA  - start hydrocodone   - PT consult     HTN  - continue amlodipine  - monitor bp with routine vs and make adjustments as needed     Anxiety/Depression  - continue home abilify, seroquel, and zoloft  - hold adderall     DVT Prophylaxis  - enoxaparin     Code Status  - full Code    Plan for disposition: home likely tomorrow       Tosin STEPHENSON DO [place]  03/09/24  16:11 EST          Note disclaimer: At [facility], we believe that sharing information builds trust and better relationships. You are receiving this note because you recently visited [facility]. It is possible you will see health information before a provider has talked with you about it. This kind of information can be easy to misunderstand. To help you fully understand what it means for your health, we urge you to discuss this note with your  provider.

## 2024-03-09 NOTE — PROGRESS NOTES
[facility] - PODIATRY    Today's Date: 03/09/24    Patient Name: Honorio Vigil Jr.  MRN: [id number]  [facility]: [id number]  PCP: Roni Gray MD  Referring Provider: Provider, No Known  Attending Provider: Tosin Ceron, *  Length of Stay: 3    SUBJECTIVE   HPI: Honorio Vigil Jr., a 58 y.o.male,    Procedure: Right hallux distal phalanx amputation  Date: 8 March 2024    Patient states they are doing well without complications.  Patient states they are following post-op instructions.  Patient states pain is controlled.      Patient denies any fevers, chills, nausea, vomiting, shortness of breathe, nor any other constitutional signs nor symptoms.      Past Medical History:   Diagnosis Date    Abscess of foot     rt    ADHD     Anxiety disorder     Backache     Cellulitis of scrotum     Elevated cholesterol     Hyperlipidemia     Hypertension     Insomnia disorder     Seizures     x2- due to overuse/overdose of meds- years ago per pt. Last one was 15-20 years ago- not on meds    Substance abuse     years ago    Viral gastroenteritis      Past Surgical History:   Procedure Laterality Date    EYE SURGERY      HAND SURGERY Bilateral     INCISION AND DRAINAGE OF WOUND Right 2/14/2023    Procedure: INCISION AND DRAINAGE BONE, right great toe;  Surgeon: Cristo Christy DPM;  Location: [facility] MAIN OR;  Service: Podiatry;  Laterality: Right;    KNEE ARTHROSCOPY Right 05/21/2014    Arthroscopy of right knee, debride medial meniscus. x3    KNEE ARTHROSCOPY W/ MENISCECTOMY Left 08/09/2022    Procedure: KNEE ARTHROSCOPY WITH PARTIAL MEDIAL MENISCECTOMY;  Surgeon: Carlos Hameed MD;  Location: [facility] OR [facility];  Service: Orthopedics;  Laterality: Left;    SHOULDER ARTHROSCOPY Right      Family History   Problem Relation Age of Onset    Thyroid disease Mother     Diabetes Mother     Cancer Mother         pancreatic,thyroid    Anxiety disorder Mother     Depression Mother     Heart disease  Father     Cancer Father         colon    Diabetes Maternal Grandmother     Cancer Paternal Grandmother         lung    Hypertension Neg Hx      Social History     Socioeconomic History    Marital status:     Number of children: 3    Highest education level: High school graduate   Tobacco Use    Smoking status: Every Day     Current packs/day: 0.50     Types: Cigarettes     Passive exposure: Current    Smokeless tobacco: Never   Vaping Use    Vaping status: Never Used   Substance and Sexual Activity    Alcohol use: No    Drug use: Not Currently     Frequency: 1.0 times per week     Types: Marijuana     Comment: last used today 12/8/2023    Sexual activity: Defer     Allergies   Allergen Reactions    Naproxen GI Intolerance     Current Facility-Administered Medications   Medication Dose Route Frequency Provider Last Rate Last Admin    amLODIPine (NORVASC) tablet 10 mg  10 mg Oral Q24H Nelson Hand DPM   10 mg at 03/07/24 0857    ARIPiprazole (ABILIFY) tablet 5 mg  5 mg Oral Daily Nelson Hand DPM   5 mg at 03/07/24 0857    sennosides-docusate (PERICOLACE) 8.6-50 MG per tablet 2 tablet  2 tablet Oral BID PRN Nelson Hand DPM        And    polyethylene glycol (MIRALAX) packet 17 g  17 g Oral Daily PRN Nelson Hand DPM        And    bisacodyl (DULCOLAX) EC tablet 5 mg  5 mg Oral Daily PRN Nelson Hand DPM        And    bisacodyl (DULCOLAX) suppository 10 mg  10 mg Rectal Daily PRN Nelson Hand DPM        cefTRIAXone (ROCEPHIN) 2000 mg/100 mL 0.9% NS IVPB (MBP)  2,000 mg Intravenous Q24H Nelson Hand DPM 0 mL/hr at 03/07/24 1140 2,000 mg at 03/08/24 0844    Enoxaparin Sodium (LOVENOX) syringe 40 mg  40 mg Subcutaneous Daily Nelson Hand DPM   40 mg at 03/07/24 1705    gabapentin (NEURONTIN) capsule 600 mg  600 mg Oral Q8H Nelson Hand DPM   600 mg at 03/08/24 2110    hydrALAZINE (APRESOLINE) injection 10 mg  10 mg  Intravenous Q6H PRN Nelson Hand, HANNA        morphine injection 2 mg  2 mg Intravenous Q4H PRN Nelson Hand DPM   2 mg at 03/09/24 0335    And    naloxone (NARCAN) injection 0.4 mg  0.4 mg Intravenous Q5 Min PRN Nelson Hand DPM        ondansetron (ZOFRAN) injection 4 mg  4 mg Intravenous Q6H PRN Nelson Hand DPM        Pharmacy to dose vancomycin   Does not apply Continuous PRN Nelson Hand DPLAURENCE        QUEtiapine (SEROquel) tablet 100 mg  100 mg Oral Nightly Nelson Hand DPM   100 mg at 03/08/24 2110    sertraline (ZOLOFT) tablet 50 mg  50 mg Oral Daily Nelson Hand DPM   50 mg at 03/07/24 0857    sodium chloride 0.9 % flush 10 mL  10 mL Intravenous Q12H Nelsno Hand DPM   10 mL at 03/08/24 2110    sodium chloride 0.9 % flush 10 mL  10 mL Intravenous PRN Nelson Hand DPM   10 mL at 03/06/24 2221    sodium chloride 0.9 % infusion 40 mL  40 mL Intravenous PRN Nelson Hand DPM   40 mL at 03/07/24 0903    vancomycin 1250 mg/250 mL 0.9% NS IVPB ([facility])  1,250 mg Intravenous Q12H Nelson Hand, DPM 0 mL/hr at 03/07/24 1427 1,250 mg at 03/08/24 2327         OBJECTIVE     Vitals:    03/09/24 0445   BP: 125/77   Pulse: 69   Resp: 20   Temp: 97.5 °F (36.4 °C)   SpO2: 95%       PHYSICAL EXAM    GEN:   A&Ox3, NAD. Pt presents in hospital bed.    Right foot shows dressing is dry and intact without signs of breakthrough.  Right hallux shows sutures intact with skin edges well-coapted with no signs of dehiscence.  Healthy surgical skin edges.  No drainage present.  No edema, erythema, calor, lymphangitis, nor signs of infection seen.          RADIOLOGY/NUCLEAR:  XR Foot 2 View Right    Result Date: 3/8/2024  Narrative: PROCEDURE: XR FOOT 2 VW RIGHT  COMPARISON: [facility], CR, XR TOE 2+ VW RIGHT, 12/22/2022, 15:37.  [facility], MR, MRI FOOT RIGHT W WO CONTRAST, 3/07/2024, 7:11.  [facility], CR, XR TOE 2+ VW RIGHT, 3/06/2024, 18:58.  [facility], CR, XR FOOT 2 VW RIGHT, 5/25/2023, 20:03.  INDICATIONS: Post Surgical Procedure; Partial right great toe amputation  FINDINGS:  Status post amputation of the 1st distal phalanx.  There is soft tissue prominence at the amputation site.  Small chronic subcortical lucency at the volar distal 5th metatarsal, unchanged.  No definite acute osseous abnormality.      Impression:   1. Status post amputation of the 1st distal phalanx. 2. No definite immediate postoperative complication is identified.      ZACH DUQUE MD       Electronically Signed and Approved By: ZACH DUQUE MD on 3/08/2024 at 18:55             LABORATORY/CULTURE RESULTS:  Results from last 7 days   Lab Units 03/07/24  0511 03/06/24 1929   WBC 10*3/mm3 8.98 11.53*   HEMOGLOBIN g/dL 14.0 15.7   HEMATOCRIT % 43.8 47.9   PLATELETS 10*3/mm3 267 328     Results from last 7 days   Lab Units 03/08/24  0421 03/07/24  0511 03/06/24 1929   SODIUM mmol/L 138 137 138   POTASSIUM mmol/L 4.4 3.8 3.9   CHLORIDE mmol/L 107 106 102   CO2 mmol/L 23.0 22.9 25.1   BUN mg/dL 14 15 10   CREATININE mg/dL 0.89 1.03 0.94   CALCIUM mg/dL 8.9 9.1 9.9   BILIRUBIN mg/dL  --   --  0.3   ALK PHOS U/L  --   --  143*   ALT (SGPT) U/L  --   --  10   AST (SGOT) U/L  --   --  19   GLUCOSE mg/dL 138* 128* 84         Microbiology Results (last 10 days)       Procedure Component Value - Date/Time    Blood Culture - Blood, Arm, Right [[id number]]  (Normal) Collected: 03/06/24 1929    Lab Status: Preliminary result Specimen: Blood from Arm, Right Updated: 03/08/24 2000     Blood Culture No growth at 2 days    Blood Culture - Blood, Arm, Left [[id number]]  (Normal) Collected: 03/06/24 1929    Lab Status: Preliminary result Specimen: Blood from Arm, Left Updated: 03/08/24 2000     Blood Culture No growth at 2 days             ASSESSMENT/PLAN     Active Hospital Problems:  Active Hospital Problems    Diagnosis      **Osteomyelitis of great toe of right foot     Cellulitis of great toe of right foot     Nicotine addiction     Depression     HTN (hypertension)        Comprehensive lower extremity examination and evaluation was performed.    Orders written for:  -Daily dry sterile dressing changes  -Surgical shoe    Recommendations:  -Home health for dressing changes  -Follow-up with Dr. Hand 1 week after discharge from hospital    Discussed with patient's hospitalist, Dr. Ceron, yesterday.    Patient may be discharged home at discretion of hospitalist.    Discussed findings and treatment plan including risks, benefits, and treatment options with patient in detail. Patient agreed with treatment plan.    Lab Frequency Next Occurrence   Follow Anesthesia Guidelines / Protocol Once 07/29/2022   Obtain Informed Consent Once 08/03/2022   Provide Instructions to Patient Regarding NPO Status Once 08/03/2022   Chlorhexidine Skin Prep - Educate and Review With Patient Once 08/03/2022   Follow Anesthesia Guidelines / Protocol Once 11/27/2022   Follow Anesthesia Guidelines / Protocol Once 11/27/2022   Obtain Informed Consent Once 11/22/2023   Follow Anesthesia Guidelines / Protocol Once 02/11/2023   Obtain Informed Consent Once 02/20/2023   Provide NPO Instructions to Patient Once 02/11/2023   Chlorhexidine Skin Prep Once 02/11/2023   MRI Knee Left Without Contrast Once 11/13/2023       This document has been electronically signed by Nelson Hand DPM on March 9, 2024 06:16 EST

## 2024-03-09 NOTE — PROGRESS NOTES
"[facility]: Vancomycin Monitoring Note    Honorio Vigil Jr. is a 58 y.o. male who is on day  of pharmacy to dose vancomycin for Bone and/or Joint Infection and Skin and Soft Tissue.    Previous Vancomycin Dose:  1250mg iv 12h  Imaging Reviewed?: Yes  Updated Cultures and Sensitivities:   3/ Blood cultures- NGTD    Vitals/Labs  Ht: 182.9 cm (72.01\"); Wt: 105 kg (230 lb 9.6 oz)   Temp (24hrs), Av.9 °F (36.6 °C), Min:97.5 °F (36.4 °C), Max:98.4 °F (36.9 °C)   Estimated Creatinine Clearance: 104 mL/min (by C-G formula based on SCr of 0.97 mg/dL).     Results from last 7 days   Lab Units 24  0543 24  0421 24  0511 24  1929   VANCOMYCIN RM mcg/mL  --  25.10  --   --    CREATININE mg/dL 0.97 0.89 1.03 0.94   WBC 10*3/mm3  --   --  8.98 11.53*     Assessment/Plan  Adjusted dose to 2 grams q 24 hours per [employer] recommendation.   Vancomycin level ordered for tomorrow AM.    Current Vancomycin Dose:  2000 mg IV every 24 hours; which provides the following predicted parameters:    Regimen: 2000 mg IV every 24 hours.  Start time: 23:27 on 2024  Exposure target: AUC24 (range)400-600 mg/L.hr   AUC24,ss: 491 mg/L.hr  PAUC*: 82 %  Ctrough,ss: 12.1 mg/L  Pconc*: 10 %  Tox.: 7 %    We will continue to monitor patient changes and renal function     Thank you for involving pharmacy in this patient's care. Please contact pharmacy with any questions or concerns.    Yovani Silverio  Clinical Pharmacist  "

## 2024-03-10 ENCOUNTER — READMISSION MANAGEMENT (OUTPATIENT)
Dept: CALL CENTER | Facility: HOSPITAL | Age: 59
End: 2024-03-10
Payer: COMMERCIAL

## 2024-03-10 VITALS
HEART RATE: 66 BPM | TEMPERATURE: 97.9 F | BODY MASS INDEX: 31.23 KG/M2 | RESPIRATION RATE: 20 BRPM | OXYGEN SATURATION: 96 % | DIASTOLIC BLOOD PRESSURE: 72 MMHG | SYSTOLIC BLOOD PRESSURE: 114 MMHG | WEIGHT: 230.6 LBS | HEIGHT: 72 IN

## 2024-03-10 PROCEDURE — 25010000002 ENOXAPARIN PER 10 MG: Performed by: PODIATRIST

## 2024-03-10 RX ORDER — HYDROCODONE BITARTRATE AND ACETAMINOPHEN 10; 325 MG/1; MG/1
1 TABLET ORAL EVERY 4 HOURS PRN
Qty: 18 TABLET | Refills: 0 | Status: SHIPPED | OUTPATIENT
Start: 2024-03-10

## 2024-03-10 RX ORDER — CEPHALEXIN 500 MG/1
500 CAPSULE ORAL EVERY 6 HOURS SCHEDULED
Qty: 17 CAPSULE | Refills: 0 | Status: SHIPPED | OUTPATIENT
Start: 2024-03-10 | End: 2024-03-15

## 2024-03-10 RX ORDER — NALOXONE HYDROCHLORIDE 4 MG/.1ML
SPRAY NASAL
Qty: 2 EACH | Refills: 0 | Status: SHIPPED | OUTPATIENT
Start: 2024-03-10

## 2024-03-10 RX ADMIN — SERTRALINE HYDROCHLORIDE 50 MG: 25 TABLET ORAL at 09:33

## 2024-03-10 RX ADMIN — HYDROCODONE BITARTRATE AND ACETAMINOPHEN 1 TABLET: 10; 325 TABLET ORAL at 09:33

## 2024-03-10 RX ADMIN — GABAPENTIN 600 MG: 300 CAPSULE ORAL at 05:00

## 2024-03-10 RX ADMIN — HYDROCODONE BITARTRATE AND ACETAMINOPHEN 1 TABLET: 10; 325 TABLET ORAL at 13:30

## 2024-03-10 RX ADMIN — ARIPIPRAZOLE 5 MG: 5 TABLET ORAL at 09:33

## 2024-03-10 RX ADMIN — CEPHALEXIN 500 MG: 500 CAPSULE ORAL at 13:25

## 2024-03-10 RX ADMIN — CEPHALEXIN 500 MG: 500 CAPSULE ORAL at 05:00

## 2024-03-10 RX ADMIN — Medication 10 ML: at 09:33

## 2024-03-10 RX ADMIN — HYDROCODONE BITARTRATE AND ACETAMINOPHEN 1 TABLET: 10; 325 TABLET ORAL at 04:54

## 2024-03-10 RX ADMIN — ENOXAPARIN SODIUM 40 MG: 100 INJECTION SUBCUTANEOUS at 09:33

## 2024-03-10 RX ADMIN — AMLODIPINE BESYLATE 10 MG: 5 TABLET ORAL at 09:33

## 2024-03-10 RX ADMIN — GABAPENTIN 600 MG: 300 CAPSULE ORAL at 13:25

## 2024-03-10 NOTE — NURSING NOTE
Patient educated in depth about performing his own wound care, the importance of wearing his surgical shoe and being NWB on his right foot, the importance of stopping smoking, and the importance of following up with Dr. Hand in 1 week as ordered.

## 2024-03-10 NOTE — PLAN OF CARE
Goal Outcome Evaluation:  Plan of Care Reviewed With: patient        Progress: improving  Outcome Evaluation: pt aox4, up adlib on room air. complaints of pain, medicated per the MAR. Pt awaiting discharge. no complaints at this time

## 2024-03-10 NOTE — OUTREACH NOTE
Prep Survey      Flowsheet Row Responses   [facility] patient discharged from? [facility]   Is LACE score < 7 ? No   Eligibility [facility]   Date of Admission 03/06/24   Date of Discharge 03/10/24   [place] Disposition [facility] [facility]   Discharge diagnosis ight great toe cellulitis, abscess and osteomyelitis s/p right great toe amputation at the interphalangeal joint   Does the patient have one of the following disease processes/diagnoses(primary or secondary)? General Surgery   Does the patient have Home health ordered? Yes   What is the Home health agency?  [facility]   Is there a DME ordered? No   Prep survey completed? Yes            NOEL ANN - Registered Nurse

## 2024-03-10 NOTE — DISCHARGE SUMMARY
Date of Admission: 3/6/2024    Date of Discharge:  3/10/2024    Length of stay:  LOS: 4 days     Admission Diagnosis:   Osteomyelitis [M86.9]  Cellulitis of great toe of right foot [L03.031]  Osteomyelitis of coccyx [M86.9]  Osteomyelitis of great toe of right foot [M86.9]      Discharge Diagnosis:       Right great toe cellulitis, abscess and osteomyelitis s/p right great toe amputation at the interphalangeal joint  - Blood cultures- NGTD  - MRI reviewed  - Discussed with podiatry and ok to dc from their standpoint  - Sed rate and CRP wnl  - review of cultures show history of MSSA  - continue keflex  - continue hydrocodone   - surgical shoe  - follow up with podiatry  - home health referral for dressing changes, nursing showed patient how to do daily dressing changes as well      HTN  - dc amlodipine as patient reports he is no longer taking it      Anxiety/Depression  - continue home abilify, adderall, Seroquel, and zoloft      Active Hospital Problems:    Active Hospital Problems    Diagnosis  POA    **Osteomyelitis of great toe of right foot [M86.9]  Yes    Cellulitis of great toe of right foot [L03.031]  Yes    Nicotine addiction [F17.200]  Yes    Depression [F32.A]  Yes    HTN (hypertension) [I10]  Yes      Resolved Hospital Problems   No resolved problems to display.       Hospital Course:  Honorio Vigil Jr. is a 58 y.o. male  with a past medical history of hypertension, substance abuse in remission, anxiety disorder, ADHD, hyperlipidemia, and GERD presented to ED with complaints of right toe pain and redness.  Patient states that around 7 PM last night he noticed some redness and pain from his right hallux.  When he woke up the pain and redness had worsened.  Patient had similar episode last year that needed to be drained and started on antibiotics.  Patient stated the pain became unbearable so he came to the ED for further evaluation.  In the ED patient's vitals were within normal limits on  arrival.  Showed that he had leukocytosis with remaining being unremarkable including a normal lactic acid level.  X-ray of the toe showed new osseous defect at the great toe distal phalanx suspicion for osteo with tissue edema. Patient had MRI which showed abscess and osteomyelitis. Patient underwent right great toe amputation with podiatry. He tolerated procedure well. He was continued on IV antibiotics, his blood cultures are negative, and he was transitioned to keflex given source control with amputation. Patient complaining of significant pain and was started on hydrocodone. He had referral for home health for dressing changes. Patient will be discharged home in stable condition and should follow up with podiatry in 1 week.     Procedures Performed:      Procedure(s):  TOE NAIL SYME TERMINAL AMPUTATION  -------------------       Consults:   Consults       Date and Time Order Name Status Description    3/6/2024  8:35 PM Hospitalist (on-call MD unless specified)      3/6/2024  8:28 PM IP General Consult (Use specialty-specific consult if known) Completed             Vital Signs:  Temp:  [97.2 °F (36.2 °C)-98.1 °F (36.7 °C)] 97.9 °F (36.6 °C)  Heart Rate:  [60-83] 66  Resp:  [16-20] 20  BP: ()/(52-77) 114/72  Body mass index is 31.27 kg/m².    Physical Exam:  Physical Exam  Vitals reviewed.   HENT:      Head: Normocephalic and atraumatic.   Cardiovascular:      Rate and Rhythm: Normal rate.   Abdominal:      General: Bowel sounds are normal.      Palpations: Abdomen is soft.   Neurological:      Mental Status: He is alert.   Psychiatric:         Mood and Affect: Mood normal.         Behavior: Behavior normal.           Wounds (Last 24 Hours)       LDA Wound       Row Name 03/10/24 0915 03/10/24 0519 03/09/24 1950       Wound 03/08/24 1737 Right anterior great toe Amputation stump    Wound - Properties Group Placement Date: 03/08/24  -HG Placement Time: 1737  -HG Side: Right  -HG Orientation: anterior  -  Location: great toe  -HG Primary Wound Type: Amputation s  -HG    Wound Image -- --  -LR --    Dressing Appearance -- dry;intact;no drainage  -LR dry;intact;no drainage  -LR    Closure Unable to assess;Sutures  dressing in place  -AG Sutures  -LR Unable to assess;Sutures  dressing in place  -LR    Periwound -- dry;intact;pink;warm  -LR --    Periwound Temperature -- warm  -LR --    Periwound Skin Turgor -- soft  -LR --    Drainage Characteristics/Odor -- sanguineous  old blood  -LR --    Drainage Amount none  -AG scant  -LR none  -LR    Care, Wound -- cleansed with;sterile normal saline  -LR --    Dressing Care -- dressing changed;non-adherent;gauze, dry  tefla, kerlix & ace wrap  -LR other (see comments)  adaptic, 4x4, kerliex & ACE  -LR    Periwound Care -- dry periwound area maintained  -LR --    Retired Wound - Properties Group Placement Date: 03/08/24  - Placement Time: 1737 -HG Side: Right  -HG Orientation: anterior  -HG Location: great toe  -HG Primary Wound Type: Amputation s  -HG    Retired Wound - Properties Group Date first assessed: 03/08/24  -HG Time first assessed: 1737  -HG Side: Right  -HG Location: great toe  -HG Primary Wound Type: Amputation s  -HG              User Key  (r) = Recorded By, (t) = Taken By, (c) = Cosigned By      Initials Name Provider Type     Nikia Varma, RN Registered Nurse    AG Conner Bridges, RN Registered Nurse    LR Jeremy Alejandra, RN Registered Nurse                      Pertinent Test Results:   Lab Results (last 72 hours)       Procedure Component Value Units Date/Time    Blood Culture - Blood, Arm, Right [[id number]]  (Normal) Collected: 03/06/24 1929    Specimen: Blood from Arm, Right Updated: 03/09/24 2000     Blood Culture No growth at 3 days    Blood Culture - Blood, Arm, Left [[id number]]  (Normal) Collected: 03/06/24 1929    Specimen: Blood from Arm, Left Updated: 03/09/24 2000     Blood Culture No growth at 3 days    Basic Metabolic Panel [[id number]]   (Abnormal) Collected: 03/09/24 0543    Specimen: Blood from Arm, Right Updated: 03/09/24 0629     Glucose 128 mg/dL      BUN 11 mg/dL      Creatinine 0.97 mg/dL      Sodium 140 mmol/L      Potassium 4.9 mmol/L      Chloride 104 mmol/L      CO2 26.3 mmol/L      Calcium 9.4 mg/dL      BUN/Creatinine Ratio 11.3     Anion Gap 9.7 mmol/L      eGFR 90.5 mL/min/1.73     Narrative:      GFR Normal >60  Chronic Kidney Disease <60  Kidney Failure <15      Vancomycin, Random [[id number]]  (Normal) Collected: 03/08/24 0421    Specimen: Blood from Hand, Left Updated: 03/08/24 0508     Vancomycin Random 25.10 mcg/mL     Narrative:      Therapeutic Ranges for Vancomycin    Vancomycin Random   5.0-40.0 mcg/mL  Vancomycin Trough   5.0-20.0 mcg/mL  Vancomycin Peak     20.0-40.0 mcg/mL    Basic Metabolic Panel [[id number]]  (Abnormal) Collected: 03/08/24 0421    Specimen: Blood from Hand, Left Updated: 03/08/24 0508     Glucose 138 mg/dL      BUN 14 mg/dL      Creatinine 0.89 mg/dL      Sodium 138 mmol/L      Potassium 4.4 mmol/L      Comment: Slight hemolysis detected by analyzer. Result may be falsely elevated.        Chloride 107 mmol/L      CO2 23.0 mmol/L      Calcium 8.9 mg/dL      BUN/Creatinine Ratio 15.7     Anion Gap 8.0 mmol/L      eGFR 99.3 mL/min/1.73     Narrative:      GFR Normal >60  Chronic Kidney Disease <60  Kidney Failure <15            Imaging Results (Most Recent)       Procedure Component Value Units Date/Time    XR Foot 2 View Right [[id number]] Collected: 03/08/24 1855     Updated: 03/08/24 1858    Narrative:      PROCEDURE: XR FOOT 2 VW RIGHT     COMPARISON: [facility], CR, XR TOE 2+ VW RIGHT, 12/22/2022, 15:37.  [facility], MR, MRI FOOT RIGHT W WO CONTRAST, 3/07/2024, 7:11.  [facility], CR, XR TOE   2+ VW RIGHT, 3/06/2024, 18:58.  [facility], CR, XR FOOT 2 VW RIGHT, 5/25/2023, 20:03.     INDICATIONS: Post Surgical Procedure; Partial right great toe  amputation     FINDINGS:   Status post amputation of the 1st distal phalanx.  There is soft tissue prominence at the   amputation site.  Small chronic subcortical lucency at the volar distal 5th metatarsal, unchanged.    No definite acute osseous abnormality.       Impression:         1. Status post amputation of the 1st distal phalanx.  2. No definite immediate postoperative complication is identified.               ZACH DUQUE MD         Electronically Signed and Approved By: ZACH DUQUE MD on 3/08/2024 at 18:55                     MRI Foot Right With & Without Contrast [[id number]] Collected: 03/07/24 0847     Updated: 03/07/24 0850    Narrative:      PROCEDURE: MRI FOOT RIGHT W WO CONTRAST     COMPARISON: None     INDICATIONS: REDDNESS AND PAIN TO DORSAL SURFACE OF BIG TOE X 2 DAYS.             TECHNIQUE: A complete multi-planar MRI of the foot was performed with and without intravenous   gadolinium contrast.       FINDINGS:   Edema and enhancement are observed throughout the subcutaneous soft tissues at the mid to distal   great toe.  These findings are more pronounced at the dorsal aspect.  The findings suggest changes   of soft tissue cellulitis.  There appears to be involvement of the nail bed.     There is a well-defined fluid collection with peripheral enhancement noted within the dorsal   lateral soft tissues of the great toe directly adjacent to the nail bed of the proximal aspect of   the nail bed.  This finding measures approximately 1.5 x 0.8 x 1.1 cm and suggests a focal abscess.     There is abnormal edema and enhancement throughout the underlying distal phalanx of the great toe.    There appear to be areas of cortical erosion.  These findings indicate changes of osteomyelitis.    There is also increased fluid signal with enhancement at the interphalangeal joint which may   indicate developing changes of septic arthropathy.     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       Impression:         1. Evidence for soft tissue cellulitis throughout the great toe.  There appears to be involvement   of the nail bed.  2. There is evidence for small focal abscess formation in the soft tissues at the dorsal aspect of   the great toe directly adjacent to the proximal portion of the nail bed.  This finding measures   approximately 1.5 cm.  3. Findings consistent with osteomyelitis involving the distal phalanx of the great toe.  There may   also be developing septic arthropathy of the interphalangeal joint.              AMGDALENA URENA MD         Electronically Signed and Approved By: MAGDALENA URENA MD on 3/07/2024 at 8:47                     XR Toe 2+ View Right [[id number]] Collected: 03/06/24 2005     Updated: 03/06/24 2008    Narrative:      PROCEDURE: XR TOE 2+ VW RIGHT     COMPARISON: [facility], , XR TOE 2+ VW RIGHT, 1/17/2023, 13:08.     INDICATIONS: GENERALIZED RIGHT 1ST DIGIT PAIN AND SWELLING SINCE YESTERDAY     FINDINGS:   There is soft tissue edema of the great toe and forefoot.  No definite foreign body or significant   soft tissue gas is seen.       There appears to be new osseous defect at the distal tuft of the great toe distal phalanx.    Findings are suspicious for osteomyelitis.  No definite acute fracture.  Mild degenerative changes   are present at the 1st MTP joint.       Impression:         1. New osseous defect at the great toe distal phalanx suspicious for osteomyelitis.  2. Soft tissue edema of the great toe and forefoot which may indicate skin and soft tissue   infection.               ZACH DUQUE MD         Electronically Signed and Approved By: ZACH DUQUE MD on 3/06/2024 at 20:05                               Imaging Results (All)       Procedure Component Value Units Date/Time    XR Foot 2 View Right [[id number]] Collected: 03/08/24 1855     Updated: 03/08/24 1858    Narrative:      PROCEDURE: XR FOOT 2 VW RIGHT      COMPARISON: [facility], CR, XR TOE 2+ VW RIGHT, 12/22/2022, 15:37.  [facility], MR, MRI FOOT RIGHT W WO CONTRAST, 3/07/2024, 7:11.  [facility], CR, XR TOE   2+ VW RIGHT, 3/06/2024, 18:58.  [facility], CR, XR FOOT 2 VW RIGHT, 5/25/2023, 20:03.     INDICATIONS: Post Surgical Procedure; Partial right great toe amputation     FINDINGS:   Status post amputation of the 1st distal phalanx.  There is soft tissue prominence at the   amputation site.  Small chronic subcortical lucency at the volar distal 5th metatarsal, unchanged.    No definite acute osseous abnormality.       Impression:         1. Status post amputation of the 1st distal phalanx.  2. No definite immediate postoperative complication is identified.               ZACH DUQUE MD         Electronically Signed and Approved By: ZACH DUQUE MD on 3/08/2024 at 18:55                     MRI Foot Right With & Without Contrast [[id number]] Collected: 03/07/24 0847     Updated: 03/07/24 0850    Narrative:      PROCEDURE: MRI FOOT RIGHT W WO CONTRAST     COMPARISON: None     INDICATIONS: REDDNESS AND PAIN TO DORSAL SURFACE OF BIG TOE X 2 DAYS.             TECHNIQUE: A complete multi-planar MRI of the foot was performed with and without intravenous   gadolinium contrast.       FINDINGS:   Edema and enhancement are observed throughout the subcutaneous soft tissues at the mid to distal   great toe.  These findings are more pronounced at the dorsal aspect.  The findings suggest changes   of soft tissue cellulitis.  There appears to be involvement of the nail bed.     There is a well-defined fluid collection with peripheral enhancement noted within the dorsal   lateral soft tissues of the great toe directly adjacent to the nail bed of the proximal aspect of   the nail bed.  This finding measures approximately 1.5 x 0.8 x 1.1 cm and suggests a focal abscess.     There is abnormal edema and enhancement throughout  the underlying distal phalanx of the great toe.    There appear to be areas of cortical erosion.  These findings indicate changes of osteomyelitis.    There is also increased fluid signal with enhancement at the interphalangeal joint which may   indicate developing changes of septic arthropathy.     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       Impression:         1. Evidence for soft tissue cellulitis throughout the great toe.  There appears to be involvement   of the nail bed.  2. There is evidence for small focal abscess formation in the soft tissues at the dorsal aspect of   the great toe directly adjacent to the proximal portion of the nail bed.  This finding measures   approximately 1.5 cm.  3. Findings consistent with osteomyelitis involving the distal phalanx of the great toe.  There may   also be developing septic arthropathy of the interphalangeal joint.              MAGDALENA URENA MD         Electronically Signed and Approved By: MAGDALENA URENA MD on 3/07/2024 at 8:47                     XR Toe 2+ View Right [[id number]] Collected: 03/06/24 2005     Updated: 03/06/24 2008    Narrative:      PROCEDURE: XR TOE 2+ VW RIGHT     COMPARISON: [facility], , XR TOE 2+ VW RIGHT, 1/17/2023, 13:08.     INDICATIONS: GENERALIZED RIGHT 1ST DIGIT PAIN AND SWELLING SINCE YESTERDAY     FINDINGS:   There is soft tissue edema of the great toe and forefoot.  No definite foreign body or significant   soft tissue gas is seen.       There appears to be new osseous defect at the distal tuft of the great toe distal phalanx.    Findings are suspicious for osteomyelitis.  No definite acute fracture.  Mild degenerative changes   are present at the 1st MTP joint.       Impression:         1. New osseous defect at the great toe distal phalanx suspicious for osteomyelitis.  2. Soft tissue edema of the great toe and forefoot which may indicate skin and soft tissue   infection.                ZACH DUQUE MD         Electronically Signed and Approved By: ZACH DUQUE MD on 3/06/2024 at 20:05                                   Discharge Disposition:  Home-Health Care [facility]    Discharge Medications:     Discharge Medications        New Medications        Instructions Start Date   cephalexin 500 MG capsule  Commonly known as: KEFLEX   500 mg, Oral, Every 6 Hours Scheduled      HYDROcodone-acetaminophen  MG per tablet  Commonly known as: NORCO   1 tablet, Oral, Every 4 Hours PRN      naloxone 4 MG/0.1ML nasal spray  Commonly known as: NARCAN   Call 911. Don't prime. [place] in 1 nostril for overdose. Repeat in 2-3 minutes in other nostril if no or minimal breathing/responsiveness.             Continue These Medications        Instructions Start Date   amphetamine-dextroamphetamine 30 MG tablet  Commonly known as: ADDERALL   Take 1 tablet by mouth 2 (Two) Times a Day.      ARIPiprazole 5 MG tablet  Commonly known as: ABILIFY   5 mg, Oral, Daily      gabapentin 600 MG tablet  Commonly known as: NEURONTIN   1 tablet, Oral, 3 times daily      QUEtiapine 100 MG tablet  Commonly known as: SEROquel   Take 1 tablet by mouth Every Night.      sertraline 50 MG tablet  Commonly known as: ZOLOFT   50 mg, Oral, Daily               [place]  reviewed    Discharge Diet:   Diet Instructions       Diet: Regular/House Diet; Regular (IDDSI 7); Thin (IDDSI 0)      Discharge Diet: Regular/House Diet    Texture: Regular (IDDSI 7)    Fluid Consistency: Thin (IDDSI 0)            Activity at Discharge:   Activity Instructions       Other Activity Instructions      Activity Instructions: up with surgical shoe              Follow-up Appointments  No future appointments.    Additional Instructions for the Follow-ups that You Need to Schedule       Discharge Follow-up with Specified Provider: Dr. Hand; 1 Week   As directed      To: Dr. Hand   Follow Up: 1 Week                Test Results Pending at Discharge:  Pending  Labs       Order Current Status    Tissue Pathology Exam Collected (03/08/24 8228)    Blood Culture - Blood, Arm, Left Preliminary result    Blood Culture - Blood, Arm, Right Preliminary result            Condition on Discharge:    Stable      Risk for Readmission (LACE): Score: 9 (3/10/2024  6:00 AM)          Tosin Ceron DO  03/10/24  14:12 EDT    Time: Discharge 36 min with face-to-face history/exam, writing all prescriptions, and documenting discharge data including care coordination            Note disclaimer: At [facility], we believe that sharing information builds trust and better relationships. You are receiving this note because you recently visited [facility]. It is possible you will see health information before a provider has talked with you about it. This kind of information can be easy to misunderstand. To help you fully understand what it means for your health, we urge you to discuss this note with your provider.

## 2024-03-10 NOTE — PLAN OF CARE
Goal Outcome Evaluation:                 Pt A/Ox4 on RA. PRN pain medication administered for R incisional toe pain. Dressing clean, dry, intact to be changed this AM.

## 2024-03-11 ENCOUNTER — TRANSITIONAL CARE MANAGEMENT TELEPHONE ENCOUNTER (OUTPATIENT)
Dept: CALL CENTER | Facility: HOSPITAL | Age: 59
End: 2024-03-11
Payer: COMMERCIAL

## 2024-03-11 LAB
BACTERIA SPEC AEROBE CULT: NORMAL
BACTERIA SPEC AEROBE CULT: NORMAL

## 2024-03-11 NOTE — OUTREACH NOTE
Call Center TCM Note      Flowsheet Row Responses   [facility] patient discharged from? [facility]   Does the patient have one of the following disease processes/diagnoses(primary or secondary)? General Surgery   TCM attempt successful? No  [no current verbal release]   Unsuccessful attempts Attempt 1   Call Status Left message            Annie Madrigal RN    3/11/2024, 15:21 EDT

## 2024-03-11 NOTE — OUTREACH NOTE
Call Center TCM Note      Flowsheet Row Responses   [facility] facility patient discharged from? [facility]   Does the patient have one of the following disease processes/diagnoses(primary or secondary)? General Surgery   TCM attempt successful? No   Unsuccessful attempts Attempt 2            Annie Madrigal RN    3/11/2024, 16:12 EDT

## 2024-03-12 ENCOUNTER — TRANSITIONAL CARE MANAGEMENT TELEPHONE ENCOUNTER (OUTPATIENT)
Dept: CALL CENTER | Facility: HOSPITAL | Age: 59
End: 2024-03-12
Payer: COMMERCIAL

## 2024-03-12 NOTE — OUTREACH NOTE
Call Center TCM Note      Flowsheet Row Responses   [facility] facility patient discharged from? [facility]   Does the patient have one of the following disease processes/diagnoses(primary or secondary)? General Surgery   TCM attempt successful? No  [no verbal release on file]   Unsuccessful attempts Attempt 3            Annie Madrigal RN    3/12/2024, 10:44 EDT

## 2024-03-13 LAB
CYTO UR: NORMAL
LAB AP CASE REPORT: NORMAL
LAB AP CLINICAL INFORMATION: NORMAL
PATH REPORT.FINAL DX SPEC: NORMAL
PATH REPORT.GROSS SPEC: NORMAL

## 2024-03-19 ENCOUNTER — OFFICE VISIT (OUTPATIENT)
Dept: PODIATRY | Facility: CLINIC | Age: 59
End: 2024-03-19
Payer: COMMERCIAL

## 2024-03-19 ENCOUNTER — TELEPHONE (OUTPATIENT)
Dept: PODIATRY | Facility: CLINIC | Age: 59
End: 2024-03-19

## 2024-03-19 VITALS
WEIGHT: 231 LBS | DIASTOLIC BLOOD PRESSURE: 84 MMHG | SYSTOLIC BLOOD PRESSURE: 124 MMHG | HEIGHT: 72 IN | HEART RATE: 92 BPM | OXYGEN SATURATION: 96 % | TEMPERATURE: 98.6 F | BODY MASS INDEX: 31.29 KG/M2

## 2024-03-19 DIAGNOSIS — B35.1 ONYCHOMYCOSIS: ICD-10-CM

## 2024-03-19 DIAGNOSIS — M79.671 FOOT PAIN, RIGHT: ICD-10-CM

## 2024-03-19 DIAGNOSIS — M79.671 FOOT PAIN, BILATERAL: ICD-10-CM

## 2024-03-19 DIAGNOSIS — S98.131A AMPUTATION OF TOE OF RIGHT FOOT: Primary | ICD-10-CM

## 2024-03-19 DIAGNOSIS — M79.672 FOOT PAIN, BILATERAL: ICD-10-CM

## 2024-03-19 PROCEDURE — 99213 OFFICE O/P EST LOW 20 MIN: CPT | Performed by: PODIATRIST

## 2024-03-19 PROCEDURE — 1160F RVW MEDS BY RX/DR IN RCRD: CPT | Performed by: PODIATRIST

## 2024-03-19 PROCEDURE — 1159F MED LIST DOCD IN RCRD: CPT | Performed by: PODIATRIST

## 2024-03-19 PROCEDURE — 3079F DIAST BP 80-89 MM HG: CPT | Performed by: PODIATRIST

## 2024-03-19 PROCEDURE — 3074F SYST BP LT 130 MM HG: CPT | Performed by: PODIATRIST

## 2024-03-19 NOTE — PROGRESS NOTES
[facility] - PODIATRY    Today's Date: 03/20/24    Patient Name: Honorio Vigil Jr.  MRN: [id number]  CSN: [id number]  PCP: Roni Gray MD,  Referring Provider: No ref. provider found    SUBJECTIVE     Chief Complaint   Patient presents with    Right Foot - Post-op Follow-up, Pain     Still some discomfort     HPI: Honorio Vigil Jr., a 58 y.o.male, presents to clinic.    Procedure: Right hallux distal phalanx amputation  Date: 8 March 2024     Patient states they are doing well without complications.  Patient states they are following post-op instructions.  Patient states pain is controlled.       New, Established, New Problem: New problem    Location: Toenails    Duration:  Greater than five years    Onset: Gradual    Nature: sore with palpation.    Stable, worsening, improving: Worsening    Aggravating factors: Pain with shoe gear and ambulation.    Previous Treatment: None    Patient denies any fevers, chills, nausea, vomiting, shortness of breathe, nor any other constitutional signs nor symptoms.      Past Medical History:   Diagnosis Date    Abscess of foot     rt    ADHD     Anxiety disorder     Backache     Cellulitis of scrotum     Elevated cholesterol     Hyperlipidemia     Hypertension     Insomnia disorder     Seizures     x2- due to overuse/overdose of meds- years ago per pt. Last one was 15-20 years ago- not on meds    Substance abuse     years ago    Viral gastroenteritis      Past Surgical History:   Procedure Laterality Date    EYE SURGERY      HAND SURGERY Bilateral     INCISION AND DRAINAGE OF WOUND Right 2/14/2023    Procedure: INCISION AND DRAINAGE BONE, right great toe;  Surgeon: Cristo Christy DPM;  Location: [facility];  Service: Podiatry;  Laterality: Right;    KNEE ARTHROSCOPY Right 05/21/2014    Arthroscopy of right knee, debride medial meniscus. x3    KNEE ARTHROSCOPY W/ MENISCECTOMY Left 08/09/2022    Procedure: KNEE ARTHROSCOPY WITH PARTIAL  MEDIAL MENISCECTOMY;  Surgeon: Carlos Hameed MD;  Location: [facility] [place] [facility] [place];  Service: Orthopedics;  Laterality: Left;    SHOULDER ARTHROSCOPY Right     TOE NAIL AMPUTATION Right 3/8/2024    Procedure: TOE NAIL SYME TERMINAL AMPUTATION;  Surgeon: Nelson Hand DPM;  Location: [facility] MAIN OR;  Service: Podiatry;  Laterality: Right;     Family History   Problem Relation Age of Onset    Thyroid disease Mother     Diabetes Mother     Cancer Mother         pancreatic,thyroid    Anxiety disorder Mother     Depression Mother     Heart disease Father     Cancer Father         colon    Diabetes Maternal Grandmother     Cancer Paternal Grandmother         lung    Hypertension Neg Hx      Social History     Socioeconomic History    Marital status:     Number of children: 3    Highest education level: High school graduate   Tobacco Use    Smoking status: Every Day     Current packs/day: 0.50     Types: Cigarettes     Passive exposure: Current    Smokeless tobacco: Never   Vaping Use    Vaping status: Never Used   Substance and Sexual Activity    Alcohol use: No    Drug use: Not Currently     Frequency: 1.0 times per week     Types: Marijuana     Comment: last used today 12/8/2023    Sexual activity: Defer     Allergies   Allergen Reactions    Naproxen GI Intolerance     Current Outpatient Medications   Medication Sig Dispense Refill    amphetamine-dextroamphetamine (ADDERALL) 30 MG tablet Take 1 tablet by mouth 2 (Two) Times a Day.      ARIPiprazole (ABILIFY) 5 MG tablet Take 1 tablet by mouth Daily. Indications: Major Depressive Disorder 30 tablet 1    gabapentin (NEURONTIN) 600 MG tablet Take 1 tablet by mouth 3 times a day.      HYDROcodone-acetaminophen (NORCO)  MG per tablet Take 1 tablet by mouth Every 4 (Four) Hours As Needed for Moderate Pain or Severe Pain. 18 tablet 0    naloxone (NARCAN) 4 MG/0.1ML nasal spray Call 911. Don't prime. Julian in 1 nostril for overdose. Repeat in 2-3 minutes in  other nostril if no or minimal breathing/responsiveness. 2 each 0    QUEtiapine (SEROquel) 100 MG tablet Take 1 tablet by mouth Every Night.      sertraline (ZOLOFT) 50 MG tablet Take 1 tablet by mouth Daily. Indications: Major Depressive Disorder 30 tablet 1    terbinafine (LamISIL) 250 MG tablet Take 1 tablet by mouth Daily for 90 days. 90 tablet 0     No current facility-administered medications for this visit.     Review of Systems   Constitutional: Negative.    Musculoskeletal:         P/O Right Great toe Symes Amputation   Skin:         Fungal toenails   All other systems reviewed and are negative.      OBJECTIVE     Vitals:    03/19/24 0904   BP: 124/84   Pulse: 92   Temp: 98.6 °F (37 °C)   SpO2: 96%       WBC   Date Value Ref Range Status   03/07/2024 8.98 3.40 - 10.80 10*3/mm3 Final     RBC   Date Value Ref Range Status   03/07/2024 4.70 4.14 - 5.80 10*6/mm3 Final     Hemoglobin   Date Value Ref Range Status   03/07/2024 14.0 13.0 - 17.7 g/dL Final     Hematocrit   Date Value Ref Range Status   03/07/2024 43.8 37.5 - 51.0 % Final     MCV   Date Value Ref Range Status   03/07/2024 93.2 79.0 - 97.0 fL Final     MCH   Date Value Ref Range Status   03/07/2024 29.8 26.6 - 33.0 pg Final     MCHC   Date Value Ref Range Status   03/07/2024 32.0 31.5 - 35.7 g/dL Final     RDW   Date Value Ref Range Status   03/07/2024 14.3 12.3 - 15.4 % Final     RDW-SD   Date Value Ref Range Status   03/07/2024 49.2 37.0 - 54.0 fl Final     MPV   Date Value Ref Range Status   03/07/2024 11.2 6.0 - 12.0 fL Final     Platelets   Date Value Ref Range Status   03/07/2024 267 140 - 450 10*3/mm3 Final     Neutrophil %   Date Value Ref Range Status   03/06/2024 62.0 42.7 - 76.0 % Final     Lymphocyte %   Date Value Ref Range Status   03/06/2024 29.2 19.6 - 45.3 % Final     Monocyte %   Date Value Ref Range Status   03/06/2024 5.1 5.0 - 12.0 % Final     Eosinophil %   Date Value Ref Range Status   03/06/2024 2.1 0.3 - 6.2 % Final      Basophil %   Date Value Ref Range Status   03/06/2024 0.7 0.0 - 1.5 % Final     Immature Grans %   Date Value Ref Range Status   03/06/2024 0.9 (H) 0.0 - 0.5 % Final     Neutrophils, Absolute   Date Value Ref Range Status   03/06/2024 7.15 (H) 1.70 - 7.00 10*3/mm3 Final     Lymphocytes, Absolute   Date Value Ref Range Status   03/06/2024 3.37 (H) 0.70 - 3.10 10*3/mm3 Final     Monocytes, Absolute   Date Value Ref Range Status   03/06/2024 0.59 0.10 - 0.90 10*3/mm3 Final     Eosinophils, Absolute   Date Value Ref Range Status   03/06/2024 0.24 0.00 - 0.40 10*3/mm3 Final     Basophils, Absolute   Date Value Ref Range Status   03/06/2024 0.08 0.00 - 0.20 10*3/mm3 Final     Immature Grans, Absolute   Date Value Ref Range Status   03/06/2024 0.10 (H) 0.00 - 0.05 10*3/mm3 Final     nRBC   Date Value Ref Range Status   03/06/2024 0.0 0.0 - 0.2 /100 WBC Final         Lab Results   Component Value Date    GLUCOSE 128 (H) 03/09/2024    BUN 11 03/09/2024    CREATININE 0.97 03/09/2024    EGFRIFNONA 94 05/30/2019    BCR 11.3 03/09/2024    K 4.9 03/09/2024    CO2 26.3 03/09/2024    CALCIUM 9.4 03/09/2024    ALBUMIN 4.2 03/06/2024    AST 19 03/06/2024    ALT 10 03/06/2024       Patient seen in no apparent distress.      PHYSICAL EXAM:     Foot/Ankle Exam    GENERAL  Appearance:  appears stated age  Orientation:  AAOx3  Affect:  appropriate  Assistance:  independent  Right shoe gear: surgical shoe  Left shoe gear: boot    VASCULAR     Right Foot Vascularity   Normal vascular exam    Dorsalis pedis:  2+  Posterior tibial:  2+  Skin temperature:  warm  Edema grading:  None  CFT:  < 3 seconds  Pedal hair growth:  Present  Varicosities:  none     Left Foot Vascularity   Normal vascular exam    Dorsalis pedis:  2+  Posterior tibial:  2+  Skin temperature:  warm  Edema grading:  None  CFT:  < 3 seconds  Pedal hair growth:  Present  Varicosities:  none     NEUROLOGIC     Right Foot Neurologic   Normal sensation    Light touch  sensation: normal  Vibratory sensation: normal  Hot/Cold sensation: normal     Left Foot Neurologic   Normal sensation    Light touch sensation: normal  Vibratory sensation: normal  Hot/Cold sensation:  normal    MUSCULOSKELETAL     Right Foot Musculoskeletal    Amputation   Toes amputated: first toe  Tenderness:  toe 1 tenderness      MUSCLE STRENGTH     Right Foot Muscle Strength   Foot dorsiflexion:  4  Foot plantar flexion:  4  Foot inversion:  4  Foot eversion:  4     Left Foot Muscle Strength   Foot dorsiflexion:  4  Foot plantar flexion:  4  Foot inversion:  4  Foot eversion:  4    RANGE OF MOTION     Right Foot Range of Motion   Foot and ankle ROM within normal limits       Left Foot Range of Motion   Foot and ankle ROM within normal limits      DERMATOLOGIC      Right Foot Dermatologic   Skin  Right foot skin is intact.   Nails  2.  Positive for onychomycosis, abnormal thickness, subungual debris and dystrophic nail.  3.  Positive for onychomycosis, abnormal thickness, subungual debris and dystrophic nail.  Nails comment:  Incisions healed scab to right lateral proximal nail bed.  No drainage no erythema, scab to lateral proximal nail bed. venous congestion to great toe.     Left Foot Dermatologic   Skin  Left foot skin is intact.   Nails  1.  Positive for onychomycosis, abnormal thickness, subungual debris and dystrophic nail.  2.  Positive for onychomycosis, abnormal thickness, subungual debris and dystrophic nail.  3.  Positive for onychomycosis, abnormal thickness, subungual debris and dystrophic nail.     Right foot additional comments: Right foot shows dressing is dry and intact without signs of breakthrough.  Great toe Symes amputation shows sutures intact with skin edges well-coapted with no signs of dehiscence.  Healthy surgical skin edges.  No drainage present.  No edema, erythema, calor, lymphangitis, nor signs of infection seen.    Upon removal of sutures, skin edges remain well-coapted with no  signs of dehiscence.    XR Foot 2 View Right    Result Date: 3/8/2024  Narrative: PROCEDURE: XR FOOT 2 VW RIGHT  COMPARISON: [facility], CR, XR TOE 2+ VW RIGHT, 12/22/2022, 15:37.  [facility], MR, MRI FOOT RIGHT W WO CONTRAST, 3/07/2024, 7:11.  [facility], CR, XR TOE 2+ VW RIGHT, 3/06/2024, 18:58.  [facility], CR, XR FOOT 2 VW RIGHT, 5/25/2023, 20:03.  INDICATIONS: Post Surgical Procedure; Partial right great toe amputation  FINDINGS:  Status post amputation of the 1st distal phalanx.  There is soft tissue prominence at the amputation site.  Small chronic subcortical lucency at the volar distal 5th metatarsal, unchanged.  No definite acute osseous abnormality.      Impression:   1. Status post amputation of the 1st distal phalanx. 2. No definite immediate postoperative complication is identified.      ZACH DUQUE MD       Electronically Signed and Approved By: ZACH DUQUE MD on 3/08/2024 at 18:55             Lab Results   Component Value Date    GLUCOSE 128 (H) 03/09/2024    BUN 11 03/09/2024    CREATININE 0.97 03/09/2024    EGFR 90.5 03/09/2024    BCR 11.3 03/09/2024    K 4.9 03/09/2024    CO2 26.3 03/09/2024    CALCIUM 9.4 03/09/2024    ALBUMIN 4.2 03/06/2024    BILITOT 0.3 03/06/2024    AST 19 03/06/2024    ALT 10 03/06/2024       ASSESSMENT/PLAN     Diagnoses and all orders for this visit:    1. Amputation of toe of right foot (Primary)    2. Foot pain, right    3. Onychomycosis  -     terbinafine (LamISIL) 250 MG tablet; Take 1 tablet by mouth Daily for 90 days.  Dispense: 90 tablet; Refill: 0    4. Foot pain, bilateral    Light gauze dressing was applied to the right great toe.    Patient may begin to weight bear as tolerated in supportive shoes.  No impact activities for one month.  After that time, the patient may increase activities as tolerated.  Patient states understanding and agreement with this plan.    Patient is discharged from the surgery.   The patient states understanding and agreement with this plan.    Patient is to monitor for recurrence and any new symptoms and to contact Dr. Hand's office for a follow-up appointment.      The patient states understanding and agreement with this plan.    Return if symptoms worsen or fail to improve., or sooner if acute issues arise.    I have dictated this note utilizing Dragon Dictation.  Please note that portions of this note were completed with a voice recognition program.  Part of this note may be an electronic transcription/translation of spoken language to printed text using the Dragon Dictation System.      This document has been electronically signed by Nelson Hand DPM on March 20, 2024 08:31 EDT

## 2024-03-20 RX ORDER — TERBINAFINE HYDROCHLORIDE 250 MG/1
250 TABLET ORAL DAILY
Qty: 90 TABLET | Refills: 0 | Status: SHIPPED | OUTPATIENT
Start: 2024-03-20 | End: 2024-06-18

## 2024-10-10 ENCOUNTER — DOCUMENTATION (OUTPATIENT)
Dept: PHYSICAL THERAPY | Facility: CLINIC | Age: 59
End: 2024-10-10
Payer: COMMERCIAL

## 2024-10-10 NOTE — PROGRESS NOTES
Outpatient Physical Therapy  [address] [facility] [place], [place] [address]      Discharge Summary  Discharge Summary from Physical Therapy Report      Dates  PT visit: 8/26/22-8/31/22  Number of Visits: 2       Goals  Plan Goals:         1. The patient has limited strength of the left knee.    LTG 1: 12 weeks: The patient will demonstrate 5/5 strength for left knee flexion and extension in order to allow patient improved joint stability in order for patient to be able to return back to work.    STATUS: New    STG 1a: 6 weeks: The patient will demonstrate 4+/5 strength for left knee flexion and extension    STATUS: New    LTG : 2 weeks: The patient will report a pain rating of 2/10 or better in order to improve tolerance to activities of daily living and improve sleep quality and be able to return back to work.    STATUS: New    STG 2: 6 weeks: The patient will report a pain rating of 4/10 or better.    STATUS: New    LTG 3: 12 weeks: The patient will demonstrate 40% limitation on the Lower Extremity Functional Scale.    STATUS: New      STG 3a: 6 weeks: The patient will demonstrate 50% limitation on the Lower Extremity Functional Scale.    STATUS: New    Discharge Plan: Continue with current home exercise program as instructed    Date of Discharge 10/10/2024      Electronically signed:   Yolis Arredondo PTA  Physical Therapist Assistant  [facility] License #: [id number]

## 2025-01-07 LAB
ALBUMIN SERPL-MCNC: 4 G/DL (ref 3.5–5.2)
ALBUMIN/GLOB SERPL: 1.2 G/DL
ALP SERPL-CCNC: 124 U/L (ref 39–117)
ALT SERPL W P-5'-P-CCNC: 17 U/L (ref 1–41)
ANION GAP SERPL CALCULATED.3IONS-SCNC: 9.9 MMOL/L (ref 5–15)
AST SERPL-CCNC: 31 U/L (ref 1–40)
BASOPHILS # BLD AUTO: 0.08 10*3/MM3 (ref 0–0.2)
BASOPHILS NFR BLD AUTO: 0.8 % (ref 0–1.5)
BILIRUB SERPL-MCNC: 0.4 MG/DL (ref 0–1.2)
BUN SERPL-MCNC: 10 MG/DL (ref 6–20)
BUN/CREAT SERPL: 7.5 (ref 7–25)
CALCIUM SPEC-SCNC: 9.3 MG/DL (ref 8.6–10.5)
CHLORIDE SERPL-SCNC: 101 MMOL/L (ref 98–107)
CO2 SERPL-SCNC: 22.1 MMOL/L (ref 22–29)
CREAT SERPL-MCNC: 1.34 MG/DL (ref 0.76–1.27)
D-LACTATE SERPL-SCNC: 0.7 MMOL/L (ref 0.5–2)
DEPRECATED RDW RBC AUTO: 46.9 FL (ref 37–54)
EGFRCR SERPLBLD CKD-EPI 2021: 61 ML/MIN/1.73
EOSINOPHIL # BLD AUTO: 0.04 10*3/MM3 (ref 0–0.4)
EOSINOPHIL NFR BLD AUTO: 0.4 % (ref 0.3–6.2)
ERYTHROCYTE [DISTWIDTH] IN BLOOD BY AUTOMATED COUNT: 14.1 % (ref 12.3–15.4)
FLUAV SUBTYP SPEC NAA+PROBE: DETECTED
FLUBV RNA ISLT QL NAA+PROBE: NOT DETECTED
GLOBULIN UR ELPH-MCNC: 3.3 GM/DL
GLUCOSE SERPL-MCNC: 113 MG/DL (ref 65–99)
HCT VFR BLD AUTO: 46.7 % (ref 37.5–51)
HGB BLD-MCNC: 15.7 G/DL (ref 13–17.7)
HOLD SPECIMEN: NORMAL
HOLD SPECIMEN: NORMAL
IMM GRANULOCYTES # BLD AUTO: 0.1 10*3/MM3 (ref 0–0.05)
IMM GRANULOCYTES NFR BLD AUTO: 1 % (ref 0–0.5)
LIPASE SERPL-CCNC: 20 U/L (ref 13–60)
LYMPHOCYTES # BLD AUTO: 1.61 10*3/MM3 (ref 0.7–3.1)
LYMPHOCYTES NFR BLD AUTO: 16.8 % (ref 19.6–45.3)
MCH RBC QN AUTO: 30.3 PG (ref 26.6–33)
MCHC RBC AUTO-ENTMCNC: 33.6 G/DL (ref 31.5–35.7)
MCV RBC AUTO: 90.2 FL (ref 79–97)
MONOCYTES # BLD AUTO: 0.8 10*3/MM3 (ref 0.1–0.9)
MONOCYTES NFR BLD AUTO: 8.3 % (ref 5–12)
NEUTROPHILS NFR BLD AUTO: 6.97 10*3/MM3 (ref 1.7–7)
NEUTROPHILS NFR BLD AUTO: 72.7 % (ref 42.7–76)
NRBC BLD AUTO-RTO: 0 /100 WBC (ref 0–0.2)
PLATELET # BLD AUTO: 230 10*3/MM3 (ref 140–450)
PMV BLD AUTO: 11 FL (ref 6–12)
POTASSIUM SERPL-SCNC: 4.1 MMOL/L (ref 3.5–5.2)
PROT SERPL-MCNC: 7.3 G/DL (ref 6–8.5)
RBC # BLD AUTO: 5.18 10*6/MM3 (ref 4.14–5.8)
RSV RNA NPH QL NAA+NON-PROBE: NOT DETECTED
SARS-COV-2 RNA RESP QL NAA+PROBE: NOT DETECTED
SODIUM SERPL-SCNC: 133 MMOL/L (ref 136–145)
WBC NRBC COR # BLD AUTO: 9.6 10*3/MM3 (ref 3.4–10.8)
WHOLE BLOOD HOLD COAG: NORMAL
WHOLE BLOOD HOLD SPECIMEN: NORMAL

## 2025-01-07 PROCEDURE — 84145 PROCALCITONIN (PCT): CPT | Performed by: STUDENT IN AN ORGANIZED HEALTH CARE EDUCATION/TRAINING PROGRAM

## 2025-01-07 PROCEDURE — 93005 ELECTROCARDIOGRAM TRACING: CPT | Performed by: EMERGENCY MEDICINE

## 2025-01-07 PROCEDURE — 87637 SARSCOV2&INF A&B&RSV AMP PRB: CPT

## 2025-01-07 PROCEDURE — 84484 ASSAY OF TROPONIN QUANT: CPT | Performed by: NURSE PRACTITIONER

## 2025-01-07 PROCEDURE — 83605 ASSAY OF LACTIC ACID: CPT

## 2025-01-07 PROCEDURE — 83880 ASSAY OF NATRIURETIC PEPTIDE: CPT | Performed by: NURSE PRACTITIONER

## 2025-01-07 PROCEDURE — 93010 ELECTROCARDIOGRAM REPORT: CPT | Performed by: SPECIALIST

## 2025-01-07 PROCEDURE — 83690 ASSAY OF LIPASE: CPT

## 2025-01-07 PROCEDURE — 93005 ELECTROCARDIOGRAM TRACING: CPT

## 2025-01-07 PROCEDURE — 85025 COMPLETE CBC W/AUTO DIFF WBC: CPT

## 2025-01-07 PROCEDURE — 36415 COLL VENOUS BLD VENIPUNCTURE: CPT

## 2025-01-07 PROCEDURE — 99291 CRITICAL CARE FIRST HOUR: CPT

## 2025-01-07 PROCEDURE — 80053 COMPREHEN METABOLIC PANEL: CPT

## 2025-01-07 RX ORDER — SODIUM CHLORIDE 0.9 % (FLUSH) 0.9 %
10 SYRINGE (ML) INJECTION AS NEEDED
Status: DISCONTINUED | OUTPATIENT
Start: 2025-01-07 | End: 2025-01-10 | Stop reason: HOSPADM

## 2025-01-08 ENCOUNTER — APPOINTMENT (OUTPATIENT)
Dept: GENERAL RADIOLOGY | Facility: HOSPITAL | Age: 60
DRG: 190 | End: 2025-01-08
Payer: COMMERCIAL

## 2025-01-08 ENCOUNTER — APPOINTMENT (OUTPATIENT)
Dept: CT IMAGING | Facility: HOSPITAL | Age: 60
DRG: 190 | End: 2025-01-08
Payer: COMMERCIAL

## 2025-01-08 ENCOUNTER — HOSPITAL ENCOUNTER (INPATIENT)
Facility: HOSPITAL | Age: 60
LOS: 2 days | Discharge: HOME | DRG: 190 | End: 2025-01-10
Attending: EMERGENCY MEDICINE | Admitting: STUDENT IN AN ORGANIZED HEALTH CARE EDUCATION/TRAINING PROGRAM
Payer: COMMERCIAL

## 2025-01-08 DIAGNOSIS — R26.2 DIFFICULTY IN WALKING: ICD-10-CM

## 2025-01-08 DIAGNOSIS — R09.02 HYPOXIA: ICD-10-CM

## 2025-01-08 DIAGNOSIS — J10.1 INFLUENZA A: Primary | ICD-10-CM

## 2025-01-08 PROBLEM — J96.02 ACUTE RESPIRATORY FAILURE WITH HYPERCAPNIA: Status: ACTIVE | Noted: 2025-01-08

## 2025-01-08 LAB
ARTERIAL PATENCY WRIST A: ABNORMAL
ARTERIAL PATENCY WRIST A: POSITIVE
ATMOSPHERIC PRESS: 751.8 MMHG
ATMOSPHERIC PRESS: 755.8 MMHG
BASE EXCESS BLDA CALC-SCNC: -2.7 MMOL/L (ref -2–2)
BASE EXCESS BLDA CALC-SCNC: -4 MMOL/L (ref -2–2)
BDY SITE: ABNORMAL
BDY SITE: ABNORMAL
BILIRUB UR QL STRIP: NEGATIVE
CLARITY UR: CLEAR
COLOR UR: YELLOW
GEN 5 1HR TROPONIN T REFLEX: <6 NG/L
GLUCOSE UR STRIP-MCNC: ABNORMAL MG/DL
HCO3 BLDA-SCNC: 22.8 MMOL/L (ref 22–26)
HCO3 BLDA-SCNC: 23.3 MMOL/L (ref 22–26)
HCT VFR BLD CALC: 46 % (ref 38–51)
HCT VFR BLD CALC: 50 % (ref 38–51)
HEMODILUTION: NO
HEMODILUTION: NO
HGB BLDA-MCNC: 15.8 G/DL (ref 12–18)
HGB BLDA-MCNC: 17.2 G/DL (ref 12–18)
HGB UR QL STRIP.AUTO: NEGATIVE
INHALED O2 CONCENTRATION: 21 %
INHALED O2 CONCENTRATION: 40 %
KETONES UR QL STRIP: NEGATIVE
LEUKOCYTE ESTERASE UR QL STRIP.AUTO: NEGATIVE
MODALITY: ABNORMAL
MODALITY: ABNORMAL
NITRITE UR QL STRIP: NEGATIVE
NT-PROBNP SERPL-MCNC: <36 PG/ML (ref 0–900)
PAW @ PEAK INSP FLOW SETTING VENT: 12 CMH2O
PCO2 BLDA: 43.3 MM HG (ref 35–45)
PCO2 BLDA: 47 MM HG (ref 35–45)
PEEP RESPIRATORY: 6 CM[H2O]
PH BLDA: 7.29 PH UNITS (ref 7.35–7.45)
PH BLDA: 7.34 PH UNITS (ref 7.35–7.45)
PH UR STRIP.AUTO: 6 [PH] (ref 5–8)
PO2 BLD: 304 MM[HG] (ref 0–500)
PO2 BLD: 306 MM[HG] (ref 0–500)
PO2 BLDA: 122.3 MM HG (ref 80–100)
PO2 BLDA: 63.8 MM HG (ref 80–100)
PROCALCITONIN SERPL-MCNC: 0.09 NG/ML (ref 0–0.25)
PROT UR QL STRIP: NEGATIVE
QT INTERVAL: 318 MS
QTC INTERVAL: 446 MS
SAO2 % BLDCOA: 89.3 % (ref 95–99)
SAO2 % BLDCOA: 98.5 % (ref 95–99)
SP GR UR STRIP: >=1.03 (ref 1–1.03)
TROPONIN T NUMERIC DELTA: NORMAL
TROPONIN T SERPL HS-MCNC: 8 NG/L
UROBILINOGEN UR QL STRIP: ABNORMAL

## 2025-01-08 PROCEDURE — 94799 UNLISTED PULMONARY SVC/PX: CPT

## 2025-01-08 PROCEDURE — 82803 BLOOD GASES ANY COMBINATION: CPT | Performed by: NURSE PRACTITIONER

## 2025-01-08 PROCEDURE — 25810000003 SODIUM CHLORIDE 0.9 % SOLUTION: Performed by: NURSE PRACTITIONER

## 2025-01-08 PROCEDURE — 25010000002 DEXAMETHASONE SODIUM PHOSPHATE 10 MG/ML SOLUTION: Performed by: NURSE PRACTITIONER

## 2025-01-08 PROCEDURE — 74177 CT ABD & PELVIS W/CONTRAST: CPT

## 2025-01-08 PROCEDURE — 36600 WITHDRAWAL OF ARTERIAL BLOOD: CPT | Performed by: NURSE PRACTITIONER

## 2025-01-08 PROCEDURE — 36600 WITHDRAWAL OF ARTERIAL BLOOD: CPT | Performed by: STUDENT IN AN ORGANIZED HEALTH CARE EDUCATION/TRAINING PROGRAM

## 2025-01-08 PROCEDURE — 99222 1ST HOSP IP/OBS MODERATE 55: CPT | Performed by: STUDENT IN AN ORGANIZED HEALTH CARE EDUCATION/TRAINING PROGRAM

## 2025-01-08 PROCEDURE — 94761 N-INVAS EAR/PLS OXIMETRY MLT: CPT

## 2025-01-08 PROCEDURE — 82803 BLOOD GASES ANY COMBINATION: CPT | Performed by: STUDENT IN AN ORGANIZED HEALTH CARE EDUCATION/TRAINING PROGRAM

## 2025-01-08 PROCEDURE — 99291 CRITICAL CARE FIRST HOUR: CPT | Performed by: INTERNAL MEDICINE

## 2025-01-08 PROCEDURE — 81003 URINALYSIS AUTO W/O SCOPE: CPT | Performed by: INTERNAL MEDICINE

## 2025-01-08 PROCEDURE — 84484 ASSAY OF TROPONIN QUANT: CPT | Performed by: NURSE PRACTITIONER

## 2025-01-08 PROCEDURE — 94640 AIRWAY INHALATION TREATMENT: CPT

## 2025-01-08 PROCEDURE — 25010000002 METHYLPREDNISOLONE PER 40 MG: Performed by: STUDENT IN AN ORGANIZED HEALTH CARE EDUCATION/TRAINING PROGRAM

## 2025-01-08 PROCEDURE — 71045 X-RAY EXAM CHEST 1 VIEW: CPT

## 2025-01-08 PROCEDURE — 94660 CPAP INITIATION&MGMT: CPT

## 2025-01-08 PROCEDURE — 25510000001 IOPAMIDOL PER 1 ML: Performed by: EMERGENCY MEDICINE

## 2025-01-08 RX ORDER — SODIUM CHLORIDE 0.9 % (FLUSH) 0.9 %
10 SYRINGE (ML) INJECTION EVERY 12 HOURS SCHEDULED
Status: DISCONTINUED | OUTPATIENT
Start: 2025-01-08 | End: 2025-01-10 | Stop reason: HOSPADM

## 2025-01-08 RX ORDER — ALUMINA, MAGNESIA, AND SIMETHICONE 2400; 2400; 240 MG/30ML; MG/30ML; MG/30ML
15 SUSPENSION ORAL EVERY 6 HOURS PRN
Status: DISCONTINUED | OUTPATIENT
Start: 2025-01-08 | End: 2025-01-10 | Stop reason: HOSPADM

## 2025-01-08 RX ORDER — DEXAMETHASONE SODIUM PHOSPHATE 10 MG/ML
10 INJECTION, SOLUTION INTRAMUSCULAR; INTRAVENOUS ONCE
Status: COMPLETED | OUTPATIENT
Start: 2025-01-08 | End: 2025-01-08

## 2025-01-08 RX ORDER — DEXTROAMPHETAMINE SACCHARATE, AMPHETAMINE ASPARTATE MONOHYDRATE, DEXTROAMPHETAMINE SULFATE AND AMPHETAMINE SULFATE 7.5; 7.5; 7.5; 7.5 MG/1; MG/1; MG/1; MG/1
30 CAPSULE, EXTENDED RELEASE ORAL EVERY MORNING
COMMUNITY
Start: 2024-12-13

## 2025-01-08 RX ORDER — ENOXAPARIN SODIUM 100 MG/ML
40 INJECTION SUBCUTANEOUS DAILY
Status: DISCONTINUED | OUTPATIENT
Start: 2025-01-09 | End: 2025-01-10 | Stop reason: HOSPADM

## 2025-01-08 RX ORDER — ONDANSETRON 4 MG/1
4 TABLET, ORALLY DISINTEGRATING ORAL 4 TIMES DAILY PRN
Qty: 20 TABLET | Refills: 0 | Status: SHIPPED | OUTPATIENT
Start: 2025-01-08

## 2025-01-08 RX ORDER — OSELTAMIVIR PHOSPHATE 75 MG/1
75 CAPSULE ORAL EVERY 12 HOURS SCHEDULED
Status: DISCONTINUED | OUTPATIENT
Start: 2025-01-08 | End: 2025-01-10 | Stop reason: HOSPADM

## 2025-01-08 RX ORDER — POLYETHYLENE GLYCOL 3350 17 G
2 POWDER IN PACKET (EA) ORAL
Status: DISCONTINUED | OUTPATIENT
Start: 2025-01-08 | End: 2025-01-10 | Stop reason: HOSPADM

## 2025-01-08 RX ORDER — NICOTINE 21 MG/24HR
1 PATCH, TRANSDERMAL 24 HOURS TRANSDERMAL
Status: DISCONTINUED | OUTPATIENT
Start: 2025-01-08 | End: 2025-01-10 | Stop reason: HOSPADM

## 2025-01-08 RX ORDER — IPRATROPIUM BROMIDE AND ALBUTEROL SULFATE 2.5; .5 MG/3ML; MG/3ML
3 SOLUTION RESPIRATORY (INHALATION)
Status: DISCONTINUED | OUTPATIENT
Start: 2025-01-08 | End: 2025-01-10 | Stop reason: HOSPADM

## 2025-01-08 RX ORDER — BENZONATATE 200 MG/1
200 CAPSULE ORAL 3 TIMES DAILY PRN
Qty: 20 CAPSULE | Refills: 0 | Status: SHIPPED | OUTPATIENT
Start: 2025-01-08

## 2025-01-08 RX ORDER — IOPAMIDOL 755 MG/ML
100 INJECTION, SOLUTION INTRAVASCULAR
Status: COMPLETED | OUTPATIENT
Start: 2025-01-08 | End: 2025-01-08

## 2025-01-08 RX ORDER — SODIUM CHLORIDE 9 MG/ML
40 INJECTION, SOLUTION INTRAVENOUS AS NEEDED
Status: DISCONTINUED | OUTPATIENT
Start: 2025-01-08 | End: 2025-01-10 | Stop reason: HOSPADM

## 2025-01-08 RX ORDER — BISACODYL 5 MG/1
5 TABLET, DELAYED RELEASE ORAL DAILY PRN
Status: DISCONTINUED | OUTPATIENT
Start: 2025-01-08 | End: 2025-01-10 | Stop reason: HOSPADM

## 2025-01-08 RX ORDER — ACETAMINOPHEN 325 MG/1
650 TABLET ORAL EVERY 6 HOURS PRN
Status: DISCONTINUED | OUTPATIENT
Start: 2025-01-08 | End: 2025-01-10 | Stop reason: HOSPADM

## 2025-01-08 RX ORDER — NITROGLYCERIN 0.4 MG/1
0.4 TABLET SUBLINGUAL
Status: DISCONTINUED | OUTPATIENT
Start: 2025-01-08 | End: 2025-01-10 | Stop reason: HOSPADM

## 2025-01-08 RX ORDER — GABAPENTIN 300 MG/1
600 CAPSULE ORAL EVERY 12 HOURS SCHEDULED
Status: DISCONTINUED | OUTPATIENT
Start: 2025-01-08 | End: 2025-01-10 | Stop reason: HOSPADM

## 2025-01-08 RX ORDER — AMOXICILLIN 250 MG
2 CAPSULE ORAL 2 TIMES DAILY PRN
Status: DISCONTINUED | OUTPATIENT
Start: 2025-01-08 | End: 2025-01-10 | Stop reason: HOSPADM

## 2025-01-08 RX ORDER — IBUPROFEN 600 MG/1
600 TABLET, FILM COATED ORAL ONCE
Status: COMPLETED | OUTPATIENT
Start: 2025-01-08 | End: 2025-01-08

## 2025-01-08 RX ORDER — SODIUM CHLORIDE 0.9 % (FLUSH) 0.9 %
10 SYRINGE (ML) INJECTION AS NEEDED
Status: DISCONTINUED | OUTPATIENT
Start: 2025-01-08 | End: 2025-01-10 | Stop reason: HOSPADM

## 2025-01-08 RX ORDER — AMITRIPTYLINE HYDROCHLORIDE 150 MG/1
150 TABLET ORAL NIGHTLY PRN
COMMUNITY
Start: 2024-12-11

## 2025-01-08 RX ORDER — DEXTROAMPHETAMINE SACCHARATE, AMPHETAMINE ASPARTATE, DEXTROAMPHETAMINE SULFATE AND AMPHETAMINE SULFATE 5; 5; 5; 5 MG/1; MG/1; MG/1; MG/1
20 TABLET ORAL
COMMUNITY
Start: 2024-12-13

## 2025-01-08 RX ORDER — POLYETHYLENE GLYCOL 3350 17 G/17G
17 POWDER, FOR SOLUTION ORAL DAILY PRN
Status: DISCONTINUED | OUTPATIENT
Start: 2025-01-08 | End: 2025-01-10 | Stop reason: HOSPADM

## 2025-01-08 RX ORDER — BISACODYL 10 MG
10 SUPPOSITORY, RECTAL RECTAL DAILY PRN
Status: DISCONTINUED | OUTPATIENT
Start: 2025-01-08 | End: 2025-01-10 | Stop reason: HOSPADM

## 2025-01-08 RX ORDER — DICYCLOMINE HCL 20 MG
20 TABLET ORAL EVERY 6 HOURS PRN
Qty: 30 TABLET | Refills: 0 | Status: SHIPPED | OUTPATIENT
Start: 2025-01-08 | End: 2025-01-10 | Stop reason: HOSPADM

## 2025-01-08 RX ADMIN — METHYLPREDNISOLONE SODIUM SUCCINATE 40 MG: 40 INJECTION INTRAMUSCULAR; INTRAVENOUS at 10:38

## 2025-01-08 RX ADMIN — IOPAMIDOL 100 ML: 755 INJECTION, SOLUTION INTRAVENOUS at 01:22

## 2025-01-08 RX ADMIN — AMITRIPTYLINE HYDROCHLORIDE 100 MG: 25 TABLET ORAL at 20:54

## 2025-01-08 RX ADMIN — OSELTAMIVIR PHOSPHATE 75 MG: 75 CAPSULE ORAL at 20:41

## 2025-01-08 RX ADMIN — OSELTAMIVIR PHOSPHATE 75 MG: 75 CAPSULE ORAL at 11:20

## 2025-01-08 RX ADMIN — IPRATROPIUM BROMIDE AND ALBUTEROL SULFATE 3 ML: .5; 3 SOLUTION RESPIRATORY (INHALATION) at 10:31

## 2025-01-08 RX ADMIN — SODIUM CHLORIDE 1000 ML: 9 INJECTION, SOLUTION INTRAVENOUS at 01:54

## 2025-01-08 RX ADMIN — IPRATROPIUM BROMIDE AND ALBUTEROL SULFATE 3 ML: .5; 3 SOLUTION RESPIRATORY (INHALATION) at 15:35

## 2025-01-08 RX ADMIN — IBUPROFEN 600 MG: 600 TABLET, FILM COATED ORAL at 00:44

## 2025-01-08 RX ADMIN — GABAPENTIN 600 MG: 300 CAPSULE ORAL at 20:41

## 2025-01-08 RX ADMIN — ALUMINUM HYDROXIDE, MAGNESIUM HYDROXIDE, AND DIMETHICONE 15 ML: 400; 400; 40 SUSPENSION ORAL at 18:41

## 2025-01-08 RX ADMIN — Medication 10 ML: at 20:42

## 2025-01-08 RX ADMIN — DEXAMETHASONE SODIUM PHOSPHATE 10 MG: 10 INJECTION INTRAMUSCULAR; INTRAVENOUS at 04:36

## 2025-01-08 RX ADMIN — IPRATROPIUM BROMIDE AND ALBUTEROL SULFATE 3 ML: .5; 3 SOLUTION RESPIRATORY (INHALATION) at 19:52

## 2025-01-08 RX ADMIN — Medication 5 MG: at 20:42

## 2025-01-08 RX ADMIN — IPRATROPIUM BROMIDE AND ALBUTEROL SULFATE 3 ML: .5; 3 SOLUTION RESPIRATORY (INHALATION) at 23:42

## 2025-01-08 RX ADMIN — Medication 10 ML: at 10:38

## 2025-01-08 NOTE — PROGRESS NOTES
RT EQUIPMENT DEVICE RELATED - SKIN ASSESSMENT    RT Medical Equipment/Device:     NIV Mask:  Full-face    size: M    Skin Assessment:      Cheek:  Intact  Chin:  Intact  Forehead:  Intact  Nose:  Redness    Device Skin Pressure Protection:  Positioning supports utilized    Nurse Notification:  Yes, notified Eneida Gracia, RRT

## 2025-01-08 NOTE — CONSULTS
Pulmonary / Critical Care Consult Note      Patient Name: Honorio Vigil Jr.  : 1965  MRN: [id number]  Primary Care Physician:  Roni Gray MD  Referring Physician: Fabrice Zepeda DO  Date of admission: 2025    Subjective   Subjective     Reason for Consult/ Chief Complaint:   Respiratory failure, influenza    HPI:  Honorio Vigil Jr. is a 59 y.o. male with history of tobacco use came in feeling poorly.  Reports few days of increasing abdominal pain, shortness of breath, fever and fatigue.  The patient presented very confused and altered.  Was found to have acute on chronic hypercapnic and hypoxemic respiratory failure.  Patient was confused with CO2 narcosis so was placed on BiPAP.  Patient had some clinical improvement with that.  He is still been smoking despite all this.  Tested positive for influenza A and x-ray shows possible pneumonia.  He has been started on Tamiflu, steroids, antibiotics and BiPAP.  Mental status is slowly improving with BiPAP.  Repeat ABG currently pending.        Personal History     Past Medical History:   Diagnosis Date    Abscess of foot     rt    ADHD     Anxiety disorder     Backache     Cellulitis of scrotum     Elevated cholesterol     Hyperlipidemia     Hypertension     Insomnia disorder     Seizures     x2- due to overuse/overdose of meds- years ago per pt. Last one was 15-20 years ago- not on meds    Substance abuse     years ago    Viral gastroenteritis        Past Surgical History:   Procedure Laterality Date    EYE SURGERY      HAND SURGERY Bilateral     INCISION AND DRAINAGE OF WOUND Right 2023    Procedure: INCISION AND DRAINAGE BONE, right great toe;  Surgeon: Cristo Christy DPM;  Location: [facility];  Service: Podiatry;  Laterality: Right;    KNEE ARTHROSCOPY Right 2014    Arthroscopy of right knee, debride medial meniscus. x3    KNEE ARTHROSCOPY W/ MENISCECTOMY Left 2022    Procedure: KNEE ARTHROSCOPY WITH  PARTIAL MEDIAL MENISCECTOMY;  Surgeon: Carlos Hameed MD;  Location: [facility] OR [facility] [place];  Service: Orthopedics;  Laterality: Left;    SHOULDER ARTHROSCOPY Right     TOE NAIL AMPUTATION Right 3/8/2024    Procedure: TOE NAIL SYME TERMINAL AMPUTATION;  Surgeon: Nelson Hand DPM;  Location: [facility] MAIN OR;  Service: Podiatry;  Laterality: Right;       Family History: family history includes Anxiety disorder in his mother; Cancer in his father, mother, and paternal grandmother; Depression in his mother; Diabetes in his maternal grandmother and mother; Heart disease in his father; Thyroid disease in his mother. Otherwise pertinent FHx was reviewed and not pertinent to current issue.    Social History:  reports that he has been smoking cigarettes. He has been exposed to tobacco smoke. He has never used smokeless tobacco. He reports that he does not currently use drugs after having used the following drugs: Marijuana. Frequency: 1.00 time per week. He reports that he does not drink alcohol.    Home Medications:  amitriptyline, amphetamine-dextroamphetamine, amphetamine-dextroamphetamine XR, benzonatate, dicyclomine, gabapentin, and ondansetron ODT    Allergies:  Allergies   Allergen Reactions    Naproxen GI Intolerance       Objective    Objective     Vitals:   Temp:  [97.2 °F (36.2 °C)-100.3 °F (37.9 °C)] 97.2 °F (36.2 °C)  Heart Rate:  [] 86  Resp:  [16-20] 18  BP: ()/() 99/68  Flow (L/min) (Oxygen Therapy):  [4] 4    Physical Exam:  Vital Signs Reviewed   General:  WDWN, somnolent but arousable, no apparent distress  HEENT:  PERRL, EOMI.  OP, nares clear, no sinus tenderness  Neck:  Supple, no JVD, no thyromegaly  Lymph: no axillary, cervical, supraclavicular lymphadenopathy noted bilaterally  Chest:  good aeration, coarse wheezing and rhonchi bilaterally, tympanic to percussion bilaterally, increased work of breathing on BiPAP  CV: RRR, no MGR, pulses 2+, equal.  Abd:  Soft, NT, ND, + BS, no  HSM  EXT:  no clubbing, no cyanosis, no edema, no joint tenderness  Neuro:  A&Ox2, confused, CN grossly intact, no focal deficits.  Skin: No rashes or lesions noted      Result Review    Result Review:  I have personally reviewed the results from the time of this admission to 1/8/2025 14:10 EST and agree with these findings:  [x]  Laboratory  [x]  Microbiology  [x]  Radiology  [x]  EKG/Telemetry   []  Cardiology/Vascular   []  Pathology  []  Old records  []  Other:  Most notable findings include:       Lab 01/07/25  2217   WBC 9.60   HEMOGLOBIN 15.7   HEMATOCRIT 46.7   PLATELETS 230   SODIUM 133*   POTASSIUM 4.1   CHLORIDE 101   CO2 22.1   BUN 10   CREATININE 1.34*   GLUCOSE 113*   CALCIUM 9.3   TOTAL PROTEIN 7.3   ALBUMIN 4.0   GLOBULIN 3.3     Chest x-ray with some mild bibasilar atelectasis.    Influenza A positive  ABG 7.2 9/47/63/22    CT Abdomen Pelvis With Contrast    Result Date: 1/8/2025  CT ABDOMEN PELVIS W CONTRAST Date of Exam: 1/8/2025 1:13 AM EST Indication: epigastric and RUQ pain. Comparison: None available. Technique: Axial CT images were obtained of the abdomen and pelvis after the uneventful intravenous administration of iodinated contrast. Reconstructed coronal and sagittal images were also obtained. Automated exposure control and iterative construction methods were used. Findings: There is scarring/atelectasis in the lung bases. There is a subpleural left lower lobe nodule measuring 5 mm. This is stable to slightly smaller from a chest CT of 12/9/2022 and benign. No follow-up is indicated. Abdominal aorta is normal in caliber. Gallbladder is normal. No biliary obstruction. There is probably a mild degree of hepatic steatosis, and there is some focal fatty infiltration in the left hepatic lobe adjacent to the fissure. Solid abdominal organs are otherwise normal. The kidneys are  nonobstructed. No evidence of acute pancreatitis. Urinary bladder and prostate gland are within normal limits. The  appendix is normal. There is mild colonic diverticulosis. No evidence of diverticulitis or colitis. No small bowel obstruction is identified. Stomach is normal except for a small hiatal hernia. There is no free fluid or adenopathy. There is lumbar degenerative disease, predominantly at L5-S1.     Impression: 1.No acute findings in the abdomen or pelvis. 2.Mild hepatic steatosis. 3.Mild colonic diverticulosis. 4.Small hiatal hernia. Electronically Signed: Miguel A Mead MD  1/8/2025 1:30 AM EST  Workstation ID: [id number]         Assessment & Plan   Assessment / Plan     Active Hospital Problems:  Active Hospital Problems    Diagnosis     **Acute respiratory failure with hypercapnia      Impression:  Acute hypoxemic and hypercapnic respiratory failure requiring NIPPV  Altered mental status  CO2 narcosis/toxic encephalopathy  Question COPD with acute exacerbation  Influenza A respiratory tract infection  Possible community-acquired pneumonia  Ongoing tobacco use of cigarettes    Plan:  Respiratory status remains rather tenuous at this time on BiPAP  Continue BiPAP for now on current settings.  As patient becomes more awake today can give breaks off BiPAP with oxygen.  Wean O2 to keep SPO2 greater than 90%   Repeat ABG later this morning  Continue with Tamiflu day 1/5  Will do IV Solu-Medrol for now  Start nebulizers and aggressive bronchopulmonary hygiene  Holding all sedative medications at this time  N.p.o. for now until more awake  No indication for antibiotics at this time    VTE Prophylaxis:  Pharmacologic VTE prophylaxis orders are present.    Code Status and Medical Interventions: CPR (Attempt to Resuscitate); Full Support   Ordered at: 01/08/25 0429     Code Status (Patient has no pulse and is not breathing):    CPR (Attempt to Resuscitate)     Medical Interventions (Patient has pulse or is breathing):    Full Support        Labs, imaging, microbiology, notes and medications personally reviewed  Discussed with  primary.  30 minutes of critical care time was spent managing this patient, excluding procedures.    Thank you for involving me in the care of this patient.    Electronically signed by Rc Carson MD, 01/08/25, 2:13 PM EST.

## 2025-01-08 NOTE — PROGRESS NOTES
Respiratory Therapist Broncho-Pulmonary Hygiene Progress Note      Patient Name:  Honorio Vigil Jr.  YOB: 1965    Honorio Vigil Jr. meets the qualification for Level 2 of the Bronco-Pulmonary Hygiene Protocol. This was based on my daily patient assessment and includes review of chest x-ray results, cough ability and quality, oxygenation, secretions or risk for secretion development and patient mobility.     Broncho-Pulmonary Hygiene Assessment:    Level of Movement: Actively changing positions without assistance  Alert/ oriented/ cooperative    Breath Sounds: Diminished and/or coarse rhonchi    Cough: Strong, effective    Chest X-Ray: Possible signs of consolidation and/or atelectasis or clear.     Sputum Productions: None or small amount of thin or watery secretions with effective cough    History and Physical: Chronic condition    SpO2 to Oxygen Need: greater than 92% on 4-6L nasal canula    Current SpO2 is: 95% on 4L nasal cannula     Based on this information, I have completed the following interventions: Aerobika with bronchodialtor medication or TID      Electronically signed by Priscilla Gracia RRT, 01/08/25, 11:22 AM EST.

## 2025-01-08 NOTE — ED PROVIDER NOTES
Time: 10:04 PM EST  Date of encounter:  1/7/2025  Independent Historian/Clinical History and Information was obtained by:   Patient    History is limited by: N/A    Chief Complaint   Patient presents with    Nausea    Abdominal Pain    Dizziness         History of Present Illness:  Patient is a 59 y.o. year old male who presents to the emergency department for evaluation of dizziness, headache, body aches, fever, and epigastric pain. Symptoms started today. States that his friend was diagnosed with the flu. He also states that he's been having more epigastric and RUQ pain. States that every time he eats, he feels like the food is coming back out. He was seen at [facility], [place] today; his covid and flu swabs were negative. Hx HTN, HLD. (Triage Provider: Jaime Lopez PA-C).      Patient Care Team  Primary Care Provider: Roni Gray MD    Past Medical History:     Allergies   Allergen Reactions    Naproxen GI Intolerance     Past Medical History:   Diagnosis Date    Abscess of foot     rt    ADHD     Anxiety disorder     Backache     Cellulitis of scrotum     Elevated cholesterol     Hyperlipidemia     Hypertension     Insomnia disorder     Seizures     x2- due to overuse/overdose of meds- years ago per pt. Last one was 15-20 years ago- not on meds    Substance abuse     years ago    Viral gastroenteritis      Past Surgical History:   Procedure Laterality Date    EYE SURGERY      HAND SURGERY Bilateral     INCISION AND DRAINAGE OF WOUND Right 2/14/2023    Procedure: INCISION AND DRAINAGE BONE, right great toe;  Surgeon: Cristo Christy DPM;  Location: [facility] OR;  Service: Podiatry;  Laterality: Right;    KNEE ARTHROSCOPY Right 05/21/2014    Arthroscopy of right knee, debride medial meniscus. x3    KNEE ARTHROSCOPY W/ MENISCECTOMY Left 08/09/2022    Procedure: KNEE ARTHROSCOPY WITH PARTIAL MEDIAL MENISCECTOMY;  Surgeon: Carlos Hameed MD;  [facility] [facility] [place] [facility]  Service: Orthopedics;  Laterality: Left;    SHOULDER  ARTHROSCOPY Right     TOE NAIL AMPUTATION Right 3/8/2024    Procedure: TOE NAIL SYME TERMINAL AMPUTATION;  Surgeon: Nelson Hand DPM;  Location: [facility] MAIN OR;  Service: Podiatry;  Laterality: Right;     Family History   Problem Relation Age of Onset    Thyroid disease Mother     Diabetes Mother     Cancer Mother         pancreatic,thyroid    Anxiety disorder Mother     Depression Mother     Heart disease Father     Cancer Father         colon    Diabetes Maternal Grandmother     Cancer Paternal Grandmother         lung    Hypertension Neg Hx        Home Medications:  Prior to Admission medications    Medication Sig Start Date End Date Taking? Authorizing Provider   amphetamine-dextroamphetamine (ADDERALL) 30 MG tablet Take 1 tablet by mouth 2 (Two) Times a Day. 12/26/23   Charu Del Castillo MD   ARIPiprazole (ABILIFY) 5 MG tablet Take 1 tablet by mouth Daily. Indications: Major Depressive Disorder 12/15/23   Mynor Vela MD   gabapentin (NEURONTIN) 600 MG tablet Take 1 tablet by mouth 3 times a day. 12/22/23   Charu Del Castillo MD   HYDROcodone-acetaminophen (NORCO)  MG per tablet Take 1 tablet by mouth Every 4 (Four) Hours As Needed for Moderate Pain or Severe Pain. 3/10/24   Tosin Ceron DO   naloxone (NARCAN) 4 MG/0.1ML nasal spray Call 911. Don't prime. [place]oy[place] in 1 nostril for overdose. Repeat in 2-3 minutes in other nostril if no or minimal breathing/responsiveness. 3/10/24   Tosin Ceron DO   QUEtiapine (SEROquel) 100 MG tablet Take 1 tablet by mouth Every Night. 1/22/24   Charu Del Castillo MD   sertraline (ZOLOFT) 50 MG tablet Take 1 tablet by mouth Daily. Indications: Major Depressive Disorder 12/15/23   Mynor Vela MD        Social History:   Social History     Tobacco Use    Smoking status: Every Day     Current packs/day: 0.50     Types: Cigarettes     Passive exposure: Current    Smokeless tobacco: Never   Vaping Use    Vaping status: Never Used  "  Substance Use Topics    Alcohol use: No    Drug use: Not Currently     Frequency: 1.0 times per week     Types: Marijuana     Comment: last used today 12/8/2023         Review of Systems:  Review of Systems   Constitutional:  Positive for chills and fever.   HENT:  Positive for congestion. Negative for ear pain and sore throat.    Eyes:  Negative for pain.   Respiratory:  Positive for cough (Yellow sputum). Negative for chest tightness and shortness of breath.    Cardiovascular:  Negative for chest pain.   Gastrointestinal:  Positive for abdominal pain. Negative for diarrhea, nausea and vomiting.   Genitourinary:  Negative for flank pain and hematuria.   Musculoskeletal:  Positive for myalgias. Negative for joint swelling.   Skin:  Negative for pallor.   Neurological:  Positive for dizziness. Negative for seizures and headaches.   Psychiatric/Behavioral: Negative.          Physical Exam:  BP (!) 89/65   Pulse 87   Temp 98.8 °F (37.1 °C) (Oral)   Resp 18   Ht 182.9 cm (72.01\")   Wt 113 kg (248 lb 3.8 oz)   SpO2 (!) 89%   BMI 33.66 kg/m²         Physical Exam  Vitals and nursing note reviewed.   Constitutional:       General: He is not in acute distress.     Appearance: Normal appearance. He is not toxic-appearing.   HENT:      Head: Normocephalic and atraumatic.      Right Ear: Hearing, tympanic membrane, ear canal and external ear normal.      Left Ear: Hearing, tympanic membrane, ear canal and external ear normal.      Nose: Congestion present.      Mouth/Throat:      Mouth: Mucous membranes are moist.      Pharynx: No posterior oropharyngeal erythema.   Eyes:      General: No scleral icterus.     Pupils: Pupils are equal, round, and reactive to light.   Cardiovascular:      Rate and Rhythm: Regular rhythm. Tachycardia present.      Pulses: Normal pulses.      Heart sounds: Normal heart sounds.   Pulmonary:      Effort: Pulmonary effort is normal. No respiratory distress.      Breath sounds: Normal breath " sounds.   Abdominal:      General: Abdomen is protuberant. Bowel sounds are normal. There is no distension.      Palpations: Abdomen is soft.      Tenderness: There is abdominal tenderness in the right upper quadrant. There is no right CVA tenderness or left CVA tenderness.   Musculoskeletal:         General: Normal range of motion.      Cervical back: Normal range of motion and neck supple.      Right lower leg: No edema.      Left lower leg: No edema.   Skin:     General: Skin is warm and dry.   Neurological:      General: No focal deficit present.      Mental Status: He is alert and oriented to person, place, and time.   Psychiatric:         Mood and Affect: Mood normal.         Behavior: Behavior normal.            Medical Decision Making:      Comorbidities that affect care:    Hypertension, Smoking, Substance Abuse    External Notes reviewed:    Previous Clinic Note: Patient seen at urgent care for viral illness and fever with bodyaches and cough and sent for evaluation      The following orders were placed and all results were independently analyzed by me:  Orders Placed This Encounter   Procedures    COVID-19, FLU A/B, RSV PCR 1 HR TAT - Swab, Nasopharynx    CT Abdomen Pelvis With Contrast    XR Chest 1 View    [place] Draw    Comprehensive Metabolic Panel    Lipase    Urinalysis With Microscopic If Indicated (No Culture) - Urine, Clean Catch    Lactic Acid, Plasma    CBC Auto Differential    BNP    High Sensitivity Troponin T    High Sensitivity Troponin T 1Hr    Blood Gas, Arterial -    Procalcitonin    NPO Diet NPO Type: Strict NPO    Undress & Gown    Recheck vitals  Misc Nursing Order (Specify)    Straight cath    Hospitalist (on-call MD unless specified)    Oxygen Therapy- Nasal Cannula; Titrate 1-6 LPM Per SpO2; 90 - 95%    NIPPV (CPAP or BIPAP)    ECG 12 Lead Tachycardia    Insert Peripheral IV    CBC & Differential    Green Top (Gel)    Lavender Top    Gold Top - SST    Light Blue Top        Medications Given in the Emergency Department:  Medications   sodium chloride 0.9 % flush 10 mL (has no administration in time range)   dexAMETHasone sodium phosphate injection 10 mg (has no administration in time range)   ibuprofen (ADVIL,MOTRIN) tablet 600 mg (600 mg Oral Given 1/8/25 0044)   iopamidol (ISOVUE-370) 76 % injection 100 mL (100 mL Intravenous Given 1/8/25 0122)   sodium chloride 0.9 % bolus 1,000 mL (0 mL Intravenous Stopped 1/8/25 0320)        ED Course:    The patient was initially evaluated in the triage area where orders were placed. The patient was later dispositioned by FLAVIO Curiel.      The patient was advised to stay for completion of workup which includes but is not limited to communication of labs and radiological results, reassessment and plan. The patient was advised that leaving prior to disposition by a provider could result in critical findings that are not communicated to the patient.     ED Course as of 01/08/25 0410   Wed Jan 08, 2025   0231 XR Chest 1 View  Mild atelectasis no acute disease [DS]   0317 Patient has got hypotensive and hypoxic decreased mentation [DS]      ED Course User Index  [DS] Renay Jung APRN       Labs:    Lab Results (last 24 hours)       Procedure Component Value Units Date/Time    POCT SARS-CoV-2 Antigen [[id number]]  (Normal) Collected: 01/07/25 1514    Specimen: Swab from Nasopharynx Updated: 01/07/25 1515     SARS Antigen Not Detected     Internal Control Passed     Lot Number [id number]     Expiration Date 06/18/25    POC Influenza A / B [[id number]]  (Normal) Collected: 01/07/25 1514    Specimen: Swab Updated: 01/07/25 1514     Rapid Influenza A Ag Negative     Rapid Influenza B Ag Negative     Internal Control Passed     Lot Number [id number]     Expiration Date 10/11/26    COVID-19, FLU A/B, RSV PCR 1 HR TAT - Swab, Nasopharynx [[id number]]  (Abnormal) Collected: 01/07/25 2209    Specimen: Swab from Nasopharynx Updated: 01/07/25 4826      COVID19 Not Detected     Influenza A PCR Detected     Influenza B PCR Not Detected     RSV, PCR Not Detected    Narrative:      Fact sheet for providers: https://www.fda.gov/media/166439/download    Fact sheet for patients: https://www.fda.gov/media/995790/download    Test performed by PCR.    CBC & Differential [[id number]]  (Abnormal) Collected: 01/07/25 2217    Specimen: Blood from Arm, Left Updated: 01/07/25 2222    Narrative:      The following orders were created for panel order CBC & Differential.  Procedure                               Abnormality         Status                     ---------                               -----------         ------                     CBC Auto Differential[[id number]]        Abnormal            Final result                 Please view results for these tests on the individual orders.    Comprehensive Metabolic Panel [[id number]]  (Abnormal) Collected: 01/07/25 2217    Specimen: Blood from Arm, Left Updated: 01/07/25 2240     Glucose 113 mg/dL      BUN 10 mg/dL      Creatinine 1.34 mg/dL      Sodium 133 mmol/L      Potassium 4.1 mmol/L      Comment: Slight hemolysis detected by analyzer. Result may be falsely elevated.        Chloride 101 mmol/L      CO2 22.1 mmol/L      Calcium 9.3 mg/dL      Total Protein 7.3 g/dL      Albumin 4.0 g/dL      ALT (SGPT) 17 U/L      AST (SGOT) 31 U/L      Comment: Slight hemolysis detected by analyzer. Result may be falsely elevated.        Alkaline Phosphatase 124 U/L      Total Bilirubin 0.4 mg/dL      Globulin 3.3 gm/dL      A/G Ratio 1.2 g/dL      BUN/Creatinine Ratio 7.5     Anion Gap 9.9 mmol/L      eGFR 61.0 mL/min/1.73     Narrative:      GFR Categories in Chronic Kidney Disease (CKD)      GFR Category          GFR (mL/min/1.73)    Interpretation  G1                     90 or greater         Normal or high (1)  G2                      60-89                Mild decrease (1)  G3a                   45-59                Mild to moderate  decrease  G3b                   30-44                Moderate to severe decrease  G4                    15-29                Severe decrease  G5                    14 or less           Kidney failure          (1)In the absence of evidence of kidney disease, neither GFR category G1 or G2 fulfill the criteria for CKD.    eGFR calculation 2021 CKD-EPI creatinine equation, which does not include race as a factor    Lipase [[id number]]  (Normal) Collected: 01/07/25 2217    Specimen: Blood from Arm, Left Updated: 01/07/25 2239     Lipase 20 U/L     Lactic Acid, Plasma [[id number]]  (Normal) Collected: 01/07/25 2217    Specimen: Blood from Arm, Left Updated: 01/07/25 2237     Lactate 0.7 mmol/L     CBC Auto Differential [[id number]]  (Abnormal) Collected: 01/07/25 2217    Specimen: Blood from Arm, Left Updated: 01/07/25 2222     WBC 9.60 10*3/mm3      RBC 5.18 10*6/mm3      Hemoglobin 15.7 g/dL      Hematocrit 46.7 %      MCV 90.2 fL      MCH 30.3 pg      MCHC 33.6 g/dL      RDW 14.1 %      RDW-SD 46.9 fl      MPV 11.0 fL      Platelets 230 10*3/mm3      Neutrophil % 72.7 %      Lymphocyte % 16.8 %      Monocyte % 8.3 %      Eosinophil % 0.4 %      Basophil % 0.8 %      Immature Grans % 1.0 %      Neutrophils, Absolute 6.97 10*3/mm3      Lymphocytes, Absolute 1.61 10*3/mm3      Monocytes, Absolute 0.80 10*3/mm3      Eosinophils, Absolute 0.04 10*3/mm3      Basophils, Absolute 0.08 10*3/mm3      Immature Grans, Absolute 0.10 10*3/mm3      nRBC 0.0 /100 WBC     BNP [[id number]]  (Normal) Collected: 01/07/25 2217    Specimen: Blood from Arm, Left Updated: 01/08/25 0158     proBNP <36.0 pg/mL     Narrative:      This assay is used as an aid in the diagnosis of individuals suspected of having heart failure. It can be used as an aid in the diagnosis of acute decompensated heart failure (ADHF) in patients presenting with signs and symptoms of ADHF to the emergency department (ED). In addition, NT-proBNP of <300 pg/mL indicates ADHF  is not likely.    Age Range Result Interpretation  NT-proBNP Concentration (pg/mL:      <50             Positive            >450                   Gray                 300-450                    Negative             <300    50-75           Positive            >900                  Gray                300-900                  Negative            <300      >75             Positive            >1800                  Gray                300-1800                  Negative            <300    High Sensitivity Troponin T [[id number]]  (Normal) Collected: 01/07/25 2217    Specimen: Blood from Arm, Left Updated: 01/08/25 0159     HS Troponin T 8 ng/L     Narrative:      High Sensitive Troponin T Reference Range:  <14.0 ng/L- Negative Female for AMI  <22.0 ng/L- Negative Male for AMI  >=14 - Abnormal Female indicating possible myocardial injury.  >=22 - Abnormal Male indicating possible myocardial injury.   Clinicians would have to utilize clinical acumen, EKG, Troponin, and serial changes to determine if it is an Acute Myocardial Infarction or myocardial injury due to an underlying chronic condition.         Procalcitonin [[id number]] Collected: 01/07/25 2217    Specimen: Blood from Arm, Left Updated: 01/08/25 0406    Blood Gas, Arterial - [[id number]]  (Abnormal) Collected: 01/08/25 0331    Specimen: Arterial Blood Updated: 01/08/25 0334     Site Right Brachial     Leo's Test N/A     pH, Arterial 7.295 pH units      pCO2, Arterial 47.0 mm Hg      pO2, Arterial 63.8 mm Hg      HCO3, Arterial 22.8 mmol/L      Base Excess, Arterial -4.0 mmol/L      Comment: Serial Number: [id number]:  [id number]        O2 Saturation, Arterial 89.3 %      Hemoglobin, Blood Gas 15.8 g/dL      Hematocrit, Blood Gas 46.0 %      Barometric Pressure for Blood Gas 755.8000 mmHg      Modality Room Air     FIO2 21 %      Hemodilution No     PO2/FIO2 304             Imaging:    XR Chest 1 View    Result Date: 1/8/2025  XR CHEST 1 VW Date of Exam: 1/8/2025  1:37 AM EST Indication: cough/ fever Comparison: 11/10/2023 Findings: Cardiac and mediastinal contours are normal. There is some mild atelectasis in the left base. Lungs are otherwise clear. Pulmonary vascularity is normal. No pneumothorax.     Mild left basilar atelectasis. Otherwise negative. Electronically Signed: Miguel A Mead MD  1/8/2025 1:48 AM EST  Workstation ID: [id number]    CT Abdomen Pelvis With Contrast    Result Date: 1/8/2025  CT ABDOMEN PELVIS W CONTRAST Date of Exam: 1/8/2025 1:13 AM EST Indication: epigastric and RUQ pain. Comparison: None available. Technique: Axial CT images were obtained of the abdomen and pelvis after the uneventful intravenous administration of iodinated contrast. Reconstructed coronal and sagittal images were also obtained. Automated exposure control and iterative construction methods were used. Findings: There is scarring/atelectasis in the lung bases. There is a subpleural left lower lobe nodule measuring 5 mm. This is stable to slightly smaller from a chest CT of 12/9/2022 and benign. No follow-up is indicated. Abdominal aorta is normal in caliber. Gallbladder is normal. No biliary obstruction. There is probably a mild degree of hepatic steatosis, and there is some focal fatty infiltration in the left hepatic lobe adjacent to the fissure. Solid abdominal organs are otherwise normal. The kidneys are  nonobstructed. No evidence of acute pancreatitis. Urinary bladder and prostate gland are within normal limits. The appendix is normal. There is mild colonic diverticulosis. No evidence of diverticulitis or colitis. No small bowel obstruction is identified. Stomach is normal except for a small hiatal hernia. There is no free fluid or adenopathy. There is lumbar degenerative disease, predominantly at L5-S1.     1.No acute findings in the abdomen or pelvis. 2.Mild hepatic steatosis. 3.Mild colonic diverticulosis. 4.Small hiatal hernia. Electronically Signed: Miguel A Mead MD   1/8/2025 1:30 AM [facility]  Workstation ID: [id number]       Differential Diagnosis and Discussion:      Abdominal Pain: Based on the patient's signs and symptoms, I considered abdominal aortic aneurysm, small bowel obstruction, pancreatitis, acute cholecystitis, acute appendecitis, peptic ulcer disease, gastritis, colitis, endocrine disorders, irritable bowel syndrome and other differential diagnosis an etiology of the patient's abdominal pain.  Cough: Differential diagnosis includes but is not limited to pneumonia, acute bronchitis, upper respiratory infection, ACE inhibitor use, allergic reaction, epiglottitis, seasonal allergies, chemical irritants, exercise-induced asthma, viral syndrome.  Dizziness: Based on the patient's history, signs, and symptoms, the diffential diagnosis includes but is not limited to meningitis, stroke, sepsis, subarachnoid hemorrhage, intracranial bleeding, encephalitis, vertigo, electrolyte imbalance, and metabolic disorders.  Myalgia: Differential diagnosis includes but is not limited to muscle overuse or strain, infections, inflammatory conditions, autoimmune diseases, medications, metabolic disorders, fibromyalgia, vascular disorders, neurological conditions, psychological factors, toxins, and muscle disorders.    PROCEDURES:    Labs were collected in the emergency department and all labs were reviewed and interpreted by me.  X-ray were performed in the emergency department and all X-ray impressions were independently interpreted by me.  An EKG was performed and the EKG was interpreted by supervising attending.  CT scan was performed in the emergency department and the CT scan radiology impression was interpreted by me.    ECG 12 Lead Tachycardia   Preliminary Result   HEART ZFLL=817  bpm   RR Erggsgzr=255  ms   MI Zrnfgpys=549  ms   P Horizontal Axis=21  deg   P Front Axis=12  deg   QRSD Qxztoxha=781  ms   QT Ehigangu=492  ms   YOcX=768  ms   QRS Axis=-38  deg   T Wave Axis=64  deg   -  ABNORMAL ECG -   Sinus tachycardia   Probable left atrial enlargement   Left axis deviation   ST elevation, consider anterolateral injury   Date and Time of Study:2025-01-07 22:11:22           Procedures    MDM  Number of Diagnoses or Management Options  Hypoxia  Influenza A  Diagnosis management comments: Patient presented to the emergency department with multiple symptoms including dizziness headache body aches fever and epigastric pain.  Workup was essentially unremarkable other than patient did test positive for influenza A patient got IV fluids and initially was going to go home but patient has since declined became hypotensive and more somnolent.  Patient was found to have hypercapnia and is requiring BiPAP.  Also additional fluid resuscitation due to hypotension has been increased.  Patient will be admitted for stabilization.  There is no sign of sepsis as patient's lactic and white count are normal and there is no bacterial source of infection.  Patient has been afebrile.  The on-call hospitalist has accepted the patient       Amount and/or Complexity of Data Reviewed  Clinical lab tests: reviewed and ordered  Tests in the radiology section of CPT®: reviewed and ordered  Tests in the medicine section of CPT®: reviewed and ordered    Risk of Complications, Morbidity, and/or Mortality  Presenting problems: moderate  Diagnostic procedures: moderate  Management options: moderate    Patient Progress  Patient progress: stable           The patient presents with respiratory distress.  The patient does require supplemental oxygen.  Patient was placed on the cardiac monitor after given supplemental IV fluids and started on BiPAP for hypoxia.  They were monitored for ventricular ectopy, arrhythmia, tachycardia, hypoxia, and changes in blood pressure.  Continuous pulse oximetry was placed in waveform with corresponding oxygen saturation was assessed throughout their stay in the emergency department.  Patient was  rechecked several times in the ED for decline in mental status and worsening distress.    Total Critical Care time of 35 minutes. Total critical care time documented does not include time spent on separately billed procedures for services of nurses or physician assistants. I personally saw and examined the patient. I have reviewed all diagnostic interpretations and treatment plans as written. I was present for the key portions of any procedures performed and the inclusive time noted in any critical care statement. Critical care time includes patient management by me, time spent at the patients bedside,  time to review lab and imaging results, discussing patient care, documentation in the medical record, and time spent with family or caregiver.          Patient Care Considerations:    SEPSIS was considered but is NOT present in the emergency department as SIRS criteria is not present.      Consultants/Shared Management Plan:    Hospitalist: I have discussed the case with dr infante who agrees to accept the patient for admission.    Social Determinants of Health:    Patient is independent, reliable, and has access to care.       Disposition and Care Coordination:    Admit:   Through independent evaluation of the patient's history, physical, and imperical data, the patient meets criteria for inpatient admission to the hospital.        Final diagnoses:   Influenza A   Hypoxia        ED Disposition       ED Disposition   Decision to Admit    Condition   --    Comment   --               This medical record created using voice recognition software.             Renay Jung, APRN  01/08/25 0418

## 2025-01-08 NOTE — ED NOTES
Multiple attempts to collect urine, pt refuses cath at this time and states he is unable to urinate

## 2025-01-08 NOTE — PROGRESS NOTES
RT EQUIPMENT DEVICE RELATED - SKIN ASSESSMENT    RT Medical Equipment/Device:  NIV Mask: Full-face  size: Medium    Skin Assessment: Cheek: Intact, Chin: Intact, Ears: Intact, and Nose: Intact    Device Skin Pressure Protection: Pressure points protected    Nurse Notification: Dasha Membreno, CRT

## 2025-01-08 NOTE — H&P
[facility]   HOSPITALIST HISTORY AND PHYSICAL  Date: 2025   Patient Name: Honorio Vigil Jr.  : 1965  MRN: [id number]  Primary Care Physician:  Roni Gray MD  Date of admission: 2025    Subjective   Subjective     Chief Complaint: Abdominal pain, fevers  HPI:    Honorio Vigil Jr. is a 59 y.o. male past medical history of tobacco use, hypertension, anxiety/depression who presents to the ER due to dizziness headache body aches and epigastric pain with fever over the last day.  Patient is a poor historian due to his clinical condition at the time my exam and history supplemented by discussion with ER staff and chart review.  Appears patient has been ill for 24 hours with complaints everywhere from headache to fever to dizziness and epigastric pain.  He was evaluated in the ER with x-ray of the chest that showed some atelectasis and a CT abdomen pelvis with contrast that was negative except for hepatic steatosis and diverticuli.  Patient was treated with fluid bolus and ibuprofen however while in the ER he had decompensation with increasing lethargy and hypoxia at which point he was further worked up with an ABG that showed hypercapnic respiratory failure with hypoxemia as well.  He was given a dose of IV steroids and started on BiPAP.  Hospitalist then contacted for admission.  At the time my exam patient is arousable to sternal rub and following simple commands but quickly lethargic again.  He is able to tell me he continues to smoke and is currently short of breath but denies having chest pain.  Patient is currently confused and only oriented to person and place.      Personal History     Past Medical History:  Past Medical History:   Diagnosis Date    Abscess of foot     rt    ADHD     Anxiety disorder     Backache     Cellulitis of scrotum     Elevated cholesterol     Hyperlipidemia     Hypertension     Insomnia disorder     Seizures     x2- due to overuse/overdose of  meds- years ago per pt. Last one was 15-20 years ago- not on meds    Substance abuse     years ago    Viral gastroenteritis          Past Surgical History:  Past Surgical History:   Procedure Laterality Date    EYE SURGERY      HAND SURGERY Bilateral     INCISION AND DRAINAGE OF WOUND Right 2/14/2023    Procedure: INCISION AND DRAINAGE BONE, right great toe;  Surgeon: Cristo Christy DPM;  Location: [facility] OR;  Service: Podiatry;  Laterality: Right;    KNEE ARTHROSCOPY Right 05/21/2014    Arthroscopy of right knee, debride medial meniscus. x3    KNEE ARTHROSCOPY W/ MENISCECTOMY Left 08/09/2022    Procedure: KNEE ARTHROSCOPY WITH PARTIAL MEDIAL MENISCECTOMY;  Surgeon: Carlos Hameed MD;  Location: [facility] OR [facility] [place];  Service: Orthopedics;  Laterality: Left;    SHOULDER ARTHROSCOPY Right     TOE NAIL AMPUTATION Right 3/8/2024    Procedure: TOE NAIL SYME TERMINAL AMPUTATION;  Surgeon: Nelson Hand DPM;  Location: [facility] OR;  Service: Podiatry;  Laterality: Right;         Family History:   Reviewed and noncontributory except as mentioned in HPI    Social History:   Social Drivers of Health     Tobacco Use: High Risk (1/8/2025)    Patient History     Smoking Tobacco Use: Every Day     Smokeless Tobacco Use: Never     Passive Exposure: Current   Alcohol Use: Not At Risk (3/6/2024)    AUDIT-C     Frequency of Alcohol Consumption: Never     Average Number of Drinks: Patient does not drink     Frequency of Binge Drinking: Never   Financial Resource Strain: Medium Risk (3/8/2024)    Overall Financial Resource Strain (CARDIA)     Difficulty of Paying Living Expenses: Somewhat hard   Food Insecurity: Food Insecurity Present (3/7/2024)    Hunger Vital Sign     Worried About Running Out of Food in the Last Year: Sometimes true     Ran Out of Food in the Last Year: Sometimes true   Transportation Needs: No Transportation Needs (3/7/2024)    PRAPARE - Transportation     Lack of Transportation (Medical): No      Lack of Transportation (Non-Medical): No   Recent Concern: Transportation Needs - Unmet Transportation Needs (12/11/2023)    PRAPARE - Transportation     Lack of Transportation (Medical): Yes     Lack of Transportation (Non-Medical): Yes   Physical Activity: Inactive (3/7/2024)    Exercise Vital Sign     Days of Exercise per Week: 0 days     Minutes of Exercise per Session: 0 min   Stress: Patient Declined (3/8/2024)    [employer] - Occupational Stress Questionnaire     Feeling of Stress : Patient declined   Recent Concern: Stress - Stress Concern Present (12/11/2023)    [employer] - Occupational Stress Questionnaire     Feeling of Stress : Very much   Social Connections: Unknown (3/8/2024)    Family and Community Support     Help with Day-to-Day Activities: I don't need any help     Lonely or Isolated: Patient declined   Recent Concern: Social Connections - At Risk (12/11/2023)    Family and Community Support     Help with Day-to-Day Activities: I don't need any help     Lonely or Isolated: Often   Interpersonal Safety: Not At Risk (1/8/2025)    Abuse Screen     Unsafe at Home or Work/School: no     Feels Threatened by Someone?: no     Does Anyone Keep You from Contacting Others or Doint Things Outside the Home?: no     Physical Sign of Abuse Present: no   Depression: Not at risk (12/11/2023)    PHQ-2     PHQ-2 Score: 2   Recent Concern: Depression - At risk (12/9/2023)    PHQ-2     PHQ-2 Score: 6   Housing Stability: Not At Risk (3/7/2024)    Housing Stability     Current Living Arrangements: home     Potentially Unsafe Housing Conditions: none   Utilities: Not At Risk (3/7/2024)    [facility] Utilities     Threatened with loss of utilities: No   Health Literacy: Not At Risk (3/8/2024)    Education     Help with school or training?: No     Preferred Language: English   Employment: Not At Risk (3/8/2024)    Employment     Do you want help finding or keeping work  or a job?: I do not need or want help   Recent Concern: Employment - At Risk (12/9/2023)    Employment     Do you want help finding or keeping work or a job?: Yes, help finding work   Disabilities: At Risk (3/6/2024)    Disabilities     Concentrating, Remembering, or Making Decisions Difficulty: yes     Doing Errands Independently Difficulty: no         Home Medications:  ARIPiprazole, HYDROcodone-acetaminophen, QUEtiapine, amphetamine-dextroamphetamine, benzonatate, dicyclomine, gabapentin, naloxone, ondansetron ODT, and sertraline    Allergies:  Allergies   Allergen Reactions    Naproxen GI Intolerance       Review of Systems   Unable to obtain due to patient's lethargy    Objective   Objective     Vitals:   Temp:  [98.8 °F (37.1 °C)-99.8 °F (37.7 °C)] 98.8 °F (37.1 °C)  Heart Rate:  [] 84  Resp:  [18-20] 18  BP: ()/(65-95) 89/65    Physical Exam    Constitutional: Arousable, follows simple commands but quickly lethargic   Eyes: Pupils equal, sclerae anicteric, no conjunctival injection   HENT: NCAT, mucous membranes moist   Neck: Supple, no thyromegaly, no lymphadenopathy, trachea midline   Respiratory: Audible wheezing in bilateral lung fields with mildly labored respiration   Cardiovascular: RRR, no murmurs, rubs, or gallops, palpable pedal pulses bilaterally   Gastrointestinal: Positive bowel sounds, soft, nontender, nondistended   Musculoskeletal: No bilateral ankle edema, no clubbing or cyanosis to extremities   Psychiatric: Appropriate affect, cooperative   Neurologic: Oriented x 2 (person/place), moves all extremities spontaneously   Skin: No rashes     Result Review    Result Review:  I have personally reviewed the results from the time of this admission to 1/8/2025 04:29 EST and agree with these findings:  [x]  Laboratory  [x]  Microbiology  [x]  Radiology  [x]  EKG/Telemetry   [x]  Cardiology/Vascular   []  Pathology  [x]  Old records  []  Other:      Assessment & Plan   Assessment / Plan      Assessment/Plan:   Acute hypoxic hypercapnic respiratory failure secondary to influenza infection and COPD exacerbation  Acute influenza A infection  Acute COPD exacerbation  Acute metabolic encephalopathy likely secondary above processes  Hypertension  Anxiety/depression    Plan  - Admit to hospitalist service  - We will start patient on Tamiflu for influenza A.  Continue scheduled bronchodilators and steroids for COPD exacerbation.  No indication for antibiotics given lack of consolidation seen on imaging and no mention of productive cough prior to arrival.   -I have also ordered an ABG to be done in 2 hours to assess for improvement in hypercapnia, patient did seem more awake and alert on my exam then when described by ER provider.  Will keep on continuous pulse ox and avoid sedating meds for the time being  - Reviewed patient's external prescriptions and it does seem like he is on multiple sedating meds including Abilify and gabapentin, will hold for now while patient is lethargic and restarted more appropriate time  - Clarify all home meds and resume as appropriate      Discussed with ER Physician and Nurse    All labs/imaging studies were personally reviewed and findings are as noted above      DVT Prophylaxis: Lovenox    CODE STATUS:    Code Status (Patient has no pulse and is not breathing): CPR (Attempt to Resuscitate)  Medical Interventions (Patient has pulse or is breathing): Full Support      Admission Status:  I believe this patient meets inpatient status.    Electronically signed by Sameer Angela MD, 01/08/25, 4:29 AM EST.

## 2025-01-08 NOTE — ED PROVIDER NOTES
"SHARED VISIT NOTE:    Patient is 59 y.o. year old male that presents to the ED for evaluation of dizziness, fever, body aches.     Physical Exam  Constitutional:       Comments: somnolent   HENT:      Head: Normocephalic.   Cardiovascular:      Rate and Rhythm: Normal rate and regular rhythm.   Pulmonary:      Breath sounds: No wheezing.   Abdominal:      Tenderness: There is no abdominal tenderness.   Neurological:      Comments: Patient is somnolent and confused.  He is moving all extremities.         ED Course:    BP (!) 89/65   Pulse 87   Temp 98.8 °F (37.1 °C) (Oral)   Resp 18   Ht 182.9 cm (72.01\")   Wt 113 kg (248 lb 3.8 oz)   SpO2 (!) 89%   BMI 33.66 kg/m²   Results for orders placed or performed during the hospital encounter of 01/08/25   COVID-19, FLU A/B, RSV PCR 1 HR TAT - Swab, Nasopharynx    Collection Time: 01/07/25 10:09 PM    Specimen: Nasopharynx; Swab   Result Value Ref Range    COVID19 Not Detected Not Detected - Ref. Range    Influenza A PCR Detected (A) Not Detected    Influenza B PCR Not Detected Not Detected    RSV, PCR Not Detected Not Detected   ECG 12 Lead Tachycardia    Collection Time: 01/07/25 10:11 PM   Result Value Ref Range    QT Interval 318 ms    QTC Interval 446 ms   Comprehensive Metabolic Panel    Collection Time: 01/07/25 10:17 PM    Specimen: Arm, Left; Blood   Result Value Ref Range    Glucose 113 (H) 65 - 99 mg/dL    BUN 10 6 - 20 mg/dL    Creatinine 1.34 (H) 0.76 - 1.27 mg/dL    Sodium 133 (L) 136 - 145 mmol/L    Potassium 4.1 3.5 - 5.2 mmol/L    Chloride 101 98 - 107 mmol/L    CO2 22.1 22.0 - 29.0 mmol/L    Calcium 9.3 8.6 - 10.5 mg/dL    Total Protein 7.3 6.0 - 8.5 g/dL    Albumin 4.0 3.5 - 5.2 g/dL    ALT (SGPT) 17 1 - 41 U/L    AST (SGOT) 31 1 - 40 U/L    Alkaline Phosphatase 124 (H) 39 - 117 U/L    Total Bilirubin 0.4 0.0 - 1.2 mg/dL    Globulin 3.3 gm/dL    A/G Ratio 1.2 g/dL    BUN/Creatinine Ratio 7.5 7.0 - 25.0    Anion Gap 9.9 5.0 - 15.0 mmol/L    eGFR 61.0 " >60.0 mL/min/1.73   Lipase    Collection Time: 01/07/25 10:17 PM    Specimen: Arm, Left; Blood   Result Value Ref Range    Lipase 20 13 - 60 U/L   Lactic Acid, Plasma    Collection Time: 01/07/25 10:17 PM    Specimen: Arm, Left; Blood   Result Value Ref Range    Lactate 0.7 0.5 - 2.0 mmol/L   CBC Auto Differential    Collection Time: 01/07/25 10:17 PM    Specimen: Arm, Left; Blood   Result Value Ref Range    WBC 9.60 3.40 - 10.80 10*3/mm3    RBC 5.18 4.14 - 5.80 10*6/mm3    Hemoglobin 15.7 13.0 - 17.7 g/dL    Hematocrit 46.7 37.5 - 51.0 %    MCV 90.2 79.0 - 97.0 fL    MCH 30.3 26.6 - 33.0 pg    MCHC 33.6 31.5 - 35.7 g/dL    RDW 14.1 12.3 - 15.4 %    RDW-SD 46.9 37.0 - 54.0 fl    MPV 11.0 6.0 - 12.0 fL    Platelets 230 140 - 450 10*3/mm3    Neutrophil % 72.7 42.7 - 76.0 %    Lymphocyte % 16.8 (L) 19.6 - 45.3 %    Monocyte % 8.3 5.0 - 12.0 %    Eosinophil % 0.4 0.3 - 6.2 %    Basophil % 0.8 0.0 - 1.5 %    Immature Grans % 1.0 (H) 0.0 - 0.5 %    Neutrophils, Absolute 6.97 1.70 - 7.00 10*3/mm3    Lymphocytes, Absolute 1.61 0.70 - 3.10 10*3/mm3    Monocytes, Absolute 0.80 0.10 - 0.90 10*3/mm3    Eosinophils, Absolute 0.04 0.00 - 0.40 10*3/mm3    Basophils, Absolute 0.08 0.00 - 0.20 10*3/mm3    Immature Grans, Absolute 0.10 (H) 0.00 - 0.05 10*3/mm3    nRBC 0.0 0.0 - 0.2 /100 WBC   BNP    Collection Time: 01/07/25 10:17 PM    Specimen: Arm, Left; Blood   Result Value Ref Range    proBNP <36.0 0.0 - 900.0 pg/mL   High Sensitivity Troponin T    Collection Time: 01/07/25 10:17 PM    Specimen: Arm, Left; Blood   Result Value Ref Range    HS Troponin T 8 <22 ng/L   Green Top (Gel)    Collection Time: 01/07/25 10:17 PM   Result Value Ref Range    Extra Tube Hold for add-ons.    Lavender Top    Collection Time: 01/07/25 10:17 PM   Result Value Ref Range    Extra Tube hold for add-on    Gold Top - SST    Collection Time: 01/07/25 10:17 PM   Result Value Ref Range    Extra Tube Hold for add-ons.    Light Blue Top    Collection  Time: 01/07/25 10:17 PM   Result Value Ref Range    Extra Tube Hold for add-ons.    Blood Gas, Arterial -    Collection Time: 01/08/25  3:31 AM    Specimen: Arterial Blood   Result Value Ref Range    Site Right Brachial     Leo's Test N/A     pH, Arterial 7.295 (L) 7.350 - 7.450 pH units    pCO2, Arterial 47.0 (H) 35.0 - 45.0 mm Hg    pO2, Arterial 63.8 (L) 80.0 - 100.0 mm Hg    HCO3, Arterial 22.8 22.0 - 26.0 mmol/L    Base Excess, Arterial -4.0 (L) -2.0 - 2.0 mmol/L    O2 Saturation, Arterial 89.3 (L) 95.0 - 99.0 %    Hemoglobin, Blood Gas 15.8 12 - 18 g/dL    Hematocrit, Blood Gas 46.0 38.0 - 51.0 %    Barometric Pressure for Blood Gas 755.8000 mmHg    Modality Room Air     FIO2 21 %    Hemodilution No     PO2/FIO2 304 0 - 500     Medications   sodium chloride 0.9 % flush 10 mL (has no administration in time range)   ibuprofen (ADVIL,MOTRIN) tablet 600 mg (600 mg Oral Given 1/8/25 0044)   iopamidol (ISOVUE-370) 76 % injection 100 mL (100 mL Intravenous Given 1/8/25 0122)   sodium chloride 0.9 % bolus 1,000 mL (0 mL Intravenous Stopped 1/8/25 0320)     XR Chest 1 View    Result Date: 1/8/2025  Narrative: XR CHEST 1 VW Date of Exam: 1/8/2025 1:37 AM EST Indication: cough/ fever Comparison: 11/10/2023 Findings: Cardiac and mediastinal contours are normal. There is some mild atelectasis in the left base. Lungs are otherwise clear. Pulmonary vascularity is normal. No pneumothorax.     Impression: Mild left basilar atelectasis. Otherwise negative. Electronically Signed: Miguel A Mead MD  1/8/2025 1:48 AM EST  Workstation ID: [id number]    CT Abdomen Pelvis With Contrast    Result Date: 1/8/2025  Narrative: CT ABDOMEN PELVIS W CONTRAST Date of Exam: 1/8/2025 1:13 AM EST Indication: epigastric and RUQ pain. Comparison: None available. Technique: Axial CT images were obtained of the abdomen and pelvis after the uneventful intravenous administration of iodinated contrast. Reconstructed coronal and sagittal images were  also obtained. Automated exposure control and iterative construction methods were used. Findings: There is scarring/atelectasis in the lung bases. There is a subpleural left lower lobe nodule measuring 5 mm. This is stable to slightly smaller from a chest CT of 12/9/2022 and benign. No follow-up is indicated. Abdominal aorta is normal in caliber. Gallbladder is normal. No biliary obstruction. There is probably a mild degree of hepatic steatosis, and there is some focal fatty infiltration in the left hepatic lobe adjacent to the fissure. Solid abdominal organs are otherwise normal. The kidneys are  nonobstructed. No evidence of acute pancreatitis. Urinary bladder and prostate gland are within normal limits. The appendix is normal. There is mild colonic diverticulosis. No evidence of diverticulitis or colitis. No small bowel obstruction is identified. Stomach is normal except for a small hiatal hernia. There is no free fluid or adenopathy. There is lumbar degenerative disease, predominantly at L5-S1.     Impression: 1.No acute findings in the abdomen or pelvis. 2.Mild hepatic steatosis. 3.Mild colonic diverticulosis. 4.Small hiatal hernia. Electronically Signed: Miguel A Mead MD  1/8/2025 1:30 AM EST  Workstation ID: [id number]     MDM:    Procedures    Labs were collected in the emergency department and all labs were reviewed and interpreted by me.  X-ray were performed in the emergency department and all X-ray impressions were independently interpreted by me.  An EKG was performed and the EKG was interpreted by me.          SHARED VISIT ATTESTATION:    This visit was performed by both myself and an APC.  I performed the substantive portion of the medical decision making. The management plan was made or approved by me, and I take responsibility for patient management.           Allyssa Catherine MD  03:57 EST  01/08/25         Allyssa Catherine MD  01/08/25 0652

## 2025-01-08 NOTE — PLAN OF CARE
Goal Outcome Evaluation:  Plan of Care Reviewed With: patient        Progress: no change  Outcome Evaluation: Pt came up from ED on bipap 12/6 40%. Bipap is now on standby. Pt is currently on a 4L nasal cannula. Will continue to monitor & titrate O2 as pt tolerates.

## 2025-01-09 LAB
ANION GAP SERPL CALCULATED.3IONS-SCNC: 9 MMOL/L (ref 5–15)
BASOPHILS # BLD AUTO: 0.02 10*3/MM3 (ref 0–0.2)
BASOPHILS NFR BLD AUTO: 0.1 % (ref 0–1.5)
BUN SERPL-MCNC: 16 MG/DL (ref 6–20)
BUN/CREAT SERPL: 18.4 (ref 7–25)
CALCIUM SPEC-SCNC: 8.8 MG/DL (ref 8.6–10.5)
CHLORIDE SERPL-SCNC: 102 MMOL/L (ref 98–107)
CO2 SERPL-SCNC: 23 MMOL/L (ref 22–29)
CREAT SERPL-MCNC: 0.87 MG/DL (ref 0.76–1.27)
DEPRECATED RDW RBC AUTO: 49.1 FL (ref 37–54)
EGFRCR SERPLBLD CKD-EPI 2021: 99.4 ML/MIN/1.73
EOSINOPHIL # BLD AUTO: 0 10*3/MM3 (ref 0–0.4)
EOSINOPHIL NFR BLD AUTO: 0 % (ref 0.3–6.2)
ERYTHROCYTE [DISTWIDTH] IN BLOOD BY AUTOMATED COUNT: 14.4 % (ref 12.3–15.4)
GLUCOSE SERPL-MCNC: 143 MG/DL (ref 65–99)
HCT VFR BLD AUTO: 45.3 % (ref 37.5–51)
HGB BLD-MCNC: 14.8 G/DL (ref 13–17.7)
IMM GRANULOCYTES # BLD AUTO: 0.06 10*3/MM3 (ref 0–0.05)
IMM GRANULOCYTES NFR BLD AUTO: 0.4 % (ref 0–0.5)
LYMPHOCYTES # BLD AUTO: 1.74 10*3/MM3 (ref 0.7–3.1)
LYMPHOCYTES NFR BLD AUTO: 12.7 % (ref 19.6–45.3)
MAGNESIUM SERPL-MCNC: 2.2 MG/DL (ref 1.6–2.6)
MCH RBC QN AUTO: 30.3 PG (ref 26.6–33)
MCHC RBC AUTO-ENTMCNC: 32.7 G/DL (ref 31.5–35.7)
MCV RBC AUTO: 92.6 FL (ref 79–97)
MONOCYTES # BLD AUTO: 1.09 10*3/MM3 (ref 0.1–0.9)
MONOCYTES NFR BLD AUTO: 8 % (ref 5–12)
NEUTROPHILS NFR BLD AUTO: 10.8 10*3/MM3 (ref 1.7–7)
NEUTROPHILS NFR BLD AUTO: 78.8 % (ref 42.7–76)
NRBC BLD AUTO-RTO: 0 /100 WBC (ref 0–0.2)
PLATELET # BLD AUTO: 231 10*3/MM3 (ref 140–450)
PMV BLD AUTO: 11 FL (ref 6–12)
POTASSIUM SERPL-SCNC: 4.2 MMOL/L (ref 3.5–5.2)
RBC # BLD AUTO: 4.89 10*6/MM3 (ref 4.14–5.8)
SODIUM SERPL-SCNC: 134 MMOL/L (ref 136–145)
WBC NRBC COR # BLD AUTO: 13.71 10*3/MM3 (ref 3.4–10.8)

## 2025-01-09 PROCEDURE — 83735 ASSAY OF MAGNESIUM: CPT | Performed by: STUDENT IN AN ORGANIZED HEALTH CARE EDUCATION/TRAINING PROGRAM

## 2025-01-09 PROCEDURE — 94799 UNLISTED PULMONARY SVC/PX: CPT

## 2025-01-09 PROCEDURE — 63710000001 PREDNISONE PER 1 MG: Performed by: INTERNAL MEDICINE

## 2025-01-09 PROCEDURE — 94761 N-INVAS EAR/PLS OXIMETRY MLT: CPT

## 2025-01-09 PROCEDURE — 94664 DEMO&/EVAL PT USE INHALER: CPT

## 2025-01-09 PROCEDURE — 85025 COMPLETE CBC W/AUTO DIFF WBC: CPT | Performed by: STUDENT IN AN ORGANIZED HEALTH CARE EDUCATION/TRAINING PROGRAM

## 2025-01-09 PROCEDURE — 99232 SBSQ HOSP IP/OBS MODERATE 35: CPT | Performed by: INTERNAL MEDICINE

## 2025-01-09 PROCEDURE — 25010000002 ENOXAPARIN PER 10 MG: Performed by: STUDENT IN AN ORGANIZED HEALTH CARE EDUCATION/TRAINING PROGRAM

## 2025-01-09 PROCEDURE — 80048 BASIC METABOLIC PNL TOTAL CA: CPT | Performed by: STUDENT IN AN ORGANIZED HEALTH CARE EDUCATION/TRAINING PROGRAM

## 2025-01-09 RX ORDER — ARFORMOTEROL TARTRATE 15 UG/2ML
15 SOLUTION RESPIRATORY (INHALATION)
Status: DISCONTINUED | OUTPATIENT
Start: 2025-01-09 | End: 2025-01-10 | Stop reason: HOSPADM

## 2025-01-09 RX ORDER — PREDNISONE 20 MG/1
40 TABLET ORAL
Status: DISCONTINUED | OUTPATIENT
Start: 2025-01-09 | End: 2025-01-10 | Stop reason: HOSPADM

## 2025-01-09 RX ORDER — IPRATROPIUM BROMIDE AND ALBUTEROL SULFATE 2.5; .5 MG/3ML; MG/3ML
3 SOLUTION RESPIRATORY (INHALATION) EVERY 4 HOURS PRN
Status: DISCONTINUED | OUTPATIENT
Start: 2025-01-09 | End: 2025-01-10 | Stop reason: HOSPADM

## 2025-01-09 RX ORDER — BUDESONIDE 0.5 MG/2ML
0.5 INHALANT ORAL
Status: DISCONTINUED | OUTPATIENT
Start: 2025-01-09 | End: 2025-01-10 | Stop reason: HOSPADM

## 2025-01-09 RX ORDER — FAMOTIDINE 10 MG/ML
20 INJECTION, SOLUTION INTRAVENOUS EVERY 12 HOURS SCHEDULED
Status: DISCONTINUED | OUTPATIENT
Start: 2025-01-09 | End: 2025-01-10 | Stop reason: HOSPADM

## 2025-01-09 RX ADMIN — BUDESONIDE 0.5 MG: 0.5 INHALANT RESPIRATORY (INHALATION) at 18:56

## 2025-01-09 RX ADMIN — IPRATROPIUM BROMIDE AND ALBUTEROL SULFATE 3 ML: .5; 3 SOLUTION RESPIRATORY (INHALATION) at 03:34

## 2025-01-09 RX ADMIN — Medication 10 ML: at 09:54

## 2025-01-09 RX ADMIN — FAMOTIDINE 20 MG: 10 INJECTION INTRAVENOUS at 20:48

## 2025-01-09 RX ADMIN — OSELTAMIVIR PHOSPHATE 75 MG: 75 CAPSULE ORAL at 20:38

## 2025-01-09 RX ADMIN — BUDESONIDE 0.5 MG: 0.5 INHALANT RESPIRATORY (INHALATION) at 10:59

## 2025-01-09 RX ADMIN — ARFORMOTEROL TARTRATE 15 MCG: 15 SOLUTION RESPIRATORY (INHALATION) at 10:59

## 2025-01-09 RX ADMIN — AMITRIPTYLINE HYDROCHLORIDE 100 MG: 25 TABLET ORAL at 20:38

## 2025-01-09 RX ADMIN — IPRATROPIUM BROMIDE AND ALBUTEROL SULFATE 3 ML: .5; 3 SOLUTION RESPIRATORY (INHALATION) at 18:56

## 2025-01-09 RX ADMIN — OSELTAMIVIR PHOSPHATE 75 MG: 75 CAPSULE ORAL at 09:53

## 2025-01-09 RX ADMIN — ENOXAPARIN SODIUM 40 MG: 100 INJECTION SUBCUTANEOUS at 09:53

## 2025-01-09 RX ADMIN — IPRATROPIUM BROMIDE AND ALBUTEROL SULFATE 3 ML: .5; 3 SOLUTION RESPIRATORY (INHALATION) at 14:59

## 2025-01-09 RX ADMIN — IPRATROPIUM BROMIDE AND ALBUTEROL SULFATE 3 ML: .5; 3 SOLUTION RESPIRATORY (INHALATION) at 06:45

## 2025-01-09 RX ADMIN — IPRATROPIUM BROMIDE AND ALBUTEROL SULFATE 3 ML: .5; 3 SOLUTION RESPIRATORY (INHALATION) at 23:02

## 2025-01-09 RX ADMIN — Medication 10 ML: at 20:39

## 2025-01-09 RX ADMIN — ARFORMOTEROL TARTRATE 15 MCG: 15 SOLUTION RESPIRATORY (INHALATION) at 18:56

## 2025-01-09 RX ADMIN — IPRATROPIUM BROMIDE AND ALBUTEROL SULFATE 3 ML: .5; 3 SOLUTION RESPIRATORY (INHALATION) at 10:59

## 2025-01-09 RX ADMIN — GABAPENTIN 600 MG: 300 CAPSULE ORAL at 20:38

## 2025-01-09 RX ADMIN — GABAPENTIN 600 MG: 300 CAPSULE ORAL at 09:53

## 2025-01-09 RX ADMIN — Medication 5 MG: at 20:38

## 2025-01-09 RX ADMIN — ALUMINUM HYDROXIDE, MAGNESIUM HYDROXIDE, AND DIMETHICONE 15 ML: 400; 400; 40 SUSPENSION ORAL at 18:33

## 2025-01-09 RX ADMIN — ALUMINUM HYDROXIDE, MAGNESIUM HYDROXIDE, AND DIMETHICONE 15 ML: 400; 400; 40 SUSPENSION ORAL at 11:17

## 2025-01-09 RX ADMIN — PREDNISONE 40 MG: 20 TABLET ORAL at 09:53

## 2025-01-09 NOTE — PLAN OF CARE
Goal Outcome Evaluation:              Outcome Evaluation: Tamiflu started today. Droplet isolation continues. Maalox given for co of indigestion. Resting in bed.

## 2025-01-09 NOTE — PROGRESS NOTES
RT EQUIPMENT DEVICE RELATED - SKIN ASSESSMENT    RT Medical Equipment/Device:     NIV Mask:  Under-the-nose   size:  b    Skin Assessment:      Cheek:  Intact  Chin:  Intact  Nose:  Redness    Device Skin Pressure Protection:  Positioning supports utilized  S/O from [facility] to [facility]    Nurse Notification:  Dasha Oscar, RRT

## 2025-01-09 NOTE — PLAN OF CARE
Goal Outcome Evaluation:Pt wore BIPAP for only 1 hour  tonight @ 12/6, rr 20, 30%. His nose bridge was red on FFM and was switched to [employer] size B, He has been titrated from 4-2 L this night.

## 2025-01-09 NOTE — PROGRESS NOTES
Pulmonary / Critical Care Progress Note      Patient Name: Honorio Vigil Jr.  : 1965  MRN: [id number]  Attending:  Fabrice Zepeda DO  Date of admission: 2025    Subjective   Subjective   Follow-up for respiratory failure, influenza    No acute events overnight.    This morning,  Resting in bed  Remains on 2 L nasal cannula  Overall feeling better  Dyspnea and cough improving  No fever or chills  No chest pain or hemoptysis  Remains weak and fatigued      Objective   Objective     Vitals:   Temp:  [97.2 °F (36.2 °C)-97.9 °F (36.6 °C)] 97.3 °F (36.3 °C)  Heart Rate:  [] 91  Resp:  [16-24] 20  BP: ()/(68-93) 121/79  Flow (L/min) (Oxygen Therapy):  [2-4] 2    Physical Exam   Vital Signs Reviewed   General:  WDWN, Alert, NAD, sitting up in bed.    Chest:  good aeration, wheezes and rhonchi to auscultation bilaterally, no work of breathing noted on 2 L NC  CV: RRR, no MGR, pulses 2+, equal.  Abd:  Soft, NT, ND, + BS, no HSM  EXT:  no clubbing, no cyanosis, no edema  Neuro:  A&Ox3, CN grossly intact, no focal deficits.  Skin: No rashes or lesions noted      Result Review    Result Review:  I have personally reviewed the results from the time of this admission to 2025 07:58 EST and agree with these findings:  [x]  Laboratory  [x]  Microbiology  [x]  Radiology  [x]  EKG/Telemetry   []  Cardiology/Vascular   []  Pathology  []  Old records  []  Other:  Most notable findings include:         Lab 25  0547 25  2217   WBC 13.71* 9.60   HEMOGLOBIN 14.8 15.7   HEMATOCRIT 45.3 46.7   PLATELETS 231 230   SODIUM 134* 133*   POTASSIUM 4.2 4.1   CHLORIDE 102 101   CO2 23.0 22.1   BUN 16 10   CREATININE 0.87 1.34*   GLUCOSE 143* 113*   CALCIUM 8.8 9.3   TOTAL PROTEIN  --  7.3   ALBUMIN  --  4.0   GLOBULIN  --  3.3     Chest x-ray with some mild bibasilar atelectasis.    Influenza A positive  ABG 7.2 //    Assessment & Plan   Assessment / Plan     Active Hospital Problems:  Active  Hospital Problems    Diagnosis     **Acute respiratory failure with hypercapnia      Impression:  Acute hypoxemic and hypercapnic respiratory failure requiring NIPPV  Altered mental status  CO2 narcosis/toxic encephalopathy  Question COPD with acute exacerbation  Influenza A respiratory tract infection  Hyponatremia, clinically insignificant  Class I obesity with BMI 30.8  Ongoing tobacco use of cigarettes     Plan:  -Continue to wean O2 to maintain SpO2 greater than 90%.  Does not wear O2 at baseline.  -Discontinue NIPPV.  -No indication for antibiotics at this time.  -Continue Tamiflu, day 2 of 5  -Solu-Medrol transition to prednisone 40 mg daily.  Day 2/5  -Start Brovana, Pulmicort and DuoNebs  -Start bronchopulmonary hygiene.  Encourage use of I-S and flutter valve.  -Encourage mobilization.  Out of bed to chair.    VTE Prophylaxis:  Pharmacologic VTE prophylaxis orders are present.    CODE STATUS:   Code Status (Patient has no pulse and is not breathing): CPR (Attempt to Resuscitate)  Medical Interventions (Patient has pulse or is breathing): Full Support      Labs, imaging, microbiology, notes and medications personally reviewed  Discussed with primary    I, Dr. Rc Carson, have spent more than 50% of the total time managing the patient in this encounter today.  This included personally reviewing all pertinent labs, imaging, microbiology and documentation. Also discussing the case with the patient and any available family, the admitting physician and any available ancillary staff.    Electronically signed by FLAVIO Perez, 01/09/25, 2:33 PM EST.  Electronically signed by Rc Carson MD, 01/09/25, 3:08 PM EST.

## 2025-01-09 NOTE — PROGRESS NOTES
[facility]   Hospitalist Progress Note  Date: 2025  Patient Name: Honorio Vigil Jr.  : 1965  MRN: [id number]  Date of admission: 2025  Room/Bed: 204/      Subjective   Subjective     Chief Complaint: fever, short of air     Summary:Honorio Vigil Jr. is a 59 y.o. male  with  past medical history of tobacco use, hypertension, anxiety/depression who presents to the ER due to dizziness headache body aches and epigastric pain with fever over the last day.  Patient is a poor historian.  The patient has been ill for 24 hours with complaints everywhere from headache to fever to dizziness and epigastric pain.  He was evaluated in the ER with x-ray of the chest that showed some atelectasis and a CT abdomen pelvis with contrast that was negative except for hepatic steatosis and diverticuli.  Patient was treated with fluid bolus and ibuprofen however while in the ER he had decompensation with increasing lethargy and hypoxia at which point he was further worked up with an ABG that showed hypercapnic respiratory failure with hypoxemia as well.  He was given a dose of IV steroids and started on BiPAP.  Patient did also test positive for influenza A.    Interval Followup:   Patient states he is feeling better today.  Currently is on 2 L  Patient continues to complain of some epigastric pain and states he feels burning when he eats  Afebrile   Continues to have cough     Review of Systems    All systems reviewed and negative except for what is outlined above.      Objective   Objective     Vitals:   Temp:  [97.3 °F (36.3 °C)-97.9 °F (36.6 °C)] 97.4 °F (36.3 °C)  Heart Rate:  [] 96  Resp:  [16-24] 20  BP: (112-134)/(71-93) 120/72  Flow (L/min) (Oxygen Therapy):  [2-4] 2    Physical Exam   General: Awake, alert, NAD.  Resting in bed sleeping  HENT: NCAT, MMM  Eyes: pupils equal, no scleral icterus  Cardiovascular: RRR, no murmurs   Pulmonary: Decreased, no wheezing.  Patient does have a  cough at times  Gastrointestinal: S/ND/NT, +BS  Musculoskeletal: Full range of motion  Skin: No jaundice, no rash on exposed skin appreciated  Neuro: CN II through XII grossly intact; speech clear; no tremor  Psych: Mood and affect appropriate  : No Abreu catheter; no suprapubic tenderness    Result Review    Result Review:  I have personally reviewed these results:  [x]  Laboratory      Lab 01/09/25  0547 01/07/25  2217   WBC 13.71* 9.60   HEMOGLOBIN 14.8 15.7   HEMATOCRIT 45.3 46.7   PLATELETS 231 230   NEUTROS ABS 10.80* 6.97   IMMATURE GRANS (ABS) 0.06* 0.10*   LYMPHS ABS 1.74 1.61   MONOS ABS 1.09* 0.80   EOS ABS 0.00 0.04   MCV 92.6 90.2   PROCALCITONIN  --  0.09   LACTATE  --  0.7         Lab 01/09/25  0547 01/07/25  2217   SODIUM 134* 133*   POTASSIUM 4.2 4.1   CHLORIDE 102 101   CO2 23.0 22.1   ANION GAP 9.0 9.9   BUN 16 10   CREATININE 0.87 1.34*   EGFR 99.4 61.0   GLUCOSE 143* 113*   CALCIUM 8.8 9.3   MAGNESIUM 2.2  --          Lab 01/07/25  2217   TOTAL PROTEIN 7.3   ALBUMIN 4.0   GLOBULIN 3.3   ALT (SGPT) 17   AST (SGOT) 31   BILIRUBIN 0.4   ALK PHOS 124*   LIPASE 20         Lab 01/08/25  0908 01/07/25  2217   PROBNP  --  <36.0   HSTROP T <6 8                 Lab 01/08/25  1031 01/08/25  0331   PH, ARTERIAL 7.338* 7.295*   PCO2, ARTERIAL 43.3 47.0*   PO2 .3* 63.8*   O2 SATURATION ART 98.5 89.3*   FIO2 40 21   HCO3 ART 23.3 22.8   BASE EXCESS ART -2.7* -4.0*     Brief Urine Lab Results  (Last result in the past 365 days)        Color   Clarity   Blood   Leuk Est   Nitrite   Protein   CREAT   Urine HCG        01/08/25 1802 Yellow   Clear   Negative   Negative   Negative   Negative                 [x]  Microbiology   Microbiology Results (last 10 days)       Procedure Component Value - Date/Time    COVID-19, FLU A/B, RSV PCR 1 HR TAT - Swab, Nasopharynx [[id number]]  (Abnormal) Collected: 01/07/25 7563    Lab Status: Final result Specimen: Swab from Nasopharynx Updated: 01/07/25 5201     COVID19  Not Detected     Influenza A PCR Detected     Influenza B PCR Not Detected     RSV, PCR Not Detected    Narrative:      Fact sheet for providers: https://www.fda.gov/media/002198/download    Fact sheet for patients: https://www.fda.gov/media/665838/download    Test performed by PCR.          [x]  Radiology  XR Chest 1 View    Result Date: 1/8/2025  Mild left basilar atelectasis. Otherwise negative. Electronically Signed: Miguel A Mead MD  1/8/2025 1:48 AM EST  Workstation ID: [id number]    CT Abdomen Pelvis With Contrast    Result Date: 1/8/2025  1.No acute findings in the abdomen or pelvis. 2.Mild hepatic steatosis. 3.Mild colonic diverticulosis. 4.Small hiatal hernia. Electronically Signed: Miguel A Mead MD  1/8/2025 1:30 AM EST  Workstation ID: [id number]   []  EKG/Telemetry   []  Cardiology/Vascular   []  Pathology  []  Old records  []  Other:    Assessment & Plan   Assessment / Plan     Assessment:  Influenza A  Acute hypoxemic and hypercapnic respiratory failure requiring NIPPV  Toxic encephalopathy due to elevation of CO2  Tobacco abuse chronic and ongoing  Epigastric pain    Plan:  Patient remains admitted to the medicine service  Patient is currently on 2 L of oxygen.  Respiratory status improving  Continue BiPAP with sleep  Pulmonary consulted  Continue Tamiflu to complete 5 days  Continue bronchodilators  Continue IV Solu-Medrol  Patient requested that we restart his Elavil and Neurontin  Discussed plan with nurse and with patient       Discussed with RN.    VTE Prophylaxis:  Pharmacologic VTE prophylaxis orders are present.        CODE STATUS:   Code Status (Patient has no pulse and is not breathing): CPR (Attempt to Resuscitate)  Medical Interventions (Patient has pulse or is breathing): Full Support      Electronically signed by Fabrice Zepeda DO, 1/9/2025, 12:24 EST.

## 2025-01-09 NOTE — PROGRESS NOTES
RT EQUIPMENT DEVICE RELATED - SKIN ASSESSMENT    RT Medical Equipment/Device:     NIV Mask:  Under-the-nose   size:       Skin Assessment:      Cheek:  Intact  Chin:  Intact  Neck:  Intact  Nose:  Intact    Device Skin Pressure Protection:  Pressure points protected    Nurse Notification:  Dasha Roach, RRT

## 2025-01-09 NOTE — PLAN OF CARE
Goal Outcome Evaluation:              Outcome Evaluation: Pt saturation >94% on RA. Pt states plans to return to sober living upon DC.

## 2025-01-09 NOTE — PLAN OF CARE
Goal Outcome Evaluation:              Outcome Evaluation: Patient wore bipap with settings of 12/6 R-20 and 30% for appx 12 minutes last night and then ask for unit to be taken off; patient is currently on 2L and tolerating well; patient on continuous pulse oximeter

## 2025-01-10 ENCOUNTER — READMISSION MANAGEMENT (OUTPATIENT)
Dept: CALL CENTER | Facility: HOSPITAL | Age: 60
End: 2025-01-10
Payer: COMMERCIAL

## 2025-01-10 VITALS
BODY MASS INDEX: 30.85 KG/M2 | DIASTOLIC BLOOD PRESSURE: 74 MMHG | WEIGHT: 227.74 LBS | RESPIRATION RATE: 16 BRPM | HEIGHT: 72 IN | TEMPERATURE: 97.8 F | SYSTOLIC BLOOD PRESSURE: 132 MMHG | HEART RATE: 85 BPM | OXYGEN SATURATION: 96 %

## 2025-01-10 PROBLEM — J10.1 INFLUENZA A WITH RESPIRATORY MANIFESTATIONS: Status: ACTIVE | Noted: 2025-01-10

## 2025-01-10 LAB
ANION GAP SERPL CALCULATED.3IONS-SCNC: 9.6 MMOL/L (ref 5–15)
BASOPHILS # BLD AUTO: 0.03 10*3/MM3 (ref 0–0.2)
BASOPHILS NFR BLD AUTO: 0.3 % (ref 0–1.5)
BUN SERPL-MCNC: 16 MG/DL (ref 6–20)
BUN/CREAT SERPL: 19 (ref 7–25)
CALCIUM SPEC-SCNC: 8.8 MG/DL (ref 8.6–10.5)
CHLORIDE SERPL-SCNC: 103 MMOL/L (ref 98–107)
CO2 SERPL-SCNC: 24.4 MMOL/L (ref 22–29)
CREAT SERPL-MCNC: 0.84 MG/DL (ref 0.76–1.27)
DEPRECATED RDW RBC AUTO: 49.6 FL (ref 37–54)
EGFRCR SERPLBLD CKD-EPI 2021: 100.5 ML/MIN/1.73
EOSINOPHIL # BLD AUTO: 0.03 10*3/MM3 (ref 0–0.4)
EOSINOPHIL NFR BLD AUTO: 0.3 % (ref 0.3–6.2)
ERYTHROCYTE [DISTWIDTH] IN BLOOD BY AUTOMATED COUNT: 14.2 % (ref 12.3–15.4)
GLUCOSE SERPL-MCNC: 148 MG/DL (ref 65–99)
HCT VFR BLD AUTO: 45.7 % (ref 37.5–51)
HGB BLD-MCNC: 14.7 G/DL (ref 13–17.7)
IMM GRANULOCYTES # BLD AUTO: 0.06 10*3/MM3 (ref 0–0.05)
IMM GRANULOCYTES NFR BLD AUTO: 0.6 % (ref 0–0.5)
LYMPHOCYTES # BLD AUTO: 2.29 10*3/MM3 (ref 0.7–3.1)
LYMPHOCYTES NFR BLD AUTO: 21.3 % (ref 19.6–45.3)
MAGNESIUM SERPL-MCNC: 2.2 MG/DL (ref 1.6–2.6)
MCH RBC QN AUTO: 30.1 PG (ref 26.6–33)
MCHC RBC AUTO-ENTMCNC: 32.2 G/DL (ref 31.5–35.7)
MCV RBC AUTO: 93.6 FL (ref 79–97)
MONOCYTES # BLD AUTO: 0.69 10*3/MM3 (ref 0.1–0.9)
MONOCYTES NFR BLD AUTO: 6.4 % (ref 5–12)
NEUTROPHILS NFR BLD AUTO: 7.64 10*3/MM3 (ref 1.7–7)
NEUTROPHILS NFR BLD AUTO: 71.1 % (ref 42.7–76)
NRBC BLD AUTO-RTO: 0 /100 WBC (ref 0–0.2)
PLATELET # BLD AUTO: 234 10*3/MM3 (ref 140–450)
PMV BLD AUTO: 11.2 FL (ref 6–12)
POTASSIUM SERPL-SCNC: 4.1 MMOL/L (ref 3.5–5.2)
RBC # BLD AUTO: 4.88 10*6/MM3 (ref 4.14–5.8)
SODIUM SERPL-SCNC: 137 MMOL/L (ref 136–145)
WBC NRBC COR # BLD AUTO: 10.74 10*3/MM3 (ref 3.4–10.8)

## 2025-01-10 PROCEDURE — 99232 SBSQ HOSP IP/OBS MODERATE 35: CPT | Performed by: INTERNAL MEDICINE

## 2025-01-10 PROCEDURE — 63710000001 PREDNISONE PER 1 MG: Performed by: INTERNAL MEDICINE

## 2025-01-10 PROCEDURE — 25010000002 ENOXAPARIN PER 10 MG: Performed by: STUDENT IN AN ORGANIZED HEALTH CARE EDUCATION/TRAINING PROGRAM

## 2025-01-10 PROCEDURE — 94761 N-INVAS EAR/PLS OXIMETRY MLT: CPT

## 2025-01-10 PROCEDURE — 97161 PT EVAL LOW COMPLEX 20 MIN: CPT

## 2025-01-10 PROCEDURE — 94618 PULMONARY STRESS TESTING: CPT

## 2025-01-10 PROCEDURE — 94799 UNLISTED PULMONARY SVC/PX: CPT

## 2025-01-10 PROCEDURE — 83735 ASSAY OF MAGNESIUM: CPT | Performed by: STUDENT IN AN ORGANIZED HEALTH CARE EDUCATION/TRAINING PROGRAM

## 2025-01-10 PROCEDURE — 99239 HOSP IP/OBS DSCHRG MGMT >30: CPT | Performed by: INTERNAL MEDICINE

## 2025-01-10 PROCEDURE — 80048 BASIC METABOLIC PNL TOTAL CA: CPT | Performed by: STUDENT IN AN ORGANIZED HEALTH CARE EDUCATION/TRAINING PROGRAM

## 2025-01-10 PROCEDURE — 94664 DEMO&/EVAL PT USE INHALER: CPT

## 2025-01-10 PROCEDURE — 85025 COMPLETE CBC W/AUTO DIFF WBC: CPT | Performed by: STUDENT IN AN ORGANIZED HEALTH CARE EDUCATION/TRAINING PROGRAM

## 2025-01-10 RX ORDER — PREDNISONE 20 MG/1
40 TABLET ORAL
Qty: 4 TABLET | Refills: 0 | Status: SHIPPED | OUTPATIENT
Start: 2025-01-11 | End: 2025-01-13

## 2025-01-10 RX ORDER — OSELTAMIVIR PHOSPHATE 75 MG/1
75 CAPSULE ORAL EVERY 12 HOURS SCHEDULED
Qty: 10 CAPSULE | Refills: 0 | Status: SHIPPED | OUTPATIENT
Start: 2025-01-10 | End: 2025-01-15

## 2025-01-10 RX ADMIN — BUDESONIDE 0.5 MG: 0.5 INHALANT RESPIRATORY (INHALATION) at 06:23

## 2025-01-10 RX ADMIN — IPRATROPIUM BROMIDE AND ALBUTEROL SULFATE 3 ML: .5; 3 SOLUTION RESPIRATORY (INHALATION) at 06:24

## 2025-01-10 RX ADMIN — ENOXAPARIN SODIUM 40 MG: 100 INJECTION SUBCUTANEOUS at 08:40

## 2025-01-10 RX ADMIN — ALUMINUM HYDROXIDE, MAGNESIUM HYDROXIDE, AND DIMETHICONE 15 ML: 400; 400; 40 SUSPENSION ORAL at 08:40

## 2025-01-10 RX ADMIN — PREDNISONE 40 MG: 20 TABLET ORAL at 08:39

## 2025-01-10 RX ADMIN — OSELTAMIVIR PHOSPHATE 75 MG: 75 CAPSULE ORAL at 08:40

## 2025-01-10 RX ADMIN — ARFORMOTEROL TARTRATE 15 MCG: 15 SOLUTION RESPIRATORY (INHALATION) at 06:24

## 2025-01-10 RX ADMIN — GABAPENTIN 600 MG: 300 CAPSULE ORAL at 08:40

## 2025-01-10 RX ADMIN — FAMOTIDINE 20 MG: 10 INJECTION INTRAVENOUS at 08:40

## 2025-01-10 RX ADMIN — Medication 10 ML: at 08:40

## 2025-01-10 NOTE — PLAN OF CARE
Goal Outcome Evaluation:  Plan of Care Reviewed With: patient        Progress: improving  Outcome Evaluation: Continues to be on room air. States he still doesn't feel well.

## 2025-01-10 NOTE — OUTREACH NOTE
Prep Survey      Flowsheet Row Responses   [facility] patient discharged from? [facility]   Is LACE score < 7 ? Yes   Eligibility [facility]   Date of Admission 01/08/25   Date of Discharge 01/08/25   Discharge Disposition Home or Self Care   Discharge diagnosis Acute respiratory failure   Does the patient have one of the following disease processes/diagnoses(primary or secondary)? Other   Does the patient have Home health ordered? No   Is there a DME ordered? No   Prep survey completed? Yes            EITAN A - Registered Nurse

## 2025-01-10 NOTE — DISCHARGE SUMMARY
[facility]         HOSPITALIST  DISCHARGE SUMMARY    Patient Name: Honorio Vigil Jr.  : 1965  MRN: [id number]    Date of Admission: 2025  Date of Discharge:  1/10/2025    Primary Care Physician: Roni Gray MD    Consults       Date and Time Order Name Status Description    2025  9:58 AM Inpatient Pulmonology Consult Completed     2025  7:48 AM Inpatient Pulmonology Consult Completed     2025  3:49 AM Hospitalist (on-call MD unless specified)              Active and Resolved Hospital Problems:  Active Hospital Problems    Diagnosis POA   • **Acute respiratory failure with hypercapnia [J96.02] Yes   • Influenza A with respiratory manifestations [J10.1] Unknown      Resolved Hospital Problems   No resolved problems to display.       Hospital Course     Hospital Course:  Honorio Vigil Jr. is a 59 y.o. male  with a  past medical history of tobacco use, hypertension, anxiety/depression who presents to the ER due to dizziness headache body aches and epigastric pain with fever over the last day.  Patient is a poor historian.  The patient has been ill for 24 hours with complaints everywhere from headache to fever to dizziness and epigastric pain.  He was evaluated in the ER with x-ray of the chest that showed some atelectasis and a CT abdomen pelvis with contrast that was negative except for hepatic steatosis and diverticuli.  Patient was treated with fluid bolus and ibuprofen however while in the ER he had decompensation with increasing lethargy and hypoxia at which point he was further worked up with an ABG that showed hypercapnic respiratory failure with hypoxemia as well.  He was given a dose of IV steroids and started on BiPAP.  Patient did also test positive for influenza A.  Patient's respiratory status has improved significantly.  Patient is now on room air.  Patient states he is feeling better and wants to discharge home.  At this time we will  transition patient over to oral prednisone and finish his course of Tamiflu.  Patient will be discharged home today in stable condition.  I did discuss with patient to take an antiacid for his epigastric pain as that did seem to help him.     DISCHARGE Follow Up Recommendations for labs and diagnostics:   Follow-up with primary care in 1 week      Day of Discharge     Vital Signs:  Temp:  [97.5 °F (36.4 °C)-97.9 °F (36.6 °C)] 97.8 °F (36.6 °C)  Heart Rate:  [] 85  Resp:  [16-20] 16  BP: (102-132)/(61-80) 132/74  Flow (L/min) (Oxygen Therapy):  [2] 2  Physical Exam:   General: Awake, alert, NAD.  Resting in bed sleeping  HENT: NCAT, MMM  Eyes: pupils equal, no scleral icterus  Cardiovascular: RRR, no murmurs   Pulmonary: Decreased, no wheezing.    Gastrointestinal: S/ND/NT, +BS  Musculoskeletal: Full range of motion  Skin: No jaundice, no rash on exposed skin appreciated  Neuro: CN II through XII grossly intact; speech clear; no tremor  Psych: Mood and affect appropriate  : No Abreu catheter; no suprapubic tenderness         Discharge Details        Discharge Medications        New Medications        Instructions Start Date   benzonatate 200 MG capsule  Commonly known as: TESSALON   200 mg, Oral, 3 Times Daily PRN      ondansetron ODT 4 MG disintegrating tablet  Commonly known as: ZOFRAN-ODT   4 mg, Translingual, 4 Times Daily PRN      oseltamivir 75 MG capsule  Commonly known as: TAMIFLU   75 mg, Oral, Every 12 Hours Scheduled      predniSONE 20 MG tablet  Commonly known as: DELTASONE   40 mg, Oral, Daily With Breakfast   Start Date: January 11, 2025            Continue These Medications        Instructions Start Date   amitriptyline 150 MG tablet  Commonly known as: ELAVIL   150 mg, Nightly PRN      amphetamine-dextroamphetamine XR 30 MG 24 hr capsule  Commonly known as: ADDERALL XR   30 mg, Every Morning      amphetamine-dextroamphetamine 20 MG tablet  Commonly known as: ADDERALL   20 mg, After Lunch       gabapentin 600 MG tablet  Commonly known as: NEURONTIN   1 tablet, 3 times daily               Allergies   Allergen Reactions   • Naproxen GI Intolerance       Discharge Disposition:  Home or Self Care    Diet:  Hospital:  Diet Order   Procedures   • Diet: Regular/House; Fluid Consistency: Thin (IDDSI 0)       Discharge Activity: as tolerated       CODE STATUS:  Code Status and Medical Interventions: CPR (Attempt to Resuscitate); Full Support   Ordered at: 01/08/25 0429     Code Status (Patient has no pulse and is not breathing):    CPR (Attempt to Resuscitate)     Medical Interventions (Patient has pulse or is breathing):    Full Support         No future appointments.    Additional Instructions for the Follow-ups that You Need to Schedule       Discharge Follow-up with PCP   As directed       Currently Documented PCP:    Roni Gray MD    PCP Phone Number:    [phone]     Follow Up Details: in 1 week                Pertinent  and/or Most Recent Results     PROCEDURES:   None     LAB RESULTS:      Lab 01/10/25  0424 01/09/25  0547 01/07/25 2217   WBC 10.74 13.71* 9.60   HEMOGLOBIN 14.7 14.8 15.7   HEMATOCRIT 45.7 45.3 46.7   PLATELETS 234 231 230   NEUTROS ABS 7.64* 10.80* 6.97   IMMATURE GRANS (ABS) 0.06* 0.06* 0.10*   LYMPHS ABS 2.29 1.74 1.61   MONOS ABS 0.69 1.09* 0.80   EOS ABS 0.03 0.00 0.04   MCV 93.6 92.6 90.2   PROCALCITONIN  --   --  0.09   LACTATE  --   --  0.7         Lab 01/10/25  0424 01/09/25  0547 01/07/25  2217   SODIUM 137 134* 133*   POTASSIUM 4.1 4.2 4.1   CHLORIDE 103 102 101   CO2 24.4 23.0 22.1   ANION GAP 9.6 9.0 9.9   BUN 16 16 10   CREATININE 0.84 0.87 1.34*   EGFR 100.5 99.4 61.0   GLUCOSE 148* 143* 113*   CALCIUM 8.8 8.8 9.3   MAGNESIUM 2.2 2.2  --          Lab 01/07/25  2217   TOTAL PROTEIN 7.3   ALBUMIN 4.0   GLOBULIN 3.3   ALT (SGPT) 17   AST (SGOT) 31   BILIRUBIN 0.4   ALK PHOS 124*   LIPASE 20         Lab 01/08/25  0908 01/07/25  2217   PROBNP  --  <36.0   HSTROP T <6 8                  Lab 01/08/25  1031 01/08/25  0331   PH, ARTERIAL 7.338* 7.295*   PCO2, ARTERIAL 43.3 47.0*   PO2 .3* 63.8*   O2 SATURATION ART 98.5 89.3*   FIO2 40 21   HCO3 ART 23.3 22.8   BASE EXCESS ART -2.7* -4.0*     Brief Urine Lab Results  (Last result in the past 365 days)        Color   Clarity   Blood   Leuk Est   Nitrite   Protein   CREAT   Urine HCG        01/08/25 1802 Yellow   Clear   Negative   Negative   Negative   Negative                 Microbiology Results (last 10 days)       Procedure Component Value - Date/Time    COVID-19, FLU A/B, RSV PCR 1 HR TAT - Swab, Nasopharynx [[id number]]  (Abnormal) Collected: 01/07/25 2209    Lab Status: Final result Specimen: Swab from Nasopharynx Updated: 01/07/25 2331     COVID19 Not Detected     Influenza A PCR Detected     Influenza B PCR Not Detected     RSV, PCR Not Detected    Narrative:      Fact sheet for providers: https://www.fda.gov/media/746180/download    Fact sheet for patients: https://www.fda.gov/media/028042/download    Test performed by PCR.            XR Chest 1 View    Result Date: 1/8/2025  Mild left basilar atelectasis. Otherwise negative. Electronically Signed: Miguel A Mead MD  1/8/2025 1:48 AM EST  Workstation ID: [id number]    CT Abdomen Pelvis With Contrast    Result Date: 1/8/2025  1.No acute findings in the abdomen or pelvis. 2.Mild hepatic steatosis. 3.Mild colonic diverticulosis. 4.Small hiatal hernia. Electronically Signed: Miguel A Mead MD  1/8/2025 1:30 AM EST  Workstation ID: [id number]                  Labs Pending at Discharge:        Time spent on Discharge including face to face service:  more than 35  minutes    Electronically signed by Fabrice Zepeda DO, 01/10/25, 12:02 PM EST.

## 2025-01-10 NOTE — PLAN OF CARE
Goal Outcome Evaluation:              Outcome Evaluation: Pt to dc back to sober living, transport to there will be provided by the rehab.

## 2025-01-10 NOTE — THERAPY EVALUATION
Acute Care - Physical Therapy Initial Evaluation   [facility]     Patient Name: Honorio Vigil Jr.  : 1965  MRN: [id number]  Today's Date: 1/10/2025      Visit Dx:     ICD-10-CM ICD-9-CM   1. Influenza A  J10.1 487.1   2. Hypoxia  R09.02 799.02   3. Difficulty in walking  R26.2 719.7     Patient Active Problem List   Diagnosis    Suicidal ideations    Severe episode of recurrent major depressive disorder, without psychotic features    Cannabis abuse    Methamphetamine use disorder, moderate    Sprain of left knee    Injury of left knee    Acute internal derangement of left knee    Acute medial meniscus tear of left knee    S/P Left Knee Arthroscopy With Partial Medial Meniscectomy     Family history of colon cancer in father    Dysphagia    Abscess of right foot    Depression    Severe recurrent major depression without psychotic features    Cellulitis of left lower leg    HTN (hypertension)    Dermatitis    Nicotine addiction    Leukocytosis    Cellulitis and abscess of left leg    Osteomyelitis of coccyx    Osteomyelitis of great toe of right foot    Cellulitis of great toe of right foot    Acute respiratory failure with hypercapnia     Past Medical History:   Diagnosis Date    Abscess of foot     rt    ADHD     Anxiety disorder     Backache     Cellulitis of scrotum     Elevated cholesterol     Hyperlipidemia     Hypertension     Insomnia disorder     Seizures     x2- due to overuse/overdose of meds- years ago per pt. Last one was 15-20 years ago- not on meds    Substance abuse     years ago    Viral gastroenteritis      Past Surgical History:   Procedure Laterality Date    EYE SURGERY      HAND SURGERY Bilateral     INCISION AND DRAINAGE OF WOUND Right 2023    Procedure: INCISION AND DRAINAGE BONE, right great toe;  Surgeon: Cristo Christy DPM;  Location: [facility];  Service: Podiatry;  Laterality: Right;    KNEE ARTHROSCOPY Right 2014    Arthroscopy of right knee, debride  medial meniscus. x3    KNEE ARTHROSCOPY W/ MENISCECTOMY Left 08/09/2022    Procedure: KNEE ARTHROSCOPY WITH PARTIAL MEDIAL MENISCECTOMY;  Surgeon: Carlos Hameed MD;  Location: [facility] OR [facility];  Service: Orthopedics;  Laterality: Left;    SHOULDER ARTHROSCOPY Right     TOE NAIL AMPUTATION Right 3/8/2024    Procedure: TOE NAIL SYME TERMINAL AMPUTATION;  Surgeon: Nelson Hand DPM;  Location: [facility] MAIN OR;  Service: Podiatry;  Laterality: Right;     PT Assessment (Last 12 Hours)       PT Evaluation and Treatment       Row Name 01/10/25 0900          Physical Therapy Time and Intention    Subjective Information no complaints  -DOMINGO     Document Type evaluation  -DOMINGO     Mode of Treatment individual therapy;physical therapy  -DOMINGO     Patient Effort good  -DOMINGO       Row Name 01/10/25 0900          General Information    Patient Observations alert;cooperative;agree to therapy  -DOMINGO     Prior Level of Function independent:;all household mobility;community mobility  -DOMINGO     Equipment Currently Used at Home none  -DOMINGO     Existing Precautions/Restrictions no known precautions/restrictions  -DOMINGO     Barriers to Rehab none identified  -DOMINGO       Row Name 01/10/25 0900          Living Environment    Current Living Arrangements home  -DOMINGO       Row Name 01/10/25 0900          Cognition    Orientation Status (Cognition) oriented x 3  -DOMINGO       Row Name 01/10/25 0900          Range of Motion (ROM)    Range of Motion ROM is L  -DOMINGO       Row Name 01/10/25 0900          Strength (Manual Muscle Testing)    Strength (Manual Muscle Testing) strength is [facility]  -DOMINGO       Row Name 01/10/25 0900          Bed Mobility    Bed Mobility bed mobility (all) activities;supine-sit  -DOMINGO     All Activities, [place] (Bed Mobility) independent  -DOMINGO     Supine-Sit [place] (Bed Mobility) independent  -DOMINGO       Row Name 01/10/25 0900          Transfers    Transfers sit-stand transfer  -DOMINGO       Row Name 01/10/25 0900          Sit-Stand Transfer     Sit-Stand [place] (Transfers) independent  -DOMINGO       Row Name 01/10/25 0900          Gait/Stairs (Locomotion)    Gait/Stairs Locomotion gait/ambulation independence  -DOMINGO     [place] Level (Gait) independent  -DOMINGO     Distance in Feet (Gait) 70  -DOMINGO       Row Name 01/10/25 0900          Balance    Balance Assessment standing dynamic balance  -DOMINGO     Dynamic Standing Balance independent  -DOMINGO       Row Name 01/10/25 0900          Plan of Care Review    Plan of Care Reviewed With patient  -DOMINGO     Outcome Evaluation Pt did not demonstrate any safety or mobility deficits during the initial evaluation.  Pt is safe to continue ambulating within their room independently until their discharge from the hospital.  Pt will be discharged from PT services at this time.  -DOMINGO       Row Name 01/10/25 0900          Therapy Assessment/Plan (PT)    Criteria for Skilled Interventions Met (PT) no problems identified which require skilled intervention  -DOMINGO     Therapy Frequency (PT) evaluation only  -DOMINGO       Row Name 01/10/25 0900          PT Evaluation Complexity    History, PT Evaluation Complexity no personal factors and/or comorbidities  -DOMINGO     Examination of Body Systems (PT Eval Complexity) total of 4 or more elements  -DOMINGO     Clinical Presentation (PT Evaluation Complexity) stable  -DOMINGO     Clinical Decision Making (PT Evaluation Complexity) low complexity  -DOMINGO     Overall Complexity (PT Evaluation Complexity) low complexity  -DOMINGO       Row Name 01/10/25 0900          Therapy Plan Review/Discharge Plan (PT)    Therapy Plan Review (PT) evaluation/treatment results reviewed;participants voiced agreement with care plan;participants included;patient  -DOMINGO       Row Name 01/10/25 0900          Physical Therapy Goals    Problem Specific Goal Selection (PT) problem specific goal 1, PT  -DOMINGO       Row Name 01/10/25 0900          Problem Specific Goal 1 (PT)    Problem Specific Goal 1 (PT) Complete PT evaluation  -DOMINGO     Time Frame  (Problem Specific Goal 1, PT) 1 day  -DOMINGO     Progress/Outcome (Problem Specific Goal 1, PT) goal met  -DOMINGO               User Key  (r) = Recorded By, (t) = Taken By, (c) = Cosigned By      Initials Name Provider Type    Norm Hooker PT Physical Therapist                    Physical Therapy Education        No education to display                  PT Recommendation and Plan  Anticipated Discharge Disposition (PT): home  Therapy Frequency (PT): evaluation only  Plan of Care Reviewed With: patient  Outcome Evaluation: Pt did not demonstrate any safety or mobility deficits during the initial evaluation.  Pt is safe to continue ambulating within their room independently until their discharge from the hospital.  Pt will be discharged from PT services at this time.   Outcome Measures       Row Name 01/10/25 0900             How much help from another person do you currently need...    Turning from your back to your side while in flat bed without using bedrails? 4  -DOMINGO      Moving from lying on back to sitting on the side of a flat bed without bedrails? 4  -DOMINGO      Moving to and from a bed to a chair (including a wheelchair)? 4  -DOMINGO      Standing up from a chair using your arms (e.g., wheelchair, bedside chair)? 4  -DOMINGO      Climbing 3-5 steps with a railing? 4  -DOMINGO      To walk in hospital room? 4  -DOMINGO      AM-PAC 6 Clicks Score (PT) 24  -DOMINGO         Functional Assessment    Outcome Measure Options AM-PAC 6 Clicks Basic Mobility (PT)  -DOMINGO                User Key  (r) = Recorded By, (t) = Taken By, (c) = Cosigned By      Initials Name Provider Type    Norm Hooker PT Physical Therapist                     Time Calculation:    PT Charges       Row Name 01/10/25 0936             Time Calculation    PT Received On 01/10/25  -DOMINGO         Untimed Charges    PT Eval/Re-eval Minutes 20  -DOMINGO         Total Minutes    Untimed Charges Total Minutes 20  -DOMINGO       Total Minutes 20  -DOMINGO                User Key  (r) = Recorded  By, (t) = Taken By, (c) = Cosigned By      Initials Name Provider Type    Norm Hooker, PT Physical Therapist                      PT G-Codes  Outcome Measure Options: AM-PAC 6 Clicks Basic Mobility (PT)  AM-PAC 6 Clicks Score (PT): 24    Norm Owens, PT  1/10/2025

## 2025-01-10 NOTE — PROGRESS NOTES
Pulmonary / Critical Care Progress Note      Patient Name: Honorio Vigil Jr.  : 1965  MRN: [id number]  Attending:  No att. providers found  Date of admission: 2025    Subjective   Subjective   Follow-up for respiratory failure, influenza    Feels better  On 2 L of oxygen but taking off oxygen  Dyspnea better  Cough with thin clear secretions  No wheezing  No fever or chills  No chest pain or hemoptysis  Remains weak and fatigued      Objective   Objective     Vitals:   Temp:  [97.5 °F (36.4 °C)-97.9 °F (36.6 °C)] 97.8 °F (36.6 °C)  Heart Rate:  [] 85  Resp:  [16-20] 16  BP: (102-132)/(61-80) 132/74  Flow (L/min) (Oxygen Therapy):  [2] 2    Physical Exam   Vital Signs Reviewed   General:  WDWN, Alert, NAD, sitting up in bed.    Chest:  good aeration, scant rhonchi bilaterally, no work of breathing noted on 2 L NC  CV: RRR, no MGR, pulses 2+, equal.  Abd:  Soft, NT, ND, + BS, no HSM  EXT:  no clubbing, no cyanosis, no edema  Neuro:  A&Ox3, CN grossly intact, no focal deficits.  Skin: No rashes or lesions noted      Result Review    Result Review:  I have personally reviewed the results from the time of this admission to 1/10/2025 15:00 EST and agree with these findings:  [x]  Laboratory  [x]  Microbiology  [x]  Radiology  [x]  EKG/Telemetry   []  Cardiology/Vascular   []  Pathology  []  Old records  []  Other:  Most notable findings include:         Lab 01/10/25  0424 25  0547 25  2217   WBC 10.74 13.71* 9.60   HEMOGLOBIN 14.7 14.8 15.7   HEMATOCRIT 45.7 45.3 46.7   PLATELETS 234 231 230   SODIUM 137 134* 133*   POTASSIUM 4.1 4.2 4.1   CHLORIDE 103 102 101   CO2 24.4 23.0 22.1   BUN 16 16 10   CREATININE 0.84 0.87 1.34*   GLUCOSE 148* 143* 113*   CALCIUM 8.8 8.8 9.3   TOTAL PROTEIN  --   --  7.3   ALBUMIN  --   --  4.0   GLOBULIN  --   --  3.3     Chest x-ray with some mild bibasilar atelectasis.    Influenza A positive  ABG 7.2 /    Assessment & Plan   Assessment /  Plan     Active Hospital Problems:  Active Hospital Problems    Diagnosis     **Acute respiratory failure with hypercapnia     Influenza A with respiratory manifestations      Impression:  Acute hypoxemic and hypercapnic respiratory failure requiring NIPPV  Altered mental status  CO2 narcosis/toxic encephalopathy  Question COPD with acute exacerbation  Influenza A respiratory tract infection  Hyponatremia, clinically insignificant  Class I obesity with BMI 30.8  Ongoing tobacco use of cigarettes     Plan:  -Wean O2 to keep SPO2 greater than 90%.  Walk test for home O2 today  -No indication for antibiotics at this time.  -Continue Tamiflu, day 3 of 5  -Solu-Medrol transition to prednisone 40 mg daily.  Day 3/5  -Continue nebulizers and bronchopulmonary hygiene  -Encourage mobilization.  Out of bed to chair.  -Okay to discharge from my perspective    VTE Prophylaxis:  Pharmacologic VTE prophylaxis orders are present.    CODE STATUS:   Code Status (Patient has no pulse and is not breathing): CPR (Attempt to Resuscitate)  Medical Interventions (Patient has pulse or is breathing): Full Support      Labs, imaging, microbiology, notes and medications personally reviewed  Discussed with primary    Electronically signed by Rc Carson MD, 01/10/25, 3:01 PM EST.

## 2025-01-10 NOTE — PROGRESS NOTES
"Enter Query Response Below      Query Response:   Toxic/metabolic encephalopathy was not supported     Electronically signed by Fabrice Zepeda DO, 01/10/25, 3:50 PM EST.               If applicable, please update the problem list.   Patient: Honorio Vigil Jr.        : 1965  Account: [id number]           Admit Date:         How to Respond to this query:       a. Click New Note     b. Answer query within the yellow box.                c. Update the Problem List, if applicable.      If you have any questions about this query contact me at: [email or web address]      Dr. Zepeda,    Patient admitted with COPD exacerbation, influenza A, acute hypoxic/hypercapnic respiratory failure, and noted to have \"acute metabolic encephalopathy\" and \"toxic encephalopathy due to elevation of CO2.\" Nursing documented GCS 15 -1/10.   ED noted \"patient is somnolent and confused.\"  H&P \"Patient is currently confused and only oriented to person and place.\"  pulmonology consult \"The patient presented very confused and altered;\" \"Patient was confused with CO2 narcosis so was placed on BiPAP;\" \"Neuro:  A&Ox2, confused, CN grossly intact, no focal deficits.\"  Treatment included BiPAP, Duo-nebs, Tamiflu, PO/IV steroids, IV fluid bolus, and monitoring mental status, labs, and vital signs.    After study, was toxic/metabolic encephalopathy clinically supported during this admission?    Toxic/metabolic encephalopathy was supported with additional clinical indicators:____________  Toxic/metabolic encephalopathy was not supported  Other- specify_____________  Unable to determine      By submitting this query, we are merely seeking further clarification of documentation to accurately reflect all conditions that you are monitoring, evaluating, treating or that extend the hospitalization or utilize additional resources of care. Please utilize your independent clinical judgment when addressing the question(s) above. "     This query and your response, once completed, will be entered into the legal medical record.    Sincerely,  Jodi Nogueira BS, BSN, RN, [facility]   Clinical Documentation Integrity Program

## 2025-01-10 NOTE — PROGRESS NOTES
Walking Oximetry Progress Note      Patient Name:  Honorio Vigil Jr.  YOB: 1965  Date of Procedure: 01/10/25              ROOM AIR BASELINE   SpO2% 94   Heart Rate 99     EXERCISE ON ROOM AIR SpO2% EXERCISE ON O2 LPM SpO2%   1 MINUTE 92 1 MINUTE     2 MINUTES 93 2 MINUTES     3 MINUTES 95 3 MINUTES     4 MINUTES 95 4 MINUTES     5 MINUTES 95 5 MINUTES     6 MINUTES 95 6 MINUTES                Time to Recovery  1 minute   SpO2% Post Exercise  96% on Room Air.    HR Post Exercise  95     Comments:           Electronically signed by Shanda Crenshaw CRT, 01/10/25, 8:53 AM EST.

## 2025-01-12 ENCOUNTER — TRANSITIONAL CARE MANAGEMENT TELEPHONE ENCOUNTER (OUTPATIENT)
Dept: CALL CENTER | Facility: HOSPITAL | Age: 60
End: 2025-01-12
Payer: COMMERCIAL

## 2025-01-12 NOTE — OUTREACH NOTE
Call Center TCM Note      Flowsheet Row Responses   [facility] [place] facility patient discharged from? [facility]   Does the patient have one of the following disease processes/diagnoses(primary or secondary)? Other   TCM attempt successful? No   Unsuccessful attempts Attempt 1            Eleonora Michaels RN    1/12/2025, 09:43 EST

## 2025-01-13 ENCOUNTER — TRANSITIONAL CARE MANAGEMENT TELEPHONE ENCOUNTER (OUTPATIENT)
Dept: CALL CENTER | Facility: HOSPITAL | Age: 60
End: 2025-01-13
Payer: COMMERCIAL

## 2025-01-13 NOTE — OUTREACH NOTE
Call Center TCM Note      Flowsheet Row Responses   [facility] patient discharged from? [facility]   Does the patient have one of the following disease processes/diagnoses(primary or secondary)? Other   TCM attempt successful? No  [no verbal release on file]   Unsuccessful attempts Attempt 2            Annie Madrigal RN    1/13/2025, 11:11 EST

## 2025-01-14 ENCOUNTER — TRANSITIONAL CARE MANAGEMENT TELEPHONE ENCOUNTER (OUTPATIENT)
Dept: CALL CENTER | Facility: HOSPITAL | Age: 60
End: 2025-01-14
Payer: COMMERCIAL

## 2025-01-14 NOTE — OUTREACH NOTE
Call Center TCM Note      Flowsheet Row Responses   [facility] patient discharged from? [facility]   Does the patient have one of the following disease processes/diagnoses(primary or secondary)? Other   TCM attempt successful? No   Unsuccessful attempts Attempt 3  [No verbal release on file from PCP group]            Mellissa Zayas RN    1/14/2025, 12:31 EST

## (undated) DEVICE — INTENDED FOR TISSUE SEPARATION, AND OTHER PROCEDURES THAT REQUIRE A SHARP SURGICAL BLADE TO PUNCTURE OR CUT.: Brand: BARD-PARKER ® CARBON RIB-BACK BLADES

## (undated) DEVICE — BNDG ELAS ECON W/CLIP 4IN 5YD LF STRL

## (undated) DEVICE — BANDAGE,GAUZE,BULKEE II,4.5"X4.1YD,STRL: Brand: MEDLINE

## (undated) DEVICE — EXTREMITY-LF: Brand: MEDLINE INDUSTRIES, INC.

## (undated) DEVICE — GLV SURG SENSICARE PI LF PF 7.5 GRN STRL

## (undated) DEVICE — APPL CHLORAPREP HI/LITE 26ML ORNG

## (undated) DEVICE — DRSNG WND GZ CURAD OIL EMULSION 3X3IN STRL

## (undated) DEVICE — DISPOSABLE TOURNIQUET CUFF SINGLE BLADDER, SINGLE PORT AND QUICK CONNECT CONNECTOR: Brand: COLOR CUFF

## (undated) DEVICE — STANDARD HYPODERMIC NEEDLE,POLYPROPYLENE HUB: Brand: MONOJECT

## (undated) DEVICE — ELECTRD BLD EDGE/STD 1P 2.4X6.35MM STRL

## (undated) DEVICE — PENCL E/S SMOKEEVAC W/TELESCP CANN

## (undated) DEVICE — STERILE POLYISOPRENE POWDER-FREE SURGICAL GLOVES: Brand: PROTEXIS

## (undated) DEVICE — BNDG ESMARK 4IN 12FT LF STRL BLU

## (undated) DEVICE — CUFF TOURNI 1BLADDER 1PRT 24IN STRL

## (undated) DEVICE — BNDG ELAS ECON W/CLIP 6IN 5YD LF STRL

## (undated) DEVICE — GLV SURG SENSICARE PI ORTHO SZ9 LF STRL

## (undated) DEVICE — GLV SURG SENSICARE PI ORTHO SZ7.5 LF STRL

## (undated) DEVICE — GLV SURG SENSICARE SLT PF LF 6 STRL

## (undated) DEVICE — KNEE ARTHROSCOPY-LF: Brand: MEDLINE INDUSTRIES, INC.

## (undated) DEVICE — DRSNG GZ PETROLTM XEROFORM CURAD 1X8IN STRL

## (undated) DEVICE — GOWN,REINFORCE,POLY,SIRUS,BREATH SLV,XLG: Brand: MEDLINE

## (undated) DEVICE — GAUZE,SPONGE,4"X4",16PLY,STRL,LF,10/TRAY: Brand: MEDLINE

## (undated) DEVICE — GLV SURG SENSICARE PI ORTHO SZ8 LF STRL

## (undated) DEVICE — STERILE POLYISOPRENE POWDER-FREE SURGICAL GLOVES WITH EMOLLIENT COATING: Brand: PROTEXIS

## (undated) DEVICE — BLD CUT FORMLA AGGR 3.5MM

## (undated) DEVICE — SUT ETHLN 3-0 FS118IN 663H

## (undated) DEVICE — DRSNG PAD ABD 8X10IN STRL

## (undated) DEVICE — DRSNG WND GZ CURAD OIL EMULSION 3X8IN LF STRL 1PK

## (undated) DEVICE — DRESSING,GAUZE,XEROFORM,CURAD,1"X8",ST: Brand: CURAD

## (undated) DEVICE — SOL IRR NACL 0.9PCT BT 1000ML

## (undated) DEVICE — INTEGRATED CASSETTE TUBING, DO NOT USE IF PACKAGE IS DAMAGED: Brand: CROSSFLOW

## (undated) DEVICE — 90-S CRUISE, SUCTION PROBE, NON-BENDABLE, MAX CUT LEVEL 1: Brand: SERFAS ENERGY

## (undated) DEVICE — SOL IRR NACL 0.9PCT 3000ML

## (undated) DEVICE — GOWN,REINF,POLY,SIRUS,BRTH SLV,XLNG/XXL: Brand: MEDLINE